# Patient Record
Sex: MALE | Race: BLACK OR AFRICAN AMERICAN | Employment: OTHER
[De-identification: names, ages, dates, MRNs, and addresses within clinical notes are randomized per-mention and may not be internally consistent; named-entity substitution may affect disease eponyms.]

---

## 2017-01-25 ENCOUNTER — OFFICE VISIT (OUTPATIENT)
Dept: INTERNAL MEDICINE | Facility: CLINIC | Age: 52
End: 2017-01-25

## 2017-01-25 VITALS
WEIGHT: 268.4 LBS | BODY MASS INDEX: 44.72 KG/M2 | HEART RATE: 84 BPM | DIASTOLIC BLOOD PRESSURE: 90 MMHG | SYSTOLIC BLOOD PRESSURE: 140 MMHG | TEMPERATURE: 98.1 F | HEIGHT: 65 IN | OXYGEN SATURATION: 98 %

## 2017-01-25 DIAGNOSIS — M54.16 LUMBAR RADICULITIS: Chronic | ICD-10-CM

## 2017-01-25 DIAGNOSIS — I10 ESSENTIAL HYPERTENSION: ICD-10-CM

## 2017-01-25 DIAGNOSIS — E78.5 HYPERLIPIDEMIA, UNSPECIFIED HYPERLIPIDEMIA TYPE: ICD-10-CM

## 2017-01-25 DIAGNOSIS — Z12.11 SPECIAL SCREENING FOR MALIGNANT NEOPLASMS, COLON: ICD-10-CM

## 2017-01-25 DIAGNOSIS — M16.11 ARTHRITIS OF RIGHT HIP: ICD-10-CM

## 2017-01-25 DIAGNOSIS — R51.9 DAILY HEADACHE: ICD-10-CM

## 2017-01-25 DIAGNOSIS — E66.01 MORBID OBESITY WITH BMI OF 40.0-44.9, ADULT (HCC): ICD-10-CM

## 2017-01-25 DIAGNOSIS — E11.9 CONTROLLED TYPE 2 DIABETES MELLITUS WITHOUT COMPLICATION, WITHOUT LONG-TERM CURRENT USE OF INSULIN (HCC): Primary | ICD-10-CM

## 2017-01-25 PROCEDURE — G8427 DOCREV CUR MEDS BY ELIG CLIN: HCPCS | Performed by: FAMILY MEDICINE

## 2017-01-25 PROCEDURE — 4004F PT TOBACCO SCREEN RCVD TLK: CPT | Performed by: FAMILY MEDICINE

## 2017-01-25 PROCEDURE — 3017F COLORECTAL CA SCREEN DOC REV: CPT | Performed by: FAMILY MEDICINE

## 2017-01-25 PROCEDURE — G8484 FLU IMMUNIZE NO ADMIN: HCPCS | Performed by: FAMILY MEDICINE

## 2017-01-25 PROCEDURE — 3044F HG A1C LEVEL LT 7.0%: CPT | Performed by: FAMILY MEDICINE

## 2017-01-25 PROCEDURE — G8419 CALC BMI OUT NRM PARAM NOF/U: HCPCS | Performed by: FAMILY MEDICINE

## 2017-01-25 PROCEDURE — 99214 OFFICE O/P EST MOD 30 MIN: CPT | Performed by: FAMILY MEDICINE

## 2017-01-25 RX ORDER — ATORVASTATIN CALCIUM 40 MG/1
40 TABLET, FILM COATED ORAL DAILY
Qty: 30 TABLET | Refills: 3 | Status: SHIPPED | OUTPATIENT
Start: 2017-01-25 | End: 2017-11-03 | Stop reason: SDUPTHER

## 2017-01-25 RX ORDER — LISINOPRIL AND HYDROCHLOROTHIAZIDE 12.5; 1 MG/1; MG/1
1 TABLET ORAL DAILY
Qty: 30 TABLET | Refills: 3 | Status: SHIPPED | OUTPATIENT
Start: 2017-01-25 | End: 2017-03-30 | Stop reason: SDUPTHER

## 2017-01-25 ASSESSMENT — PATIENT HEALTH QUESTIONNAIRE - PHQ9
1. LITTLE INTEREST OR PLEASURE IN DOING THINGS: 0
SUM OF ALL RESPONSES TO PHQ QUESTIONS 1-9: 0
SUM OF ALL RESPONSES TO PHQ9 QUESTIONS 1 & 2: 0
2. FEELING DOWN, DEPRESSED OR HOPELESS: 0

## 2017-01-25 ASSESSMENT — ENCOUNTER SYMPTOMS
ALLERGIC/IMMUNOLOGIC NEGATIVE: 1
EYES NEGATIVE: 1
RESPIRATORY NEGATIVE: 1

## 2017-01-31 ENCOUNTER — TELEPHONE (OUTPATIENT)
Dept: INTERNAL MEDICINE | Facility: CLINIC | Age: 52
End: 2017-01-31

## 2017-01-31 ENCOUNTER — OFFICE VISIT (OUTPATIENT)
Dept: ORTHOPEDIC SURGERY | Facility: CLINIC | Age: 52
End: 2017-01-31

## 2017-01-31 DIAGNOSIS — Z12.11 SPECIAL SCREENING FOR MALIGNANT NEOPLASMS, COLON: ICD-10-CM

## 2017-01-31 DIAGNOSIS — M16.11 ARTHRITIS OF RIGHT HIP: Primary | ICD-10-CM

## 2017-01-31 DIAGNOSIS — R19.5 POSITIVE FIT (FECAL IMMUNOCHEMICAL TEST): Primary | ICD-10-CM

## 2017-01-31 DIAGNOSIS — M16.11 PRIMARY OSTEOARTHRITIS OF RIGHT HIP: Primary | ICD-10-CM

## 2017-01-31 DIAGNOSIS — M16.11 PRIMARY OSTEOARTHRITIS OF RIGHT HIP: ICD-10-CM

## 2017-01-31 LAB
CONTROL: ABNORMAL
HEMOCCULT STL QL: POSITIVE

## 2017-01-31 PROCEDURE — G8419 CALC BMI OUT NRM PARAM NOF/U: HCPCS | Performed by: ORTHOPAEDIC SURGERY

## 2017-01-31 PROCEDURE — 4004F PT TOBACCO SCREEN RCVD TLK: CPT | Performed by: ORTHOPAEDIC SURGERY

## 2017-01-31 PROCEDURE — 3017F COLORECTAL CA SCREEN DOC REV: CPT | Performed by: ORTHOPAEDIC SURGERY

## 2017-01-31 PROCEDURE — G8484 FLU IMMUNIZE NO ADMIN: HCPCS | Performed by: ORTHOPAEDIC SURGERY

## 2017-01-31 PROCEDURE — 82274 ASSAY TEST FOR BLOOD FECAL: CPT | Performed by: FAMILY MEDICINE

## 2017-01-31 PROCEDURE — 99213 OFFICE O/P EST LOW 20 MIN: CPT | Performed by: ORTHOPAEDIC SURGERY

## 2017-01-31 PROCEDURE — G8427 DOCREV CUR MEDS BY ELIG CLIN: HCPCS | Performed by: ORTHOPAEDIC SURGERY

## 2017-01-31 ASSESSMENT — HOOS JR
SITTING: 2
RISING FROM SITTING: 3
BENDING TO THE FLOOR TO PICK UP OBJECT: 3
GOING UP OR DOWN STAIRS: 3
HOOS JR RAW SCORE: 16
LYING IN BED (TURNING OVER, MAINTAINING HIP POSITION): 2
WALKING ON UNEVEN SURFACE: 3

## 2017-01-31 ASSESSMENT — PROMIS GLOBAL HEALTH SCALE
IN GENERAL, PLEASE RATE HOW WELL YOU CARRY OUT YOUR USUAL SOCIAL ACTIVITIES (INCLUDES ACTIVITIES AT HOME, AT WORK, AND IN YOUR COMMUNITY, AND RESPONSIBILITIES AS A PARENT, CHILD, SPOUSE, EMPLOYEE, FRIEND, ETC) [ON A SCALE OF 1 (POOR) TO 5 (EXCELLENT)]?: 2
WHO IS THE PERSON COMPLETING THE PROMIS V1.1 SURVEY?: 0
IN THE PAST 7 DAYS, HOW WOULD YOU RATE YOUR PAIN ON AVERAGE [ON A SCALE FROM 0 (NO PAIN) TO 10 (WORST IMAGINABLE PAIN)]?: 8
HOW IS THE PROMIS V1.1 BEING ADMINISTERED?: 0
IN THE PAST 7 DAYS, HOW WOULD YOU RATE YOUR FATIGUE ON AVERAGE [ON A SCALE FROM 1 (NONE) TO 5 (VERY SEVERE)]?: 2
IN THE PAST 7 DAYS, HOW OFTEN HAVE YOU BEEN BOTHERED BY EMOTIONAL PROBLEMS, SUCH AS FEELING ANXIOUS, DEPRESSED, OR IRRITABLE [ON A SCALE FROM 1 (NEVER) TO 5 (ALWAYS)]?: 2
SUM OF RESPONSES TO QUESTIONS 3, 6, 7, & 8: 13
IN GENERAL, HOW WOULD YOU RATE YOUR PHYSICAL HEALTH [ON A SCALE OF 1 (POOR) TO 5 (EXCELLENT)]?: 1
IN GENERAL, HOW WOULD YOU RATE YOUR SATISFACTION WITH YOUR SOCIAL ACTIVITIES AND RELATIONSHIPS [ON A SCALE OF 1 (POOR) TO 5 (EXCELLENT)]?: 2
TO WHAT EXTENT ARE YOU ABLE TO CARRY OUT YOUR EVERYDAY PHYSICAL ACTIVITIES SUCH AS WALKING, CLIMBING STAIRS, CARRYING GROCERIES, OR MOVING A CHAIR [ON A SCALE OF 1 (NOT AT ALL) TO 5 (COMPLETELY)]?: 2
IN GENERAL, WOULD YOU SAY YOUR QUALITY OF LIFE IS...[ON A SCALE OF 1 (POOR) TO 5 (EXCELLENT)]: 2
IN GENERAL, HOW WOULD YOU RATE YOUR MENTAL HEALTH, INCLUDING YOUR MOOD AND YOUR ABILITY TO THINK [ON A SCALE OF 1 (POOR) TO 5 (EXCELLENT)]?: 5
IN GENERAL, WOULD YOU SAY YOUR HEALTH IS...[ON A SCALE OF 1 (POOR) TO 5 (EXCELLENT)]: 2
SUM OF RESPONSES TO QUESTIONS 2, 4, 5, & 10: 11

## 2017-02-14 ENCOUNTER — OFFICE VISIT (OUTPATIENT)
Dept: GASTROENTEROLOGY | Facility: CLINIC | Age: 52
End: 2017-02-14

## 2017-02-14 VITALS
TEMPERATURE: 98.4 F | BODY MASS INDEX: 43.65 KG/M2 | DIASTOLIC BLOOD PRESSURE: 92 MMHG | OXYGEN SATURATION: 98 % | HEIGHT: 65 IN | SYSTOLIC BLOOD PRESSURE: 147 MMHG | HEART RATE: 78 BPM | WEIGHT: 262 LBS

## 2017-02-14 DIAGNOSIS — Z86.010 HISTORY OF COLON POLYPS: ICD-10-CM

## 2017-02-14 DIAGNOSIS — K62.5 RECTAL BLEEDING: ICD-10-CM

## 2017-02-14 DIAGNOSIS — K75.9 INFLAMMATORY LIVER DISEASE: ICD-10-CM

## 2017-02-14 DIAGNOSIS — R76.8 HCV ANTIBODY POSITIVE: ICD-10-CM

## 2017-02-14 DIAGNOSIS — R19.5 POSITIVE FIT (FECAL IMMUNOCHEMICAL TEST): Primary | ICD-10-CM

## 2017-02-14 DIAGNOSIS — K76.9 LIVER DISEASE: ICD-10-CM

## 2017-02-14 DIAGNOSIS — R79.89 ELEVATED LFTS: ICD-10-CM

## 2017-02-14 DIAGNOSIS — K64.9 HEMORRHOIDS, UNSPECIFIED HEMORRHOID TYPE: ICD-10-CM

## 2017-02-14 PROCEDURE — G8419 CALC BMI OUT NRM PARAM NOF/U: HCPCS | Performed by: INTERNAL MEDICINE

## 2017-02-14 PROCEDURE — 99204 OFFICE O/P NEW MOD 45 MIN: CPT | Performed by: INTERNAL MEDICINE

## 2017-02-14 PROCEDURE — 4004F PT TOBACCO SCREEN RCVD TLK: CPT | Performed by: INTERNAL MEDICINE

## 2017-02-14 PROCEDURE — G8484 FLU IMMUNIZE NO ADMIN: HCPCS | Performed by: INTERNAL MEDICINE

## 2017-02-14 PROCEDURE — G8427 DOCREV CUR MEDS BY ELIG CLIN: HCPCS | Performed by: INTERNAL MEDICINE

## 2017-02-14 PROCEDURE — 3017F COLORECTAL CA SCREEN DOC REV: CPT | Performed by: INTERNAL MEDICINE

## 2017-02-14 ASSESSMENT — ENCOUNTER SYMPTOMS
DIARRHEA: 0
TROUBLE SWALLOWING: 0
SORE THROAT: 0
CONSTIPATION: 0
ABDOMINAL DISTENTION: 1
NAUSEA: 0
COUGH: 0
SHORTNESS OF BREATH: 0
VOMITING: 0
BLOOD IN STOOL: 0
RECTAL PAIN: 0
BACK PAIN: 1
ABDOMINAL PAIN: 0
CHOKING: 0
ANAL BLEEDING: 1

## 2017-02-15 ENCOUNTER — HOSPITAL ENCOUNTER (OUTPATIENT)
Age: 52
Setting detail: SPECIMEN
Discharge: HOME | End: 2017-02-15
Payer: MEDICARE

## 2017-02-15 ENCOUNTER — OFFICE VISIT (OUTPATIENT)
Dept: PAIN MANAGEMENT | Facility: CLINIC | Age: 52
End: 2017-02-15

## 2017-02-15 VITALS
WEIGHT: 262 LBS | HEART RATE: 75 BPM | DIASTOLIC BLOOD PRESSURE: 100 MMHG | HEIGHT: 65 IN | BODY MASS INDEX: 43.65 KG/M2 | RESPIRATION RATE: 16 BRPM | OXYGEN SATURATION: 98 % | SYSTOLIC BLOOD PRESSURE: 155 MMHG

## 2017-02-15 DIAGNOSIS — K75.9 INFLAMMATORY LIVER DISEASE: ICD-10-CM

## 2017-02-15 DIAGNOSIS — M16.11 ARTHRITIS OF RIGHT HIP: ICD-10-CM

## 2017-02-15 DIAGNOSIS — R76.8 HCV ANTIBODY POSITIVE: ICD-10-CM

## 2017-02-15 DIAGNOSIS — R79.89 ELEVATED LFTS: ICD-10-CM

## 2017-02-15 DIAGNOSIS — K76.9 LIVER DISEASE: ICD-10-CM

## 2017-02-15 DIAGNOSIS — M54.16 CHRONIC LUMBAR RADICULOPATHY: ICD-10-CM

## 2017-02-15 DIAGNOSIS — M48.061 LUMBAR STENOSIS: Primary | ICD-10-CM

## 2017-02-15 DIAGNOSIS — R19.5 POSITIVE FIT (FECAL IMMUNOCHEMICAL TEST): ICD-10-CM

## 2017-02-15 DIAGNOSIS — Z86.010 HISTORY OF COLON POLYPS: ICD-10-CM

## 2017-02-15 LAB
FERRITIN: 151 UG/L (ref 30–400)
HAV AB SERPL IA-ACNC: REACTIVE
HEPATITIS B CORE TOTAL ANTIBODY: REACTIVE
IGG: 1303 MG/DL (ref 700–1600)
IGM: 137 MG/DL (ref 40–230)
IRON SATURATION: 22 % (ref 20–55)
IRON: 79 UG/DL (ref 59–158)
TOTAL IRON BINDING CAPACITY: 358 UG/DL (ref 250–450)
UNSATURATED IRON BINDING CAPACITY: 279 UG/DL (ref 112–347)

## 2017-02-15 PROCEDURE — G8417 CALC BMI ABV UP PARAM F/U: HCPCS | Performed by: ANESTHESIOLOGY

## 2017-02-15 PROCEDURE — 99214 OFFICE O/P EST MOD 30 MIN: CPT | Performed by: ANESTHESIOLOGY

## 2017-02-15 PROCEDURE — 3017F COLORECTAL CA SCREEN DOC REV: CPT | Performed by: ANESTHESIOLOGY

## 2017-02-15 PROCEDURE — G8484 FLU IMMUNIZE NO ADMIN: HCPCS | Performed by: ANESTHESIOLOGY

## 2017-02-15 PROCEDURE — G8427 DOCREV CUR MEDS BY ELIG CLIN: HCPCS | Performed by: ANESTHESIOLOGY

## 2017-02-15 PROCEDURE — 4004F PT TOBACCO SCREEN RCVD TLK: CPT | Performed by: ANESTHESIOLOGY

## 2017-02-15 RX ORDER — OXYCODONE HYDROCHLORIDE AND ACETAMINOPHEN 5; 325 MG/1; MG/1
1 TABLET ORAL DAILY
Qty: 30 TABLET | Refills: 0 | Status: SHIPPED | OUTPATIENT
Start: 2017-02-15 | End: 2017-03-16 | Stop reason: SDUPTHER

## 2017-02-15 ASSESSMENT — ENCOUNTER SYMPTOMS: BACK PAIN: 1

## 2017-02-16 LAB
ANTI-NUCLEAR ANTIBODY (ANA): NEGATIVE
SMOOTH MUSCLE ANTIBODY: NORMAL

## 2017-02-17 LAB — MITOCHONDRIAL ANTIBODY: 8.8 UNITS (ref 0–20)

## 2017-02-20 LAB
DIRECT EXAM: ABNORMAL
HCV QUANTITATIVE: NORMAL
HEPATITIS C GENOTYPE: NORMAL
Lab: ABNORMAL
SPECIMEN DESCRIPTION: ABNORMAL
STATUS: ABNORMAL

## 2017-03-03 ENCOUNTER — INITIAL CONSULT (OUTPATIENT)
Dept: NEUROSURGERY | Facility: CLINIC | Age: 52
End: 2017-03-03

## 2017-03-03 VITALS
HEIGHT: 65 IN | HEART RATE: 88 BPM | WEIGHT: 259 LBS | DIASTOLIC BLOOD PRESSURE: 79 MMHG | BODY MASS INDEX: 43.15 KG/M2 | SYSTOLIC BLOOD PRESSURE: 139 MMHG

## 2017-03-03 DIAGNOSIS — T14.8XXA NERVE INJURY: Primary | ICD-10-CM

## 2017-03-03 PROCEDURE — 3017F COLORECTAL CA SCREEN DOC REV: CPT | Performed by: NEUROLOGICAL SURGERY

## 2017-03-03 PROCEDURE — 4004F PT TOBACCO SCREEN RCVD TLK: CPT | Performed by: NEUROLOGICAL SURGERY

## 2017-03-03 PROCEDURE — G8427 DOCREV CUR MEDS BY ELIG CLIN: HCPCS | Performed by: NEUROLOGICAL SURGERY

## 2017-03-03 PROCEDURE — 99203 OFFICE O/P NEW LOW 30 MIN: CPT | Performed by: NEUROLOGICAL SURGERY

## 2017-03-03 PROCEDURE — G8417 CALC BMI ABV UP PARAM F/U: HCPCS | Performed by: NEUROLOGICAL SURGERY

## 2017-03-03 PROCEDURE — G8484 FLU IMMUNIZE NO ADMIN: HCPCS | Performed by: NEUROLOGICAL SURGERY

## 2017-03-17 RX ORDER — OXYCODONE HYDROCHLORIDE AND ACETAMINOPHEN 5; 325 MG/1; MG/1
1 TABLET ORAL DAILY
Qty: 30 TABLET | Refills: 0 | Status: SHIPPED | OUTPATIENT
Start: 2017-03-17 | End: 2017-04-12 | Stop reason: SDUPTHER

## 2017-03-24 ENCOUNTER — OFFICE VISIT (OUTPATIENT)
Dept: GASTROENTEROLOGY | Age: 52
End: 2017-03-24
Payer: COMMERCIAL

## 2017-03-24 VITALS
HEART RATE: 91 BPM | OXYGEN SATURATION: 97 % | BODY MASS INDEX: 43.82 KG/M2 | DIASTOLIC BLOOD PRESSURE: 79 MMHG | TEMPERATURE: 97.3 F | SYSTOLIC BLOOD PRESSURE: 145 MMHG | HEIGHT: 65 IN | WEIGHT: 263 LBS

## 2017-03-24 DIAGNOSIS — R19.5 POSITIVE FIT (FECAL IMMUNOCHEMICAL TEST): ICD-10-CM

## 2017-03-24 DIAGNOSIS — B18.2 CHRONIC HEPATITIS C WITH HEPATIC COMA (HCC): ICD-10-CM

## 2017-03-24 DIAGNOSIS — K64.9 HEMORRHOIDS, UNSPECIFIED HEMORRHOID TYPE: ICD-10-CM

## 2017-03-24 PROCEDURE — 99203 OFFICE O/P NEW LOW 30 MIN: CPT | Performed by: INTERNAL MEDICINE

## 2017-03-24 ASSESSMENT — ENCOUNTER SYMPTOMS
DIARRHEA: 0
SORE THROAT: 0
VOMITING: 0
RECTAL PAIN: 0
BLOOD IN STOOL: 0
SHORTNESS OF BREATH: 0
TROUBLE SWALLOWING: 0
ABDOMINAL DISTENTION: 1
ABDOMINAL PAIN: 0
CONSTIPATION: 0
BACK PAIN: 1
ANAL BLEEDING: 1
COUGH: 0
NAUSEA: 0
CHOKING: 0

## 2017-03-27 ENCOUNTER — TELEPHONE (OUTPATIENT)
Dept: GASTROENTEROLOGY | Age: 52
End: 2017-03-27

## 2017-03-27 DIAGNOSIS — B18.2 CHRONIC HEPATITIS C WITH HEPATIC COMA (HCC): Primary | ICD-10-CM

## 2017-03-30 ENCOUNTER — HOSPITAL ENCOUNTER (OUTPATIENT)
Age: 52
Setting detail: SPECIMEN
Discharge: HOME | End: 2017-03-30
Payer: COMMERCIAL

## 2017-03-30 DIAGNOSIS — B18.2 CHRONIC HEPATITIS C WITH HEPATIC COMA (HCC): ICD-10-CM

## 2017-03-30 LAB
ABSOLUTE EOS #: 0.2 K/UL (ref 0–0.4)
ABSOLUTE LYMPH #: 4 K/UL (ref 1–4.8)
ABSOLUTE MONO #: 0.6 K/UL (ref 0.1–1.2)
AFP: 7.2 UG/L
ALBUMIN SERPL-MCNC: 3.9 G/DL (ref 3.5–5.2)
ALBUMIN/GLOBULIN RATIO: 1.1 (ref 1–2.5)
ALP BLD-CCNC: 64 U/L (ref 40–129)
ALT SERPL-CCNC: 58 U/L (ref 5–41)
ANION GAP SERPL CALCULATED.3IONS-SCNC: 13 MMOL/L (ref 9–17)
AST SERPL-CCNC: 35 U/L
BASOPHILS # BLD: 1 % (ref 0–2)
BASOPHILS ABSOLUTE: 0.1 K/UL (ref 0–0.2)
BILIRUB SERPL-MCNC: 0.18 MG/DL (ref 0.3–1.2)
BILIRUBIN DIRECT: 0.11 MG/DL
BILIRUBIN, INDIRECT: 0.07 MG/DL (ref 0–1)
BUN BLDV-MCNC: 14 MG/DL (ref 6–20)
CALCIUM SERPL-MCNC: 9.3 MG/DL (ref 8.6–10.4)
CHLORIDE BLD-SCNC: 99 MMOL/L (ref 98–107)
CO2: 21 MMOL/L (ref 20–31)
CREAT SERPL-MCNC: 0.9 MG/DL (ref 0.7–1.2)
DIFFERENTIAL TYPE: NORMAL
EOSINOPHILS RELATIVE PERCENT: 2 % (ref 1–4)
GFR AFRICAN AMERICAN: >60 ML/MIN
GFR NON-AFRICAN AMERICAN: >60 ML/MIN
GFR SERPL CREATININE-BSD FRML MDRD: ABNORMAL ML/MIN/{1.73_M2}
GFR SERPL CREATININE-BSD FRML MDRD: ABNORMAL ML/MIN/{1.73_M2}
GLUCOSE BLD-MCNC: 109 MG/DL (ref 70–99)
HCT VFR BLD CALC: 42.1 % (ref 41–53)
HEMOGLOBIN: 14.2 G/DL (ref 13.5–17.5)
LYMPHOCYTES # BLD: 44 % (ref 24–44)
MCH RBC QN AUTO: 30.2 PG (ref 26–34)
MCHC RBC AUTO-ENTMCNC: 33.8 G/DL (ref 31–37)
MCV RBC AUTO: 89.2 FL (ref 80–100)
MONOCYTES # BLD: 6 % (ref 2–11)
PDW BLD-RTO: 14.7 % (ref 12.5–15.4)
PLATELET # BLD: 203 K/UL (ref 140–450)
PLATELET ESTIMATE: NORMAL
PMV BLD AUTO: 8.9 FL (ref 6–12)
POTASSIUM SERPL-SCNC: 4 MMOL/L (ref 3.7–5.3)
RBC # BLD: 4.72 M/UL (ref 4.5–5.9)
RBC # BLD: NORMAL 10*6/UL
SEG NEUTROPHILS: 47 % (ref 36–66)
SEGMENTED NEUTROPHILS ABSOLUTE COUNT: 4.3 K/UL (ref 1.8–7.7)
SODIUM BLD-SCNC: 133 MMOL/L (ref 135–144)
TOTAL PROTEIN: 7.3 G/DL (ref 6.4–8.3)
WBC # BLD: 9.1 K/UL (ref 3.5–11)
WBC # BLD: NORMAL 10*3/UL

## 2017-04-03 LAB
ALANINE AMINOTRANSFERASE, FIBROMETER: 63 U/L (ref 5–50)
ALPHA-2-MACROGLOBULIN, FIBROMETER: 269 MG/DL (ref 131–293)
ASPARTATE AMINOTRANSFERASE, FIBROMETER: 37 U/L (ref 9–50)
CIRRHOMETER PATIENT SCORE: 0.02
EER FIBROMETER REPORT: ABNORMAL
FIBROMETER INTERPRETATION: ABNORMAL
FIBROMETER PATIENT SCORE: 0.48
FIBROMETER PLATELET COUNT: 206
FIBROMETER PROTHROMBIN INDEX: 110 % (ref 90–120)
FIBROSIS METAVIR CLASSIFICATION: ABNORMAL
GAMMA GLUTAMYL TRANSFERASE, FIBROMETER: 134 U/L (ref 7–51)
INFLAMETER METAVIR CLASSIFICATION: ABNORMAL
INFLAMETER PATIENT SCORE: 0.38
UREA NITROGEN, FIBROMETER: 14 MG/DL (ref 7–20)

## 2017-04-12 ENCOUNTER — OFFICE VISIT (OUTPATIENT)
Dept: PAIN MANAGEMENT | Age: 52
End: 2017-04-12
Payer: COMMERCIAL

## 2017-04-12 VITALS
WEIGHT: 259.6 LBS | DIASTOLIC BLOOD PRESSURE: 78 MMHG | HEART RATE: 83 BPM | HEIGHT: 65 IN | SYSTOLIC BLOOD PRESSURE: 128 MMHG | RESPIRATION RATE: 16 BRPM | BODY MASS INDEX: 43.25 KG/M2 | OXYGEN SATURATION: 95 %

## 2017-04-12 DIAGNOSIS — M54.16 LUMBAR RADICULOPATHY, CHRONIC: Primary | ICD-10-CM

## 2017-04-12 DIAGNOSIS — M16.11 ARTHRITIS OF RIGHT HIP: ICD-10-CM

## 2017-04-12 DIAGNOSIS — M48.061 LUMBAR STENOSIS: ICD-10-CM

## 2017-04-12 PROCEDURE — 99215 OFFICE O/P EST HI 40 MIN: CPT | Performed by: ANESTHESIOLOGY

## 2017-04-12 RX ORDER — OXYCODONE HYDROCHLORIDE AND ACETAMINOPHEN 5; 325 MG/1; MG/1
1 TABLET ORAL 2 TIMES DAILY PRN
Qty: 60 TABLET | Refills: 0 | Status: SHIPPED | OUTPATIENT
Start: 2017-04-12 | End: 2017-05-10 | Stop reason: SDUPTHER

## 2017-04-12 ASSESSMENT — ENCOUNTER SYMPTOMS: BACK PAIN: 1

## 2017-04-25 ENCOUNTER — TELEPHONE (OUTPATIENT)
Dept: GASTROENTEROLOGY | Age: 52
End: 2017-04-25

## 2017-05-10 RX ORDER — OXYCODONE HYDROCHLORIDE AND ACETAMINOPHEN 5; 325 MG/1; MG/1
1 TABLET ORAL 2 TIMES DAILY PRN
Qty: 60 TABLET | Refills: 0 | Status: SHIPPED | OUTPATIENT
Start: 2017-05-10 | End: 2017-06-05 | Stop reason: SDUPTHER

## 2017-06-01 ENCOUNTER — OFFICE VISIT (OUTPATIENT)
Dept: INTERNAL MEDICINE CLINIC | Age: 52
End: 2017-06-01
Payer: COMMERCIAL

## 2017-06-01 VITALS
HEART RATE: 80 BPM | TEMPERATURE: 98 F | OXYGEN SATURATION: 98 % | RESPIRATION RATE: 20 BRPM | HEIGHT: 65 IN | DIASTOLIC BLOOD PRESSURE: 80 MMHG | WEIGHT: 260 LBS | BODY MASS INDEX: 43.32 KG/M2 | SYSTOLIC BLOOD PRESSURE: 124 MMHG

## 2017-06-01 DIAGNOSIS — R51.9 DAILY HEADACHE: ICD-10-CM

## 2017-06-01 DIAGNOSIS — M51.26 LUMBAR DISCOGENIC PAIN SYNDROME: ICD-10-CM

## 2017-06-01 DIAGNOSIS — E11.21 CONTROLLED TYPE 2 DIABETES MELLITUS WITH DIABETIC NEPHROPATHY, WITHOUT LONG-TERM CURRENT USE OF INSULIN (HCC): Primary | ICD-10-CM

## 2017-06-01 DIAGNOSIS — E08.21 DIABETIC NEPHROPATHY ASSOCIATED WITH DIABETES MELLITUS DUE TO UNDERLYING CONDITION (HCC): ICD-10-CM

## 2017-06-01 DIAGNOSIS — E08.42 DIABETIC POLYNEUROPATHY ASSOCIATED WITH DIABETES MELLITUS DUE TO UNDERLYING CONDITION (HCC): ICD-10-CM

## 2017-06-01 DIAGNOSIS — F11.20 OPIATE DEPENDENCE, CONTINUOUS (HCC): ICD-10-CM

## 2017-06-01 DIAGNOSIS — B18.2 CHRONIC HEPATITIS C WITH HEPATIC COMA (HCC): ICD-10-CM

## 2017-06-01 LAB — HBA1C MFR BLD: 6 %

## 2017-06-01 PROCEDURE — 83036 HEMOGLOBIN GLYCOSYLATED A1C: CPT | Performed by: FAMILY MEDICINE

## 2017-06-01 PROCEDURE — 99214 OFFICE O/P EST MOD 30 MIN: CPT | Performed by: FAMILY MEDICINE

## 2017-06-01 ASSESSMENT — ENCOUNTER SYMPTOMS
EYES NEGATIVE: 1
ALLERGIC/IMMUNOLOGIC NEGATIVE: 1
RESPIRATORY NEGATIVE: 1

## 2017-06-05 RX ORDER — OXYCODONE HYDROCHLORIDE AND ACETAMINOPHEN 5; 325 MG/1; MG/1
1 TABLET ORAL 2 TIMES DAILY PRN
Qty: 60 TABLET | Refills: 0 | Status: SHIPPED | OUTPATIENT
Start: 2017-06-05 | End: 2017-07-12 | Stop reason: SDUPTHER

## 2017-06-30 RX ORDER — LISINOPRIL AND HYDROCHLOROTHIAZIDE 12.5; 1 MG/1; MG/1
TABLET ORAL
Qty: 90 TABLET | Refills: 0 | Status: SHIPPED | OUTPATIENT
Start: 2017-06-30 | End: 2017-09-27 | Stop reason: SDUPTHER

## 2017-06-30 RX ORDER — OXYCODONE HYDROCHLORIDE AND ACETAMINOPHEN 5; 325 MG/1; MG/1
1 TABLET ORAL 2 TIMES DAILY PRN
Qty: 60 TABLET | Refills: 0 | Status: CANCELLED | OUTPATIENT
Start: 2017-06-30 | End: 2017-07-30

## 2017-07-07 ENCOUNTER — TELEPHONE (OUTPATIENT)
Dept: PAIN MANAGEMENT | Age: 52
End: 2017-07-07

## 2017-07-12 ENCOUNTER — OFFICE VISIT (OUTPATIENT)
Dept: PAIN MANAGEMENT | Age: 52
End: 2017-07-12
Payer: COMMERCIAL

## 2017-07-12 VITALS
BODY MASS INDEX: 42.92 KG/M2 | HEART RATE: 83 BPM | DIASTOLIC BLOOD PRESSURE: 95 MMHG | SYSTOLIC BLOOD PRESSURE: 143 MMHG | TEMPERATURE: 97 F | OXYGEN SATURATION: 96 % | WEIGHT: 257.6 LBS | RESPIRATION RATE: 20 BRPM | HEIGHT: 65 IN

## 2017-07-12 DIAGNOSIS — M54.16 CHRONIC LUMBAR RADICULOPATHY: ICD-10-CM

## 2017-07-12 DIAGNOSIS — M54.16 LUMBAR RADICULITIS: Primary | Chronic | ICD-10-CM

## 2017-07-12 DIAGNOSIS — M48.061 LUMBAR STENOSIS: ICD-10-CM

## 2017-07-12 PROCEDURE — 99214 OFFICE O/P EST MOD 30 MIN: CPT | Performed by: ANESTHESIOLOGY

## 2017-07-12 RX ORDER — OXYCODONE HYDROCHLORIDE AND ACETAMINOPHEN 5; 325 MG/1; MG/1
1 TABLET ORAL 2 TIMES DAILY PRN
Qty: 60 TABLET | Refills: 0 | Status: SHIPPED | OUTPATIENT
Start: 2017-07-12 | End: 2017-09-06 | Stop reason: SDUPTHER

## 2017-07-12 RX ORDER — OXYCODONE HYDROCHLORIDE AND ACETAMINOPHEN 5; 325 MG/1; MG/1
1 TABLET ORAL 2 TIMES DAILY PRN
Qty: 60 TABLET | Refills: 0 | Status: SHIPPED | OUTPATIENT
Start: 2017-07-12 | End: 2017-07-12 | Stop reason: SDUPTHER

## 2017-07-12 ASSESSMENT — ENCOUNTER SYMPTOMS
RESPIRATORY NEGATIVE: 1
ALLERGIC/IMMUNOLOGIC NEGATIVE: 1
BACK PAIN: 1
GASTROINTESTINAL NEGATIVE: 1

## 2017-08-01 ENCOUNTER — HOSPITAL ENCOUNTER (EMERGENCY)
Age: 52
Discharge: HOME | End: 2017-08-01
Attending: EMERGENCY MEDICINE
Payer: COMMERCIAL

## 2017-08-01 VITALS
OXYGEN SATURATION: 97 % | HEART RATE: 97 BPM | SYSTOLIC BLOOD PRESSURE: 156 MMHG | HEIGHT: 65 IN | RESPIRATION RATE: 17 BRPM | BODY MASS INDEX: 42.89 KG/M2 | WEIGHT: 257.44 LBS | TEMPERATURE: 98.7 F | DIASTOLIC BLOOD PRESSURE: 72 MMHG

## 2017-08-01 DIAGNOSIS — M54.42 ACUTE LEFT-SIDED LOW BACK PAIN WITH LEFT-SIDED SCIATICA: ICD-10-CM

## 2017-08-01 DIAGNOSIS — M54.2 NECK PAIN: Primary | ICD-10-CM

## 2017-08-01 PROCEDURE — 99283 EMERGENCY DEPT VISIT LOW MDM: CPT

## 2017-08-01 PROCEDURE — 6370000000 HC RX 637 (ALT 250 FOR IP): Performed by: EMERGENCY MEDICINE

## 2017-08-01 RX ORDER — DIAZEPAM 5 MG/1
5 TABLET ORAL ONCE
Status: COMPLETED | OUTPATIENT
Start: 2017-08-01 | End: 2017-08-01

## 2017-08-01 RX ORDER — CYCLOBENZAPRINE HCL 10 MG
10 TABLET ORAL 3 TIMES DAILY PRN
Qty: 15 TABLET | Refills: 0 | Status: SHIPPED | OUTPATIENT
Start: 2017-08-01 | End: 2017-08-11

## 2017-08-01 RX ORDER — IBUPROFEN 800 MG/1
800 TABLET ORAL EVERY 8 HOURS PRN
Qty: 30 TABLET | Refills: 0 | Status: SHIPPED | OUTPATIENT
Start: 2017-08-01 | End: 2018-10-29 | Stop reason: RX

## 2017-08-01 RX ADMIN — DIAZEPAM 5 MG: 5 TABLET ORAL at 20:11

## 2017-08-01 ASSESSMENT — ENCOUNTER SYMPTOMS
NAUSEA: 0
TROUBLE SWALLOWING: 0
PHOTOPHOBIA: 0
BLOOD IN STOOL: 0
BACK PAIN: 1
ABDOMINAL PAIN: 0
CONSTIPATION: 0
SORE THROAT: 0
SHORTNESS OF BREATH: 0
RHINORRHEA: 0
DIARRHEA: 0
SINUS PRESSURE: 0
VOMITING: 0
COUGH: 0

## 2017-08-01 ASSESSMENT — PAIN DESCRIPTION - PAIN TYPE: TYPE: ACUTE PAIN

## 2017-08-01 ASSESSMENT — PAIN SCALES - GENERAL: PAINLEVEL_OUTOF10: 8

## 2017-08-01 ASSESSMENT — PAIN DESCRIPTION - LOCATION: LOCATION: NECK;BACK

## 2017-08-01 ASSESSMENT — PAIN DESCRIPTION - DESCRIPTORS: DESCRIPTORS: THROBBING;SHARP

## 2017-08-04 ENCOUNTER — OFFICE VISIT (OUTPATIENT)
Dept: INTERNAL MEDICINE CLINIC | Age: 52
End: 2017-08-04
Payer: OTHER MISCELLANEOUS

## 2017-08-04 VITALS
SYSTOLIC BLOOD PRESSURE: 128 MMHG | HEIGHT: 65 IN | BODY MASS INDEX: 43.42 KG/M2 | DIASTOLIC BLOOD PRESSURE: 80 MMHG | TEMPERATURE: 98 F | OXYGEN SATURATION: 97 % | HEART RATE: 80 BPM | WEIGHT: 260.6 LBS | RESPIRATION RATE: 20 BRPM

## 2017-08-04 DIAGNOSIS — B18.2 CHRONIC HEPATITIS C WITHOUT HEPATIC COMA (HCC): ICD-10-CM

## 2017-08-04 DIAGNOSIS — R07.89 ANTERIOR CHEST WALL PAIN: ICD-10-CM

## 2017-08-04 DIAGNOSIS — M79.18 MUSCULOSKELETAL PAIN: ICD-10-CM
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
## 2017-08-04 DIAGNOSIS — Z79.891 USE OF OPIATES FOR THERAPEUTIC PURPOSES: ICD-10-CM

## 2017-08-04 DIAGNOSIS — R73.01 IFG (IMPAIRED FASTING GLUCOSE): ICD-10-CM

## 2017-08-04 DIAGNOSIS — I10 ESSENTIAL HYPERTENSION: ICD-10-CM

## 2017-08-04 DIAGNOSIS — E88.81 METABOLIC SYNDROME: ICD-10-CM

## 2017-08-04 DIAGNOSIS — M47.816 OSTEOARTHRITIS OF LUMBAR SPINE, UNSPECIFIED SPINAL OSTEOARTHRITIS COMPLICATION STATUS: ICD-10-CM

## 2017-08-04 DIAGNOSIS — E78.2 MIXED HYPERLIPIDEMIA: ICD-10-CM

## 2017-08-04 PROCEDURE — 99214 OFFICE O/P EST MOD 30 MIN: CPT | Performed by: FAMILY MEDICINE

## 2017-08-04 ASSESSMENT — ENCOUNTER SYMPTOMS
ALLERGIC/IMMUNOLOGIC NEGATIVE: 1
RESPIRATORY NEGATIVE: 1
EYES NEGATIVE: 1

## 2017-08-08 ENCOUNTER — HOSPITAL ENCOUNTER (OUTPATIENT)
Dept: PHYSICAL THERAPY | Facility: CLINIC | Age: 52
Setting detail: THERAPIES SERIES
Discharge: HOME | End: 2017-08-08
Payer: OTHER MISCELLANEOUS

## 2017-08-08 PROCEDURE — 97162 PT EVAL MOD COMPLEX 30 MIN: CPT

## 2017-08-08 PROCEDURE — G8981 BODY POS CURRENT STATUS: HCPCS

## 2017-08-08 PROCEDURE — G0283 ELEC STIM OTHER THAN WOUND: HCPCS

## 2017-08-08 PROCEDURE — G8982 BODY POS GOAL STATUS: HCPCS

## 2017-08-10 ENCOUNTER — HOSPITAL ENCOUNTER (OUTPATIENT)
Age: 52
Setting detail: SPECIMEN
Discharge: HOME | End: 2017-08-10
Payer: OTHER MISCELLANEOUS

## 2017-08-10 DIAGNOSIS — M79.18 MUSCULOSKELETAL PAIN: ICD-10-CM

## 2017-08-10 DIAGNOSIS — M47.816 OSTEOARTHRITIS OF LUMBAR SPINE, UNSPECIFIED SPINAL OSTEOARTHRITIS COMPLICATION STATUS: ICD-10-CM

## 2017-08-10 DIAGNOSIS — I10 ESSENTIAL HYPERTENSION: ICD-10-CM

## 2017-08-10 LAB
CHOLESTEROL/HDL RATIO: 5.8
CHOLESTEROL: 245 MG/DL
CREATININE URINE: 23.9 MG/DL (ref 39–259)
HDLC SERPL-MCNC: 42 MG/DL
LDL CHOLESTEROL: 175 MG/DL (ref 0–130)
MICROALBUMIN/CREAT 24H UR: <12 MG/L
MICROALBUMIN/CREAT UR-RTO: 50 MCG/MG CREAT
TRIGL SERPL-MCNC: 140 MG/DL
TSH SERPL DL<=0.05 MIU/L-ACNC: 1.32 MIU/L (ref 0.3–5)
VLDLC SERPL CALC-MCNC: ABNORMAL MG/DL (ref 1–30)

## 2017-08-11 ENCOUNTER — HOSPITAL ENCOUNTER (OUTPATIENT)
Dept: PHYSICAL THERAPY | Facility: CLINIC | Age: 52
Setting detail: THERAPIES SERIES
Discharge: HOME | End: 2017-08-11
Payer: OTHER MISCELLANEOUS

## 2017-08-11 PROCEDURE — 97110 THERAPEUTIC EXERCISES: CPT

## 2017-08-11 PROCEDURE — G0283 ELEC STIM OTHER THAN WOUND: HCPCS

## 2017-08-15 ENCOUNTER — HOSPITAL ENCOUNTER (OUTPATIENT)
Dept: PHYSICAL THERAPY | Facility: CLINIC | Age: 52
Setting detail: THERAPIES SERIES
Discharge: HOME | End: 2017-08-15
Payer: OTHER MISCELLANEOUS

## 2017-08-15 PROCEDURE — G0283 ELEC STIM OTHER THAN WOUND: HCPCS

## 2017-08-15 PROCEDURE — 97110 THERAPEUTIC EXERCISES: CPT

## 2017-08-17 ENCOUNTER — HOSPITAL ENCOUNTER (OUTPATIENT)
Dept: PHYSICAL THERAPY | Facility: CLINIC | Age: 52
Setting detail: THERAPIES SERIES
Discharge: HOME | End: 2017-08-17
Payer: OTHER MISCELLANEOUS

## 2017-08-17 PROCEDURE — G0283 ELEC STIM OTHER THAN WOUND: HCPCS

## 2017-08-17 PROCEDURE — 97110 THERAPEUTIC EXERCISES: CPT

## 2017-08-22 ENCOUNTER — HOSPITAL ENCOUNTER (OUTPATIENT)
Dept: PHYSICAL THERAPY | Facility: CLINIC | Age: 52
Setting detail: THERAPIES SERIES
Discharge: HOME | End: 2017-08-22
Payer: OTHER MISCELLANEOUS

## 2017-08-22 PROCEDURE — G0283 ELEC STIM OTHER THAN WOUND: HCPCS

## 2017-08-24 ENCOUNTER — HOSPITAL ENCOUNTER (OUTPATIENT)
Dept: PHYSICAL THERAPY | Facility: CLINIC | Age: 52
Setting detail: THERAPIES SERIES
Discharge: HOME | End: 2017-08-24
Payer: OTHER MISCELLANEOUS

## 2017-08-24 PROCEDURE — G0283 ELEC STIM OTHER THAN WOUND: HCPCS

## 2017-08-24 PROCEDURE — 97110 THERAPEUTIC EXERCISES: CPT

## 2017-08-29 ENCOUNTER — HOSPITAL ENCOUNTER (OUTPATIENT)
Dept: PHYSICAL THERAPY | Facility: CLINIC | Age: 52
Setting detail: THERAPIES SERIES
Discharge: HOME | End: 2017-08-29
Payer: OTHER MISCELLANEOUS

## 2017-08-29 PROCEDURE — G0283 ELEC STIM OTHER THAN WOUND: HCPCS

## 2017-08-31 ENCOUNTER — HOSPITAL ENCOUNTER (OUTPATIENT)
Dept: PHYSICAL THERAPY | Facility: CLINIC | Age: 52
Setting detail: THERAPIES SERIES
Discharge: HOME | End: 2017-08-31
Payer: OTHER MISCELLANEOUS

## 2017-08-31 PROCEDURE — 97110 THERAPEUTIC EXERCISES: CPT

## 2017-08-31 PROCEDURE — G0283 ELEC STIM OTHER THAN WOUND: HCPCS

## 2017-09-05 ENCOUNTER — HOSPITAL ENCOUNTER (OUTPATIENT)
Dept: PHYSICAL THERAPY | Facility: CLINIC | Age: 52
Setting detail: THERAPIES SERIES
Discharge: HOME | End: 2017-09-05

## 2017-09-05 PROCEDURE — G0283 ELEC STIM OTHER THAN WOUND: HCPCS

## 2017-09-05 PROCEDURE — 97110 THERAPEUTIC EXERCISES: CPT

## 2017-09-06 ENCOUNTER — OFFICE VISIT (OUTPATIENT)
Dept: PAIN MANAGEMENT | Age: 52
End: 2017-09-06
Payer: COMMERCIAL

## 2017-09-06 VITALS
DIASTOLIC BLOOD PRESSURE: 88 MMHG | OXYGEN SATURATION: 95 % | HEART RATE: 77 BPM | BODY MASS INDEX: 43.15 KG/M2 | WEIGHT: 259 LBS | HEIGHT: 65 IN | SYSTOLIC BLOOD PRESSURE: 131 MMHG | RESPIRATION RATE: 16 BRPM

## 2017-09-06 DIAGNOSIS — M47.816 SPONDYLOSIS OF LUMBAR REGION WITHOUT MYELOPATHY OR RADICULOPATHY: Primary | ICD-10-CM

## 2017-09-06 DIAGNOSIS — M48.062 LUMBAR STENOSIS WITH NEUROGENIC CLAUDICATION: ICD-10-CM

## 2017-09-06 DIAGNOSIS — M54.16 CHRONIC LUMBAR RADICULOPATHY: ICD-10-CM

## 2017-09-06 DIAGNOSIS — Z79.891 CHRONIC USE OF OPIATE DRUGS THERAPEUTIC PURPOSES: ICD-10-CM

## 2017-09-06 DIAGNOSIS — E66.01 MORBID OBESITY WITH BMI OF 40.0-44.9, ADULT (HCC): ICD-10-CM

## 2017-09-06 PROCEDURE — 99214 OFFICE O/P EST MOD 30 MIN: CPT | Performed by: ANESTHESIOLOGY

## 2017-09-06 RX ORDER — OXYCODONE HYDROCHLORIDE AND ACETAMINOPHEN 5; 325 MG/1; MG/1
1 TABLET ORAL 2 TIMES DAILY PRN
Qty: 60 TABLET | Refills: 0 | Status: SHIPPED | OUTPATIENT
Start: 2017-09-06 | End: 2017-10-09 | Stop reason: SDUPTHER

## 2017-09-06 ASSESSMENT — ENCOUNTER SYMPTOMS
BACK PAIN: 1
RESPIRATORY NEGATIVE: 1

## 2017-09-07 ENCOUNTER — HOSPITAL ENCOUNTER (OUTPATIENT)
Dept: PHYSICAL THERAPY | Facility: CLINIC | Age: 52
Setting detail: THERAPIES SERIES
Discharge: HOME | End: 2017-09-07

## 2017-09-07 PROCEDURE — G0283 ELEC STIM OTHER THAN WOUND: HCPCS

## 2017-09-07 PROCEDURE — 97110 THERAPEUTIC EXERCISES: CPT

## 2017-09-12 ENCOUNTER — HOSPITAL ENCOUNTER (OUTPATIENT)
Dept: PHYSICAL THERAPY | Facility: CLINIC | Age: 52
Setting detail: THERAPIES SERIES
Discharge: HOME | End: 2017-09-12

## 2017-09-12 PROCEDURE — 97110 THERAPEUTIC EXERCISES: CPT

## 2017-09-12 PROCEDURE — G0283 ELEC STIM OTHER THAN WOUND: HCPCS

## 2017-09-14 ENCOUNTER — HOSPITAL ENCOUNTER (OUTPATIENT)
Dept: PHYSICAL THERAPY | Facility: CLINIC | Age: 52
Setting detail: THERAPIES SERIES
Discharge: HOME | End: 2017-09-14

## 2017-09-18 ENCOUNTER — APPOINTMENT (OUTPATIENT)
Dept: GENERAL RADIOLOGY | Age: 52
End: 2017-09-18
Attending: ANESTHESIOLOGY
Payer: COMMERCIAL

## 2017-09-18 ENCOUNTER — HOSPITAL ENCOUNTER (OUTPATIENT)
Age: 52
Setting detail: OUTPATIENT SURGERY
Discharge: HOME | End: 2017-09-18
Attending: ANESTHESIOLOGY | Admitting: ANESTHESIOLOGY
Payer: COMMERCIAL

## 2017-09-18 VITALS
RESPIRATION RATE: 16 BRPM | OXYGEN SATURATION: 97 % | HEART RATE: 76 BPM | BODY MASS INDEX: 42.97 KG/M2 | SYSTOLIC BLOOD PRESSURE: 138 MMHG | DIASTOLIC BLOOD PRESSURE: 76 MMHG | WEIGHT: 257.9 LBS | TEMPERATURE: 97.3 F | HEIGHT: 65 IN

## 2017-09-18 PROBLEM — M47.816 SPONDYLOSIS OF LUMBAR REGION WITHOUT MYELOPATHY OR RADICULOPATHY: Chronic | Status: ACTIVE | Noted: 2017-09-18

## 2017-09-18 LAB — GLUCOSE BLD-MCNC: 86 MG/DL (ref 75–110)

## 2017-09-18 PROCEDURE — 7100000011 HC PHASE II RECOVERY - ADDTL 15 MIN: Performed by: ANESTHESIOLOGY

## 2017-09-18 PROCEDURE — 3600000051 HC PAIN LEVEL 1 ADDL 15 MIN: Performed by: ANESTHESIOLOGY

## 2017-09-18 PROCEDURE — 82947 ASSAY GLUCOSE BLOOD QUANT: CPT

## 2017-09-18 PROCEDURE — 7100000010 HC PHASE II RECOVERY - FIRST 15 MIN: Performed by: ANESTHESIOLOGY

## 2017-09-18 PROCEDURE — 6360000004 HC RX CONTRAST MEDICATION: Performed by: ANESTHESIOLOGY

## 2017-09-18 PROCEDURE — 64493 INJ PARAVERT F JNT L/S 1 LEV: CPT | Performed by: ANESTHESIOLOGY

## 2017-09-18 PROCEDURE — 64494 INJ PARAVERT F JNT L/S 2 LEV: CPT | Performed by: ANESTHESIOLOGY

## 2017-09-18 PROCEDURE — 6360000002 HC RX W HCPCS: Performed by: ANESTHESIOLOGY

## 2017-09-18 PROCEDURE — 99152 MOD SED SAME PHYS/QHP 5/>YRS: CPT | Performed by: ANESTHESIOLOGY

## 2017-09-18 PROCEDURE — 3600000050 HC PAIN LEVEL 1 BASE: Performed by: ANESTHESIOLOGY

## 2017-09-18 PROCEDURE — 2580000003 HC RX 258: Performed by: ANESTHESIOLOGY

## 2017-09-18 PROCEDURE — 2500000003 HC RX 250 WO HCPCS: Performed by: ANESTHESIOLOGY

## 2017-09-18 PROCEDURE — 64495 INJ PARAVERT F JNT L/S 3 LEV: CPT | Performed by: ANESTHESIOLOGY

## 2017-09-18 PROCEDURE — 99153 MOD SED SAME PHYS/QHP EA: CPT | Performed by: ANESTHESIOLOGY

## 2017-09-18 PROCEDURE — 3209999900 FLUORO FOR SURGICAL PROCEDURES

## 2017-09-18 RX ORDER — SODIUM CHLORIDE 0.9 % (FLUSH) 0.9 %
10 SYRINGE (ML) INJECTION PRN
Status: DISCONTINUED | OUTPATIENT
Start: 2017-09-18 | End: 2017-09-18 | Stop reason: HOSPADM

## 2017-09-18 RX ORDER — LIDOCAINE HYDROCHLORIDE 10 MG/ML
INJECTION, SOLUTION INFILTRATION; PERINEURAL PRN
Status: DISCONTINUED | OUTPATIENT
Start: 2017-09-18 | End: 2017-09-18 | Stop reason: HOSPADM

## 2017-09-18 RX ORDER — SODIUM CHLORIDE 0.9 % (FLUSH) 0.9 %
10 SYRINGE (ML) INJECTION EVERY 12 HOURS SCHEDULED
Status: DISCONTINUED | OUTPATIENT
Start: 2017-09-18 | End: 2017-09-18 | Stop reason: HOSPADM

## 2017-09-18 RX ORDER — MIDAZOLAM HYDROCHLORIDE 1 MG/ML
INJECTION INTRAMUSCULAR; INTRAVENOUS PRN
Status: DISCONTINUED | OUTPATIENT
Start: 2017-09-18 | End: 2017-09-18 | Stop reason: HOSPADM

## 2017-09-18 RX ORDER — LIDOCAINE HYDROCHLORIDE 40 MG/ML
INJECTION, SOLUTION RETROBULBAR; TOPICAL PRN
Status: DISCONTINUED | OUTPATIENT
Start: 2017-09-18 | End: 2017-09-18 | Stop reason: HOSPADM

## 2017-09-18 RX ADMIN — Medication 10 ML: at 09:24

## 2017-09-18 ASSESSMENT — PAIN SCALES - GENERAL
PAINLEVEL_OUTOF10: 8
PAINLEVEL_OUTOF10: 8
PAINLEVEL_OUTOF10: 0
PAINLEVEL_OUTOF10: 0

## 2017-09-18 ASSESSMENT — PAIN - FUNCTIONAL ASSESSMENT: PAIN_FUNCTIONAL_ASSESSMENT: 0-10

## 2017-09-18 ASSESSMENT — PAIN DESCRIPTION - DESCRIPTORS: DESCRIPTORS: STABBING

## 2017-09-19 ENCOUNTER — HOSPITAL ENCOUNTER (OUTPATIENT)
Dept: PHYSICAL THERAPY | Facility: CLINIC | Age: 52
Setting detail: THERAPIES SERIES
Discharge: HOME | End: 2017-09-19

## 2017-09-19 PROCEDURE — 97110 THERAPEUTIC EXERCISES: CPT

## 2017-09-19 PROCEDURE — G0283 ELEC STIM OTHER THAN WOUND: HCPCS

## 2017-09-20 ENCOUNTER — OFFICE VISIT (OUTPATIENT)
Dept: GASTROENTEROLOGY | Age: 52
End: 2017-09-20
Payer: OTHER MISCELLANEOUS

## 2017-09-20 VITALS
SYSTOLIC BLOOD PRESSURE: 126 MMHG | WEIGHT: 258 LBS | BODY MASS INDEX: 42.99 KG/M2 | HEIGHT: 65 IN | OXYGEN SATURATION: 98 % | DIASTOLIC BLOOD PRESSURE: 83 MMHG | HEART RATE: 75 BPM

## 2017-09-20 DIAGNOSIS — B18.2 CHRONIC HEPATITIS C WITHOUT HEPATIC COMA (HCC): Primary | ICD-10-CM

## 2017-09-20 PROCEDURE — 99213 OFFICE O/P EST LOW 20 MIN: CPT | Performed by: INTERNAL MEDICINE

## 2017-09-20 ASSESSMENT — ENCOUNTER SYMPTOMS
COUGH: 0
NAUSEA: 0
VOMITING: 0
CONSTIPATION: 0
SHORTNESS OF BREATH: 0
BLOOD IN STOOL: 0
ABDOMINAL PAIN: 0
CHOKING: 0
ANAL BLEEDING: 1
BACK PAIN: 1
RECTAL PAIN: 0
TROUBLE SWALLOWING: 0
SORE THROAT: 0
DIARRHEA: 0
ABDOMINAL DISTENTION: 0

## 2017-09-21 ENCOUNTER — HOSPITAL ENCOUNTER (OUTPATIENT)
Age: 52
Setting detail: SPECIMEN
Discharge: HOME | End: 2017-09-21
Payer: COMMERCIAL

## 2017-09-21 ENCOUNTER — HOSPITAL ENCOUNTER (OUTPATIENT)
Dept: PHYSICAL THERAPY | Facility: CLINIC | Age: 52
Setting detail: THERAPIES SERIES
Discharge: HOME | End: 2017-09-21

## 2017-09-21 DIAGNOSIS — B18.2 CHRONIC HEPATITIS C WITHOUT HEPATIC COMA (HCC): ICD-10-CM

## 2017-09-21 PROCEDURE — G0283 ELEC STIM OTHER THAN WOUND: HCPCS

## 2017-09-21 PROCEDURE — 97110 THERAPEUTIC EXERCISES: CPT

## 2017-09-22 LAB
DIRECT EXAM: NORMAL
Lab: NORMAL
SPECIMEN DESCRIPTION: NORMAL
STATUS: NORMAL

## 2017-09-26 ENCOUNTER — HOSPITAL ENCOUNTER (OUTPATIENT)
Dept: PHYSICAL THERAPY | Facility: CLINIC | Age: 52
Setting detail: THERAPIES SERIES
Discharge: HOME | End: 2017-09-26

## 2017-09-26 PROCEDURE — 97110 THERAPEUTIC EXERCISES: CPT

## 2017-09-26 PROCEDURE — G0283 ELEC STIM OTHER THAN WOUND: HCPCS

## 2017-09-28 ENCOUNTER — HOSPITAL ENCOUNTER (OUTPATIENT)
Dept: PHYSICAL THERAPY | Facility: CLINIC | Age: 52
Setting detail: THERAPIES SERIES
Discharge: HOME | End: 2017-09-28

## 2017-09-28 PROCEDURE — 97110 THERAPEUTIC EXERCISES: CPT

## 2017-09-28 PROCEDURE — 97032 APPL MODALITY 1+ESTIM EA 15: CPT

## 2017-09-28 PROCEDURE — G0283 ELEC STIM OTHER THAN WOUND: HCPCS

## 2017-09-28 RX ORDER — LISINOPRIL AND HYDROCHLOROTHIAZIDE 12.5; 1 MG/1; MG/1
TABLET ORAL
Qty: 90 TABLET | Refills: 1 | Status: SHIPPED | OUTPATIENT
Start: 2017-09-28 | End: 2017-12-08 | Stop reason: SDUPTHER

## 2017-10-02 ENCOUNTER — TELEPHONE (OUTPATIENT)
Dept: PAIN MANAGEMENT | Age: 52
End: 2017-10-02

## 2017-10-02 NOTE — TELEPHONE ENCOUNTER
called this morning stating his procedure was denied I called pt and let him know and that we would contact him to reschedule

## 2017-10-03 ENCOUNTER — HOSPITAL ENCOUNTER (OUTPATIENT)
Dept: PHYSICAL THERAPY | Facility: CLINIC | Age: 52
Setting detail: THERAPIES SERIES
Discharge: HOME | End: 2017-10-03
Payer: COMMERCIAL

## 2017-10-03 NOTE — FLOWSHEET NOTE
[] Shayla Connolly        Outpatient Physical                Therapy       [address]       Phone: [phone]       Fax: [phone] [x] [facility] at [address] [facility]       Phone: [phone]       Fax: [phone] [] [place]. [address] [place] [address]      Phone: [phone]      Fax:  [phone]     Physical Therapy Cancel/No Show note    Date: 10/3/2017  Patient: Chitra Britt  : 1965  MRN: [id number]    Cancels/No Shows to date:     For today's appointment patient:  []  Cancelled  []  Rescheduled appointment  [x]  No-show     Reason given by patient:  []  Patient ill  []  Conflicting appointment  []  No transportation    []  Conflict with work  [x]  No reason given  []  Weather related  [x]  Other:     Comments: This was his last scheduled appointment. Will see if he calls to schedule last visit.     Electronically signed by: Shannan Triana PT

## 2017-10-05 ENCOUNTER — TELEPHONE (OUTPATIENT)
Dept: PAIN MANAGEMENT | Age: 52
End: 2017-10-05

## 2017-10-09 ENCOUNTER — HOSPITAL ENCOUNTER (OUTPATIENT)
Dept: PHYSICAL THERAPY | Facility: CLINIC | Age: 52
Setting detail: THERAPIES SERIES
Discharge: HOME | End: 2017-10-09
Payer: COMMERCIAL

## 2017-10-09 PROCEDURE — G0283 ELEC STIM OTHER THAN WOUND: HCPCS

## 2017-10-09 PROCEDURE — 97110 THERAPEUTIC EXERCISES: CPT

## 2017-10-09 RX ORDER — OXYCODONE HYDROCHLORIDE AND ACETAMINOPHEN 5; 325 MG/1; MG/1
1 TABLET ORAL 2 TIMES DAILY PRN
Qty: 60 TABLET | Refills: 0 | Status: SHIPPED | OUTPATIENT
Start: 2017-10-09 | End: 2017-10-11 | Stop reason: SDUPTHER

## 2017-10-09 NOTE — TELEPHONE ENCOUNTER
Prescription sent to the pharmacy  Controlled Substances Monitoring:     Attestation: The Prescription Monitoring Report for this patient was reviewed today. Megha Chicas MD)  Documentation: No signs of potential drug abuse or diversion identified.  Megha Chicas MD)

## 2017-10-09 NOTE — FLOWSHEET NOTE
feet elevated x 20 min  Precautions:  Exercises: bolded completed   Exercise Reps/ Time Weight/ Level Comments   Nustep 7 min L5 Increased level 9/5/17 with decreased time d/t pain   Bike 5' ML10    Supine:      LTR 5x ea 5\" hold Differed, as noted above   SKTC 10x ea 5\" hold    Posterior pelvic tilt 10x 10\"    Lstab marches 25x ea A increased reps 8/17/17   Lstab SLR 25x ea A increased reps 8/17/17               Straight crunches 20x  Added 8/17/17   Diagonal crunches   Attempted, but unable d/t pain and cramping   Hip abd standing  10x ea A Added 8/17/17; 8/24: omitted in error         Standing      Tband      -Rows 30x Green With shld abd, scapular medial/inferior contraction          Bowling motion with red tball 10x 3.3 red ball Abdom arnaldo, neutral spine    Diagonal crunch 10x ea  Elbow to opp knee w/ abdom arnaldo   Trunk rotation w/ tball 10x   3.3 red ball abdom arnaldo (caution with twist, within tolerance). Outstretched arms ~90 deg flexion holding ball             Blue ball, large   Increased reps 9/21/17   Side to side  25x     Bracing with marching 30xea A    Alternating arms up overhead 30x A    Alternating arms with legs 30x each A          Em   Added 8/31   Abdominal crunch   Seat at 4, held per patient due to positioning in equipment   Rotary twist 30x each side  4 plates    Leg press 96X 5 plates Using mostly L   Abdominals, knees bent, knees extended, rotation 20x each  Painful              Seated    Added 9/12/17   Cervical AROM 5x ea 5\" Rotation and side bend   Upper trap stretch 3x ea 30\"    Other:      Specific Instructions for next treatment:       Treatment Charges: Mins Units   [x]  Modalities- CP/IFC 15/15 0/1   [x]  Ther Exercise 25 2   []  Manual Therapy     []  Ther Activities     []  Aquatics     []  Vasocompression     []  Other     Total Treatment time 40        Assessment: [x] Progressing toward goals. [] No change.        [x] Other:Oswestry Low Back Scale = 50% overall
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
## 2017-10-09 NOTE — TELEPHONE ENCOUNTER
From: Juan Carlos Griffin  Sent: 10/8/2017 7:44 PM EDT  Subject: Medication Renewal Request    Juan Carlos Griffin would like a refill of the following medications:  oxyCODONE-acetaminophen (PERCOCET) 5-325 MG per tablet Liana Montanez MD]    Preferred pharmacy: [facility] [address] [phone] - F [phone]    Comment:

## 2017-10-10 ENCOUNTER — TELEPHONE (OUTPATIENT)
Dept: PAIN MANAGEMENT | Age: 52
End: 2017-10-10

## 2017-10-11 RX ORDER — OXYCODONE HYDROCHLORIDE AND ACETAMINOPHEN 5; 325 MG/1; MG/1
1 TABLET ORAL 2 TIMES DAILY PRN
Qty: 60 TABLET | Refills: 0 | Status: SHIPPED | OUTPATIENT
Start: 2017-10-11 | End: 2017-10-25 | Stop reason: SDUPTHER

## 2017-10-17 ENCOUNTER — APPOINTMENT (OUTPATIENT)
Dept: GENERAL RADIOLOGY | Age: 52
End: 2017-10-17
Attending: ANESTHESIOLOGY
Payer: COMMERCIAL

## 2017-10-17 ENCOUNTER — HOSPITAL ENCOUNTER (OUTPATIENT)
Age: 52
Setting detail: OUTPATIENT SURGERY
Discharge: HOME | End: 2017-10-17
Attending: ANESTHESIOLOGY | Admitting: ANESTHESIOLOGY
Payer: COMMERCIAL

## 2017-10-17 VITALS
DIASTOLIC BLOOD PRESSURE: 82 MMHG | OXYGEN SATURATION: 100 % | SYSTOLIC BLOOD PRESSURE: 131 MMHG | HEART RATE: 82 BPM | RESPIRATION RATE: 20 BRPM | HEIGHT: 65 IN | TEMPERATURE: 97.2 F | WEIGHT: 259 LBS | BODY MASS INDEX: 43.15 KG/M2

## 2017-10-17 LAB — GLUCOSE BLD-MCNC: 89 MG/DL (ref 75–110)

## 2017-10-17 PROCEDURE — 99152 MOD SED SAME PHYS/QHP 5/>YRS: CPT | Performed by: ANESTHESIOLOGY

## 2017-10-17 PROCEDURE — 7100000010 HC PHASE II RECOVERY - FIRST 15 MIN: Performed by: ANESTHESIOLOGY

## 2017-10-17 PROCEDURE — 7100000011 HC PHASE II RECOVERY - ADDTL 15 MIN: Performed by: ANESTHESIOLOGY

## 2017-10-17 PROCEDURE — 3209999900 FLUORO FOR SURGICAL PROCEDURES

## 2017-10-17 PROCEDURE — 6360000004 HC RX CONTRAST MEDICATION: Performed by: ANESTHESIOLOGY

## 2017-10-17 PROCEDURE — 3600000051 HC PAIN LEVEL 1 ADDL 15 MIN: Performed by: ANESTHESIOLOGY

## 2017-10-17 PROCEDURE — 82947 ASSAY GLUCOSE BLOOD QUANT: CPT

## 2017-10-17 PROCEDURE — 2500000003 HC RX 250 WO HCPCS: Performed by: ANESTHESIOLOGY

## 2017-10-17 PROCEDURE — 6360000002 HC RX W HCPCS: Performed by: ANESTHESIOLOGY

## 2017-10-17 PROCEDURE — 64495 INJ PARAVERT F JNT L/S 3 LEV: CPT | Performed by: ANESTHESIOLOGY

## 2017-10-17 PROCEDURE — 64493 INJ PARAVERT F JNT L/S 1 LEV: CPT | Performed by: ANESTHESIOLOGY

## 2017-10-17 PROCEDURE — 99153 MOD SED SAME PHYS/QHP EA: CPT | Performed by: ANESTHESIOLOGY

## 2017-10-17 PROCEDURE — 3600000050 HC PAIN LEVEL 1 BASE: Performed by: ANESTHESIOLOGY

## 2017-10-17 PROCEDURE — 64494 INJ PARAVERT F JNT L/S 2 LEV: CPT | Performed by: ANESTHESIOLOGY

## 2017-10-17 RX ORDER — FENTANYL CITRATE 50 UG/ML
INJECTION, SOLUTION INTRAMUSCULAR; INTRAVENOUS PRN
Status: DISCONTINUED | OUTPATIENT
Start: 2017-10-17 | End: 2017-10-17 | Stop reason: HOSPADM

## 2017-10-17 RX ORDER — SODIUM CHLORIDE 0.9 % (FLUSH) 0.9 %
10 SYRINGE (ML) INJECTION EVERY 12 HOURS SCHEDULED
Status: DISCONTINUED | OUTPATIENT
Start: 2017-10-17 | End: 2017-10-17 | Stop reason: HOSPADM

## 2017-10-17 RX ORDER — LIDOCAINE HYDROCHLORIDE 10 MG/ML
INJECTION, SOLUTION INFILTRATION; PERINEURAL PRN
Status: DISCONTINUED | OUTPATIENT
Start: 2017-10-17 | End: 2017-10-17 | Stop reason: HOSPADM

## 2017-10-17 RX ORDER — SODIUM CHLORIDE 0.9 % (FLUSH) 0.9 %
10 SYRINGE (ML) INJECTION PRN
Status: DISCONTINUED | OUTPATIENT
Start: 2017-10-17 | End: 2017-10-17 | Stop reason: HOSPADM

## 2017-10-17 RX ORDER — MIDAZOLAM HYDROCHLORIDE 1 MG/ML
INJECTION INTRAMUSCULAR; INTRAVENOUS PRN
Status: DISCONTINUED | OUTPATIENT
Start: 2017-10-17 | End: 2017-10-17 | Stop reason: HOSPADM

## 2017-10-17 RX ORDER — BUPIVACAINE HYDROCHLORIDE 5 MG/ML
INJECTION, SOLUTION EPIDURAL; INTRACAUDAL PRN
Status: DISCONTINUED | OUTPATIENT
Start: 2017-10-17 | End: 2017-10-17 | Stop reason: HOSPADM

## 2017-10-17 ASSESSMENT — PAIN SCALES - GENERAL
PAINLEVEL_OUTOF10: 0
PAINLEVEL_OUTOF10: 8
PAINLEVEL_OUTOF10: 0
PAINLEVEL_OUTOF10: 0

## 2017-10-17 ASSESSMENT — PAIN - FUNCTIONAL ASSESSMENT: PAIN_FUNCTIONAL_ASSESSMENT: 0-10

## 2017-10-17 NOTE — PROGRESS NOTES
PER SURGEON PATIENT MALLAMPATI ASSESSMENT IS 3 WITH AN ASA OF ASA 3 - Patient with moderate systemic disease with functional limitations

## 2017-10-17 NOTE — H&P
Pain Management History and Physical    Pt Name: Clearance Yovany  MRN: [id number]  YOB: 1965  Date of evaluation: 10/17/2017  Primary Care Physician: Ermalene Bamberger, MD    SUBJECTIVE:   History of Chief Complaint: This is Clearance Yovany a 45 y.o. male who presents today for a [address] L 2 TO L 5  by Dr Janna Malloy  for [address] [place] The patient complains of PAIN WITH AXIAL MOVEMENT OF THE SPINE   This is aggravated by [address] and relieved by PAIN MEDICATION  Any previous pain management  Yes.9/18/17  The patient does have a history of diabetes 89  Current Anticoagulation therapy  No.  .  Pain today is rated 7/10  on pain scale. Patient today denies  fever, chills, night sweats or recent illness. Past Medical History    has a past medical history of Asthma; Diabetes mellitus ([facility] [address] Hyperlipidemia; Hypertension; Migraine; Neuropathy ([facility] [address] [facility] Positive FIT (fecal immunochemical test); and Renal failure. Past Surgical History   has a past surgical history that includes Leg Surgery (Right); Fibula Fracture Surgery (Left); Nerve Block (N/A, 10/24/2016); Nerve Block (Right, 11/21/2016); other surgical history (Right, 01/26/2017); Cholecystectomy (03/08/2017); Colonoscopy (03/08/2017); Appendectomy; and Nerve Block (Bilateral, 9/18/2017). Medications  Prior to Admission medications    Medication Sig Start Date End Date Taking? Authorizing Provider   oxyCODONE-acetaminophen (PERCOCET) 5-325 MG per tablet Take 1 tablet by mouth 2 times daily as needed for Pain (DO NOT FILL BEFORE 10/12/2017) .  10/11/17 11/10/17 Yes Rosana Guerrier MD   lisinopril-hydrochlorothiazide (PRINZIDE;ZESTORETIC) 10-12.5 MG per tablet TAKE 1 TABLET BY MOUTH DAILY 9/28/17  Yes Ermalene Bamberger, MD   ibuprofen (ADVIL;MOTRIN) 800 MG tablet Take 1 tablet by mouth every 8 hours as needed for Pain 8/1/17  Yes Mendel Nash DO   metFORMIN (GLUCOPHAGE) 500 MG tablet TAKE 1 TABLET BY MOUTH TWICE DAILY WITH HCG in the last 720 hours. IMPRESSIONS:   SEVERE BACK PAIN  WITH CHRONIC RIGHT LEG PAIN AND NUMBNESS ASSOCIATED WITH SEVERE CENTRAL CANAL STENOSIS   1.  has a past medical history of Asthma; Diabetes mellitus ([facility] [address] Hyperlipidemia; Hypertension; Migraine; [facility] Positive FIT (fecal immunochemical test); and Renal failure. PLANS:   1.  BILATERAL MEDIAL BRANCH  NERVE BLOCK L 2 TO L 5      THUY KAPOOR APRN, ANP-BC  Electronically signed 10/17/2017 at 12:28 PM

## 2017-10-17 NOTE — OP NOTE
Patient Name: Paolo Garcia   YOB: 1965  Room/Bed: STAZ OR Pool/NONE  Medical Record Number: [id number]  Date: 10/17/2017       Sedation/ Anesthesia Plan:   intravenous sedation   as needed. Medications Planned:   midazolam (Versed) / Fentanyl  Intravenously  as needed. Preoperative Diagnosis: Lumbar spondylosis w/o myelopathy or radiculopathy  Postoperative Diagnosis: Lumbar spondylosis w/o myelopathy or radiculopathy    Procedure Performed:  Bilateral Lumbar Medial Branch nerve Blocks at the transverse processes of L3, L4, L5 and sacral  ala under fluoroscopy guidance    Procedure: The Patient was seen in the preop area, chart was reviewed, informed consent was obtained. Patient was taken to procedure room and was placed in prone position. Vital signs were monitored through out the  Procedure. A time out was completed. The skin over the back was prepped and draped in sterile manner. The target point was marked at the junction of Transverse process and superior articular process at the target levels. Skin and deep tissues were anesthetized with 1 % lidocaine. A 25-gauge needlele was advanced to the target spots under fluoroscopy guidance in AP / Lateral and Oblique views. Then after negative aspiration contrast dye was injected with live fluoroscopy in AP views that showed  spread of the contrast with no epidural space and no vascular runoff or intrathecal spread. Finally 0.5 ml of treatment solution 0.5% BUpivacaine was injected at each level. The needle was removed and a Band-Aid was placed over the needle  insertion site. The patient's vital signs remained stable and the patient tolerated the procedure well.       Post Procedure pain score in PACU was assessed with ambulation 0-1/10    Electronically signed by Irasema Mcmillan MD on 10/17/2017 at 1:49 PM

## 2017-10-25 ENCOUNTER — OFFICE VISIT (OUTPATIENT)
Dept: PAIN MANAGEMENT | Age: 52
End: 2017-10-25
Payer: COMMERCIAL

## 2017-10-25 VITALS
HEIGHT: 65 IN | BODY MASS INDEX: 43.35 KG/M2 | SYSTOLIC BLOOD PRESSURE: 136 MMHG | OXYGEN SATURATION: 96 % | RESPIRATION RATE: 16 BRPM | WEIGHT: 260.2 LBS | HEART RATE: 73 BPM | DIASTOLIC BLOOD PRESSURE: 88 MMHG

## 2017-10-25 DIAGNOSIS — M47.816 SPONDYLOSIS OF LUMBAR REGION WITHOUT MYELOPATHY OR RADICULOPATHY: Primary | Chronic | ICD-10-CM

## 2017-10-25 DIAGNOSIS — Z79.891 USE OF OPIATES FOR THERAPEUTIC PURPOSES: ICD-10-CM

## 2017-10-25 DIAGNOSIS — E66.01 MORBID OBESITY WITH BMI OF 40.0-44.9, ADULT (HCC): ICD-10-CM

## 2017-10-25 PROCEDURE — 99214 OFFICE O/P EST MOD 30 MIN: CPT | Performed by: ANESTHESIOLOGY

## 2017-10-25 RX ORDER — OXYCODONE HYDROCHLORIDE AND ACETAMINOPHEN 5; 325 MG/1; MG/1
1 TABLET ORAL 2 TIMES DAILY PRN
Qty: 60 TABLET | Refills: 0 | Status: SHIPPED | OUTPATIENT
Start: 2017-11-12 | End: 2017-12-08 | Stop reason: SDUPTHER

## 2017-10-25 RX ORDER — OXYCODONE HYDROCHLORIDE AND ACETAMINOPHEN 5; 325 MG/1; MG/1
1 TABLET ORAL 2 TIMES DAILY PRN
Qty: 60 TABLET | Refills: 0 | Status: SHIPPED | OUTPATIENT
Start: 2017-10-25 | End: 2017-10-25 | Stop reason: SDUPTHER

## 2017-10-25 ASSESSMENT — ENCOUNTER SYMPTOMS
ALLERGIC/IMMUNOLOGIC NEGATIVE: 1
BACK PAIN: 1
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
## 2017-11-02 ENCOUNTER — TELEPHONE (OUTPATIENT)
Dept: PAIN MANAGEMENT | Age: 52
End: 2017-11-02

## 2017-11-02 NOTE — TELEPHONE ENCOUNTER
Done faxed last 3 office notes to support Dx of back pain. Procedure is pending denial, need addtional info faxed to [phone].  Case #[id number]

## 2017-11-03 ENCOUNTER — OFFICE VISIT (OUTPATIENT)
Dept: INTERNAL MEDICINE CLINIC | Age: 52
End: 2017-11-03
Payer: COMMERCIAL

## 2017-11-03 VITALS
RESPIRATION RATE: 17 BRPM | DIASTOLIC BLOOD PRESSURE: 70 MMHG | TEMPERATURE: 98 F | WEIGHT: 257 LBS | SYSTOLIC BLOOD PRESSURE: 120 MMHG | OXYGEN SATURATION: 97 % | HEART RATE: 79 BPM | BODY MASS INDEX: 42.82 KG/M2 | HEIGHT: 65 IN

## 2017-11-03 DIAGNOSIS — Z79.891 USE OF OPIATES FOR THERAPEUTIC PURPOSES: ICD-10-CM

## 2017-11-03 DIAGNOSIS — E11.9 CONTROLLED TYPE 2 DIABETES MELLITUS WITHOUT COMPLICATION, WITHOUT LONG-TERM CURRENT USE OF INSULIN (HCC): Primary | ICD-10-CM

## 2017-11-03 DIAGNOSIS — G89.4 CHRONIC PAIN SYNDROME: ICD-10-CM

## 2017-11-03 DIAGNOSIS — E66.01 MORBID OBESITY WITH BMI OF 40.0-44.9, ADULT (HCC): ICD-10-CM

## 2017-11-03 DIAGNOSIS — E78.49 OTHER HYPERLIPIDEMIA: ICD-10-CM

## 2017-11-03 DIAGNOSIS — R73.01 IFG (IMPAIRED FASTING GLUCOSE): ICD-10-CM

## 2017-11-03 DIAGNOSIS — B18.2 CHRONIC HEPATITIS C WITHOUT HEPATIC COMA (HCC): ICD-10-CM

## 2017-11-03 DIAGNOSIS — M47.816 SPONDYLOSIS OF LUMBAR REGION WITHOUT MYELOPATHY OR RADICULOPATHY: Chronic | ICD-10-CM

## 2017-11-03 DIAGNOSIS — I10 ESSENTIAL HYPERTENSION: ICD-10-CM

## 2017-11-03 PROCEDURE — 99214 OFFICE O/P EST MOD 30 MIN: CPT | Performed by: FAMILY MEDICINE

## 2017-11-03 RX ORDER — ATORVASTATIN CALCIUM 40 MG/1
40 TABLET, FILM COATED ORAL DAILY
Qty: 90 TABLET | Refills: 0 | Status: SHIPPED | OUTPATIENT
Start: 2017-11-03 | End: 2018-01-29 | Stop reason: SDUPTHER

## 2017-11-03 ASSESSMENT — ENCOUNTER SYMPTOMS
GASTROINTESTINAL NEGATIVE: 1
EYES NEGATIVE: 1
ALLERGIC/IMMUNOLOGIC NEGATIVE: 1
RESPIRATORY NEGATIVE: 1

## 2017-11-03 NOTE — PROGRESS NOTES
(Originally 9/17/1982)    Flu vaccine (1) 11/13/2018 (Originally 9/1/2017)    Diabetic foot exam  06/01/2018    Diabetic hemoglobin A1C test  06/01/2018    Diabetic microalbuminuria test  08/10/2018    Lipid screen  08/10/2018    Colon cancer screen colonoscopy  03/08/2024    HIV screen  Completed
been identified and corrected by editing.

## 2017-11-09 ENCOUNTER — TELEPHONE (OUTPATIENT)
Dept: PAIN MANAGEMENT | Age: 52
End: 2017-11-09

## 2017-12-08 RX ORDER — OXYCODONE HYDROCHLORIDE AND ACETAMINOPHEN 5; 325 MG/1; MG/1
1 TABLET ORAL 2 TIMES DAILY PRN
Qty: 60 TABLET | Refills: 0 | Status: SHIPPED | OUTPATIENT
Start: 2017-12-08 | End: 2017-12-27 | Stop reason: SDUPTHER

## 2017-12-08 RX ORDER — LISINOPRIL AND HYDROCHLOROTHIAZIDE 12.5; 1 MG/1; MG/1
1 TABLET ORAL DAILY
Qty: 90 TABLET | Refills: 1 | Status: SHIPPED | OUTPATIENT
Start: 2017-12-08 | End: 2018-07-09 | Stop reason: SDUPTHER

## 2017-12-08 NOTE — TELEPHONE ENCOUNTER
script sent to pharmacy    Controlled Substances Monitoring:     Attestation: The Prescription Monitoring Report for this patient was reviewed today.  Markos Rowe MD)

## 2017-12-08 NOTE — TELEPHONE ENCOUNTER
From: Gurwinder Wilkins  Sent: 12/8/2017 10:02 AM EST  Subject: Medication Renewal Request    Gurwinder Wilkins would like a refill of the following medications:  metFORMIN (GLUCOPHAGE) 500 MG tablet Michelle Valverde MD]  lisinopril-hydrochlorothiazide (PRINZIDE;ZESTORETIC) 10-12.5 MG per tablet Michelle Valverde MD]    Preferred pharmacy: [facility] [address] [phone] - F [phone]    Comment:  pain meds are not do until 12/12/17 I wanted to put the request in now so by the 12th it will be ready.  Thank you    Medication renewals requested in this message routed separately:  oxyCODONE-acetaminophen (PERCOCET) 5-325 MG per tablet Syeda Garcia MD]

## 2017-12-27 ENCOUNTER — OFFICE VISIT (OUTPATIENT)
Dept: PAIN MANAGEMENT | Age: 52
End: 2017-12-27
Payer: COMMERCIAL

## 2017-12-27 VITALS
BODY MASS INDEX: 42.82 KG/M2 | WEIGHT: 257 LBS | SYSTOLIC BLOOD PRESSURE: 125 MMHG | HEIGHT: 65 IN | HEART RATE: 86 BPM | RESPIRATION RATE: 16 BRPM | DIASTOLIC BLOOD PRESSURE: 84 MMHG | OXYGEN SATURATION: 96 %

## 2017-12-27 DIAGNOSIS — E66.01 MORBID OBESITY WITH BMI OF 40.0-44.9, ADULT (HCC): ICD-10-CM

## 2017-12-27 DIAGNOSIS — M47.816 SPONDYLOSIS OF LUMBAR REGION WITHOUT MYELOPATHY OR RADICULOPATHY: Primary | Chronic | ICD-10-CM

## 2017-12-27 PROCEDURE — 99214 OFFICE O/P EST MOD 30 MIN: CPT | Performed by: ANESTHESIOLOGY

## 2017-12-27 RX ORDER — OXYCODONE HYDROCHLORIDE AND ACETAMINOPHEN 5; 325 MG/1; MG/1
1 TABLET ORAL 2 TIMES DAILY PRN
Qty: 60 TABLET | Refills: 0 | Status: SHIPPED | OUTPATIENT
Start: 2017-12-27 | End: 2018-02-07 | Stop reason: SDUPTHER

## 2017-12-27 ASSESSMENT — ENCOUNTER SYMPTOMS
NAUSEA: 1
BACK PAIN: 1
VOMITING: 1
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
## 2018-01-29 RX ORDER — ATORVASTATIN CALCIUM 40 MG/1
40 TABLET, FILM COATED ORAL DAILY
Qty: 90 TABLET | Refills: 0 | Status: SHIPPED | OUTPATIENT
Start: 2018-01-29 | End: 2018-05-01 | Stop reason: SDUPTHER

## 2018-02-02 ENCOUNTER — OFFICE VISIT (OUTPATIENT)
Dept: INTERNAL MEDICINE CLINIC | Age: 53
End: 2018-02-02
Payer: COMMERCIAL

## 2018-02-02 VITALS
HEIGHT: 65 IN | DIASTOLIC BLOOD PRESSURE: 80 MMHG | HEART RATE: 111 BPM | BODY MASS INDEX: 44.42 KG/M2 | WEIGHT: 266.6 LBS | OXYGEN SATURATION: 98 % | RESPIRATION RATE: 25 BRPM | SYSTOLIC BLOOD PRESSURE: 130 MMHG

## 2018-02-02 DIAGNOSIS — E11.9 CONTROLLED TYPE 2 DIABETES MELLITUS WITHOUT COMPLICATION, WITHOUT LONG-TERM CURRENT USE OF INSULIN (HCC): Primary | ICD-10-CM

## 2018-02-02 DIAGNOSIS — M54.2 NECK PAIN: ICD-10-CM

## 2018-02-02 DIAGNOSIS — E78.49 OTHER HYPERLIPIDEMIA: ICD-10-CM

## 2018-02-02 DIAGNOSIS — G89.29 CHRONIC LEFT SHOULDER PAIN: ICD-10-CM

## 2018-02-02 DIAGNOSIS — B34.9 VIRAL SYNDROME: ICD-10-CM

## 2018-02-02 DIAGNOSIS — E66.01 MORBID OBESITY WITH BMI OF 40.0-44.9, ADULT (HCC): ICD-10-CM

## 2018-02-02 DIAGNOSIS — I10 ESSENTIAL HYPERTENSION: ICD-10-CM

## 2018-02-02 DIAGNOSIS — M47.816 SPONDYLOSIS OF LUMBAR REGION WITHOUT MYELOPATHY OR RADICULOPATHY: Chronic | ICD-10-CM

## 2018-02-02 DIAGNOSIS — B18.2 CHRONIC HEPATITIS C WITHOUT HEPATIC COMA (HCC): ICD-10-CM

## 2018-02-02 DIAGNOSIS — M25.512 CHRONIC LEFT SHOULDER PAIN: ICD-10-CM

## 2018-02-02 DIAGNOSIS — E11.21 DIABETIC NEPHROPATHY ASSOCIATED WITH TYPE 2 DIABETES MELLITUS (HCC): ICD-10-CM

## 2018-02-02 PROBLEM — K64.9 HEMORRHOID: Status: RESOLVED | Noted: 2017-02-14 | Resolved: 2018-02-02

## 2018-02-02 PROCEDURE — 99214 OFFICE O/P EST MOD 30 MIN: CPT | Performed by: FAMILY MEDICINE

## 2018-02-02 ASSESSMENT — PATIENT HEALTH QUESTIONNAIRE - PHQ9
1. LITTLE INTEREST OR PLEASURE IN DOING THINGS: 0
SUM OF ALL RESPONSES TO PHQ QUESTIONS 1-9: 0
2. FEELING DOWN, DEPRESSED OR HOPELESS: 0
SUM OF ALL RESPONSES TO PHQ9 QUESTIONS 1 & 2: 0

## 2018-02-02 ASSESSMENT — ENCOUNTER SYMPTOMS
COUGH: 1
GASTROINTESTINAL NEGATIVE: 1
EYES NEGATIVE: 1
SORE THROAT: 1

## 2018-02-02 NOTE — PROGRESS NOTES
Chronic Disease Visit Information    BP Readings from Last 3 Encounters:   02/02/18 130/80   12/27/17 125/84   11/03/17 120/70          Hemoglobin A1C (%)   Date Value   06/01/2017 6.0   10/26/2016 6.0   05/23/2016 6.1 (H)     Microalb/Crt. Ratio (mcg/mg creat)   Date Value   08/10/2017 50 (H)     LDL Cholesterol (mg/dL)   Date Value   08/10/2017 175 (H)     HDL (mg/dL)   Date Value   08/10/2017 42     BUN (mg/dL)   Date Value   03/30/2017 14     CREATININE (mg/dL)   Date Value   03/30/2017 0.90     Glucose (mg/dL)   Date Value   03/30/2017 109 (H)            Have you changed or started any medications since your last visit including any over-the-counter medicines, vitamins, or herbal medicines? no   Are you having any side effects from any of your medications? -  no  Have you stopped taking any of your medications? Is so, why? -  no    Have you seen any other physician or provider since your last visit? No  Have you had any other diagnostic tests since your last visit? No  Have you been seen in the emergency room and/or had an admission to a hospital since we last saw you? No  Have you had your annual diabetic retinal (eye) exam? Yes - Records Requested  Have you had your routine dental cleaning in the past 6 months? yes -     Have you activated your "[employer] account? If not, what are your barriers?  Yes     Patient Care Team:  Selena Simmons MD as PCP - General (Family Medicine)  Nadiya Ross MD as Consulting Physician (Neurology)  Kasia Purvis MD as Consulting Physician (Pain Management)  Radha Marvin MD as Consulting Physician (Gastroenterology)         Medical History Review  Past Medical, Family, and Social History reviewed and does contribute to the patient presenting condition    Health Maintenance   Topic Date Due    Pneumococcal med risk (1 of 1 - PPSV23) 09/17/1982    Creatinine monitoring  05/23/2017    Diabetic retinal exam  09/03/2018 (Originally 9/17/1973)    DTaP/Tdap/Td vaccine (1 - Tdap) 09/11/2018 (Originally 9/17/1982)    Flu vaccine (1) 11/13/2018 (Originally 9/1/2017)    Potassium monitoring  03/30/2018    Diabetic foot exam  06/01/2018    A1C test (Diabetic or Prediabetic)  06/01/2018    Diabetic microalbuminuria test  08/10/2018    Lipid screen  08/10/2018    Colon cancer screen colonoscopy  03/08/2024    HIV screen  Completed

## 2018-02-07 DIAGNOSIS — M47.816 SPONDYLOSIS OF LUMBAR REGION WITHOUT MYELOPATHY OR RADICULOPATHY: Primary | ICD-10-CM

## 2018-02-07 RX ORDER — OXYCODONE HYDROCHLORIDE AND ACETAMINOPHEN 5; 325 MG/1; MG/1
1 TABLET ORAL 2 TIMES DAILY PRN
Qty: 60 TABLET | Refills: 0 | Status: SHIPPED | OUTPATIENT
Start: 2018-02-07 | End: 2018-03-08 | Stop reason: SDUPTHER

## 2018-02-08 ENCOUNTER — HOSPITAL ENCOUNTER (OUTPATIENT)
Dept: GENERAL RADIOLOGY | Age: 53
Discharge: HOME | End: 2018-02-10
Payer: COMMERCIAL

## 2018-02-08 ENCOUNTER — HOSPITAL ENCOUNTER (OUTPATIENT)
Dept: GENERAL RADIOLOGY | Age: 53
End: 2018-02-08
Payer: COMMERCIAL

## 2018-02-08 ENCOUNTER — APPOINTMENT (OUTPATIENT)
Dept: GENERAL RADIOLOGY | Age: 53
End: 2018-02-08
Payer: COMMERCIAL

## 2018-02-08 ENCOUNTER — HOSPITAL ENCOUNTER (OUTPATIENT)
Age: 53
Discharge: HOME | End: 2018-02-10
Payer: COMMERCIAL

## 2018-02-08 DIAGNOSIS — E78.49 OTHER HYPERLIPIDEMIA: ICD-10-CM

## 2018-02-08 DIAGNOSIS — M47.816 SPONDYLOSIS OF LUMBAR REGION WITHOUT MYELOPATHY OR RADICULOPATHY: Chronic | ICD-10-CM

## 2018-02-08 DIAGNOSIS — M54.2 NECK PAIN: ICD-10-CM

## 2018-02-08 PROCEDURE — 73030 X-RAY EXAM OF SHOULDER: CPT

## 2018-02-08 PROCEDURE — 72040 X-RAY EXAM NECK SPINE 2-3 VW: CPT

## 2018-02-27 ENCOUNTER — OFFICE VISIT (OUTPATIENT)
Dept: ORTHOPEDIC SURGERY | Age: 53
End: 2018-02-27
Payer: COMMERCIAL

## 2018-02-27 VITALS — BODY MASS INDEX: 44.41 KG/M2 | HEIGHT: 65 IN | WEIGHT: 266.54 LBS

## 2018-02-27 DIAGNOSIS — M47.812 SPONDYLOSIS OF CERVICAL REGION WITHOUT MYELOPATHY OR RADICULOPATHY: Primary | ICD-10-CM

## 2018-02-27 PROCEDURE — 99203 OFFICE O/P NEW LOW 30 MIN: CPT | Performed by: ORTHOPAEDIC SURGERY

## 2018-03-07 ENCOUNTER — HOSPITAL ENCOUNTER (OUTPATIENT)
Dept: PHYSICAL THERAPY | Facility: CLINIC | Age: 53
Setting detail: THERAPIES SERIES
Discharge: HOME | End: 2018-03-07
Payer: COMMERCIAL

## 2018-03-07 PROCEDURE — G8985 CARRY GOAL STATUS: HCPCS

## 2018-03-07 PROCEDURE — 97012 MECHANICAL TRACTION THERAPY: CPT

## 2018-03-07 PROCEDURE — G8984 CARRY CURRENT STATUS: HCPCS

## 2018-03-07 PROCEDURE — 97161 PT EVAL LOW COMPLEX 20 MIN: CPT

## 2018-03-07 NOTE — CONSULTS
work:   Job/ADL Description:  Pain:  [x] Yes  [] No Location: Left cervical/scapular rregion Pain Rating: (0-10 scale) 7-8/10  Pain altered Tx:  [] Yes  [] No  Action:  Symptoms:  [] Improving [x] Worsening [] Same  Better:  [] AM    [] PM    [] Sit    [] Rise/Sit    []Stand    [] Walk    [] Lying    [] Other:  Worse: [] AM    [] PM    [] Sit    [] Rise/Sit    []Stand    [] Walk    [] Lying    [] Bend                             [] Valsalva    [] Other:  Sleep: [] OK    [x] Disturbed    Objective:      STRENGTH  STRENGTH  ROM    Left Right  Left Right Cervical    C5 Shld Abd 4+ 5 L1-2 Hip Flex   Flexion WFL   Shld Flexion 4 5 Hip Abd   Extension Mod ? Shld IR   L3-4 Knee Ext   Rotation L 45** R 50   Shld ER   L4 Ankle DF   Sidebend L 15** R 25   C6 Elb Flex 5 5 L5 EHL   Retraction NT   C7 Elb Ext 5 5 S1 Plant. Flex   Lumbar    C8 EPL 5 5 Abdominals   Flexion    T1 Fing Abd   Erector Spinae   Extension          Rotation L  R         Sidebend L R         UE/LE                                                            ** reproduces pain in L axilla, L volar foream      TESTS (+/-) LEFT RIGHT Not Tested   SLR [] sit [] supine   []   Hamstring (SLR)   []   Spurling's ++  []   DKTC   []   Slump/Dural   []   SI JT   []   ZIYAD   []   Joint Mobility   []   Cerv. Comp   []   Cerv. Distraction   []   Cerv. Alar/Transverse   []   Vertebral Artery   []   Adsons   []   Terryl Sandi   []   Yesenia Tests ? Pain ?  Pain No Change Not Tested   RFIS [] [] [] [x]   CHAYITO [] [] [] [x]   RFIL [] [] [] [x]   REIL [] [] [] [x]   Rep Prot [] [] [] [x]   Rep Retract [] [] [] [x]       OBSERVATION No Deficit Deficit Not Tested Comments   Posture       Forward Head [] [x] []    Rounded Shoulders [] [x] []    Kyphosis [] [] []    Lordosis [] [] []    Lateral Shift [] [] []    Scoliosis [] [] []    Iliac Crest [] [] []    PSIS [] [] []    ASIS [] [] []    Genu Valgus [] [] []    Genu Varus [] [] []    Genu Recurvatum [] [] []    Pronation [] []

## 2018-03-08 DIAGNOSIS — M47.816 SPONDYLOSIS OF LUMBAR REGION WITHOUT MYELOPATHY OR RADICULOPATHY: ICD-10-CM

## 2018-03-08 RX ORDER — OXYCODONE HYDROCHLORIDE AND ACETAMINOPHEN 5; 325 MG/1; MG/1
1 TABLET ORAL 2 TIMES DAILY PRN
Qty: 60 TABLET | Refills: 0 | Status: SHIPPED | OUTPATIENT
Start: 2018-03-08 | End: 2018-03-23 | Stop reason: SDUPTHER

## 2018-03-08 NOTE — TELEPHONE ENCOUNTER
From: Ann Moore  Sent: 3/8/2018 9:22 AM EST  Subject: Medication Renewal Request    Ann Moore would like a refill of the following medications:     oxyCODONE-acetaminophen (PERCOCET) 5-325 MG per tablet Nereida Hull MD]    Preferred pharmacy: [facility] [address] [phone] - F [phone]    Comment:

## 2018-03-08 NOTE — TELEPHONE ENCOUNTER
Fred Priscilla is requesting a refill on the following medications:   Requested Prescriptions     Pending Prescriptions Disp Refills    oxyCODONE-acetaminophen (PERCOCET) 5-325 MG per tablet 60 tablet 0     Sig: Take 1 tablet by mouth 2 times daily as needed for Pain for up to 30 days. Earliest Fill Date: 3/8/18       Last OV 12/27/17    Future Appointments  Date Time Provider Chelly Aviles   3/14/2018 10:10 Shanel Lennon MD Syl[facility]   3/20/2018 9:50 AM Alda Triana MD Ortho [facility]   6/1/2018 8:45 AM Samina Alexander MD Adult pc Nena Parker report sent to Dr. Romana Rojo through alternative route for review.

## 2018-03-20 ENCOUNTER — OFFICE VISIT (OUTPATIENT)
Dept: ORTHOPEDIC SURGERY | Age: 53
End: 2018-03-20
Payer: COMMERCIAL

## 2018-03-20 VITALS — WEIGHT: 266.54 LBS | BODY MASS INDEX: 44.41 KG/M2 | HEIGHT: 65 IN

## 2018-03-20 DIAGNOSIS — M47.812 SPONDYLOSIS OF CERVICAL REGION WITHOUT MYELOPATHY OR RADICULOPATHY: Primary | ICD-10-CM

## 2018-03-20 PROCEDURE — 99212 OFFICE O/P EST SF 10 MIN: CPT | Performed by: ORTHOPAEDIC SURGERY
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
## 2018-03-23 ENCOUNTER — OFFICE VISIT (OUTPATIENT)
Dept: PAIN MANAGEMENT | Age: 53
End: 2018-03-23
Payer: COMMERCIAL

## 2018-03-23 VITALS
BODY MASS INDEX: 44.52 KG/M2 | WEIGHT: 267.2 LBS | OXYGEN SATURATION: 95 % | HEIGHT: 65 IN | HEART RATE: 87 BPM | SYSTOLIC BLOOD PRESSURE: 153 MMHG | RESPIRATION RATE: 16 BRPM | DIASTOLIC BLOOD PRESSURE: 96 MMHG

## 2018-03-23 DIAGNOSIS — M47.816 SPONDYLOSIS OF LUMBAR REGION WITHOUT MYELOPATHY OR RADICULOPATHY: Primary | ICD-10-CM

## 2018-03-23 DIAGNOSIS — Z79.891 CHRONIC USE OF OPIATE DRUGS THERAPEUTIC PURPOSES: ICD-10-CM

## 2018-03-23 DIAGNOSIS — E66.01 MORBID OBESITY WITH BMI OF 40.0-44.9, ADULT (HCC): ICD-10-CM

## 2018-03-23 PROCEDURE — 99213 OFFICE O/P EST LOW 20 MIN: CPT | Performed by: ANESTHESIOLOGY

## 2018-03-23 RX ORDER — OXYCODONE HYDROCHLORIDE AND ACETAMINOPHEN 5; 325 MG/1; MG/1
1 TABLET ORAL 2 TIMES DAILY PRN
Qty: 60 TABLET | Refills: 0 | Status: SHIPPED | OUTPATIENT
Start: 2018-03-23 | End: 2018-05-07 | Stop reason: SDUPTHER

## 2018-03-23 ASSESSMENT — ENCOUNTER SYMPTOMS
VOMITING: 1
NAUSEA: 1
BACK PAIN: 1
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
## 2018-03-26 ENCOUNTER — HOSPITAL ENCOUNTER (OUTPATIENT)
Dept: PHYSICAL THERAPY | Facility: CLINIC | Age: 53
Setting detail: THERAPIES SERIES
Discharge: HOME | End: 2018-03-26
Payer: COMMERCIAL

## 2018-03-26 PROCEDURE — 97110 THERAPEUTIC EXERCISES: CPT

## 2018-03-26 PROCEDURE — G0283 ELEC STIM OTHER THAN WOUND: HCPCS

## 2018-03-26 NOTE — FLOWSHEET NOTE
[] Medina Rahman       Outpatient Physical        Therapy       [address]       Phone: [phone]       Fax: [phone] [x] [facility] at [address]       Phone: [phone]       Fax: [phone] [] [place] [place] [address] [employer]  [address] [place] [address]   Phone: [phone]   Fax:  [phone]     Physical Therapy Daily Treatment Note    Date:  3/26/2018  Patient Name:  Brenda Kilgore    :  1965  MRN: [id number]  Physician: Celestino Rdz MD                Insurance: Brenda Mouse  Medical Diagnosis: Spondylosis of cervical  Region  Rehab Codes: M54.2, M62.830, R29.3  Onset Date: 2017                      Next 's appt. : 3/20/18  Visit# / total visits: 2/9  Cancels/No Shows: 0/0     Subjective:    Pain:  [x] Yes  [] No Location: L cervical area Pain Rating: (0-10 scale) 8/10  Pain altered Tx:  [] No  [] Yes  Action:  Comments: Pt states he had a bad migraine after traction last session and lasted around 3 days. Objective:  Modalities: IFC estim to L cervical area with heat pack in seated for 15'   Precautions:  Exercises:  Exercise Reps/ Time Weight/ Level Comments   Nu-Step 10' Lvl 1 UBE not available          UT stretch  3x20\"     Levator Stretch 3x20\"           Chin Tucks  10\"x10     Cervical AROM 10xea  Rotation, cervical flexion, flex/ext (pain free ranges)   Shoulder rolls  15xea  Front/back    Shoulder retractions x15                             Other:    Specific Instructions for next treatment:    Treatment Charges: Mins Units   [x]  Modalities: IFC, HP 15, 15 1,0   [x]  Ther Exercise 25 2   []  Manual Therapy     []  Ther Activities     []  Aquatics     []  Vasocompression     []  Other     Total Treatment time 40 3   Cap total used to date 3/26/18: $ 155.17    Assessment: [x] Progressing toward goals. Pt able to tolerate all exercises today.  Discussed to complete the exercises in a

## 2018-03-29 ENCOUNTER — HOSPITAL ENCOUNTER (OUTPATIENT)
Dept: PHYSICAL THERAPY | Facility: CLINIC | Age: 53
Setting detail: THERAPIES SERIES
Discharge: HOME | End: 2018-03-29
Payer: COMMERCIAL

## 2018-03-29 PROCEDURE — 97110 THERAPEUTIC EXERCISES: CPT

## 2018-03-29 PROCEDURE — 97140 MANUAL THERAPY 1/> REGIONS: CPT

## 2018-03-29 PROCEDURE — G0283 ELEC STIM OTHER THAN WOUND: HCPCS

## 2018-04-03 ENCOUNTER — HOSPITAL ENCOUNTER (OUTPATIENT)
Dept: PHYSICAL THERAPY | Facility: CLINIC | Age: 53
Setting detail: THERAPIES SERIES
Discharge: HOME | End: 2018-04-03
Payer: COMMERCIAL

## 2018-04-03 PROCEDURE — G0283 ELEC STIM OTHER THAN WOUND: HCPCS

## 2018-04-03 PROCEDURE — 97110 THERAPEUTIC EXERCISES: CPT

## 2018-04-03 PROCEDURE — 97140 MANUAL THERAPY 1/> REGIONS: CPT

## 2018-04-05 ENCOUNTER — HOSPITAL ENCOUNTER (OUTPATIENT)
Dept: PHYSICAL THERAPY | Facility: CLINIC | Age: 53
Setting detail: THERAPIES SERIES
Discharge: HOME | End: 2018-04-05
Payer: COMMERCIAL

## 2018-04-05 NOTE — FLOWSHEET NOTE
[] Ferdinand Seip        Outpatient Physical                Therapy       [address]       Phone: [phone]       Fax: [phone] [x] [facility] at [address] [facility]       Phone: [phone]       Fax: [phone] [] [place].  [address] [place] [address]      Phone: [phone]      Fax:  [phone]     Physical Therapy Cancel/No Show note    Date: 2018  Patient: Rigo Madrigal  : 1965  MRN: [id number]    Cancels/No Shows to date:     For today's appointment patient:  [x]  Cancelled  []  Rescheduled appointment  []  No-show     Reason given by patient:  []  Patient ill  []  Conflicting appointment  []  No transportation    []  Conflict with work  []  No reason given  []  Weather related  [x]  Other:      Comments:  Kids have an appt     [x]  Next appointment was confirmed on Monday at 8:15 am     Electronically signed by: Roland Abdalla PTA

## 2018-04-09 ENCOUNTER — HOSPITAL ENCOUNTER (OUTPATIENT)
Dept: PHYSICAL THERAPY | Facility: CLINIC | Age: 53
Setting detail: THERAPIES SERIES
Discharge: HOME | End: 2018-04-09
Payer: COMMERCIAL

## 2018-04-09 PROCEDURE — 97140 MANUAL THERAPY 1/> REGIONS: CPT

## 2018-04-09 PROCEDURE — 97110 THERAPEUTIC EXERCISES: CPT

## 2018-04-13 ENCOUNTER — HOSPITAL ENCOUNTER (OUTPATIENT)
Dept: PHYSICAL THERAPY | Facility: CLINIC | Age: 53
Setting detail: THERAPIES SERIES
Discharge: HOME | End: 2018-04-13
Payer: COMMERCIAL

## 2018-04-13 PROCEDURE — 97110 THERAPEUTIC EXERCISES: CPT

## 2018-04-13 PROCEDURE — G8985 CARRY GOAL STATUS: HCPCS

## 2018-04-13 PROCEDURE — G8984 CARRY CURRENT STATUS: HCPCS

## 2018-04-13 PROCEDURE — G0283 ELEC STIM OTHER THAN WOUND: HCPCS

## 2018-04-17 ENCOUNTER — HOSPITAL ENCOUNTER (OUTPATIENT)
Dept: PHYSICAL THERAPY | Facility: CLINIC | Age: 53
Setting detail: THERAPIES SERIES
Discharge: HOME | End: 2018-04-17
Payer: COMMERCIAL

## 2018-04-17 PROCEDURE — G0283 ELEC STIM OTHER THAN WOUND: HCPCS

## 2018-04-17 PROCEDURE — 97110 THERAPEUTIC EXERCISES: CPT

## 2018-04-19 ENCOUNTER — HOSPITAL ENCOUNTER (OUTPATIENT)
Dept: PHYSICAL THERAPY | Facility: CLINIC | Age: 53
Setting detail: THERAPIES SERIES
Discharge: HOME | End: 2018-04-19
Payer: COMMERCIAL

## 2018-04-19 PROCEDURE — G8986 CARRY D/C STATUS: HCPCS

## 2018-04-19 PROCEDURE — G8985 CARRY GOAL STATUS: HCPCS

## 2018-04-19 PROCEDURE — 97110 THERAPEUTIC EXERCISES: CPT

## 2018-04-19 PROCEDURE — G0283 ELEC STIM OTHER THAN WOUND: HCPCS

## 2018-04-19 PROCEDURE — 97140 MANUAL THERAPY 1/> REGIONS: CPT

## 2018-05-02 RX ORDER — ATORVASTATIN CALCIUM 40 MG/1
40 TABLET, FILM COATED ORAL DAILY
Qty: 90 TABLET | Refills: 0 | Status: SHIPPED | OUTPATIENT
Start: 2018-05-02 | End: 2018-07-09 | Stop reason: SDUPTHER

## 2018-05-07 DIAGNOSIS — M47.816 SPONDYLOSIS OF LUMBAR REGION WITHOUT MYELOPATHY OR RADICULOPATHY: ICD-10-CM

## 2018-05-07 RX ORDER — OXYCODONE HYDROCHLORIDE AND ACETAMINOPHEN 5; 325 MG/1; MG/1
1 TABLET ORAL 2 TIMES DAILY PRN
Qty: 60 TABLET | Refills: 0 | Status: SHIPPED | OUTPATIENT
Start: 2018-05-07 | End: 2018-06-11 | Stop reason: SDUPTHER

## 2018-06-01 ENCOUNTER — OFFICE VISIT (OUTPATIENT)
Dept: INTERNAL MEDICINE CLINIC | Age: 53
End: 2018-06-01
Payer: COMMERCIAL

## 2018-06-01 VITALS
WEIGHT: 267.2 LBS | HEIGHT: 65 IN | BODY MASS INDEX: 44.52 KG/M2 | OXYGEN SATURATION: 98 % | HEART RATE: 80 BPM | SYSTOLIC BLOOD PRESSURE: 128 MMHG | DIASTOLIC BLOOD PRESSURE: 80 MMHG | RESPIRATION RATE: 20 BRPM

## 2018-06-01 DIAGNOSIS — J45.20 MILD INTERMITTENT ASTHMA WITHOUT COMPLICATION: ICD-10-CM

## 2018-06-01 DIAGNOSIS — E66.01 MORBID OBESITY WITH BMI OF 40.0-44.9, ADULT (HCC): ICD-10-CM

## 2018-06-01 DIAGNOSIS — E78.49 OTHER HYPERLIPIDEMIA: ICD-10-CM

## 2018-06-01 DIAGNOSIS — M75.82 ROTATOR CUFF TENDONITIS, LEFT: ICD-10-CM

## 2018-06-01 DIAGNOSIS — I10 ESSENTIAL HYPERTENSION: ICD-10-CM

## 2018-06-01 DIAGNOSIS — M47.816 SPONDYLOSIS OF LUMBAR REGION WITHOUT MYELOPATHY OR RADICULOPATHY: Chronic | ICD-10-CM

## 2018-06-01 DIAGNOSIS — E11.9 CONTROLLED TYPE 2 DIABETES MELLITUS WITHOUT COMPLICATION, WITHOUT LONG-TERM CURRENT USE OF INSULIN (HCC): Primary | ICD-10-CM

## 2018-06-01 DIAGNOSIS — B18.2 CHRONIC HEPATITIS C WITHOUT HEPATIC COMA (HCC): ICD-10-CM

## 2018-06-01 DIAGNOSIS — Z79.891 CHRONIC USE OF OPIATE DRUGS THERAPEUTIC PURPOSES: ICD-10-CM

## 2018-06-01 PROCEDURE — 99214 OFFICE O/P EST MOD 30 MIN: CPT | Performed by: FAMILY MEDICINE

## 2018-06-01 ASSESSMENT — ENCOUNTER SYMPTOMS
EYES NEGATIVE: 1
RESPIRATORY NEGATIVE: 1
GASTROINTESTINAL NEGATIVE: 1
BACK PAIN: 1
ALLERGIC/IMMUNOLOGIC NEGATIVE: 1

## 2018-06-11 ENCOUNTER — INITIAL CONSULT (OUTPATIENT)
Dept: PAIN MANAGEMENT | Age: 53
End: 2018-06-11
Payer: COMMERCIAL

## 2018-06-11 VITALS
HEIGHT: 65 IN | RESPIRATION RATE: 16 BRPM | DIASTOLIC BLOOD PRESSURE: 75 MMHG | WEIGHT: 267 LBS | BODY MASS INDEX: 44.48 KG/M2 | SYSTOLIC BLOOD PRESSURE: 129 MMHG | OXYGEN SATURATION: 100 % | HEART RATE: 98 BPM

## 2018-06-11 DIAGNOSIS — Z79.891 CHRONIC USE OF OPIATE DRUGS THERAPEUTIC PURPOSES: ICD-10-CM

## 2018-06-11 DIAGNOSIS — M47.816 SPONDYLOSIS OF LUMBAR REGION WITHOUT MYELOPATHY OR RADICULOPATHY: ICD-10-CM

## 2018-06-11 DIAGNOSIS — M54.16 LUMBAR RADICULITIS: Primary | Chronic | ICD-10-CM

## 2018-06-11 PROCEDURE — 99214 OFFICE O/P EST MOD 30 MIN: CPT | Performed by: NURSE PRACTITIONER

## 2018-06-11 RX ORDER — OXYCODONE HYDROCHLORIDE AND ACETAMINOPHEN 5; 325 MG/1; MG/1
1 TABLET ORAL 2 TIMES DAILY PRN
Qty: 60 TABLET | Refills: 0 | Status: SHIPPED | OUTPATIENT
Start: 2018-06-11 | End: 2018-07-11 | Stop reason: SDUPTHER

## 2018-06-11 ASSESSMENT — ENCOUNTER SYMPTOMS
COUGH: 0
BACK PAIN: 1
CONSTIPATION: 0
SHORTNESS OF BREATH: 0

## 2018-07-09 DIAGNOSIS — M47.816 SPONDYLOSIS OF LUMBAR REGION WITHOUT MYELOPATHY OR RADICULOPATHY: ICD-10-CM

## 2018-07-09 RX ORDER — OXYCODONE HYDROCHLORIDE AND ACETAMINOPHEN 5; 325 MG/1; MG/1
1 TABLET ORAL 2 TIMES DAILY PRN
Qty: 60 TABLET | Refills: 0 | Status: CANCELLED | OUTPATIENT
Start: 2018-07-10 | End: 2018-08-09

## 2018-07-09 NOTE — TELEPHONE ENCOUNTER
From: Dimitrios Tipton  Sent: 7/9/2018 9:16 AM EDT  Subject: Medication Renewal Request    Dimitrios Tipton would like a refill of the following medications:     lisinopril-hydrochlorothiazide (PRINZIDE;ZESTORETIC) 10-12.5 MG per tablet Tariq Trujillo MD]     atorvastatin (LIPITOR) 40 MG tablet Tariq Trujillo MD]    Preferred [facility] [address] [facility] [address] - P [phone] - F [phone]        Medication renewals requested in this message routed separately:     oxyCODONE-acetaminophen (PERCOCET) 5-325 MG per tablet Brian Matute, APRN - CNP]

## 2018-07-09 NOTE — TELEPHONE ENCOUNTER
Dania Serum is requesting a refill on the following medications:   Requested Prescriptions     Pending Prescriptions Disp Refills    oxyCODONE-acetaminophen (PERCOCET) 5-325 MG per tablet 60 tablet 0     Sig: Take 1 tablet by mouth 2 times daily as needed for Pain (DO NOT FILL BEFORE 07/10/18) for up to 30 days. Nicanor Kayser Date: 7/10/18       Last OV 6/11 with Katheryn Lao NP and has appt on 8/8 with Dr July Prabhakar  Date Time Provider Chelly Aviles   8/8/2018 8:30 AM Meryle Sax, MD [facility]   10/2/2018 8:30 AM Daija Leon MD Adult pc Elise Po report sent to Dr. Mehran Augustine through alternative route for review.   Ov

## 2018-07-10 RX ORDER — ATORVASTATIN CALCIUM 40 MG/1
40 TABLET, FILM COATED ORAL DAILY
Qty: 90 TABLET | Refills: 1 | Status: SHIPPED | OUTPATIENT
Start: 2018-07-10 | End: 2019-04-02 | Stop reason: SDUPTHER

## 2018-07-10 RX ORDER — LISINOPRIL AND HYDROCHLOROTHIAZIDE 12.5; 1 MG/1; MG/1
1 TABLET ORAL DAILY
Qty: 90 TABLET | Refills: 1 | Status: SHIPPED | OUTPATIENT
Start: 2018-07-10 | End: 2019-04-02 | Stop reason: SDUPTHER

## 2018-07-11 ENCOUNTER — OFFICE VISIT (OUTPATIENT)
Dept: PAIN MANAGEMENT | Age: 53
End: 2018-07-11
Payer: COMMERCIAL

## 2018-07-11 VITALS
OXYGEN SATURATION: 98 % | BODY MASS INDEX: 43.99 KG/M2 | HEIGHT: 65 IN | HEART RATE: 77 BPM | DIASTOLIC BLOOD PRESSURE: 94 MMHG | WEIGHT: 264 LBS | SYSTOLIC BLOOD PRESSURE: 148 MMHG

## 2018-07-11 DIAGNOSIS — E66.01 MORBID OBESITY WITH BMI OF 40.0-44.9, ADULT (HCC): ICD-10-CM

## 2018-07-11 DIAGNOSIS — M47.816 SPONDYLOSIS OF LUMBAR REGION WITHOUT MYELOPATHY OR RADICULOPATHY: Primary | Chronic | ICD-10-CM

## 2018-07-11 DIAGNOSIS — Z79.891 CHRONIC USE OF OPIATE DRUGS THERAPEUTIC PURPOSES: ICD-10-CM

## 2018-07-11 PROCEDURE — 99214 OFFICE O/P EST MOD 30 MIN: CPT | Performed by: ANESTHESIOLOGY

## 2018-07-11 RX ORDER — OXYCODONE HYDROCHLORIDE AND ACETAMINOPHEN 5; 325 MG/1; MG/1
1 TABLET ORAL 2 TIMES DAILY PRN
Qty: 60 TABLET | Refills: 0 | Status: SHIPPED | OUTPATIENT
Start: 2018-07-11 | End: 2018-08-08 | Stop reason: SDUPTHER

## 2018-07-11 ASSESSMENT — ENCOUNTER SYMPTOMS
BACK PAIN: 1
CONSTIPATION: 0
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
## 2018-08-06 ENCOUNTER — HOSPITAL ENCOUNTER (OUTPATIENT)
Age: 53
Setting detail: OUTPATIENT SURGERY
Discharge: HOME | End: 2018-08-06
Attending: ANESTHESIOLOGY | Admitting: ANESTHESIOLOGY
Payer: COMMERCIAL

## 2018-08-06 ENCOUNTER — APPOINTMENT (OUTPATIENT)
Dept: GENERAL RADIOLOGY | Age: 53
End: 2018-08-06
Attending: ANESTHESIOLOGY
Payer: COMMERCIAL

## 2018-08-06 VITALS
BODY MASS INDEX: 43.35 KG/M2 | SYSTOLIC BLOOD PRESSURE: 127 MMHG | TEMPERATURE: 98.3 F | DIASTOLIC BLOOD PRESSURE: 78 MMHG | OXYGEN SATURATION: 98 % | HEART RATE: 84 BPM | WEIGHT: 260.2 LBS | HEIGHT: 65 IN | RESPIRATION RATE: 16 BRPM

## 2018-08-06 LAB — GLUCOSE BLD-MCNC: 91 MG/DL (ref 75–110)

## 2018-08-06 PROCEDURE — 82947 ASSAY GLUCOSE BLOOD QUANT: CPT

## 2018-08-06 PROCEDURE — 64635 DESTROY LUMB/SAC FACET JNT: CPT | Performed by: ANESTHESIOLOGY

## 2018-08-06 PROCEDURE — 64636 DESTROY L/S FACET JNT ADDL: CPT | Performed by: ANESTHESIOLOGY

## 2018-08-06 PROCEDURE — 3600000054 HC PAIN LEVEL 3 BASE: Performed by: ANESTHESIOLOGY

## 2018-08-06 PROCEDURE — 3600000055 HC PAIN LEVEL 3 ADDL 15 MIN: Performed by: ANESTHESIOLOGY

## 2018-08-06 PROCEDURE — 3209999900 FLUORO FOR SURGICAL PROCEDURES

## 2018-08-06 PROCEDURE — 6360000002 HC RX W HCPCS: Performed by: ANESTHESIOLOGY

## 2018-08-06 PROCEDURE — 7100000011 HC PHASE II RECOVERY - ADDTL 15 MIN: Performed by: ANESTHESIOLOGY

## 2018-08-06 PROCEDURE — 99152 MOD SED SAME PHYS/QHP 5/>YRS: CPT | Performed by: ANESTHESIOLOGY

## 2018-08-06 PROCEDURE — 7100000010 HC PHASE II RECOVERY - FIRST 15 MIN: Performed by: ANESTHESIOLOGY

## 2018-08-06 PROCEDURE — 2709999900 HC NON-CHARGEABLE SUPPLY: Performed by: ANESTHESIOLOGY

## 2018-08-06 PROCEDURE — 99153 MOD SED SAME PHYS/QHP EA: CPT | Performed by: ANESTHESIOLOGY

## 2018-08-06 PROCEDURE — 2500000003 HC RX 250 WO HCPCS: Performed by: ANESTHESIOLOGY

## 2018-08-06 RX ORDER — MIDAZOLAM HYDROCHLORIDE 1 MG/ML
INJECTION INTRAMUSCULAR; INTRAVENOUS PRN
Status: DISCONTINUED | OUTPATIENT
Start: 2018-08-06 | End: 2018-08-06 | Stop reason: HOSPADM

## 2018-08-06 RX ORDER — SODIUM CHLORIDE 0.9 % (FLUSH) 0.9 %
10 SYRINGE (ML) INJECTION EVERY 12 HOURS SCHEDULED
Status: DISCONTINUED | OUTPATIENT
Start: 2018-08-06 | End: 2018-08-06 | Stop reason: HOSPADM

## 2018-08-06 RX ORDER — SODIUM CHLORIDE 0.9 % (FLUSH) 0.9 %
10 SYRINGE (ML) INJECTION EVERY 12 HOURS SCHEDULED
Status: DISCONTINUED | OUTPATIENT
Start: 2018-08-06 | End: 2018-08-06 | Stop reason: SDUPTHER

## 2018-08-06 RX ORDER — LIDOCAINE HYDROCHLORIDE 10 MG/ML
INJECTION, SOLUTION INFILTRATION; PERINEURAL PRN
Status: DISCONTINUED | OUTPATIENT
Start: 2018-08-06 | End: 2018-08-06 | Stop reason: HOSPADM

## 2018-08-06 RX ORDER — FENTANYL CITRATE 50 UG/ML
INJECTION, SOLUTION INTRAMUSCULAR; INTRAVENOUS PRN
Status: DISCONTINUED | OUTPATIENT
Start: 2018-08-06 | End: 2018-08-06 | Stop reason: HOSPADM

## 2018-08-06 RX ORDER — SODIUM CHLORIDE 0.9 % (FLUSH) 0.9 %
10 SYRINGE (ML) INJECTION PRN
Status: DISCONTINUED | OUTPATIENT
Start: 2018-08-06 | End: 2018-08-06 | Stop reason: SDUPTHER

## 2018-08-06 RX ORDER — LIDOCAINE HYDROCHLORIDE 40 MG/ML
INJECTION, SOLUTION RETROBULBAR; TOPICAL PRN
Status: DISCONTINUED | OUTPATIENT
Start: 2018-08-06 | End: 2018-08-06 | Stop reason: HOSPADM

## 2018-08-06 RX ORDER — SODIUM CHLORIDE 0.9 % (FLUSH) 0.9 %
10 SYRINGE (ML) INJECTION PRN
Status: DISCONTINUED | OUTPATIENT
Start: 2018-08-06 | End: 2018-08-06 | Stop reason: HOSPADM

## 2018-08-06 ASSESSMENT — PAIN DESCRIPTION - LOCATION: LOCATION: BACK;LEG

## 2018-08-06 ASSESSMENT — PAIN SCALES - GENERAL
PAINLEVEL_OUTOF10: 0
PAINLEVEL_OUTOF10: 8
PAINLEVEL_OUTOF10: 0
PAINLEVEL_OUTOF10: 0

## 2018-08-06 ASSESSMENT — PAIN DESCRIPTION - PAIN TYPE: TYPE: CHRONIC PAIN

## 2018-08-06 ASSESSMENT — PAIN DESCRIPTION - ORIENTATION: ORIENTATION: LEFT

## 2018-08-06 NOTE — H&P
percocet for pain  No side effects  Helps with daily life activity tolerance     Past Medical History        Past Medical History:   Diagnosis Date    Asthma      Diabetes mellitus ([facility] [address]      Hyperlipidemia      Hypertension      Migraine      Neuropathy (HCC)      Positive FIT (fecal immunochemical test)      Renal failure           Past Surgical History         Past Surgical History:   Procedure Laterality Date    ANESTHESIA NERVE BLOCK Bilateral 9/18/2017     NERVE BLOCK BILATERAL MEDIAL BRANCH L2-L5 performed by Gema Uribe MD at [facility] Bilateral 10/17/2017     NERVE BLOCK BILATERAL MEDIAL BRANCH L2-L5 performed by Gema Uribe MD at [address] [place] [address]   03/08/2017    COLONOSCOPY   03/08/2017     internal hemorrhoids; mild diverticulosis    FIBULA FRACTURE SURGERY Left      LEG SURGERY Right      NERVE BLOCK N/A 10/24/2016     lumbar COLETTE    NERVE BLOCK Right 11/21/2016     lumbar COLETTE    OTHER SURGICAL HISTORY Right 01/26/2017     right hip steroid injection              Allergies   Allergen Reactions    Amitriptyline Anaphylaxis    Paxil [Paroxetine Hcl] Other (See Comments)         Review of Systems   Gastrointestinal: Negative for constipation. Musculoskeletal: Positive for back pain. Neurological: Positive for weakness. Psychiatric/Behavioral: Negative.          Objective:   Physical Exam   Constitutional: He is oriented to person, place, and time. He appears well-developed. No distress. HENT:   Head: Normocephalic and atraumatic. Cardiovascular: Normal rate. Pulmonary/Chest: Effort normal.   Musculoskeletal:        Lumbar back: He exhibits decreased range of motion, tenderness, pain and spasm. + TTP OVER BILATERAL LUMBAR PARA SPINAL AREA  + VE FACET LOADING   - VE SLR   Neurological: He is alert and oriented to person, place, and time. He has normal strength.  Gait normal.   Psychiatric: He has a normal mood and

## 2018-08-08 ENCOUNTER — OFFICE VISIT (OUTPATIENT)
Dept: PAIN MANAGEMENT | Age: 53
End: 2018-08-08

## 2018-08-08 VITALS
TEMPERATURE: 96.8 F | HEIGHT: 65 IN | RESPIRATION RATE: 16 BRPM | DIASTOLIC BLOOD PRESSURE: 71 MMHG | HEART RATE: 71 BPM | OXYGEN SATURATION: 98 % | WEIGHT: 260 LBS | SYSTOLIC BLOOD PRESSURE: 117 MMHG | BODY MASS INDEX: 43.32 KG/M2

## 2018-08-08 DIAGNOSIS — M47.816 SPONDYLOSIS OF LUMBAR REGION WITHOUT MYELOPATHY OR RADICULOPATHY: Chronic | ICD-10-CM

## 2018-08-08 DIAGNOSIS — Z79.891 CHRONIC USE OF OPIATE DRUGS THERAPEUTIC PURPOSES: ICD-10-CM

## 2018-08-08 DIAGNOSIS — E66.01 MORBID OBESITY WITH BMI OF 40.0-44.9, ADULT (HCC): ICD-10-CM

## 2018-08-08 DIAGNOSIS — M54.16 LUMBAR RADICULITIS: Primary | Chronic | ICD-10-CM

## 2018-08-08 PROCEDURE — 99024 POSTOP FOLLOW-UP VISIT: CPT | Performed by: ANESTHESIOLOGY

## 2018-08-08 RX ORDER — OXYCODONE HYDROCHLORIDE AND ACETAMINOPHEN 5; 325 MG/1; MG/1
1 TABLET ORAL 2 TIMES DAILY PRN
Qty: 60 TABLET | Refills: 0 | Status: SHIPPED | OUTPATIENT
Start: 2018-08-08 | End: 2018-09-07 | Stop reason: SDUPTHER

## 2018-08-08 ASSESSMENT — ENCOUNTER SYMPTOMS
BACK PAIN: 1
DIARRHEA: 1
RESPIRATORY NEGATIVE: 1
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
## 2018-08-13 ENCOUNTER — APPOINTMENT (OUTPATIENT)
Dept: GENERAL RADIOLOGY | Age: 53
End: 2018-08-13
Attending: ANESTHESIOLOGY
Payer: COMMERCIAL

## 2018-08-13 ENCOUNTER — HOSPITAL ENCOUNTER (OUTPATIENT)
Age: 53
Setting detail: OUTPATIENT SURGERY
Discharge: HOME | End: 2018-08-13
Attending: ANESTHESIOLOGY | Admitting: ANESTHESIOLOGY
Payer: COMMERCIAL

## 2018-08-13 VITALS
RESPIRATION RATE: 16 BRPM | SYSTOLIC BLOOD PRESSURE: 126 MMHG | BODY MASS INDEX: 43.32 KG/M2 | DIASTOLIC BLOOD PRESSURE: 78 MMHG | OXYGEN SATURATION: 100 % | HEIGHT: 65 IN | TEMPERATURE: 98.4 F | HEART RATE: 70 BPM | WEIGHT: 260 LBS

## 2018-08-13 LAB — GLUCOSE BLD-MCNC: 94 MG/DL (ref 75–110)

## 2018-08-13 PROCEDURE — 99153 MOD SED SAME PHYS/QHP EA: CPT | Performed by: ANESTHESIOLOGY

## 2018-08-13 PROCEDURE — 82947 ASSAY GLUCOSE BLOOD QUANT: CPT

## 2018-08-13 PROCEDURE — 3600000054 HC PAIN LEVEL 3 BASE: Performed by: ANESTHESIOLOGY

## 2018-08-13 PROCEDURE — 2709999900 HC NON-CHARGEABLE SUPPLY: Performed by: ANESTHESIOLOGY

## 2018-08-13 PROCEDURE — 64635 DESTROY LUMB/SAC FACET JNT: CPT | Performed by: ANESTHESIOLOGY

## 2018-08-13 PROCEDURE — 64636 DESTROY L/S FACET JNT ADDL: CPT | Performed by: ANESTHESIOLOGY

## 2018-08-13 PROCEDURE — 99152 MOD SED SAME PHYS/QHP 5/>YRS: CPT | Performed by: ANESTHESIOLOGY

## 2018-08-13 PROCEDURE — 3600000055 HC PAIN LEVEL 3 ADDL 15 MIN: Performed by: ANESTHESIOLOGY

## 2018-08-13 PROCEDURE — 7100000011 HC PHASE II RECOVERY - ADDTL 15 MIN: Performed by: ANESTHESIOLOGY

## 2018-08-13 PROCEDURE — 3209999900 FLUORO FOR SURGICAL PROCEDURES

## 2018-08-13 PROCEDURE — 6360000002 HC RX W HCPCS: Performed by: ANESTHESIOLOGY

## 2018-08-13 PROCEDURE — 7100000010 HC PHASE II RECOVERY - FIRST 15 MIN: Performed by: ANESTHESIOLOGY

## 2018-08-13 PROCEDURE — 2500000003 HC RX 250 WO HCPCS: Performed by: ANESTHESIOLOGY

## 2018-08-13 RX ORDER — SODIUM CHLORIDE 0.9 % (FLUSH) 0.9 %
10 SYRINGE (ML) INJECTION PRN
Status: DISCONTINUED | OUTPATIENT
Start: 2018-08-13 | End: 2018-08-13 | Stop reason: HOSPADM

## 2018-08-13 RX ORDER — SODIUM CHLORIDE 0.9 % (FLUSH) 0.9 %
10 SYRINGE (ML) INJECTION EVERY 12 HOURS SCHEDULED
Status: DISCONTINUED | OUTPATIENT
Start: 2018-08-13 | End: 2018-08-13 | Stop reason: HOSPADM

## 2018-08-13 RX ORDER — MIDAZOLAM HYDROCHLORIDE 1 MG/ML
INJECTION INTRAMUSCULAR; INTRAVENOUS PRN
Status: DISCONTINUED | OUTPATIENT
Start: 2018-08-13 | End: 2018-08-13 | Stop reason: HOSPADM

## 2018-08-13 RX ORDER — LIDOCAINE HYDROCHLORIDE 40 MG/ML
INJECTION, SOLUTION RETROBULBAR; TOPICAL PRN
Status: DISCONTINUED | OUTPATIENT
Start: 2018-08-13 | End: 2018-08-13 | Stop reason: HOSPADM

## 2018-08-13 RX ORDER — KETOROLAC TROMETHAMINE 30 MG/ML
INJECTION, SOLUTION INTRAMUSCULAR; INTRAVENOUS PRN
Status: DISCONTINUED | OUTPATIENT
Start: 2018-08-13 | End: 2018-08-13 | Stop reason: HOSPADM

## 2018-08-13 RX ORDER — LIDOCAINE HYDROCHLORIDE 10 MG/ML
INJECTION, SOLUTION INFILTRATION; PERINEURAL PRN
Status: DISCONTINUED | OUTPATIENT
Start: 2018-08-13 | End: 2018-08-13 | Stop reason: HOSPADM

## 2018-08-13 RX ORDER — FENTANYL CITRATE 50 UG/ML
INJECTION, SOLUTION INTRAMUSCULAR; INTRAVENOUS PRN
Status: DISCONTINUED | OUTPATIENT
Start: 2018-08-13 | End: 2018-08-13 | Stop reason: HOSPADM

## 2018-08-13 ASSESSMENT — PAIN SCALES - GENERAL
PAINLEVEL_OUTOF10: 0
PAINLEVEL_OUTOF10: 8
PAINLEVEL_OUTOF10: 0
PAINLEVEL_OUTOF10: 0

## 2018-08-13 ASSESSMENT — PAIN DESCRIPTION - DESCRIPTORS: DESCRIPTORS: ACHING

## 2018-08-13 ASSESSMENT — PAIN - FUNCTIONAL ASSESSMENT: PAIN_FUNCTIONAL_ASSESSMENT: 0-10

## 2018-08-13 NOTE — OP NOTE
Preoperative Diagnosis: Lumbar spondylosis w/o myelopathy, chronic low back pain  Postoperative Diagnosis: Lumbar spondylosis w/o myelopathy, chronic low back pain    Procedure Performed:  :Radiofrequency ablation of median branches at the Transverse processes of L3 / L4 / L5 and S1 on Left under fluoroscopic guidance with IV sedation. Procedure:    After starting an IV, the patient was taken to procedure room. The patient was placed in prone position and skin over the back was prepped and draped in sterile manner. Standard monitors were connected and vitals were monitored during the case and they remained stable during the procedure. A meaningful communication was kept up with the patient throughout the procedure. Then using fluoroscopy the junction of the transverse process of the target vertebra with the superior process of the facet joint was observed and the view was optimized. The skin and deep tissues were infiltrated with 2 ml of  1 % lidocaine. The RF canula with the 10 mm active tip was introduced through the skin wheal under fluoroscopy guidance such that the tip of the needle lies in the groove of the transverse process with the superior processes of the facet joint. Then a lateral and AP view of the lumbar spine was obtained to make sure the tip of needle is not in the neural foramen. Then electric impedence was checked to make sure it is acceptable. Then a sensory stimulus was applied at 50 Hz up to 0.5 volt and concordant pain symptoms were reproduced. Then a motor stimulus was applied at 2 Hz up to 2 volts or 3 x times the sensory stimulus and no motor stimulation was seen in lower extremities. Some multifidus stimulus was seen. Then after negative aspiration 1 ml of 4% lidocaine was injected through the needle at each level. The radiofrequency lesion was done at 85 degrees centigrade for 120 seconds.       For L5 median branch block the junction of the ala of  the sacrum with the
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
## 2018-09-07 ENCOUNTER — OFFICE VISIT (OUTPATIENT)
Dept: PAIN MANAGEMENT | Age: 53
End: 2018-09-07
Payer: COMMERCIAL

## 2018-09-07 VITALS
DIASTOLIC BLOOD PRESSURE: 83 MMHG | HEIGHT: 65 IN | TEMPERATURE: 97 F | SYSTOLIC BLOOD PRESSURE: 120 MMHG | RESPIRATION RATE: 16 BRPM | BODY MASS INDEX: 43.32 KG/M2 | HEART RATE: 77 BPM | OXYGEN SATURATION: 97 % | WEIGHT: 260 LBS

## 2018-09-07 DIAGNOSIS — Z79.891 CHRONIC USE OF OPIATE DRUGS THERAPEUTIC PURPOSES: ICD-10-CM

## 2018-09-07 DIAGNOSIS — M47.816 SPONDYLOSIS OF LUMBAR REGION WITHOUT MYELOPATHY OR RADICULOPATHY: Chronic | ICD-10-CM

## 2018-09-07 DIAGNOSIS — E66.01 MORBID OBESITY WITH BMI OF 40.0-44.9, ADULT (HCC): ICD-10-CM

## 2018-09-07 DIAGNOSIS — G89.29 ENCOUNTER FOR CHRONIC PAIN MANAGEMENT: Primary | ICD-10-CM

## 2018-09-07 PROCEDURE — 99213 OFFICE O/P EST LOW 20 MIN: CPT | Performed by: ANESTHESIOLOGY

## 2018-09-07 RX ORDER — OXYCODONE HYDROCHLORIDE AND ACETAMINOPHEN 5; 325 MG/1; MG/1
1 TABLET ORAL 2 TIMES DAILY PRN
Qty: 60 TABLET | Refills: 0 | Status: SHIPPED | OUTPATIENT
Start: 2018-09-07 | End: 2018-10-01 | Stop reason: SDUPTHER

## 2018-09-07 ASSESSMENT — ENCOUNTER SYMPTOMS
BACK PAIN: 1
RESPIRATORY NEGATIVE: 1

## 2018-10-01 ENCOUNTER — OFFICE VISIT (OUTPATIENT)
Dept: PAIN MANAGEMENT | Age: 53
End: 2018-10-01
Payer: COMMERCIAL

## 2018-10-01 VITALS
BODY MASS INDEX: 43.15 KG/M2 | RESPIRATION RATE: 16 BRPM | SYSTOLIC BLOOD PRESSURE: 142 MMHG | WEIGHT: 259 LBS | HEART RATE: 85 BPM | DIASTOLIC BLOOD PRESSURE: 81 MMHG | HEIGHT: 65 IN | OXYGEN SATURATION: 96 %

## 2018-10-01 DIAGNOSIS — M47.816 SPONDYLOSIS OF LUMBAR REGION WITHOUT MYELOPATHY OR RADICULOPATHY: Chronic | ICD-10-CM

## 2018-10-01 DIAGNOSIS — Z79.891 CHRONIC USE OF OPIATE DRUGS THERAPEUTIC PURPOSES: Primary | ICD-10-CM

## 2018-10-01 PROBLEM — B18.2 CHRONIC HEPATITIS C WITHOUT HEPATIC COMA (HCC): Status: RESOLVED | Noted: 2017-08-04 | Resolved: 2018-10-01

## 2018-10-01 PROCEDURE — 99213 OFFICE O/P EST LOW 20 MIN: CPT | Performed by: NURSE PRACTITIONER

## 2018-10-01 RX ORDER — OXYCODONE HYDROCHLORIDE AND ACETAMINOPHEN 5; 325 MG/1; MG/1
1 TABLET ORAL 2 TIMES DAILY PRN
Qty: 60 TABLET | Refills: 0 | Status: SHIPPED | OUTPATIENT
Start: 2018-10-07 | End: 2018-10-29 | Stop reason: SDUPTHER

## 2018-10-01 ASSESSMENT — ENCOUNTER SYMPTOMS
RESPIRATORY NEGATIVE: 1
BACK PAIN: 1

## 2018-10-01 NOTE — PROGRESS NOTES
Subjective:      Patient ID: Rocio Mccarthy is a 52 y.o. male. Back Pain   This is a chronic problem. The current episode started more than 1 year ago. The problem occurs constantly. The problem is unchanged. The pain is present in the lumbar spine. The quality of the pain is described as aching. The pain does not radiate. The pain is at a severity of 9/10. The pain is worse during the day. The symptoms are aggravated by bending, sitting and twisting. Risk factors include lack of exercise and obesity. He has tried analgesics (injections) for the symptoms. The treatment provided mild relief. Chief Complaint: back pain   Medication Refill: percocet     Pain score Today:  9  Adverse effects (Constipation / Nausea / Sedation / sexual Dysfunction / others) : No   Mood: good  Sleep pattern and quality: poor  Activity level: poor    Pill count Today: no patient did not bring but states he has 13 tablets at home. Instructed to bring medications to appt   Last dose taken: 10/1/18  OARRS report reviewed today: yes  ER/Hospitalizations/PCP visit related to pain since last visit: No    Any legal problems e.g. DUI etc.:No  Satisfied with current management: Yes     Opioid Contract:4/12/17  Last Urine Dug screen dated:7/11/18    Lab Results   Component Value Date    LABA1C 6.0 06/01/2017     Lab Results   Component Value Date     10/26/2016       Past Medical History, Past Surgical History, Social History, Allergies and Medications reviewed and updated in EPIC as indicated      Review of Systems   Constitutional: Negative. HENT: Negative. Respiratory: Negative. Musculoskeletal: Positive for arthralgias, back pain and myalgias. Negative for gait problem, joint swelling, neck pain and neck stiffness. Objective:   Physical Exam   Constitutional: He is oriented to person, place, and time. He appears well-developed. Cardiovascular: Normal rate.     Pulmonary/Chest: Effort normal.   Abdominal: Normal

## 2018-10-02 ENCOUNTER — HOSPITAL ENCOUNTER (OUTPATIENT)
Age: 53
Setting detail: SPECIMEN
Discharge: HOME | End: 2018-10-02
Payer: COMMERCIAL

## 2018-10-02 ENCOUNTER — OFFICE VISIT (OUTPATIENT)
Dept: INTERNAL MEDICINE CLINIC | Age: 53
End: 2018-10-02
Payer: COMMERCIAL

## 2018-10-02 VITALS
HEART RATE: 80 BPM | BODY MASS INDEX: 43.2 KG/M2 | HEIGHT: 65 IN | DIASTOLIC BLOOD PRESSURE: 80 MMHG | OXYGEN SATURATION: 98 % | RESPIRATION RATE: 20 BRPM | SYSTOLIC BLOOD PRESSURE: 130 MMHG | WEIGHT: 259.3 LBS

## 2018-10-02 DIAGNOSIS — M47.816 SPONDYLOSIS OF LUMBAR REGION WITHOUT MYELOPATHY OR RADICULOPATHY: Chronic | ICD-10-CM

## 2018-10-02 DIAGNOSIS — R51.9 DAILY HEADACHE: ICD-10-CM

## 2018-10-02 DIAGNOSIS — E11.9 CONTROLLED TYPE 2 DIABETES MELLITUS WITHOUT COMPLICATION, WITHOUT LONG-TERM CURRENT USE OF INSULIN (HCC): ICD-10-CM

## 2018-10-02 DIAGNOSIS — E11.9 CONTROLLED TYPE 2 DIABETES MELLITUS WITHOUT COMPLICATION, WITHOUT LONG-TERM CURRENT USE OF INSULIN (HCC): Primary | ICD-10-CM

## 2018-10-02 DIAGNOSIS — E78.2 MIXED HYPERLIPIDEMIA: ICD-10-CM

## 2018-10-02 DIAGNOSIS — Z79.891 CHRONIC USE OF OPIATE DRUGS THERAPEUTIC PURPOSES: ICD-10-CM

## 2018-10-02 DIAGNOSIS — I10 ESSENTIAL HYPERTENSION: ICD-10-CM

## 2018-10-02 DIAGNOSIS — J45.40 MODERATE PERSISTENT ASTHMA WITHOUT COMPLICATION: ICD-10-CM

## 2018-10-02 LAB
-: ABNORMAL
ALBUMIN SERPL-MCNC: 4.3 G/DL (ref 3.5–5.2)
ALBUMIN/GLOBULIN RATIO: 1.3 (ref 1–2.5)
ALP BLD-CCNC: 46 U/L (ref 40–129)
ALT SERPL-CCNC: 20 U/L (ref 5–41)
AMORPHOUS: ABNORMAL
ANION GAP SERPL CALCULATED.3IONS-SCNC: 12 MMOL/L (ref 9–17)
AST SERPL-CCNC: 21 U/L
BACTERIA: ABNORMAL
BILIRUB SERPL-MCNC: 0.37 MG/DL (ref 0.3–1.2)
BILIRUBIN URINE: NEGATIVE
BUN BLDV-MCNC: 13 MG/DL (ref 6–20)
BUN/CREAT BLD: NORMAL (ref 9–20)
CALCIUM SERPL-MCNC: 9.3 MG/DL (ref 8.6–10.4)
CASTS UA: ABNORMAL /LPF (ref 0–8)
CHLORIDE BLD-SCNC: 102 MMOL/L (ref 98–107)
CHOLESTEROL/HDL RATIO: 4.9
CHOLESTEROL: 196 MG/DL
CO2: 24 MMOL/L (ref 20–31)
COLOR: ABNORMAL
CREAT SERPL-MCNC: 0.99 MG/DL (ref 0.7–1.2)
CREATININE URINE: 206.6 MG/DL (ref 39–259)
CRYSTALS, UA: ABNORMAL /HPF
EPITHELIAL CELLS UA: ABNORMAL /HPF (ref 0–5)
ESTIMATED AVERAGE GLUCOSE: 134 MG/DL
GFR AFRICAN AMERICAN: >60 ML/MIN
GFR NON-AFRICAN AMERICAN: >60 ML/MIN
GFR SERPL CREATININE-BSD FRML MDRD: NORMAL ML/MIN/{1.73_M2}
GFR SERPL CREATININE-BSD FRML MDRD: NORMAL ML/MIN/{1.73_M2}
GLUCOSE BLD-MCNC: 99 MG/DL (ref 70–99)
GLUCOSE URINE: NEGATIVE
HBA1C MFR BLD: 6.3 % (ref 4–6)
HCT VFR BLD CALC: 43 % (ref 40.7–50.3)
HDLC SERPL-MCNC: 40 MG/DL
HEMOGLOBIN: 14 G/DL (ref 13–17)
KETONES, URINE: NEGATIVE
LDL CHOLESTEROL: 125 MG/DL (ref 0–130)
LEUKOCYTE ESTERASE, URINE: NEGATIVE
MCH RBC QN AUTO: 29.3 PG (ref 25.2–33.5)
MCHC RBC AUTO-ENTMCNC: 32.6 G/DL (ref 28.4–34.8)
MCV RBC AUTO: 90 FL (ref 82.6–102.9)
MICROALBUMIN/CREAT 24H UR: <12 MG/L
MICROALBUMIN/CREAT UR-RTO: NORMAL MCG/MG CREAT
MUCUS: ABNORMAL
NITRITE, URINE: NEGATIVE
NRBC AUTOMATED: 0 PER 100 WBC
OTHER OBSERVATIONS UA: ABNORMAL
PDW BLD-RTO: 13.4 % (ref 11.8–14.4)
PH UA: 5 (ref 5–8)
PLATELET # BLD: 222 K/UL (ref 138–453)
PMV BLD AUTO: 10.5 FL (ref 8.1–13.5)
POTASSIUM SERPL-SCNC: 4.4 MMOL/L (ref 3.7–5.3)
PROTEIN UA: NEGATIVE
RBC # BLD: 4.78 M/UL (ref 4.21–5.77)
RBC UA: ABNORMAL /HPF (ref 0–4)
RENAL EPITHELIAL, UA: ABNORMAL /HPF
SODIUM BLD-SCNC: 138 MMOL/L (ref 135–144)
SPECIFIC GRAVITY UA: 1.02 (ref 1–1.03)
TOTAL PROTEIN: 7.7 G/DL (ref 6.4–8.3)
TRICHOMONAS: ABNORMAL
TRIGL SERPL-MCNC: 157 MG/DL
TSH SERPL DL<=0.05 MIU/L-ACNC: 1.59 MIU/L (ref 0.3–5)
TURBIDITY: CLEAR
URINE HGB: ABNORMAL
UROBILINOGEN, URINE: NORMAL
VLDLC SERPL CALC-MCNC: ABNORMAL MG/DL (ref 1–30)
WBC # BLD: 7.9 K/UL (ref 3.5–11.3)
WBC UA: ABNORMAL /HPF (ref 0–5)
YEAST: ABNORMAL

## 2018-10-02 PROCEDURE — 99214 OFFICE O/P EST MOD 30 MIN: CPT | Performed by: FAMILY MEDICINE

## 2018-10-02 ASSESSMENT — ENCOUNTER SYMPTOMS
GASTROINTESTINAL NEGATIVE: 1
ALLERGIC/IMMUNOLOGIC NEGATIVE: 1
RESPIRATORY NEGATIVE: 1
BACK PAIN: 1
EYES NEGATIVE: 1

## 2018-10-29 ENCOUNTER — OFFICE VISIT (OUTPATIENT)
Dept: PAIN MANAGEMENT | Age: 53
End: 2018-10-29
Payer: COMMERCIAL

## 2018-10-29 VITALS
OXYGEN SATURATION: 98 % | BODY MASS INDEX: 41.93 KG/M2 | DIASTOLIC BLOOD PRESSURE: 89 MMHG | WEIGHT: 252 LBS | SYSTOLIC BLOOD PRESSURE: 147 MMHG | HEART RATE: 75 BPM

## 2018-10-29 DIAGNOSIS — M54.16 LUMBAR RADICULITIS: Primary | Chronic | ICD-10-CM

## 2018-10-29 DIAGNOSIS — M47.816 SPONDYLOSIS OF LUMBAR REGION WITHOUT MYELOPATHY OR RADICULOPATHY: Chronic | ICD-10-CM

## 2018-10-29 DIAGNOSIS — Z79.891 CHRONIC USE OF OPIATE DRUGS THERAPEUTIC PURPOSES: ICD-10-CM

## 2018-10-29 PROCEDURE — 99214 OFFICE O/P EST MOD 30 MIN: CPT | Performed by: NURSE PRACTITIONER

## 2018-10-29 RX ORDER — OXYCODONE HYDROCHLORIDE AND ACETAMINOPHEN 5; 325 MG/1; MG/1
1 TABLET ORAL 2 TIMES DAILY PRN
Qty: 60 TABLET | Refills: 0 | Status: SHIPPED | OUTPATIENT
Start: 2018-11-07 | End: 2018-12-04 | Stop reason: SDUPTHER

## 2018-10-29 ASSESSMENT — ENCOUNTER SYMPTOMS
STRIDOR: 0
CHOKING: 0
BACK PAIN: 1
APNEA: 0
CHEST TIGHTNESS: 0
WHEEZING: 0
SHORTNESS OF BREATH: 1
COUGH: 1
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
## 2018-11-08 ENCOUNTER — HOSPITAL ENCOUNTER (OUTPATIENT)
Dept: MRI IMAGING | Age: 53
Discharge: HOME | End: 2018-11-10
Payer: COMMERCIAL

## 2018-11-08 DIAGNOSIS — M54.16 LUMBAR RADICULITIS: Chronic | ICD-10-CM

## 2018-11-08 DIAGNOSIS — M47.816 SPONDYLOSIS OF LUMBAR REGION WITHOUT MYELOPATHY OR RADICULOPATHY: Chronic | ICD-10-CM

## 2018-11-08 PROCEDURE — 72148 MRI LUMBAR SPINE W/O DYE: CPT

## 2018-11-15 ENCOUNTER — OFFICE VISIT (OUTPATIENT)
Dept: PAIN MANAGEMENT | Age: 53
End: 2018-11-15
Payer: COMMERCIAL

## 2018-11-15 VITALS
HEART RATE: 95 BPM | DIASTOLIC BLOOD PRESSURE: 88 MMHG | OXYGEN SATURATION: 98 % | SYSTOLIC BLOOD PRESSURE: 142 MMHG | BODY MASS INDEX: 42.65 KG/M2 | HEIGHT: 65 IN | WEIGHT: 256 LBS

## 2018-11-15 DIAGNOSIS — Z79.891 CHRONIC USE OF OPIATE DRUGS THERAPEUTIC PURPOSES: Primary | ICD-10-CM

## 2018-11-15 DIAGNOSIS — M47.817 LUMBOSACRAL SPONDYLOSIS WITHOUT MYELOPATHY: ICD-10-CM

## 2018-11-15 DIAGNOSIS — E66.01 MORBID OBESITY WITH BMI OF 40.0-44.9, ADULT (HCC): ICD-10-CM

## 2018-11-15 DIAGNOSIS — M54.16 RIGHT LUMBAR RADICULITIS: ICD-10-CM

## 2018-11-15 DIAGNOSIS — R93.89 ABNORMAL MRI: ICD-10-CM

## 2018-11-15 PROCEDURE — 99214 OFFICE O/P EST MOD 30 MIN: CPT | Performed by: ANESTHESIOLOGY

## 2018-11-15 RX ORDER — METAXALONE 800 MG/1
800 TABLET ORAL 3 TIMES DAILY
Qty: 30 TABLET | Refills: 0 | Status: SHIPPED | OUTPATIENT
Start: 2018-11-15 | End: 2018-11-25

## 2018-11-15 ASSESSMENT — ENCOUNTER SYMPTOMS
CHOKING: 0
WHEEZING: 1
COUGH: 1
CHEST TIGHTNESS: 0
APNEA: 0
BACK PAIN: 1
STRIDOR: 0
SHORTNESS OF BREATH: 1

## 2018-11-15 NOTE — PROGRESS NOTES
Subjective:      Patient ID: Damián Landry is a 52 y.o. male. HPI   Chief Complaint   Patient presents with    Back Pain    Leg Pain    Results     MRI        History of chronic eczema lower back pain diagnosed with lumbar facet mediated pain  Failed conservative measures with physical therapy in 2017 and 2018  Previous imaging has shown multilevel lumbar facet arthropathy  Patient underwent diagnostic medial branch nerve block with good short-term pain relief and is now status post lumbar medial branch nerve radiofrequency ablation in August 2018  Patient reported 70-80% improvement in lower back pain after the radiofrequency treatment. On chronic opioid therapy  Currently takes oxycodone 5 mA twice a day  Denies any side effect  Takes the medication as prescribed  Automated prescription report is reviewed and is consistent with prescription history. Today presents with flareup of back pain and right leg pain  Onset without any particular injury, located in the lumbar area across midline affecting both sides predominantly on the right lumbosacral area with radiation down the right leg all the way to the foot associated with right leg numbness and tingling. Pain aggravated with standing and walking twisting turning and bending  No changes in bladder or bowel control  Nothing seems to alleviate the pain  Patient has been trying to rest physical therapy at home and medications for last several weeks without any improvement  Pain is markedly disabling  Patient had a recent MRI of lumbar spine and is here to review the results also. Patient is here with back pain, bilateral leg pain today and follow up for MRI results. Review of Systems   Constitutional: Positive for activity change. Negative for appetite change, chills, diaphoresis, fatigue, fever and unexpected weight change. Respiratory: Positive for cough, shortness of breath and wheezing. Negative for apnea, choking, chest tightness and stridor. CONTRAST, 11/8/2018 11:23 am    L4-L5: There is mild prominence of the epidural fat. The thecal sac is triangulated and mildly stenotic measuring 8.7 mm. Disc and/or osteophytes cause minimal narrowing of the neural foramina bilaterally. L5-S1: There prominence of the epidural fat. The thecal sac and S1 nerve roots are intact. Disc and/or osteophytes cause minimal narrowing of the neural foramina bilaterally. 1. Chronic use of opiate drugs therapeutic purposes    2. Morbid obesity with BMI of 40.0-44.9, adult ([facility] [address]    3. Abnormal MRI    4. Lumbosacral spondylosis without myelopathy    5.  Right lumbar radiculitis              Plan:      Recent MRI lumbar spine October 2018  Images were reviewed independently  Report is reviewed  Report is available in Epic  Epidural lipomatosis noticed      Severe right lumbar radiculitis refractory to conservative measures markedly disabling  I think patient can benefit from a lumbar epidural steroid injection    Orders Placed This Encounter   Procedures    [place] INJECT ANES/STEROID FORAMEN LUMBAR/SACRAL W IMG GUIDE ,1 LEVEL     RIGHT L4 AND L5 COLETTE     Standing Status:   Future     Standing Expiration Date:   2/15/2019     Orders Placed This Encounter   Medications    metaxalone (SKELAXIN) 800 MG tablet     Sig: Take 1 tablet by mouth 3 times daily for 10 days     Dispense:  30 tablet     Refill:  0             Pérez Adair MD

## 2018-12-03 ENCOUNTER — HOSPITAL ENCOUNTER (OUTPATIENT)
Age: 53
Setting detail: OUTPATIENT SURGERY
Discharge: HOME | End: 2018-12-03
Attending: ANESTHESIOLOGY | Admitting: ANESTHESIOLOGY
Payer: COMMERCIAL

## 2018-12-03 ENCOUNTER — APPOINTMENT (OUTPATIENT)
Dept: GENERAL RADIOLOGY | Age: 53
End: 2018-12-03
Attending: ANESTHESIOLOGY
Payer: COMMERCIAL

## 2018-12-03 VITALS
DIASTOLIC BLOOD PRESSURE: 84 MMHG | HEIGHT: 65 IN | HEART RATE: 78 BPM | RESPIRATION RATE: 22 BRPM | OXYGEN SATURATION: 96 % | TEMPERATURE: 97 F | BODY MASS INDEX: 44.77 KG/M2 | SYSTOLIC BLOOD PRESSURE: 126 MMHG | WEIGHT: 268.7 LBS

## 2018-12-03 PROBLEM — M54.16 RIGHT LUMBAR RADICULITIS: Chronic | Status: ACTIVE | Noted: 2018-12-03

## 2018-12-03 LAB — GLUCOSE BLD-MCNC: 104 MG/DL (ref 75–110)

## 2018-12-03 PROCEDURE — 7100000011 HC PHASE II RECOVERY - ADDTL 15 MIN: Performed by: ANESTHESIOLOGY

## 2018-12-03 PROCEDURE — 99152 MOD SED SAME PHYS/QHP 5/>YRS: CPT | Performed by: ANESTHESIOLOGY

## 2018-12-03 PROCEDURE — 2500000003 HC RX 250 WO HCPCS: Performed by: ANESTHESIOLOGY

## 2018-12-03 PROCEDURE — 2709999900 HC NON-CHARGEABLE SUPPLY: Performed by: ANESTHESIOLOGY

## 2018-12-03 PROCEDURE — 64484 NJX AA&/STRD TFRM EPI L/S EA: CPT | Performed by: ANESTHESIOLOGY

## 2018-12-03 PROCEDURE — 2580000003 HC RX 258: Performed by: ANESTHESIOLOGY

## 2018-12-03 PROCEDURE — 6360000002 HC RX W HCPCS: Performed by: ANESTHESIOLOGY

## 2018-12-03 PROCEDURE — 6360000004 HC RX CONTRAST MEDICATION: Performed by: ANESTHESIOLOGY

## 2018-12-03 PROCEDURE — 7100000010 HC PHASE II RECOVERY - FIRST 15 MIN: Performed by: ANESTHESIOLOGY

## 2018-12-03 PROCEDURE — 3600000051 HC PAIN LEVEL 1 ADDL 15 MIN: Performed by: ANESTHESIOLOGY

## 2018-12-03 PROCEDURE — 3600000050 HC PAIN LEVEL 1 BASE: Performed by: ANESTHESIOLOGY

## 2018-12-03 PROCEDURE — 82947 ASSAY GLUCOSE BLOOD QUANT: CPT

## 2018-12-03 PROCEDURE — 99153 MOD SED SAME PHYS/QHP EA: CPT | Performed by: ANESTHESIOLOGY

## 2018-12-03 PROCEDURE — 3209999900 FLUORO FOR SURGICAL PROCEDURES

## 2018-12-03 PROCEDURE — 64483 NJX AA&/STRD TFRM EPI L/S 1: CPT | Performed by: ANESTHESIOLOGY

## 2018-12-03 RX ORDER — SODIUM CHLORIDE 0.9 % (FLUSH) 0.9 %
10 SYRINGE (ML) INJECTION PRN
Status: DISCONTINUED | OUTPATIENT
Start: 2018-12-03 | End: 2018-12-03 | Stop reason: HOSPADM

## 2018-12-03 RX ORDER — LIDOCAINE HYDROCHLORIDE 10 MG/ML
INJECTION, SOLUTION INFILTRATION; PERINEURAL PRN
Status: DISCONTINUED | OUTPATIENT
Start: 2018-12-03 | End: 2018-12-03 | Stop reason: HOSPADM

## 2018-12-03 RX ORDER — MIDAZOLAM HYDROCHLORIDE 1 MG/ML
INJECTION INTRAMUSCULAR; INTRAVENOUS PRN
Status: DISCONTINUED | OUTPATIENT
Start: 2018-12-03 | End: 2018-12-03 | Stop reason: HOSPADM

## 2018-12-03 RX ORDER — FENTANYL CITRATE 50 UG/ML
INJECTION, SOLUTION INTRAMUSCULAR; INTRAVENOUS PRN
Status: DISCONTINUED | OUTPATIENT
Start: 2018-12-03 | End: 2018-12-03 | Stop reason: HOSPADM

## 2018-12-03 RX ORDER — SODIUM CHLORIDE 0.9 % (FLUSH) 0.9 %
10 SYRINGE (ML) INJECTION EVERY 12 HOURS SCHEDULED
Status: DISCONTINUED | OUTPATIENT
Start: 2018-12-03 | End: 2018-12-03 | Stop reason: HOSPADM

## 2018-12-03 RX ORDER — DEXAMETHASONE SODIUM PHOSPHATE 10 MG/ML
INJECTION INTRAMUSCULAR; INTRAVENOUS PRN
Status: DISCONTINUED | OUTPATIENT
Start: 2018-12-03 | End: 2018-12-03 | Stop reason: HOSPADM

## 2018-12-03 RX ADMIN — SODIUM CHLORIDE, PRESERVATIVE FREE 10 ML: 5 INJECTION INTRAVENOUS at 09:02

## 2018-12-03 ASSESSMENT — PAIN SCALES - GENERAL
PAINLEVEL_OUTOF10: 0
PAINLEVEL_OUTOF10: 9
PAINLEVEL_OUTOF10: 9
PAINLEVEL_OUTOF10: 0
PAINLEVEL_OUTOF10: 0
PAINLEVEL_OUTOF10: 9
PAINLEVEL_OUTOF10: 9

## 2018-12-03 ASSESSMENT — PAIN DESCRIPTION - DESCRIPTORS: DESCRIPTORS: STABBING;BURNING

## 2018-12-03 ASSESSMENT — PAIN - FUNCTIONAL ASSESSMENT: PAIN_FUNCTIONAL_ASSESSMENT: 0-10

## 2018-12-04 ENCOUNTER — TELEPHONE (OUTPATIENT)
Dept: PAIN MANAGEMENT | Age: 53
End: 2018-12-04

## 2018-12-04 DIAGNOSIS — M47.816 SPONDYLOSIS OF LUMBAR REGION WITHOUT MYELOPATHY OR RADICULOPATHY: Chronic | ICD-10-CM

## 2018-12-04 RX ORDER — OXYCODONE HYDROCHLORIDE AND ACETAMINOPHEN 5; 325 MG/1; MG/1
1 TABLET ORAL 2 TIMES DAILY PRN
Qty: 60 TABLET | Refills: 0 | Status: SHIPPED | OUTPATIENT
Start: 2018-12-04 | End: 2018-12-28 | Stop reason: SDUPTHER

## 2018-12-21 ENCOUNTER — HOSPITAL ENCOUNTER (EMERGENCY)
Age: 53
Discharge: HOME | End: 2018-12-21
Attending: FAMILY MEDICINE
Payer: COMMERCIAL

## 2018-12-21 VITALS
OXYGEN SATURATION: 98 % | WEIGHT: 259 LBS | TEMPERATURE: 98.1 F | HEART RATE: 79 BPM | DIASTOLIC BLOOD PRESSURE: 86 MMHG | HEIGHT: 65 IN | SYSTOLIC BLOOD PRESSURE: 179 MMHG | BODY MASS INDEX: 43.15 KG/M2 | RESPIRATION RATE: 16 BRPM

## 2018-12-21 DIAGNOSIS — S46.911A STRAIN OF RIGHT SHOULDER, INITIAL ENCOUNTER: Primary | ICD-10-CM

## 2018-12-21 PROCEDURE — 99283 EMERGENCY DEPT VISIT LOW MDM: CPT

## 2018-12-21 RX ORDER — ORPHENADRINE CITRATE 100 MG/1
100 TABLET, EXTENDED RELEASE ORAL 2 TIMES DAILY
Qty: 14 TABLET | Refills: 0 | Status: ON HOLD | OUTPATIENT
Start: 2018-12-21 | End: 2019-03-25

## 2018-12-21 ASSESSMENT — ENCOUNTER SYMPTOMS
ALLERGIC/IMMUNOLOGIC NEGATIVE: 1
GASTROINTESTINAL NEGATIVE: 1
EYES NEGATIVE: 1
RESPIRATORY NEGATIVE: 1

## 2018-12-21 ASSESSMENT — PAIN DESCRIPTION - LOCATION: LOCATION: SHOULDER;NECK

## 2018-12-21 ASSESSMENT — PAIN SCALES - GENERAL: PAINLEVEL_OUTOF10: 8

## 2018-12-21 ASSESSMENT — PAIN DESCRIPTION - ORIENTATION: ORIENTATION: LEFT
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
## 2018-12-28 ENCOUNTER — OFFICE VISIT (OUTPATIENT)
Dept: PAIN MANAGEMENT | Age: 53
End: 2018-12-28
Payer: COMMERCIAL

## 2018-12-28 ENCOUNTER — OFFICE VISIT (OUTPATIENT)
Dept: INTERNAL MEDICINE CLINIC | Age: 53
End: 2018-12-28
Payer: COMMERCIAL

## 2018-12-28 VITALS
BODY MASS INDEX: 43.65 KG/M2 | OXYGEN SATURATION: 99 % | RESPIRATION RATE: 16 BRPM | DIASTOLIC BLOOD PRESSURE: 80 MMHG | WEIGHT: 262 LBS | SYSTOLIC BLOOD PRESSURE: 143 MMHG | HEART RATE: 80 BPM | HEIGHT: 65 IN

## 2018-12-28 VITALS
RESPIRATION RATE: 20 BRPM | BODY MASS INDEX: 43.65 KG/M2 | SYSTOLIC BLOOD PRESSURE: 143 MMHG | HEIGHT: 65 IN | HEART RATE: 79 BPM | DIASTOLIC BLOOD PRESSURE: 80 MMHG | OXYGEN SATURATION: 98 % | WEIGHT: 262 LBS

## 2018-12-28 DIAGNOSIS — M21.371 RIGHT FOOT DROP: ICD-10-CM

## 2018-12-28 DIAGNOSIS — E66.01 MORBID OBESITY WITH BMI OF 40.0-44.9, ADULT (HCC): ICD-10-CM

## 2018-12-28 DIAGNOSIS — J45.20 MILD INTERMITTENT ASTHMA WITHOUT COMPLICATION: ICD-10-CM

## 2018-12-28 DIAGNOSIS — E11.9 CONTROLLED TYPE 2 DIABETES MELLITUS WITHOUT COMPLICATION, WITHOUT LONG-TERM CURRENT USE OF INSULIN (HCC): ICD-10-CM

## 2018-12-28 DIAGNOSIS — M47.816 SPONDYLOSIS OF LUMBAR REGION WITHOUT MYELOPATHY OR RADICULOPATHY: Chronic | ICD-10-CM

## 2018-12-28 DIAGNOSIS — I10 ESSENTIAL HYPERTENSION: ICD-10-CM

## 2018-12-28 DIAGNOSIS — Z79.891 CHRONIC USE OF OPIATE DRUGS THERAPEUTIC PURPOSES: Primary | ICD-10-CM

## 2018-12-28 DIAGNOSIS — M54.16 RIGHT LUMBAR RADICULITIS: Chronic | ICD-10-CM

## 2018-12-28 DIAGNOSIS — Z79.891 CHRONIC USE OF OPIATE DRUGS THERAPEUTIC PURPOSES: ICD-10-CM

## 2018-12-28 DIAGNOSIS — M75.82 ROTATOR CUFF TENDONITIS, LEFT: Primary | ICD-10-CM

## 2018-12-28 DIAGNOSIS — E78.2 MIXED HYPERLIPIDEMIA: ICD-10-CM

## 2018-12-28 PROCEDURE — 99214 OFFICE O/P EST MOD 30 MIN: CPT | Performed by: NURSE PRACTITIONER

## 2018-12-28 PROCEDURE — 99214 OFFICE O/P EST MOD 30 MIN: CPT | Performed by: FAMILY MEDICINE

## 2018-12-28 RX ORDER — TIZANIDINE 4 MG/1
4 TABLET ORAL 2 TIMES DAILY
Qty: 28 TABLET | Refills: 0 | Status: SHIPPED | OUTPATIENT
Start: 2018-12-28 | End: 2022-01-13 | Stop reason: SDUPTHER

## 2018-12-28 RX ORDER — OXYCODONE HYDROCHLORIDE AND ACETAMINOPHEN 5; 325 MG/1; MG/1
1 TABLET ORAL 2 TIMES DAILY PRN
Qty: 60 TABLET | Refills: 0 | Status: SHIPPED | OUTPATIENT
Start: 2019-01-03 | End: 2019-01-21 | Stop reason: SDUPTHER

## 2018-12-28 ASSESSMENT — ENCOUNTER SYMPTOMS
BACK PAIN: 1
RESPIRATORY NEGATIVE: 1

## 2019-01-10 ENCOUNTER — HOSPITAL ENCOUNTER (OUTPATIENT)
Dept: PHYSICAL THERAPY | Facility: CLINIC | Age: 54
Setting detail: THERAPIES SERIES
Discharge: HOME | End: 2019-01-10
Payer: COMMERCIAL

## 2019-01-10 PROCEDURE — 97161 PT EVAL LOW COMPLEX 20 MIN: CPT

## 2019-01-10 PROCEDURE — 97110 THERAPEUTIC EXERCISES: CPT

## 2019-01-21 ENCOUNTER — OFFICE VISIT (OUTPATIENT)
Dept: PAIN MANAGEMENT | Age: 54
End: 2019-01-21
Payer: COMMERCIAL

## 2019-01-21 VITALS
RESPIRATION RATE: 16 BRPM | DIASTOLIC BLOOD PRESSURE: 120 MMHG | HEIGHT: 65 IN | OXYGEN SATURATION: 99 % | WEIGHT: 262 LBS | BODY MASS INDEX: 43.65 KG/M2 | HEART RATE: 81 BPM | SYSTOLIC BLOOD PRESSURE: 188 MMHG

## 2019-01-21 DIAGNOSIS — M47.816 SPONDYLOSIS OF LUMBAR REGION WITHOUT MYELOPATHY OR RADICULOPATHY: Chronic | ICD-10-CM

## 2019-01-21 PROCEDURE — 99213 OFFICE O/P EST LOW 20 MIN: CPT | Performed by: NURSE PRACTITIONER

## 2019-01-21 RX ORDER — OXYCODONE HYDROCHLORIDE AND ACETAMINOPHEN 5; 325 MG/1; MG/1
1 TABLET ORAL 2 TIMES DAILY PRN
Qty: 60 TABLET | Refills: 0 | Status: SHIPPED | OUTPATIENT
Start: 2019-02-02 | End: 2019-02-25 | Stop reason: SDUPTHER

## 2019-01-21 ASSESSMENT — ENCOUNTER SYMPTOMS
RESPIRATORY NEGATIVE: 1
BACK PAIN: 1

## 2019-01-23 ENCOUNTER — HOSPITAL ENCOUNTER (OUTPATIENT)
Dept: PHYSICAL THERAPY | Facility: CLINIC | Age: 54
Setting detail: THERAPIES SERIES
Discharge: HOME | End: 2019-01-23
Payer: COMMERCIAL

## 2019-01-23 PROCEDURE — 97140 MANUAL THERAPY 1/> REGIONS: CPT

## 2019-01-23 PROCEDURE — 97016 VASOPNEUMATIC DEVICE THERAPY: CPT

## 2019-01-23 PROCEDURE — 97110 THERAPEUTIC EXERCISES: CPT

## 2019-01-28 ENCOUNTER — HOSPITAL ENCOUNTER (OUTPATIENT)
Dept: PHYSICAL THERAPY | Facility: CLINIC | Age: 54
Setting detail: THERAPIES SERIES
End: 2019-01-28
Payer: COMMERCIAL

## 2019-01-29 ENCOUNTER — HOSPITAL ENCOUNTER (OUTPATIENT)
Dept: PHYSICAL THERAPY | Facility: CLINIC | Age: 54
Setting detail: THERAPIES SERIES
Discharge: HOME | End: 2019-01-29
Payer: COMMERCIAL

## 2019-01-29 PROCEDURE — 97016 VASOPNEUMATIC DEVICE THERAPY: CPT

## 2019-01-29 PROCEDURE — 97110 THERAPEUTIC EXERCISES: CPT

## 2019-01-31 ENCOUNTER — HOSPITAL ENCOUNTER (OUTPATIENT)
Dept: PHYSICAL THERAPY | Facility: CLINIC | Age: 54
Setting detail: THERAPIES SERIES
Discharge: HOME | End: 2019-01-31
Payer: COMMERCIAL

## 2019-01-31 PROCEDURE — 97110 THERAPEUTIC EXERCISES: CPT

## 2019-01-31 PROCEDURE — 97016 VASOPNEUMATIC DEVICE THERAPY: CPT

## 2019-02-04 ENCOUNTER — HOSPITAL ENCOUNTER (OUTPATIENT)
Dept: PHYSICAL THERAPY | Facility: CLINIC | Age: 54
Setting detail: THERAPIES SERIES
Discharge: HOME | End: 2019-02-04
Payer: COMMERCIAL

## 2019-02-04 PROCEDURE — 97110 THERAPEUTIC EXERCISES: CPT

## 2019-02-04 PROCEDURE — 97012 MECHANICAL TRACTION THERAPY: CPT

## 2019-02-04 PROCEDURE — 97140 MANUAL THERAPY 1/> REGIONS: CPT

## 2019-02-07 ENCOUNTER — HOSPITAL ENCOUNTER (OUTPATIENT)
Dept: PHYSICAL THERAPY | Facility: CLINIC | Age: 54
Setting detail: THERAPIES SERIES
Discharge: HOME | End: 2019-02-07
Payer: COMMERCIAL

## 2019-02-07 PROCEDURE — 97110 THERAPEUTIC EXERCISES: CPT

## 2019-02-11 ENCOUNTER — HOSPITAL ENCOUNTER (OUTPATIENT)
Dept: PHYSICAL THERAPY | Facility: CLINIC | Age: 54
Setting detail: THERAPIES SERIES
Discharge: HOME | End: 2019-02-11
Payer: COMMERCIAL

## 2019-02-11 PROCEDURE — 97110 THERAPEUTIC EXERCISES: CPT

## 2019-02-14 ENCOUNTER — HOSPITAL ENCOUNTER (OUTPATIENT)
Dept: PHYSICAL THERAPY | Facility: CLINIC | Age: 54
Setting detail: THERAPIES SERIES
Discharge: HOME | End: 2019-02-14
Payer: COMMERCIAL

## 2019-02-14 PROCEDURE — 97110 THERAPEUTIC EXERCISES: CPT

## 2019-02-18 ENCOUNTER — HOSPITAL ENCOUNTER (OUTPATIENT)
Dept: PHYSICAL THERAPY | Facility: CLINIC | Age: 54
Setting detail: THERAPIES SERIES
Discharge: HOME | End: 2019-02-18
Payer: COMMERCIAL

## 2019-02-18 PROCEDURE — 97110 THERAPEUTIC EXERCISES: CPT

## 2019-02-21 ENCOUNTER — HOSPITAL ENCOUNTER (OUTPATIENT)
Dept: PHYSICAL THERAPY | Facility: CLINIC | Age: 54
Setting detail: THERAPIES SERIES
Discharge: HOME | End: 2019-02-21
Payer: COMMERCIAL

## 2019-02-21 PROCEDURE — 97110 THERAPEUTIC EXERCISES: CPT

## 2019-02-25 ENCOUNTER — OFFICE VISIT (OUTPATIENT)
Dept: PAIN MANAGEMENT | Age: 54
End: 2019-02-25
Payer: COMMERCIAL

## 2019-02-25 VITALS
WEIGHT: 262 LBS | SYSTOLIC BLOOD PRESSURE: 149 MMHG | OXYGEN SATURATION: 99 % | HEIGHT: 65 IN | DIASTOLIC BLOOD PRESSURE: 94 MMHG | HEART RATE: 87 BPM | BODY MASS INDEX: 43.65 KG/M2

## 2019-02-25 DIAGNOSIS — M47.816 SPONDYLOSIS OF LUMBAR REGION WITHOUT MYELOPATHY OR RADICULOPATHY: Primary | Chronic | ICD-10-CM

## 2019-02-25 DIAGNOSIS — M54.16 RIGHT LUMBAR RADICULITIS: Chronic | ICD-10-CM

## 2019-02-25 DIAGNOSIS — Z79.891 ENCOUNTER FOR LONG-TERM OPIATE ANALGESIC USE: ICD-10-CM

## 2019-02-25 DIAGNOSIS — M54.16 LUMBAR RADICULITIS: Chronic | ICD-10-CM

## 2019-02-25 PROCEDURE — 99214 OFFICE O/P EST MOD 30 MIN: CPT | Performed by: NURSE PRACTITIONER

## 2019-02-25 RX ORDER — TIZANIDINE 4 MG/1
4 TABLET ORAL 2 TIMES DAILY
Qty: 28 TABLET | Refills: 0 | Status: CANCELLED | OUTPATIENT
Start: 2019-02-25 | End: 2019-03-11

## 2019-02-25 RX ORDER — OXYCODONE HYDROCHLORIDE AND ACETAMINOPHEN 5; 325 MG/1; MG/1
1 TABLET ORAL 2 TIMES DAILY PRN
Qty: 60 TABLET | Refills: 0 | Status: SHIPPED | OUTPATIENT
Start: 2019-03-04 | End: 2019-03-21 | Stop reason: SDUPTHER

## 2019-02-25 ASSESSMENT — ENCOUNTER SYMPTOMS
CHOKING: 0
COUGH: 1
CONSTIPATION: 0
WHEEZING: 0
CHEST TIGHTNESS: 0
BACK PAIN: 1
APNEA: 0
STRIDOR: 0
SHORTNESS OF BREATH: 1

## 2019-02-26 ENCOUNTER — HOSPITAL ENCOUNTER (OUTPATIENT)
Dept: PHYSICAL THERAPY | Facility: CLINIC | Age: 54
Setting detail: THERAPIES SERIES
Discharge: HOME | End: 2019-02-26
Payer: COMMERCIAL

## 2019-02-26 PROCEDURE — 97110 THERAPEUTIC EXERCISES: CPT

## 2019-02-28 ENCOUNTER — HOSPITAL ENCOUNTER (OUTPATIENT)
Dept: PHYSICAL THERAPY | Facility: CLINIC | Age: 54
Setting detail: THERAPIES SERIES
Discharge: HOME | End: 2019-02-28
Payer: COMMERCIAL

## 2019-02-28 PROCEDURE — 97110 THERAPEUTIC EXERCISES: CPT

## 2019-02-28 ASSESSMENT — ENCOUNTER SYMPTOMS
CHEST TIGHTNESS: 0
CONSTIPATION: 0
BACK PAIN: 1
APNEA: 0
COUGH: 1
SHORTNESS OF BREATH: 1
WHEEZING: 0
CHOKING: 0
STRIDOR: 0

## 2019-03-21 ENCOUNTER — OFFICE VISIT (OUTPATIENT)
Dept: PAIN MANAGEMENT | Age: 54
End: 2019-03-21
Payer: COMMERCIAL

## 2019-03-21 VITALS
HEIGHT: 65 IN | OXYGEN SATURATION: 95 % | DIASTOLIC BLOOD PRESSURE: 77 MMHG | BODY MASS INDEX: 43.49 KG/M2 | HEART RATE: 83 BPM | WEIGHT: 261 LBS | SYSTOLIC BLOOD PRESSURE: 126 MMHG

## 2019-03-21 DIAGNOSIS — E66.01 MORBID OBESITY WITH BMI OF 40.0-44.9, ADULT (HCC): ICD-10-CM

## 2019-03-21 DIAGNOSIS — M47.816 SPONDYLOSIS OF LUMBAR REGION WITHOUT MYELOPATHY OR RADICULOPATHY: Chronic | ICD-10-CM

## 2019-03-21 DIAGNOSIS — M54.16 RIGHT LUMBAR RADICULITIS: Primary | Chronic | ICD-10-CM

## 2019-03-21 DIAGNOSIS — Z79.891 CHRONIC USE OF OPIATE DRUGS THERAPEUTIC PURPOSES: ICD-10-CM

## 2019-03-21 PROCEDURE — 99213 OFFICE O/P EST LOW 20 MIN: CPT | Performed by: ANESTHESIOLOGY

## 2019-03-21 RX ORDER — OXYCODONE HYDROCHLORIDE AND ACETAMINOPHEN 5; 325 MG/1; MG/1
1 TABLET ORAL 2 TIMES DAILY PRN
Qty: 60 TABLET | Refills: 0 | Status: SHIPPED | OUTPATIENT
Start: 2019-03-21 | End: 2019-04-25 | Stop reason: SDUPTHER

## 2019-03-21 ASSESSMENT — ENCOUNTER SYMPTOMS
BACK PAIN: 1
RESPIRATORY NEGATIVE: 1

## 2019-03-25 ENCOUNTER — HOSPITAL ENCOUNTER (OUTPATIENT)
Age: 54
Setting detail: OUTPATIENT SURGERY
Discharge: HOME | End: 2019-03-25
Attending: ANESTHESIOLOGY | Admitting: ANESTHESIOLOGY
Payer: COMMERCIAL

## 2019-03-25 ENCOUNTER — APPOINTMENT (OUTPATIENT)
Dept: GENERAL RADIOLOGY | Age: 54
End: 2019-03-25
Attending: ANESTHESIOLOGY
Payer: COMMERCIAL

## 2019-03-25 VITALS
BODY MASS INDEX: 44.82 KG/M2 | HEART RATE: 79 BPM | TEMPERATURE: 97.7 F | DIASTOLIC BLOOD PRESSURE: 80 MMHG | HEIGHT: 65 IN | WEIGHT: 269 LBS | SYSTOLIC BLOOD PRESSURE: 135 MMHG | OXYGEN SATURATION: 94 % | RESPIRATION RATE: 20 BRPM

## 2019-03-25 LAB — GLUCOSE BLD-MCNC: 94 MG/DL (ref 75–110)

## 2019-03-25 PROCEDURE — 6360000002 HC RX W HCPCS: Performed by: ANESTHESIOLOGY

## 2019-03-25 PROCEDURE — 82947 ASSAY GLUCOSE BLOOD QUANT: CPT

## 2019-03-25 PROCEDURE — 6360000004 HC RX CONTRAST MEDICATION: Performed by: ANESTHESIOLOGY

## 2019-03-25 PROCEDURE — 7100000011 HC PHASE II RECOVERY - ADDTL 15 MIN: Performed by: ANESTHESIOLOGY

## 2019-03-25 PROCEDURE — 64483 NJX AA&/STRD TFRM EPI L/S 1: CPT | Performed by: ANESTHESIOLOGY

## 2019-03-25 PROCEDURE — 3600000051 HC PAIN LEVEL 1 ADDL 15 MIN: Performed by: ANESTHESIOLOGY

## 2019-03-25 PROCEDURE — 64484 NJX AA&/STRD TFRM EPI L/S EA: CPT | Performed by: ANESTHESIOLOGY

## 2019-03-25 PROCEDURE — 3600000050 HC PAIN LEVEL 1 BASE: Performed by: ANESTHESIOLOGY

## 2019-03-25 PROCEDURE — 99152 MOD SED SAME PHYS/QHP 5/>YRS: CPT | Performed by: ANESTHESIOLOGY

## 2019-03-25 PROCEDURE — 2709999900 HC NON-CHARGEABLE SUPPLY: Performed by: ANESTHESIOLOGY

## 2019-03-25 PROCEDURE — 3209999900 FLUORO FOR SURGICAL PROCEDURES

## 2019-03-25 PROCEDURE — 7100000010 HC PHASE II RECOVERY - FIRST 15 MIN: Performed by: ANESTHESIOLOGY

## 2019-03-25 PROCEDURE — 2500000003 HC RX 250 WO HCPCS: Performed by: ANESTHESIOLOGY

## 2019-03-25 RX ORDER — SODIUM CHLORIDE 0.9 % (FLUSH) 0.9 %
10 SYRINGE (ML) INJECTION EVERY 12 HOURS SCHEDULED
Status: DISCONTINUED | OUTPATIENT
Start: 2019-03-25 | End: 2019-03-25 | Stop reason: HOSPADM

## 2019-03-25 RX ORDER — DEXAMETHASONE SODIUM PHOSPHATE 10 MG/ML
INJECTION INTRAMUSCULAR; INTRAVENOUS PRN
Status: DISCONTINUED | OUTPATIENT
Start: 2019-03-25 | End: 2019-03-25 | Stop reason: ALTCHOICE

## 2019-03-25 RX ORDER — SODIUM CHLORIDE 0.9 % (FLUSH) 0.9 %
10 SYRINGE (ML) INJECTION PRN
Status: DISCONTINUED | OUTPATIENT
Start: 2019-03-25 | End: 2019-03-25 | Stop reason: HOSPADM

## 2019-03-25 RX ORDER — FENTANYL CITRATE 50 UG/ML
INJECTION, SOLUTION INTRAMUSCULAR; INTRAVENOUS PRN
Status: DISCONTINUED | OUTPATIENT
Start: 2019-03-25 | End: 2019-03-25 | Stop reason: ALTCHOICE

## 2019-03-25 RX ORDER — LIDOCAINE HYDROCHLORIDE 10 MG/ML
INJECTION, SOLUTION INFILTRATION; PERINEURAL PRN
Status: DISCONTINUED | OUTPATIENT
Start: 2019-03-25 | End: 2019-03-25 | Stop reason: ALTCHOICE

## 2019-03-25 RX ORDER — MIDAZOLAM HYDROCHLORIDE 1 MG/ML
INJECTION INTRAMUSCULAR; INTRAVENOUS PRN
Status: DISCONTINUED | OUTPATIENT
Start: 2019-03-25 | End: 2019-03-25 | Stop reason: ALTCHOICE

## 2019-03-25 ASSESSMENT — PAIN DESCRIPTION - FREQUENCY
FREQUENCY: CONTINUOUS

## 2019-03-25 ASSESSMENT — PAIN - FUNCTIONAL ASSESSMENT
PAIN_FUNCTIONAL_ASSESSMENT: ACTIVITIES ARE NOT PREVENTED
PAIN_FUNCTIONAL_ASSESSMENT: PREVENTS OR INTERFERES SOME ACTIVE ACTIVITIES AND ADLS
PAIN_FUNCTIONAL_ASSESSMENT: 0-10

## 2019-03-25 ASSESSMENT — PAIN SCALES - GENERAL
PAINLEVEL_OUTOF10: 1
PAINLEVEL_OUTOF10: 6
PAINLEVEL_OUTOF10: 10
PAINLEVEL_OUTOF10: 2
PAINLEVEL_OUTOF10: 10

## 2019-03-25 ASSESSMENT — PAIN DESCRIPTION - LOCATION
LOCATION: BACK

## 2019-03-25 ASSESSMENT — PAIN DESCRIPTION - DESCRIPTORS
DESCRIPTORS: BURNING
DESCRIPTORS: SORE

## 2019-03-25 ASSESSMENT — PAIN DESCRIPTION - PROGRESSION
CLINICAL_PROGRESSION: GRADUALLY IMPROVING

## 2019-03-25 ASSESSMENT — PAIN DESCRIPTION - ORIENTATION
ORIENTATION: LOWER

## 2019-03-25 ASSESSMENT — PAIN DESCRIPTION - PAIN TYPE: TYPE: ACUTE PAIN

## 2019-04-02 ENCOUNTER — OFFICE VISIT (OUTPATIENT)
Dept: INTERNAL MEDICINE CLINIC | Age: 54
End: 2019-04-02
Payer: COMMERCIAL

## 2019-04-02 VITALS
HEIGHT: 65 IN | HEART RATE: 76 BPM | BODY MASS INDEX: 43.99 KG/M2 | OXYGEN SATURATION: 98 % | RESPIRATION RATE: 20 BRPM | DIASTOLIC BLOOD PRESSURE: 80 MMHG | WEIGHT: 264 LBS | SYSTOLIC BLOOD PRESSURE: 122 MMHG

## 2019-04-02 DIAGNOSIS — M47.816 SPONDYLOSIS OF LUMBAR REGION WITHOUT MYELOPATHY OR RADICULOPATHY: Chronic | ICD-10-CM

## 2019-04-02 DIAGNOSIS — R51.9 DAILY HEADACHE: ICD-10-CM

## 2019-04-02 DIAGNOSIS — F41.9 ANXIETY AND DEPRESSION: ICD-10-CM

## 2019-04-02 DIAGNOSIS — J44.9 OSA AND COPD OVERLAP SYNDROME (HCC): ICD-10-CM

## 2019-04-02 DIAGNOSIS — M54.16 LUMBAR RADICULITIS: Chronic | ICD-10-CM

## 2019-04-02 DIAGNOSIS — I10 ESSENTIAL HYPERTENSION: ICD-10-CM

## 2019-04-02 DIAGNOSIS — E66.01 MORBID OBESITY WITH BMI OF 40.0-44.9, ADULT (HCC): ICD-10-CM

## 2019-04-02 DIAGNOSIS — J44.9 CHRONIC OBSTRUCTIVE PULMONARY DISEASE, UNSPECIFIED COPD TYPE (HCC): Primary | ICD-10-CM

## 2019-04-02 DIAGNOSIS — Z79.891 CHRONIC USE OF OPIATE DRUGS THERAPEUTIC PURPOSES: ICD-10-CM

## 2019-04-02 DIAGNOSIS — E78.2 MIXED HYPERLIPIDEMIA: ICD-10-CM

## 2019-04-02 DIAGNOSIS — E11.9 CONTROLLED TYPE 2 DIABETES MELLITUS WITHOUT COMPLICATION, WITHOUT LONG-TERM CURRENT USE OF INSULIN (HCC): ICD-10-CM

## 2019-04-02 DIAGNOSIS — G47.33 OSA AND COPD OVERLAP SYNDROME (HCC): ICD-10-CM

## 2019-04-02 DIAGNOSIS — F32.A ANXIETY AND DEPRESSION: ICD-10-CM

## 2019-04-02 PROCEDURE — 99214 OFFICE O/P EST MOD 30 MIN: CPT | Performed by: FAMILY MEDICINE

## 2019-04-02 RX ORDER — ALBUTEROL SULFATE 90 UG/1
2 AEROSOL, METERED RESPIRATORY (INHALATION) 4 TIMES DAILY PRN
Qty: 3 INHALER | Refills: 1 | Status: SHIPPED | OUTPATIENT
Start: 2019-04-02 | End: 2021-03-08 | Stop reason: SDUPTHER

## 2019-04-02 RX ORDER — ASPIRIN 81 MG/1
81 TABLET ORAL DAILY
Qty: 90 TABLET | Refills: 1 | Status: SHIPPED | OUTPATIENT
Start: 2019-04-02 | End: 2021-09-20

## 2019-04-02 RX ORDER — LISINOPRIL AND HYDROCHLOROTHIAZIDE 12.5; 1 MG/1; MG/1
1 TABLET ORAL DAILY
Qty: 90 TABLET | Refills: 1 | Status: SHIPPED | OUTPATIENT
Start: 2019-04-02 | End: 2020-07-31

## 2019-04-02 RX ORDER — BUDESONIDE AND FORMOTEROL FUMARATE DIHYDRATE 160; 4.5 UG/1; UG/1
2 AEROSOL RESPIRATORY (INHALATION) 2 TIMES DAILY
Qty: 3 INHALER | Refills: 1 | Status: SHIPPED | OUTPATIENT
Start: 2019-04-02 | End: 2021-03-08 | Stop reason: SDUPTHER

## 2019-04-02 RX ORDER — BUPROPION HYDROCHLORIDE 150 MG/1
150 TABLET, EXTENDED RELEASE ORAL 2 TIMES DAILY
Qty: 60 TABLET | Refills: 3 | Status: SHIPPED | OUTPATIENT
Start: 2019-04-02 | End: 2019-04-17

## 2019-04-02 RX ORDER — OXYCODONE HYDROCHLORIDE AND ACETAMINOPHEN 5; 325 MG/1; MG/1
1 TABLET ORAL 2 TIMES DAILY PRN
Qty: 60 TABLET | Refills: 0 | Status: CANCELLED | OUTPATIENT
Start: 2019-04-02 | End: 2019-05-02

## 2019-04-02 RX ORDER — ATORVASTATIN CALCIUM 40 MG/1
40 TABLET, FILM COATED ORAL DAILY
Qty: 90 TABLET | Refills: 1 | Status: SHIPPED | OUTPATIENT
Start: 2019-04-02 | End: 2021-03-08 | Stop reason: SDUPTHER

## 2019-04-02 ASSESSMENT — PATIENT HEALTH QUESTIONNAIRE - PHQ9
1. LITTLE INTEREST OR PLEASURE IN DOING THINGS: 0
SUM OF ALL RESPONSES TO PHQ9 QUESTIONS 1 & 2: 0
SUM OF ALL RESPONSES TO PHQ QUESTIONS 1-9: 0
2. FEELING DOWN, DEPRESSED OR HOPELESS: 0
SUM OF ALL RESPONSES TO PHQ QUESTIONS 1-9: 0

## 2019-04-02 ASSESSMENT — ENCOUNTER SYMPTOMS
GASTROINTESTINAL NEGATIVE: 1
BACK PAIN: 1
COUGH: 1
ALLERGIC/IMMUNOLOGIC NEGATIVE: 1
EYES NEGATIVE: 1
SHORTNESS OF BREATH: 1

## 2019-04-02 ASSESSMENT — COPD QUESTIONNAIRES: COPD: 1

## 2019-04-02 NOTE — PROGRESS NOTES
Subjective:      Patient ID: Arnie Santos is a 52 y.o. male. COPD   He complains of cough and shortness of breath. This is a chronic problem. The current episode started more than 1 month ago. The problem occurs daily. The problem has been waxing and waning. The cough is non-productive. His symptoms are aggravated by emotional stress, exercise, change in weather and strenuous activity. Risk factors for lung disease include smoking/tobacco exposure. His past medical history is significant for asthma and COPD. Review of Systems   Constitutional: Negative. HENT: Negative. Eyes: Negative. Respiratory: Positive for cough and shortness of breath. Cardiovascular: Negative. Gastrointestinal: Negative. Endocrine: Negative. Musculoskeletal: Positive for arthralgias and back pain. Skin: Negative. Allergic/Immunologic: Negative. Neurological: Negative. Hematological: Negative. Psychiatric/Behavioral: Positive for dysphoric mood and sleep disturbance. The patient is nervous/anxious. Past family and social history unremarkable. Objective:   Physical Exam   Constitutional: He is oriented to person, place, and time. He appears well-developed and well-nourished. Morbid obesity with clinical suspicion for sleep apnea   HENT:   Head: Normocephalic and atraumatic. Right Ear: External ear normal.   Left Ear: External ear normal.   Nose: Nose normal.   Mouth/Throat: Oropharynx is clear and moist.   Eyes: Pupils are equal, round, and reactive to light. Conjunctivae and EOM are normal.   Neck: Normal range of motion. Neck supple. Cardiovascular: Normal rate, regular rhythm, normal heart sounds and intact distal pulses. Pulmonary/Chest: Effort normal and breath sounds normal.   Abdominal: Soft. Bowel sounds are normal.   Musculoskeletal: Normal range of motion. Chronic back pain. He is established with pain management. No apparent sign of myelopathy.   Right foot drop improved Neurological: He is alert and oriented to person, place, and time. He has normal reflexes. Skin: Skin is warm and dry. Psychiatric: He has a normal mood and affect. His behavior is normal. Judgment and thought content normal.   Anxiety/depression. He denies suicidality  Underlying chronic pain syndrome  Tobacco use  History of alcohol abuse. Currently sober   Nursing note and vitals reviewed. Assessment:       Diagnosis Orders   1. Chronic obstructive pulmonary disease, unspecified COPD type ([facility] [address]  Full PFT Study With Bronchodilator    XR CHEST STANDARD (2 VW)    Ajay Srivastava MD, Pulmonology, [place]   2. Spondylosis of lumbar region without myelopathy or radiculopathy     3. Daily headache     4. Essential hypertension     5. Mixed hyperlipidemia     6. Controlled type 2 diabetes mellitus without complication, without long-term current use of insulin ([facility] [address]     7. Morbid obesity with BMI of 40.0-44.9, adult ([facility] [address]     8. Chronic use of opiate drugs therapeutic purposes     9. Lumbar radiculitis     10. MARLENE and COPD overlap syndrome ([facility] [address]  Full PFT Study With Bronchodilator    XR CHEST STANDARD (2 VW)    Ajay Srivastava MD, Pulmonology, [place]   11. Anxiety and depression             Plan:      49-year-old obese -American male returns for follow-up. He is afebrile hemodynamically stable  COPD. He continued to smoke despite advise. Is counseled, quit date and consider alternative. He agrees to be placed on Wellbutrin first.  I placed him on beta agonist and inhaled corticosteroid. Morbid obesity with clinical suspicion for sleep apnea. He would be scheduled with sleep apnea clinic  Morbid obesity. She will benefit from low-fat high-fiber diet, daily moderate exercise, lifestyle change and weight reduction to keep BMI 27 below. Anxiety and depression. I placed him on Wellbutrin. He will also be scheduled with stress management he denies suicidality  Chronic back pain. Ongoing epidural and radiofrequency ablation as done by pain management  History of alcoholism. He says that he has been sober. He denies any other illicit drug use  Chronic daily headaches. Neurologically intact. Is advised to keep daily headache log, oral hydration, stress relaxation, weight reduction. Management of possible clinical sleep apnea and improved sleep hygiene. Chronic opiate dependence as provided by pain management. Avoid constipation  Hypertension well controlled to ACE inhibitor. Random blood pressure is less than 130/80. Consume less than 2 g of salt a day. Diabetes mellitus with A1c of 6.3. Continue metformin is tolerating well. Hyperlipidemia statin that he is tolerating well  I also added low-dose aspirin  Monofilament testing is unremarkable. He is counseled on diabetic foot care  Is advised to update his annual dilated eye exam with his ophthalmologist  Further recommendations to follow  This note is created with a voice recognition program and while intend to generate a document that accurately reflects the content of the visit, no guarantee can be provided that every mistake has been identified and corrected by editing.                 Kirsten Savage MD

## 2019-04-02 NOTE — PROGRESS NOTES
Chronic Disease Visit Information    BP Readings from Last 3 Encounters:   03/25/19 135/80   03/21/19 126/77   02/25/19 (!) 149/94          Hemoglobin A1C (%)   Date Value   10/02/2018 6.3 (H)   06/01/2017 6.0   10/26/2016 6.0     Microalb/Crt. Ratio (mcg/mg creat)   Date Value   10/02/2018 CANNOT BE CALCULATED     LDL Cholesterol (mg/dL)   Date Value   10/02/2018 125     HDL (mg/dL)   Date Value   10/02/2018 40 (L)     BUN (mg/dL)   Date Value   10/02/2018 13     CREATININE (mg/dL)   Date Value   10/02/2018 0.99     Glucose (mg/dL)   Date Value   10/02/2018 99            Have you changed or started any medications since your last visit including any over-the-counter medicines, vitamins, or herbal medicines? no   Are you having any side effects from any of your medications? -  no  Have you stopped taking any of your medications? Is so, why? -  no    Have you seen any other physician or provider since your last visit? No  Have you had any other diagnostic tests since your last visit? No  Have you been seen in the emergency room and/or had an admission to a hospital since we last saw you? No  Have you had your annual diabetic retinal (eye) exam? No  Have you had your routine dental cleaning in the past 6 months? yes    Have you activated your [employer] account? If not, what are your barriers?  Yes     Patient Care Team:  May Perea MD as PCP - General (Family Medicine)  May Perea MD as PCP - [facility] Attributed Provider  Lisa Ansari MD as Consulting Physician (Neurology)  Sima Wilder MD as Consulting Physician (Pain Management)  Mairya Varner MD as Consulting Physician (Gastroenterology)         Medical History Review  Past Medical, Family, and Social History reviewed and does not contribute to the patient presenting condition    Health Maintenance   Topic Date Due    Hepatitis A vaccine (1 of 2 - Risk 2-dose series) 09/17/1964    Pneumococcal 0-64 years at Risk Vaccine (1 of 1 - PPSV23) 09/17/1969    Hepatitis B Vaccine (1 of 3 - Risk 3-dose series) 09/17/1982    Diabetic foot exam  07/09/2019 (Originally 6/1/2018)    Shingles Vaccine (1 of 2) 07/23/2019 (Originally 9/17/2013)    Diabetic retinal exam  12/20/2019 (Originally 9/17/1973)    DTaP/Tdap/Td vaccine (1 - Tdap) 12/20/2019 (Originally 9/17/1982)    Flu vaccine (Season Ended) 12/20/2019 (Originally 9/1/2019)    A1C test (Diabetic or Prediabetic)  10/02/2019    Diabetic microalbuminuria test  10/02/2019    Lipid screen  10/02/2019    Potassium monitoring  10/02/2019    Creatinine monitoring  10/02/2019    Colon cancer screen colonoscopy  03/08/2024    Hepatitis C screen  Completed    HIV screen  Completed

## 2019-04-02 NOTE — TELEPHONE ENCOUNTER
Medication: Percocet 5/325  Last refill: 03/04/19  Pharmacy: [facility]  PCP:           Heena Yuen MD    Last visit: 03/21/19  Next visit: Visit date not found  Does patient have enough medication for 72 hours: Yes    Controlled Substance Only:     Last Refill: 03/04/19  Last medication contract documentation:(this must be documented using Rx Monitoring process to be updated)  Medication Contracts: Existing medication contract. (2/25/2019  9:37 AM)      Last urine drug screen: 07/11/18  Consistent with medication(s):   Yes  @[id number])@     OARRS Review for Controlled medication update  RX Monitoring 3/21/2019 2/25/2019 1/21/2019   Attestation The Prescription Monitoring Report for this patient was reviewed today. The Prescription Monitoring Report for this patient was reviewed today. The Prescription Monitoring Report for this patient was reviewed today. Chronic Pain Routine Monitoring Possible medication side effects, risk of tolerance/dependence & alternative treatments discussed. ;No signs of potential drug abuse or diversion identified: otherwise, see note documentation Possible medication side effects, risk of tolerance/dependence & alternative treatments discussed. ;Obtaining appropriate analgesic effect of treatment. ;No signs of potential drug abuse or diversion identified. Possible medication side effects, risk of tolerance/dependence & alternative treatments discussed. ;Obtaining appropriate analgesic effect of treatment. ;No signs of potential drug abuse or diversion identified. Chronic Pain > 50 MEDD Obtained or confirmened \"Consent of Opioid Use\" on file.  - -   Chronic Pain > 80 MEDD - - -         All Future Testing planned in [facility]  Lab Frequency Next Occurrence   Microalbumin, Ur Once 06/01/2019   Hemoglobin A1C Once 06/01/2019   Comprehensive Metabolic Panel Once 24/50/8499   Microalbumin, Ur Once 06/01/2019   CBC Once 06/01/2019   Lipid Panel Once 06/01/2019   Urinalysis with Microscopic Once 06/01/2019   TSH without Reflex Once 06/01/2019   Psa screening Once 06/02/2019   Destruction by neurolytic agent Once 05/25/2019   Destruction by neurolytic agent Once 05/25/2019   XR CHEST STANDARD (2 VW) Once 04/02/2019   Phase I - warming device     Phase I - cardiac monitor     Phase I & II - metered glucose         All future appointments  Future Appointments   Date Time Provider Chelly Aviles   4/30/2019  9:00 AM [facility] Respiratory Tech [facility] [place]   7/2/2019  8:30 AM Sarmad Davis  [address] Maintenance   Topic Date Due    Diabetic foot exam  07/09/2019 (Originally 6/1/2018)    Shingles Vaccine (1 of 2) 07/23/2019 (Originally 9/17/2013)    Hepatitis A vaccine (1 of 2 - Risk 2-dose series) 10/02/2019 (Originally 9/17/1964)    Diabetic retinal exam  12/20/2019 (Originally 9/17/1973)    DTaP/Tdap/Td vaccine (1 - Tdap) 12/20/2019 (Originally 9/17/1982)    Flu vaccine (Season Ended) 12/20/2019 (Originally 9/1/2019)    Hepatitis B Vaccine (1 of 3 - Risk 3-dose series) 04/02/2020 (Originally 9/17/1982)    Pneumococcal 0-64 years at Risk Vaccine (1 of 1 - PPSV23) 04/20/2023 (Originally 9/17/1969)    A1C test (Diabetic or Prediabetic)  10/02/2019    Diabetic microalbuminuria test  10/02/2019    Lipid screen  10/02/2019    Potassium monitoring  10/02/2019    Creatinine monitoring  10/02/2019    Colon cancer screen colonoscopy  03/08/2024    Hepatitis C screen  Completed    HIV screen  Completed       Hemoglobin A1C (%)   Date Value   10/02/2018 6.3 (H)   06/01/2017 6.0   10/26/2016 6.0                                                                     ( goal A1C is < 7)   Microalb/Crt.  Ratio (mcg/mg creat)   Date Value   10/02/2018 CANNOT BE CALCULATED     LDL Cholesterol (mg/dL)   Date Value   10/02/2018 125                                                  (goal LDL is <100)   AST (U/L)   Date Value   10/02/2018 21     ALT (U/L)   Date Value

## 2019-04-04 ENCOUNTER — HOSPITAL ENCOUNTER (OUTPATIENT)
Dept: GENERAL RADIOLOGY | Age: 54
Discharge: HOME | End: 2019-04-06
Payer: COMMERCIAL

## 2019-04-04 ENCOUNTER — HOSPITAL ENCOUNTER (OUTPATIENT)
Age: 54
Discharge: HOME | End: 2019-04-06
Payer: COMMERCIAL

## 2019-04-04 DIAGNOSIS — J44.9 CHRONIC OBSTRUCTIVE PULMONARY DISEASE, UNSPECIFIED COPD TYPE (HCC): ICD-10-CM

## 2019-04-04 DIAGNOSIS — J44.9 OSA AND COPD OVERLAP SYNDROME (HCC): ICD-10-CM

## 2019-04-04 DIAGNOSIS — G47.33 OSA AND COPD OVERLAP SYNDROME (HCC): ICD-10-CM

## 2019-04-04 PROCEDURE — 71046 X-RAY EXAM CHEST 2 VIEWS: CPT

## 2019-04-17 ENCOUNTER — HOSPITAL ENCOUNTER (OUTPATIENT)
Dept: PAIN MANAGEMENT | Age: 54
Discharge: HOME | End: 2019-04-17
Payer: COMMERCIAL

## 2019-04-17 VITALS
BODY MASS INDEX: 43.99 KG/M2 | TEMPERATURE: 97.5 F | RESPIRATION RATE: 16 BRPM | HEART RATE: 87 BPM | HEIGHT: 65 IN | WEIGHT: 264 LBS | SYSTOLIC BLOOD PRESSURE: 159 MMHG | DIASTOLIC BLOOD PRESSURE: 77 MMHG

## 2019-04-17 DIAGNOSIS — Z79.891 CHRONIC USE OF OPIATE DRUGS THERAPEUTIC PURPOSES: ICD-10-CM

## 2019-04-17 DIAGNOSIS — M47.816 SPONDYLOSIS OF LUMBAR REGION WITHOUT MYELOPATHY OR RADICULOPATHY: Primary | Chronic | ICD-10-CM

## 2019-04-17 DIAGNOSIS — G47.33 OSA AND COPD OVERLAP SYNDROME (HCC): ICD-10-CM

## 2019-04-17 DIAGNOSIS — E66.01 MORBID OBESITY WITH BMI OF 40.0-44.9, ADULT (HCC): ICD-10-CM

## 2019-04-17 DIAGNOSIS — J44.9 OSA AND COPD OVERLAP SYNDROME (HCC): ICD-10-CM

## 2019-04-17 PROCEDURE — 99214 OFFICE O/P EST MOD 30 MIN: CPT

## 2019-04-17 PROCEDURE — 99213 OFFICE O/P EST LOW 20 MIN: CPT | Performed by: ANESTHESIOLOGY

## 2019-04-17 RX ORDER — MELOXICAM 15 MG/1
15 TABLET ORAL DAILY
Qty: 15 TABLET | Refills: 0 | Status: SHIPPED | OUTPATIENT
Start: 2019-04-17 | End: 2019-06-03 | Stop reason: SINTOL

## 2019-04-17 RX ORDER — METAXALONE 800 MG/1
800 TABLET ORAL 2 TIMES DAILY
COMMUNITY
End: 2019-04-17 | Stop reason: SDUPTHER

## 2019-04-17 RX ORDER — MELOXICAM 15 MG/1
15 TABLET ORAL DAILY
COMMUNITY
End: 2019-04-17 | Stop reason: SDUPTHER

## 2019-04-17 RX ORDER — METAXALONE 800 MG/1
800 TABLET ORAL 2 TIMES DAILY
Qty: 15 TABLET | Refills: 0 | Status: ON HOLD | OUTPATIENT
Start: 2019-04-17 | End: 2019-04-27 | Stop reason: ALTCHOICE

## 2019-04-17 ASSESSMENT — PAIN DESCRIPTION - FREQUENCY: FREQUENCY: CONTINUOUS

## 2019-04-17 ASSESSMENT — PAIN DESCRIPTION - DESCRIPTORS: DESCRIPTORS: SHARP;NUMBNESS;PRESSURE

## 2019-04-17 ASSESSMENT — PAIN DESCRIPTION - PROGRESSION: CLINICAL_PROGRESSION: GRADUALLY WORSENING

## 2019-04-17 ASSESSMENT — PAIN SCALES - GENERAL: PAINLEVEL_OUTOF10: 9

## 2019-04-17 ASSESSMENT — PAIN DESCRIPTION - PAIN TYPE: TYPE: CHRONIC PAIN

## 2019-04-17 ASSESSMENT — PAIN DESCRIPTION - LOCATION: LOCATION: BACK

## 2019-04-17 ASSESSMENT — ENCOUNTER SYMPTOMS
BACK PAIN: 1
CHEST TIGHTNESS: 0
ABDOMINAL PAIN: 0

## 2019-04-17 ASSESSMENT — PAIN DESCRIPTION - ORIENTATION: ORIENTATION: RIGHT

## 2019-04-17 ASSESSMENT — PAIN - FUNCTIONAL ASSESSMENT: PAIN_FUNCTIONAL_ASSESSMENT: ACTIVITIES ARE NOT PREVENTED

## 2019-04-17 ASSESSMENT — PAIN DESCRIPTION - ONSET: ONSET: ON-GOING
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
## 2019-04-18 ENCOUNTER — TELEPHONE (OUTPATIENT)
Dept: PAIN MANAGEMENT | Age: 54
End: 2019-04-18

## 2019-04-18 NOTE — TELEPHONE ENCOUNTER
LVM for patient to scheduled him for 2 weeks with Dr. Felipa Segal per Dr. Felipa Segal.  Procedure is canceled for 4/22 per Felipa Segal

## 2019-04-25 ENCOUNTER — OFFICE VISIT (OUTPATIENT)
Dept: PAIN MANAGEMENT | Age: 54
End: 2019-04-25
Payer: COMMERCIAL

## 2019-04-25 VITALS
OXYGEN SATURATION: 98 % | WEIGHT: 264 LBS | HEIGHT: 65 IN | SYSTOLIC BLOOD PRESSURE: 143 MMHG | DIASTOLIC BLOOD PRESSURE: 86 MMHG | BODY MASS INDEX: 43.99 KG/M2 | HEART RATE: 85 BPM

## 2019-04-25 DIAGNOSIS — G47.33 OSA AND COPD OVERLAP SYNDROME (HCC): ICD-10-CM

## 2019-04-25 DIAGNOSIS — E66.01 MORBID OBESITY WITH BMI OF 40.0-44.9, ADULT (HCC): ICD-10-CM

## 2019-04-25 DIAGNOSIS — M47.816 SPONDYLOSIS OF LUMBAR REGION WITHOUT MYELOPATHY OR RADICULOPATHY: Primary | Chronic | ICD-10-CM

## 2019-04-25 DIAGNOSIS — Z79.891 CHRONIC USE OF OPIATE DRUGS THERAPEUTIC PURPOSES: ICD-10-CM

## 2019-04-25 DIAGNOSIS — J44.9 OSA AND COPD OVERLAP SYNDROME (HCC): ICD-10-CM

## 2019-04-25 PROCEDURE — 99213 OFFICE O/P EST LOW 20 MIN: CPT | Performed by: ANESTHESIOLOGY

## 2019-04-25 RX ORDER — OXYCODONE HYDROCHLORIDE AND ACETAMINOPHEN 5; 325 MG/1; MG/1
1 TABLET ORAL 2 TIMES DAILY PRN
Qty: 60 TABLET | Refills: 0 | Status: SHIPPED | OUTPATIENT
Start: 2019-05-03 | End: 2019-06-03 | Stop reason: SDUPTHER

## 2019-04-25 ASSESSMENT — ENCOUNTER SYMPTOMS
WHEEZING: 1
BACK PAIN: 1
SHORTNESS OF BREATH: 0
APNEA: 0
COUGH: 1
CHOKING: 0
CHEST TIGHTNESS: 0
STRIDOR: 0

## 2019-04-25 NOTE — PROGRESS NOTES
Subjective:      Patient ID: Karen Fried is a 52 y.o. male. HPI   Chief Complaint   Patient presents with    Back Pain    Medication Refill     Patient was seen 2 weeks back for flareup of acute leg up off right-sided lower back pain  No radiation of this pain in leg no associated numbness or paresthesia  He was ordered 2 week course of muscle relaxant and Mobic  He denies any significant improvement in symptoms  He is also taking chronically Percocet twice a day  Denies any side effect from the medication    Medication Refill: Percocet    Pain score Today:  9  Adverse effects (Constipation / Nausea / Sedation / sexual Dysfunction / others) :  none  Mood: fair  Sleep pattern and quality: fair  Activity level: fair    Pill count Today: patient states he has 15 pills left and he forgot them  Last dose taken   4/25/2019  OARRS report reviewed today: yes  ER/Hospitalizations/PCP visit related to pain since last visit:no   Any legal problems e.g. DUI etc.:No  Satisfied with current management: Yes    Opioid Contract: 04/12/2017  Last Urine Dug screen dated: 7/11/2018          Past Medical History, Past Surgical History, Social History, Allergies and Medications reviewed and updated in EPIC as indicated    Family History reviewed and is noncontributory. Review of Systems   Constitutional: Negative. Respiratory: Positive for cough and wheezing. Negative for apnea, choking, chest tightness, shortness of breath and stridor. Musculoskeletal: Positive for back pain, neck pain and neck stiffness. Negative for arthralgias, gait problem, joint swelling and myalgias. Neurological: Positive for weakness and numbness. Negative for dizziness, tremors, seizures, syncope, facial asymmetry, speech difficulty, light-headedness and headaches. Objective:   Physical Exam   Constitutional: No distress. OBESE   Cardiovascular: Normal rate.    Pulmonary/Chest: Effort normal.   Neurological: Gait normal. potential drug abuse or diversion identified: otherwise, see note documentation   Chronic Pain > 50 MEDD Obtained or confirmened \"Consent of Opioid Use\" on file.    Chronic Pain > 80 MEDD -             Cristina Reid MD

## 2019-04-26 ENCOUNTER — HOSPITAL ENCOUNTER (EMERGENCY)
Age: 54
Discharge: ANOTHER HOSPITAL | End: 2019-04-26
Attending: EMERGENCY MEDICINE
Payer: COMMERCIAL

## 2019-04-26 ENCOUNTER — HOSPITAL ENCOUNTER (OUTPATIENT)
Age: 54
Setting detail: OBSERVATION
Discharge: HOME | End: 2019-04-27
Attending: EMERGENCY MEDICINE | Admitting: EMERGENCY MEDICINE
Payer: COMMERCIAL

## 2019-04-26 ENCOUNTER — APPOINTMENT (OUTPATIENT)
Dept: GENERAL RADIOLOGY | Age: 54
End: 2019-04-26
Payer: COMMERCIAL

## 2019-04-26 VITALS
TEMPERATURE: 98 F | DIASTOLIC BLOOD PRESSURE: 99 MMHG | HEIGHT: 65 IN | BODY MASS INDEX: 41.48 KG/M2 | OXYGEN SATURATION: 99 % | SYSTOLIC BLOOD PRESSURE: 184 MMHG | WEIGHT: 249 LBS | RESPIRATION RATE: 26 BRPM | HEART RATE: 94 BPM

## 2019-04-26 DIAGNOSIS — S61.422A LACERATION OF LEFT HAND WITH FOREIGN BODY, INITIAL ENCOUNTER: Primary | ICD-10-CM

## 2019-04-26 DIAGNOSIS — S61.412A LACERATION OF LEFT HAND WITHOUT FOREIGN BODY, INITIAL ENCOUNTER: Primary | ICD-10-CM

## 2019-04-26 DIAGNOSIS — S61.210A LACERATION OF RIGHT INDEX FINGER WITHOUT FOREIGN BODY WITHOUT DAMAGE TO NAIL, INITIAL ENCOUNTER: ICD-10-CM

## 2019-04-26 DIAGNOSIS — S61.412A COMPLICATED LACERATION OF HAND, LEFT, INITIAL ENCOUNTER: ICD-10-CM

## 2019-04-26 LAB
ABO/RH: NORMAL
ABSOLUTE EOS #: 0.11 K/UL (ref 0–0.44)
ABSOLUTE IMMATURE GRANULOCYTE: 0.03 K/UL (ref 0–0.3)
ABSOLUTE LYMPH #: 4.23 K/UL (ref 1.1–3.7)
ABSOLUTE MONO #: 0.63 K/UL (ref 0.1–1.2)
ANION GAP SERPL CALCULATED.3IONS-SCNC: 13 MMOL/L (ref 9–17)
ANTIBODY SCREEN: NEGATIVE
ARM BAND NUMBER: NORMAL
BASOPHILS # BLD: 1 % (ref 0–2)
BASOPHILS ABSOLUTE: 0.05 K/UL (ref 0–0.2)
BUN BLDV-MCNC: 13 MG/DL (ref 6–20)
BUN/CREAT BLD: 12 (ref 9–20)
CALCIUM SERPL-MCNC: 9.2 MG/DL (ref 8.6–10.4)
CHLORIDE BLD-SCNC: 101 MMOL/L (ref 98–107)
CO2: 23 MMOL/L (ref 20–31)
CREAT SERPL-MCNC: 1.06 MG/DL (ref 0.7–1.2)
DIFFERENTIAL TYPE: ABNORMAL
EOSINOPHILS RELATIVE PERCENT: 1 % (ref 1–4)
EXPIRATION DATE: NORMAL
GFR AFRICAN AMERICAN: >60 ML/MIN
GFR NON-AFRICAN AMERICAN: >60 ML/MIN
GFR SERPL CREATININE-BSD FRML MDRD: ABNORMAL ML/MIN/{1.73_M2}
GFR SERPL CREATININE-BSD FRML MDRD: ABNORMAL ML/MIN/{1.73_M2}
GLUCOSE BLD-MCNC: 113 MG/DL (ref 70–99)
HCT VFR BLD CALC: 42.4 % (ref 40.7–50.3)
HEMOGLOBIN: 13.9 G/DL (ref 13–17)
IMMATURE GRANULOCYTES: 0 %
INR BLD: 0.9
LYMPHOCYTES # BLD: 44 % (ref 24–43)
MCH RBC QN AUTO: 29.3 PG (ref 25.2–33.5)
MCHC RBC AUTO-ENTMCNC: 32.8 G/DL (ref 28–38)
MCV RBC AUTO: 89.3 FL (ref 82.6–102.9)
MONOCYTES # BLD: 7 % (ref 3–12)
NRBC AUTOMATED: ABNORMAL PER 100 WBC
PARTIAL THROMBOPLASTIN TIME: 27.6 SEC (ref 23–31)
PDW BLD-RTO: 12.9 % (ref 11.8–14.4)
PLATELET # BLD: 231 K/UL (ref 138–453)
PLATELET ESTIMATE: ABNORMAL
PMV BLD AUTO: 9.8 FL (ref 8.1–13.5)
POTASSIUM SERPL-SCNC: 4.2 MMOL/L (ref 3.7–5.3)
PROTHROMBIN TIME: 9.7 SEC (ref 9.7–11.6)
RBC # BLD: 4.75 M/UL (ref 4.21–5.77)
RBC # BLD: ABNORMAL 10*6/UL
SEG NEUTROPHILS: 47 % (ref 36–65)
SEGMENTED NEUTROPHILS ABSOLUTE COUNT: 4.49 K/UL (ref 1.5–8.1)
SODIUM BLD-SCNC: 137 MMOL/L (ref 135–144)
WBC # BLD: 9.5 K/UL (ref 3.5–11.3)
WBC # BLD: ABNORMAL 10*3/UL

## 2019-04-26 PROCEDURE — 86900 BLOOD TYPING SEROLOGIC ABO: CPT

## 2019-04-26 PROCEDURE — 96374 THER/PROPH/DIAG INJ IV PUSH: CPT

## 2019-04-26 PROCEDURE — 6360000002 HC RX W HCPCS: Performed by: STUDENT IN AN ORGANIZED HEALTH CARE EDUCATION/TRAINING PROGRAM

## 2019-04-26 PROCEDURE — 2500000003 HC RX 250 WO HCPCS: Performed by: STUDENT IN AN ORGANIZED HEALTH CARE EDUCATION/TRAINING PROGRAM

## 2019-04-26 PROCEDURE — 86850 RBC ANTIBODY SCREEN: CPT

## 2019-04-26 PROCEDURE — 96365 THER/PROPH/DIAG IV INF INIT: CPT

## 2019-04-26 PROCEDURE — 99284 EMERGENCY DEPT VISIT MOD MDM: CPT

## 2019-04-26 PROCEDURE — 96375 TX/PRO/DX INJ NEW DRUG ADDON: CPT

## 2019-04-26 PROCEDURE — 6360000002 HC RX W HCPCS: Performed by: NURSE PRACTITIONER

## 2019-04-26 PROCEDURE — 73130 X-RAY EXAM OF HAND: CPT

## 2019-04-26 PROCEDURE — 12001 RPR S/N/AX/GEN/TRNK 2.5CM/<: CPT

## 2019-04-26 PROCEDURE — 85610 PROTHROMBIN TIME: CPT

## 2019-04-26 PROCEDURE — 85730 THROMBOPLASTIN TIME PARTIAL: CPT

## 2019-04-26 PROCEDURE — 2580000003 HC RX 258: Performed by: STUDENT IN AN ORGANIZED HEALTH CARE EDUCATION/TRAINING PROGRAM

## 2019-04-26 PROCEDURE — 85025 COMPLETE CBC W/AUTO DIFF WBC: CPT

## 2019-04-26 PROCEDURE — 96376 TX/PRO/DX INJ SAME DRUG ADON: CPT

## 2019-04-26 PROCEDURE — 86901 BLOOD TYPING SEROLOGIC RH(D): CPT

## 2019-04-26 PROCEDURE — 80048 BASIC METABOLIC PNL TOTAL CA: CPT

## 2019-04-26 RX ORDER — LIDOCAINE HYDROCHLORIDE 10 MG/ML
INJECTION, SOLUTION INFILTRATION; PERINEURAL
Status: DISPENSED
Start: 2019-04-26 | End: 2019-04-27

## 2019-04-26 RX ORDER — LIDOCAINE HYDROCHLORIDE 10 MG/ML
INJECTION, SOLUTION INFILTRATION; PERINEURAL
Status: DISCONTINUED
Start: 2019-04-26 | End: 2019-04-27 | Stop reason: HOSPADM

## 2019-04-26 RX ORDER — LIDOCAINE HYDROCHLORIDE 10 MG/ML
20 INJECTION, SOLUTION INFILTRATION; PERINEURAL ONCE
Status: COMPLETED | OUTPATIENT
Start: 2019-04-26 | End: 2019-04-26

## 2019-04-26 RX ORDER — MORPHINE SULFATE 2 MG/ML
2 INJECTION, SOLUTION INTRAMUSCULAR; INTRAVENOUS ONCE
Status: COMPLETED | OUTPATIENT
Start: 2019-04-26 | End: 2019-04-26

## 2019-04-26 RX ORDER — HYDROMORPHONE HYDROCHLORIDE 1 MG/ML
1 INJECTION, SOLUTION INTRAMUSCULAR; INTRAVENOUS; SUBCUTANEOUS ONCE
Status: COMPLETED | OUTPATIENT
Start: 2019-04-26 | End: 2019-04-26

## 2019-04-26 RX ORDER — ONDANSETRON 2 MG/ML
4 INJECTION INTRAMUSCULAR; INTRAVENOUS ONCE
Status: COMPLETED | OUTPATIENT
Start: 2019-04-26 | End: 2019-04-26

## 2019-04-26 RX ADMIN — MORPHINE SULFATE 2 MG: 2 INJECTION, SOLUTION INTRAMUSCULAR; INTRAVENOUS at 18:09

## 2019-04-26 RX ADMIN — HYDROMORPHONE HYDROCHLORIDE 1 MG: 1 INJECTION, SOLUTION INTRAMUSCULAR; INTRAVENOUS; SUBCUTANEOUS at 18:30

## 2019-04-26 RX ADMIN — HYDROMORPHONE HYDROCHLORIDE 1 MG: 1 INJECTION, SOLUTION INTRAMUSCULAR; INTRAVENOUS; SUBCUTANEOUS at 19:19

## 2019-04-26 RX ADMIN — ONDANSETRON 4 MG: 2 INJECTION INTRAMUSCULAR; INTRAVENOUS at 18:10

## 2019-04-26 RX ADMIN — DEXTROSE MONOHYDRATE 1 G: 5 INJECTION INTRAVENOUS at 20:40

## 2019-04-26 RX ADMIN — LIDOCAINE HYDROCHLORIDE 20 ML: 10 INJECTION, SOLUTION INFILTRATION; PERINEURAL at 20:41

## 2019-04-26 RX ADMIN — HYDROMORPHONE HYDROCHLORIDE 1 MG: 1 INJECTION, SOLUTION INTRAMUSCULAR; INTRAVENOUS; SUBCUTANEOUS at 19:43

## 2019-04-26 ASSESSMENT — PAIN SCALES - GENERAL
PAINLEVEL_OUTOF10: 10

## 2019-04-26 ASSESSMENT — PAIN DESCRIPTION - ORIENTATION: ORIENTATION: LEFT

## 2019-04-26 ASSESSMENT — PAIN DESCRIPTION - PAIN TYPE
TYPE: ACUTE PAIN
TYPE: ACUTE PAIN

## 2019-04-26 ASSESSMENT — PAIN DESCRIPTION - LOCATION
LOCATION: HAND
LOCATION: ARM

## 2019-04-26 ASSESSMENT — PAIN DESCRIPTION - DESCRIPTORS: DESCRIPTORS: BURNING;THROBBING

## 2019-04-26 NOTE — ED NOTES
Tourniquet re-tightened by Dr. Cammy Camilo.       Tatyana Grider, RN  04/26/19 Rene Feldman, SPIKE  04/26/19 1930

## 2019-04-26 NOTE — ED PROVIDER NOTES
[address] [place] [address] ED  EMERGENCY DEPARTMENT ENCOUNTER      Pt Name: Diana Rogers  MRN: [id number]  [place] 1965  Date of evaluation: 4/26/2019  Provider: SANDRA Miguel CNP    CHIEF COMPLAINT       Chief Complaint   Patient presents with    Laceration     left arm and R hand with griding wheel         HISTORY OF PRESENT ILLNESS  (Location/Symptom, Timing/Onset, Context/Setting, Quality, Duration, Modifying Factors, Severity.)   Diana Rogers is a 52 y.o. male who presents to the emergency department via private auto with a hand laceration that occurred less than one hour prior to arrival.  His hand got caught on a metal grinding wheel resulting in laceration to the dorsum of the left hand and to the right index finger. Fire department did come to the scene and placed a tourniquet on. They were told it would be sometime before and this would be available for transport so he was transported by private auto by a friend. He complains of significant pain to the left hand that he rates at a 10. He has numbness to all 5 fingers of left hand but denies decreased range of motion. His tetanus is up-to-date. Nursing Notes were reviewed.     ALLERGIES     Amitriptyline and Paxil [paroxetine hcl]    CURRENT MEDICATIONS       Previous Medications    ALBUTEROL SULFATE  (90 BASE) MCG/ACT INHALER    Inhale 2 puffs into the lungs 4 times daily as needed for Wheezing    ASPIRIN EC 81 MG EC TABLET    Take 1 tablet by mouth daily    ATORVASTATIN (LIPITOR) 40 MG TABLET    Take 1 tablet by mouth daily    BUDESONIDE-FORMOTEROL (SYMBICORT) 160-4.5 MCG/ACT AERO    Inhale 2 puffs into the lungs 2 times daily    LISINOPRIL-HYDROCHLOROTHIAZIDE (PRINZIDE;ZESTORETIC) 10-12.5 MG PER TABLET    Take 1 tablet by mouth daily    MELOXICAM (MOBIC) 15 MG TABLET    Take 1 tablet by mouth daily    METAXALONE (SKELAXIN) 800 MG TABLET    Take 1 tablet by mouth 2 times daily    METFORMIN (GLUCOPHAGE) 500 MG TABLET    TAKE 1 TABLET BY MOUTH TWICE DAILY WITH MEALS    OXYCODONE-ACETAMINOPHEN (PERCOCET) 5-325 MG PER TABLET    Take 1 tablet by mouth 2 times daily as needed for Pain for up to 30 days.        PAST MEDICAL HISTORY         Diagnosis Date    Asthma     Diabetes mellitus ([address]     Hyperlipidemia     Hypertension     Migraine     Neuropathy     Positive FIT (fecal immunochemical test)     Renal failure        SURGICAL HISTORY           Procedure Laterality Date    ANESTHESIA NERVE BLOCK Bilateral 9/18/2017    NERVE BLOCK BILATERAL MEDIAL BRANCH L2-L5 performed by [facility] MD at [facility] Bilateral 10/17/2017    [address] L2-L5 performed by [facility] MD at [facility] 39  03/08/2017    COLONOSCOPY  03/08/2017    internal hemorrhoids; mild diverticulosis    FIBULA FRACTURE SURGERY Left     LEG SURGERY Right     NERVE BLOCK N/A 10/24/2016    lumbar COLETTE    NERVE BLOCK Right 11/21/2016    lumbar COLTETE    NERVE BLOCK Left 08/13/2018    radiofrequency ablation medial branh block L2-L5    OTHER SURGICAL HISTORY Right 01/26/2017    right hip steroid injection    OTHER SURGICAL HISTORY Right 08/06/2018    NERVE RADIOFREQUENCY ABLATION RIGHT LUMBAR MEDIAL BRANCH L2-L5 (Right )    OTHER SURGICAL HISTORY  12/03/2018    RIGHT L4 L5 EPIDURAL STEROID INJECTION (Right )    OTHER SURGICAL HISTORY  03/25/2019    LUMBAR COLETTE (N/A )    [place] INJECT ANES/STEROID FORAMEN LUMBAR/SACRAL W IMG GUIDE ,1 LEVEL Right 12/3/2018    RIGHT L4 L5 EPIDURAL STEROID INJECTION performed by Salvatore Alexis MD at [address] [place] [address] OFFICE/OUTPT [facility] Right 8/6/2018    NERVE RADIOFREQUENCY ABLATION RIGHT LUMBAR MEDIAL BRANCH L2-L5 performed by Salvatore Alexis MD at [address] [place] [address] OFFICE/OUTPT [facility] Left 8/13/2018    NERVE RADIOFREQUENCY ABLATION LEFT LUMBAR MEDIAL BRANCH L2-L5 performed by Salvatore Alexis MD at [facility] radiologist. Macarena Simon radiographic images are visualized and preliminarily interpreted by the emergency physician with the below findings:    Interpretation per the Radiologist below, if available at the time of this note:    XR HAND LEFT (MIN 3 VIEWS)   Final Result   1. The patient's digit are incompletely distended, which limits evaluation. 2. No convincing acute fracture of the left hand. 3. Radiopaque densities are seen projecting over the soft tissues along the   radial aspect of the hand. LABS:  Labs Reviewed   CBC WITH AUTO DIFFERENTIAL - Abnormal; Notable for the following components:       Result Value    Lymphocytes 44 (*)     Absolute Lymph # 4.23 (*)     All other components within normal limits   BASIC METABOLIC PANEL - Abnormal; Notable for the following components:    Glucose 113 (*)     All other components within normal limits   APTT   PROTIME-INR   TYPE AND SCREEN       All other labs were within normal range or not returned as of this dictation. EMERGENCY DEPARTMENT COURSE and DIFFERENTIAL DIAGNOSIS/MDM:   Vitals:    Vitals:    19 1758 19 1926   BP: (!) 176/119 (!) 184/99   Pulse: 94    Resp: 26    Temp: 98 °F (36.7 °C)    TempSrc: Oral    SpO2: 99%    Weight: 249 lb (112.9 kg)    Height: 5' 5\" (1.651 m)        Medical Decision Makin-year-old male with a deep laceration to the dorsum of the left hand. Immediately discussed with Dr. Venkata Leblanc who also assessed the patient. Hand was evaluated and we wrapped. Tourniquet remains in place. He will be transferred to [facility] for trauma evaluation and probable surgery. Blood work is pending including type and screen. His tetanus is up-to-date. **Initially the tourniquet was kept in place because of probable arterial bleed. He had significant pain in the tourniquet was removed for a short time with some improvement in his pain.   Blood soaked through the dressing and less than 2 minutes and taken

## 2019-04-26 NOTE — ED NOTES
Tourniquette loosened by Dr. Margo Emanuel. Will continue to monitor dressing.       Valencia Escalante RN  04/26/19 1925
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
## 2019-04-27 VITALS
WEIGHT: 249 LBS | HEIGHT: 65 IN | HEART RATE: 95 BPM | SYSTOLIC BLOOD PRESSURE: 148 MMHG | TEMPERATURE: 98.5 F | RESPIRATION RATE: 18 BRPM | BODY MASS INDEX: 41.48 KG/M2 | OXYGEN SATURATION: 98 % | DIASTOLIC BLOOD PRESSURE: 114 MMHG

## 2019-04-27 LAB — GLUCOSE BLD-MCNC: 106 MG/DL (ref 75–110)

## 2019-04-27 PROCEDURE — G0378 HOSPITAL OBSERVATION PER HR: HCPCS

## 2019-04-27 PROCEDURE — 2580000003 HC RX 258: Performed by: STUDENT IN AN ORGANIZED HEALTH CARE EDUCATION/TRAINING PROGRAM

## 2019-04-27 PROCEDURE — 6370000000 HC RX 637 (ALT 250 FOR IP): Performed by: STUDENT IN AN ORGANIZED HEALTH CARE EDUCATION/TRAINING PROGRAM

## 2019-04-27 PROCEDURE — 82947 ASSAY GLUCOSE BLOOD QUANT: CPT

## 2019-04-27 RX ORDER — ATORVASTATIN CALCIUM 40 MG/1
40 TABLET, FILM COATED ORAL DAILY
Status: DISCONTINUED | OUTPATIENT
Start: 2019-04-27 | End: 2019-04-27 | Stop reason: HOSPADM

## 2019-04-27 RX ORDER — ASPIRIN 81 MG/1
81 TABLET ORAL DAILY
Status: DISCONTINUED | OUTPATIENT
Start: 2019-04-27 | End: 2019-04-27 | Stop reason: HOSPADM

## 2019-04-27 RX ORDER — SODIUM CHLORIDE 0.9 % (FLUSH) 0.9 %
10 SYRINGE (ML) INJECTION EVERY 12 HOURS SCHEDULED
Status: DISCONTINUED | OUTPATIENT
Start: 2019-04-27 | End: 2019-04-27 | Stop reason: HOSPADM

## 2019-04-27 RX ORDER — SODIUM CHLORIDE 0.9 % (FLUSH) 0.9 %
10 SYRINGE (ML) INJECTION PRN
Status: DISCONTINUED | OUTPATIENT
Start: 2019-04-27 | End: 2019-04-27 | Stop reason: HOSPADM

## 2019-04-27 RX ORDER — LISINOPRIL AND HYDROCHLOROTHIAZIDE 12.5; 1 MG/1; MG/1
1 TABLET ORAL DAILY
Status: DISCONTINUED | OUTPATIENT
Start: 2019-04-27 | End: 2019-04-27 | Stop reason: HOSPADM

## 2019-04-27 RX ORDER — MELOXICAM 7.5 MG/1
15 TABLET ORAL DAILY
Status: DISCONTINUED | OUTPATIENT
Start: 2019-04-27 | End: 2019-04-27 | Stop reason: HOSPADM

## 2019-04-27 RX ORDER — ACETAMINOPHEN 325 MG/1
650 TABLET ORAL EVERY 4 HOURS PRN
Status: DISCONTINUED | OUTPATIENT
Start: 2019-04-27 | End: 2019-04-27 | Stop reason: HOSPADM

## 2019-04-27 RX ORDER — CEPHALEXIN 500 MG/1
500 CAPSULE ORAL 2 TIMES DAILY
Qty: 14 CAPSULE | Refills: 0 | Status: SHIPPED | OUTPATIENT
Start: 2019-04-27 | End: 2019-05-04

## 2019-04-27 RX ADMIN — Medication 10 ML: at 08:01

## 2019-04-27 RX ADMIN — LISINOPRIL AND HYDROCHLOROTHIAZIDE 1 TABLET: 12.5; 1 TABLET ORAL at 07:59

## 2019-04-27 RX ADMIN — MELOXICAM 15 MG: 7.5 TABLET ORAL at 09:37

## 2019-04-27 RX ADMIN — ASPIRIN 81 MG: 81 TABLET ORAL at 07:59

## 2019-04-27 ASSESSMENT — PAIN SCALES - GENERAL
PAINLEVEL_OUTOF10: 3
PAINLEVEL_OUTOF10: 6
PAINLEVEL_OUTOF10: 0

## 2019-04-27 ASSESSMENT — ENCOUNTER SYMPTOMS
SHORTNESS OF BREATH: 0
ABDOMINAL PAIN: 0
NAUSEA: 0
VOMITING: 0

## 2019-04-27 NOTE — H&P
failure. I have reviewed the past medical history with the patient and it is pertinent to this complaint. SURGICAL HISTORY      has a past surgical history that includes Leg Surgery (Right); Fibula Fracture Surgery (Left); Nerve Block (N/A, 10/24/2016); Nerve Block (Right, 11/21/2016); other surgical history (Right, 01/26/2017); Cholecystectomy (03/08/2017); Colonoscopy (03/08/2017); Appendectomy; Nerve Block (Bilateral, 9/18/2017); Nerve Block (Bilateral, 10/17/2017); other surgical history (Right, 08/06/2018); pr office/outpt visit,procedure only (Right, 8/6/2018); Nerve Block (Left, 08/13/2018); pr office/outpt visit,procedure only (Left, 8/13/2018); other surgical history (12/03/2018); pr inject anes/steroid foramen lumbar/sacral w img guide ,1 level (Right, 12/3/2018); other surgical history (03/25/2019); and RADIOFREQUENCY ABLATION NERVES (N/A, 3/25/2019). I have reviewed and agree with Surgical History entered and it is pertinent to this complaint. CURRENT MEDICATIONS       aspirin EC tablet 81 mg Daily   atorvastatin (LIPITOR) tablet 40 mg Daily   lisinopril-hydrochlorothiazide (PRINZIDE;ZESTORETIC) 10-12.5 MG per tablet 1 tablet Daily   meloxicam (MOBIC) tablet 15 mg Daily   sodium chloride flush 0.9 % injection 10 mL 2 times per day   sodium chloride flush 0.9 % injection 10 mL PRN   acetaminophen (TYLENOL) tablet 650 mg Q4H PRN   lidocaine 1 % injection    lidocaine 1 % injection      fentaNYL (SUBLIMAZE) injection 25 mcg Q5 Min PRN   HYDROmorphone (DILAUDID) injection 0.5 mg Q5 Min PRN   fentaNYL (SUBLIMAZE) injection 25 mcg Q5 Min PRN   HYDROmorphone (DILAUDID) injection 0.5 mg Q5 Min PRN   labetalol (NORMODYNE;TRANDATE) injection 5 mg Q10 Min PRN   hydrALAZINE (APRESOLINE) injection 5 mg Q10 Min PRN   meperidine (DEMEROL) injection 12.5 mg Q5 Min PRN       All medication charted and reviewed. ALLERGIES     is allergic to amitriptyline and paxil [paroxetine hcl].       FAMILY HISTORY indicated that his mother is . He indicated that his father is . He indicated that the status of his maternal grandmother is unknown.     family history includes Cancer in his mother; Diabetes in his mother; Heart Disease in his maternal grandmother and mother. The patient denies any pertinent family history. I have reviewed and agree with the family history entered. I have reviewed the Family History and it is not significant to the case    SOCIAL HISTORY      reports that he has been smoking cigarettes. He started smoking about 39 years ago. He has a 10.00 pack-year smoking history. He has never used smokeless tobacco. He reports that he does not drink alcohol or use drugs. I have reviewed and agree with all Social.  There are concerns for substance abuse/use. PHYSICAL EXAM     INITIAL VITALS:  height is 5' 5\" (1.651 m) and weight is 249 lb (112.9 kg). His oral temperature is 98.5 °F (36.9 °C). His blood pressure is 148/114 (abnormal) and his pulse is 95. His respiration is 18 and oxygen saturation is 98%. CONSTITUTIONAL: AOx4, no apparent distress, appears stated age    HEAD: normocephalic, atraumatic   EYES: PERRLA, EOMI    ENT: moist mucous membranes, uvula midline   NECK: supple, symmetric   BACK: symmetric   LUNGS: clear to auscultation bilaterally   CARDIOVASCULAR: regular rate and rhythm, no murmurs, rubs or gallops   ABDOMEN: soft, non-tender, non-distended with normal active bowel sounds   NEUROLOGIC:  MAEx4, no focal sensory or motor deficits   MUSCULOSKELETAL: Right index finger with clean sutured wound. Minimal swelling present. Dorsum of left hand with 6cm sutured laceration with mild swelling present. Decreased 2 point discrimination distal to wound. Intact distal PMS and 2 point discrimination to all fingers.  Able to extend all fingers fully with some pain   SKIN: no rash or wounds       DIFFERENTIAL DIAGNOSIS/MDM:     Laceration, abrasion, avulsion, fracture, dislocation, hematoma, tendon involvement, fight bite, animal bite, foreign body    Evaluate for: time of injury, tetanus within 5 years, location, bite, foreign bodies, exposed tendon, additional injuries/ head trauma      DIAGNOSTIC RESULTS     EKG: All EKG's are interpreted by the Observation Physician who either signs or Co-signs this chart in the absence of a cardiologist.    none    RADIOLOGY:   I directly visualized the following  images and reviewed the radiologist interpretations:    Xr Hand Left (min 3 Views)    Result Date: 4/26/2019  EXAMINATION: 3 XRAY VIEWS OF THE LEFT HAND 4/26/2019 6:53 pm COMPARISON: None. HISTORY: ORDERING SYSTEM PROVIDED HISTORY: Pain TECHNOLOGIST PROVIDED HISTORY: Pain Ordering Physician Provided Reason for Exam: left hand laceration Acuity: Acute Type of Exam: Initial FINDINGS: The osseous structures are osteopenic. Evaluation of the digits is limited due to incomplete extension. There is no obvious acute fracture. Radiopaque densities are seen projecting along the radial aspect of the hand. 1. The patient's digit are incompletely distended, which limits evaluation. 2. No convincing acute fracture of the left hand. 3. Radiopaque densities are seen projecting over the soft tissues along the radial aspect of the hand. Xr Hand Right (min 3 Views)    Result Date: 4/26/2019  EXAMINATION: 3 XRAY VIEWS OF THE RIGHT HAND 4/26/2019 8:41 pm COMPARISON: None. HISTORY: ORDERING SYSTEM PROVIDED HISTORY: LAC DORSORADIAL ASPECT OF INDEX DIGIT OVER PROX PHALANX. TECHNOLOGIST PROVIDED HISTORY: LAC DORSORADIAL ASPECT OF INDEX DIGIT OVER PROX PHALANX. Ordering Physician Provided Reason for Exam: LAC DORSORADIAL ASPECT OF INDEX DIGIT OVER PROX PHALANX. Acuity: Acute Type of Exam: Initial FINDINGS: 3 images of the right hand were provided. There is no fracture or foreign body. No acute osseous abnormality.  No foreign body       LABS:  I have reviewed and interpreted all available lab results. Labs Reviewed   POC GLUCOSE FINGERSTICK       SCREENING TOOLS:    HEART Risk Score for Chest Pain Patients   History and Physical Exam Suspicion Level  (Nausea, Vomiting, Diaphoresis, Radiation, Exertion)   Slightly Suspicious (0 pts)   Moderately Suspicious (1 pt)   Highly Suspicious (2 pts)   EKG Interpretation   Normal (0 pts)   Non-Specific Repolarization Disturbance (1 pt)   Significant ST-Depression (2 pts)   Age of Patient (in years)   = 39 (0 pts)   46-64 (1 pt)   = 65 (2 pts)   Risk Factors   No Risk Factors (0 pts)   1-2 Risk Factors (1 pt)   = 3 Risk Factors (2 pts)   Risk Factors Include:   Hypercholesterolemia   Hypertension   Diabetes Mellitus   Cigarette smoking   Positive family history   Obesity   CAD   (SLE, CKDz, HIV, Cocaine abuse)   Troponin Levels   = Normal Limit (0 pts)   1-3 Times Normal Limit (1 pt)   > 3 Times Normal Limit (2 pts)  TOTAL: na    Percent Risk for Major Adverse Cardiac Event (MACE)  0-3 pts indicates low risk for MACE   2.5% (DISCHARGE)   4-7 pts indicates moderate risk for MACE  20.3% (OBS)  8-10 pts indicates high risk for MACE  72.7% (EARLY INVASIVE TX)    CDU IMPRESSION / PLAN      Samantha Moser is a 52 y.o. male who presents with    1.)  Acute laceration to dorsum of left hand  -Injury secondary to  usage  -Tourniquet was placed at [facility] and removed at [place] [id number] marked as placed at 1711, tourniquet removed at 2010  -X-ray demonstrated multiple foreign bodies in the wound.  -Wound was extensively debrided with removal of foreign bodies and then sutured.  -Wound currently has minimal bleeding with mild swelling. There is decreased 2 point discrimination distal to the wound. There is intact 2 point discrimination to patient's fingers in the left hand.   Patient is able to fully extend all of his fingers in his left hand with mild pain  -Wound is currently sutured and bandaged  -A consult was placed to Dr. Deuce Julien, plastic surgeon    2). Acute laceration to dorsal proixmal right index finger  -Resulting from same injury as above  -No tourniquet was placed for this wound  -X-ray did not demonstrate any foreign bodies to this wound  -Wound was cleaned and sutured in the emergency room  -There is minimal swelling and no bleeding present at this time  -Patient has intact range of motion to his right index finger  -Able to maintain digit in extension against resistance  -Patient has no decreased sensation to the right index finger. Distal pulse, motor ability, and sensation intact      · Continue home medications  · Monitor vitals, labs, and imaging  · DISPO: pending consults and clinical improvement    CONSULTS:    IP CONSULT TO PLASTIC SURGERY    PROCEDURES:  Not indicated       PATIENT REFERRED TO:    Cristian Ojeda MD  [address] [facility] [phone]            --  Diony Blanca MD   Emergency Medicine Resident     This dictation was generated by voice recognition computer software. Although all attempts are made to edit the dictation for accuracy, there may be errors in the transcription that are not intended.

## 2019-04-27 NOTE — ED PROVIDER NOTES
sulfate  (90 Base) MCG/ACT inhaler Inhale 2 puffs into the lungs 4 times daily as needed for Wheezing 4/2/19  Yes May Perea MD     PHYSICAL EXAM   (up to 7 for level 4, 8 or more for level 5)      INITIAL VITALS:    height is 5' 5\" (1.651 m) and weight is 249 lb (112.9 kg). His oral temperature is 97.5 °F (36.4 °C). His blood pressure is 160/90 (abnormal) and his pulse is 100. His respiration is 20 and oxygen saturation is 95%. Physical Exam   Constitutional: He is oriented to person, place, and time. He appears well-developed and well-nourished. Distressed, yelling in pain (this improved significantly after removal of tourniquet)   HENT:   Head: Normocephalic and atraumatic. Neck: Normal range of motion. Neck supple. Cardiovascular: Normal rate, regular rhythm and normal heart sounds. Pulmonary/Chest: Effort normal and breath sounds normal.   Abdominal: Soft. There is no tenderness. Musculoskeletal: He exhibits no deformity. Neurological: He is alert and oriented to person, place, and time. Skin: Skin is warm and dry. L hand- 6cm deep laceration to the dorsal aspect of patients hand from the base of the thumb MCP to the mid hand. Distal sensation in all digits intact, cap refill < 2 seconds in al digits, motor function intact in all digits (flexion, extension, adduction and abduction) and in the wrist  R hand- 2cm laceration over dorsal aspect of the proximal right index finger, sensation intact, cap refill intact, motor intact   Psychiatric: He has a normal mood and affect. His behavior is normal.   Vitals reviewed.         Vitals:    Vitals:    04/26/19 2014 04/27/19 0015 04/27/19 0115   BP: (!) 185/105 (!) 163/110 (!) 160/90   Pulse: 92 117 100   Resp: 16 20 20   Temp:  97.7 °F (36.5 °C) 97.5 °F (36.4 °C)   TempSrc:  Oral Oral   SpO2: 95% 94% 95%   Weight:  249 lb (112.9 kg)    Height:  5' 5\" (1.651 m)        DIFFERENTIAL  DIAGNOSIS     PLAN (Sheryle Koch / Jillian Crandall / EKG):  Orders Placed This Encounter   Procedures    XR HAND RIGHT (MIN 3 VIEWS)    DIET GENERAL;    Vital signs per unit routine    Notify physician    Notify physician    Up as tolerated    Place intermittent pneumatic compression device    Full Code    PATIENT STATUS (FROM ED OR OR/PROCEDURAL) Observation     Plan of care is reviewed and discussed with the family/ patient when able. Family/ Patient consents to treatment and plan if able to do so. MEDICATIONS ORDERED:  Orders Placed This Encounter   Medications    lidocaine 1 % injection 20 mL    ceFAZolin (ANCEF) 1 g in dextrose 5 % 50 mL IVPB (mini-bag)    lidocaine 1 % injection     NIOCLE VALDES: cabinet override    lidocaine 1 % injection     PRISCILLA DRAPER: cabinet override    aspirin EC tablet 81 mg    atorvastatin (LIPITOR) tablet 40 mg    lisinopril-hydrochlorothiazide (PRINZIDE;ZESTORETIC) 10-12.5 MG per tablet 1 tablet    meloxicam (MOBIC) tablet 15 mg    sodium chloride flush 0.9 % injection 10 mL    sodium chloride flush 0.9 % injection 10 mL    acetaminophen (TYLENOL) tablet 650 mg     DIAGNOSTIC RESULTS     LABS:  No results found for this visit on 04/26/19. Labs Reviewed - No data to display    RADIOLOGY:  Xr Hand Left (min 3 Views)    Result Date: 4/26/2019  EXAMINATION: 3 XRAY VIEWS OF THE LEFT HAND 4/26/2019 6:53 pm COMPARISON: None. HISTORY: ORDERING SYSTEM PROVIDED HISTORY: Pain TECHNOLOGIST PROVIDED HISTORY: Pain Ordering Physician Provided Reason for Exam: left hand laceration Acuity: Acute Type of Exam: Initial FINDINGS: The osseous structures are osteopenic. Evaluation of the digits is limited due to incomplete extension. There is no obvious acute fracture. Radiopaque densities are seen projecting along the radial aspect of the hand. 1. The patient's digit are incompletely distended, which limits evaluation. 2. No convincing acute fracture of the left hand.  3. Radiopaque densities are seen projecting over the soft tissues along the radial aspect of the hand. Xr Hand Right (min 3 Views)    Result Date: 4/26/2019  EXAMINATION: 3 XRAY VIEWS OF THE RIGHT HAND 4/26/2019 8:41 pm COMPARISON: None. HISTORY: ORDERING SYSTEM PROVIDED HISTORY: LAC DORSORADIAL ASPECT OF INDEX DIGIT OVER PROX PHALANX. TECHNOLOGIST PROVIDED HISTORY: LAC DORSORADIAL ASPECT OF INDEX DIGIT OVER PROX PHALANX. Ordering Physician Provided Reason for Exam: LAC DORSORADIAL ASPECT OF INDEX DIGIT OVER PROX PHALANX. Acuity: Acute Type of Exam: Initial FINDINGS: 3 images of the right hand were provided. There is no fracture or foreign body. No acute osseous abnormality. No foreign body     ED BEDSIDE ULTRASOUND:   Not indicated    [address] [place] [address] / [facility]   53 y/o M transferred from [facility] for management of laceration to the dorsal aspect of his L hand. Tourniquet was placed by paramedics [phone], patient arrived with tourniquet still in place, yelling and writhing, complaining of severe pain. Tourniquet was removed immediately with relief of patient's severe pain. No significant amount of bleeding noticed from laceration. Review of patient's [facility] reveals no underlying fracture however there are multiple radiopaque density seen over the soft tissues consistent with foreign body. Patient also with smaller laceration to dorsal aspect of right index finger. We will treat with Ancef, thoroughly irrigated wound and probed for foreign bodies prior to laceration repair. Tetanus is up to date. Plan for wound irrigation, exploration and debridement and removal of foreign bodies. Multiple metallic foreign bodies removed from laceration, wound was copiously irrigated. No tendon injury identified. Laceration was subsequently repaired, as well as laceration to the right index finger. As patient had a tourniquet on for several hours will admit patient to the ETU for further monitoring and for hand surgery evaluation. PROCEDURES:  Procedures  Patient Name: Jese Plunkett   Medical Record Number: [id number]   Laceration Repair Procedure Note  Indication: Laceration    Procedure: The patient was placed in the appropriate position and anesthesia around the laceration was obtained by infiltration using 1% Lidocaine without epinephrine. The area was then debrided, irrigated with normal saline and explored with no tendon injury noted and foreign material removed which had the appearance of metal. The laceration was closed with 4-0 Ethilon using interrupted sutures. A second laceration was closed with 4-0 Ethilon using interrupted sutures. The wound area was then dressed with bacitracin and gauze. Total repaired wound length: 6cm (L dorsal hand), 2cm (right index finger)    Other Items: Suture count: 15 (L dorsal hand), 5 (R index finger)    The patient tolerated the procedure well. Complications: None    CONSULTS:  None    CRITICAL CARE:  See attending note    FINAL IMPRESSION      1. Laceration of left hand with foreign body, initial encounter    2. Laceration of right index finger without foreign body without damage to nail, initial encounter        DISPOSITION / [address] Admitted 04/26/2019 11:34:27 PM    If the patient was admitted, some of the above orders,medications, labs, and consults may have been placed by the admitting team(s) and were auto populated above when I refreshed my note prior to signing it. If there is any question, please check for the responsible providerfor individual orders in the EHR system.     PATIENT REFERRED TO:  Toño Yates MD  [address] [address] [address]  [phone]            DISCHARGE MEDICATIONS:  Current Discharge Medication List        Cassie Arango DO  Emergency Medicine Resident    (Please note that portions of this note were completed with a voice recognition program.  Richard Ip made to edit the dictations but occasionally words are mis-transcribed.)      Scott Hutson,   04/27/19 5445

## 2019-04-27 NOTE — ED PROVIDER NOTES
I performed a history and physical examination of the patient and discussed management with the resident. I reviewed the residents note and agree with the documented findings and plan of care. Any areas of disagreement are noted on the chart. I was personally present for the key portions of any procedures. I have documented in the chart those procedures where I was not present during the key portions. I have reviewed the emergency nurses triage note. I agree with the chief complaint, past medical history, past surgical history, allergies, medications, social and family history as documented unless otherwise noted below. Documentation of the HPI, Physical Exam and Medical Decision Making performed by medical students or scribes is based on my personal performance of the HPI, PE and MDM. For Phys Assistant/ Nurse Practitioner cases/documentation I have personally evaluated this patient and have completed at least one if not all key elements of the E/M (history, physical exam, and MDM). I find the patient's history and physical exam are consistent with the NP/PA documentation. I agree with the care provided, treatment rendered, disposition and followup plan. Additional findings are as noted. Nilsa Banks. Samantha Owen MD  Attending Emergency  Physician    [facility] ED WITH LAC TO FRANCO HANDS ON [email or web address]. WRIST TOURNIQUET APPLIED AT [facility] DUE TO HEMORRHAGE AND PATIENT ARRIVES WITH TOURNIQUET STILL IN PLACE AND COMPLAINING BITTERLY OF PAIN OF THE ENTIRE LEFT HAND. TET UTD. LHD. NO BLEEDING WHEN TOURNIQUET REMOVED IMMEDIATELY UPON ARRIVAL IN ED-PATIENT REPORTED IMMEDIATE RELIEF OF PAIN IN LEFT HAND WHEN TOURNIQUET REMOVED. TOURNIQUET MARKED AS PLACED AT 1711. TOURNIQUET REMOVED AT 2010H. AWAKE, ALERT, COOP, RESPONSIVE. LEFT UPPER EXTR/HAND-5CM DEEP LAC DORSAL ASPECT OF HAND, EXTENDING FROM BASE OF THUMB METACARPAL TO BASE OF LONG FINGER METACARPAL.  DISTAL SENSATION, CAP REFILL, MOTOR/TENDON FUNC INTACT. ABLE TO MAINTAIN THUMB/INDEX/LONG DIGITS IN EXTENSION AGAINST RESISTANCE WITHOUT PAIN. 3CM LAC OVER DORSUM OF PROX PHALANX OF RIGHT INDEX DIGIT. NO DEFORMITY, CREPITUS. DISTAL SENSATION, CAP REFILL, MOTOR/TENDON FUNC INTACT. ABLE TO MAINTAIN DIGIT IN EXTENSION AGAINST RESISTANCE. RADIOGRAPHS-FLECKS OF RADIOOPAQUE FB'S PROJECTING OVER AREA OF LAC ON LEFT HAND. NO FX OR ACUTE BONY ABN. RIGHT HAND NEG FOR FX OR ACUTE BONY ABN. IMP-LAC'S TO RIGHT INDEX FINGER AND LEFT HAND AS DESCRIBED. PLAN-LOCAL ANES INFILTRATION, IRRIGATION,  EXPLORATION, DEBRIDEMENT OF LAC'S. PRIMARY REPAIR IF INDICATED. ATB'S.  ANTICIPATE ADMISSION TO OBS FOR HAND SURG CONSULT AND REASSESSMENT GIVEN PROLONGED TOURNIQUET TIME         Lidia Ledbetter MD  04/26/19 1728

## 2019-04-27 NOTE — ED NOTES
Bed: 18  Expected date:   Expected time:   Means of arrival:   Comments:  St smita Phillips RN  04/26/19 2001

## 2019-04-27 NOTE — DISCHARGE SUMMARY
, or return to the Emergency Room with worsening symptoms    Stressed to patient the importance of following up with primary care doctor for further workup/management of symptoms. Pt verbalizes understanding and agrees with plan. Discharge Medication Changes:       Medication List      START taking these medications    cephALEXin 500 MG capsule  Commonly known as:  KEFLEX  Take 1 capsule by mouth 2 times daily for 7 days        CONTINUE taking these medications    albuterol sulfate  (90 Base) MCG/ACT inhaler  Inhale 2 puffs into the lungs 4 times daily as needed for Wheezing     aspirin EC 81 MG EC tablet  Take 1 tablet by mouth daily     atorvastatin 40 MG tablet  Commonly known as:  LIPITOR  Take 1 tablet by mouth daily     budesonide-formoterol 160-4.5 MCG/ACT Aero  Commonly known as:  SYMBICORT  Inhale 2 puffs into the lungs 2 times daily     lisinopril-hydrochlorothiazide 10-12.5 MG per tablet  Commonly known as:  PRINZIDE;ZESTORETIC  Take 1 tablet by mouth daily     meloxicam 15 MG tablet  Commonly known as:  MOBIC  Take 1 tablet by mouth daily     metFORMIN 500 MG tablet  Commonly known as:  GLUCOPHAGE  TAKE 1 TABLET BY MOUTH TWICE DAILY WITH MEALS     oxyCODONE-acetaminophen 5-325 MG per tablet  Commonly known as:  PERCOCET  Take 1 tablet by mouth 2 times daily as needed for Pain for up to 30 days.   Start taking on:  5/3/2019        STOP taking these medications    metaxalone 800 MG tablet  Commonly known as:  SKELAXIN           Where to Get Your Medications      You can get these medications from any pharmacy    Bring a paper prescription for each of these medications  · cephALEXin 500 MG capsule         Diet:  DIET GENERAL;, advance as tolerated     Activity:  As tolerated    Consultants: IP CONSULT TO PLASTIC SURGERY    Procedures:  Not indicated     Diagnostic Test:   Results for orders placed or performed during the hospital encounter of 04/26/19   POC Glucose Fingerstick   Result Value Ref Range    POC Glucose 106 75 - 110 mg/dL     Xr Hand Left (min 3 Views)    Result Date: 4/26/2019  EXAMINATION: 3 XRAY VIEWS OF THE LEFT HAND 4/26/2019 6:53 pm COMPARISON: None. HISTORY: ORDERING SYSTEM PROVIDED HISTORY: Pain TECHNOLOGIST PROVIDED HISTORY: Pain Ordering Physician Provided Reason for Exam: left hand laceration Acuity: Acute Type of Exam: Initial FINDINGS: The osseous structures are osteopenic. Evaluation of the digits is limited due to incomplete extension. There is no obvious acute fracture. Radiopaque densities are seen projecting along the radial aspect of the hand. 1. The patient's digit are incompletely distended, which limits evaluation. 2. No convincing acute fracture of the left hand. 3. Radiopaque densities are seen projecting over the soft tissues along the radial aspect of the hand. Xr Hand Right (min 3 Views)    Result Date: 4/26/2019  EXAMINATION: 3 XRAY VIEWS OF THE RIGHT HAND 4/26/2019 8:41 pm COMPARISON: None. HISTORY: ORDERING SYSTEM PROVIDED HISTORY: LAC DORSORADIAL ASPECT OF INDEX DIGIT OVER PROX PHALANX. TECHNOLOGIST PROVIDED HISTORY: LAC DORSORADIAL ASPECT OF INDEX DIGIT OVER PROX PHALANX. Ordering Physician Provided Reason for Exam: LAC DORSORADIAL ASPECT OF INDEX DIGIT OVER PROX PHALANX. Acuity: Acute Type of Exam: Initial FINDINGS: 3 images of the right hand were provided. There is no fracture or foreign body. No acute osseous abnormality. No foreign body           Physical Exam:    General appearance - NAD, AOx 3   Lungs -CTAB, no R/R/R  Heart - RRR, no M/R/G  Abdomen - Soft, NT/ND  Neurological:  MAEx4, No focal motor deficit, sensory loss  Extremities - Cap refil <2 sec in all ext., decreased 2point discrimination distal to dorsum of left hand wound. Sutured and bandaged. Right index finger wound sutured, no drainage or swelling. Intact sensation  Skin -warm, dry      Hospital Course:  Clinical course has improved, labs and imaging reviewed.      Samantha Moser originally presented to the hospital on 4/26/2019  8:01 PM with left hand and right finger injury. At that time it was determined that He required further observation and testing and consultation. Labs and imaging were followed daily. Imaging results as above. He is medically stable to be discharged. Disposition: Home    Patient stated that they will not drive themselves home from the hospital if they have gotten pain killers/ narcotics earlier that day and that they will arrange for transportation on their own or work with the  for a ride. Patient counseled NOT to drive while under the influence of narcotics/ pain killers. Condition: Good    Patient stable and ready for discharge home. I have discussed plan of care with patient and they are in understanding. They were instructed to read discharge paperwork. All of their questions and concerns were addressed. Time Spent: 0 day      --  Kayla Rivera MD  Emergency Medicine Resident Physician    This dictation was generated by voice recognition computer software. Although all attempts are made to edit the dictation for accuracy, there may be errors in the transcription that are not intended.

## 2019-04-27 NOTE — CARE COORDINATION
Case Management Initial Discharge Plan  Katerine Landrum,             Met with:patient to discuss discharge plans. Information verified: address, contacts, phone number, , insurance Yes  PCP: Estela Arreguin MD  Date of last visit: 19    Insurance Provider: Luciana Dunn    Discharge Planning    Living Arrangements:  Children   Support Systems:  Family Members, [id number] has 2 stories  3 stairs to climb to get into front door, 13 stairs to climb to reach second floor  Location of bedroom/bathroom in home 2nd     Patient able to perform ADL's:Independent    Current Services (outpatient & in home) pain management  DME equipment: none  DME provider: n/a    Pharmacy: [facility]    Potential Assistance Purchasing Medications:  No  Does patient want to participate in local refill/ meds to beds program?  Not Assessed    Potential Assistance Needed:  N/A    Patient agreeable to home care: No  [place] of choice provided:  n/a    Prior SNF/Rehab Placement and Facility: no  Agreeable to SNF/Rehab: No  [place] of choice provided: n/a   Evaluation: n/a    Expected Discharge date:  19  Patient expects to be discharged to:  home  Follow Up Appointment: Best Day/ Time: Monday AM    Transportation provider: friend able to transport home  Transportation arrangements needed for discharge: No    Readmission Risk              Risk of Unplanned Readmission:        7             Does patient have a readmission risk score greater than 14?: No  If yes, follow-up appointment must be made within 7 days of discharge. 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 Patient plans to DC home independently and wants to follow up OP. KOREY spoke to provider, awaiting plastic sx consult 2/2 numbness and tingling to hand and deep wound. Awaiting for plastic sx consult. DC plan to return home, patient is agreeable.      2019 at 0918: referral sent to  [facility] office for follow up OP appt as requested by Dr. Steve Cárdenas. Bedside RN has also instructed patient to call office on Monday for appointment. Dr. Steve Cárdenas has s/w Dr. Jenni Silverio to schedule follow up 5/1/2019.        Electronically signed by Linda Coulter, RN, BSN on 4/27/19 at 8:59 AM

## 2019-04-27 NOTE — PROGRESS NOTES
CDU Daily Progress Note  Attending Physician       Pt Name: Caryle Jude  MRN: [id number]  [place] 1965  Date of evaluation: 4/27/19    I performed a history and physical examination of the patient and discussed management with the resident. I reviewed the residents note and agree with the documented findings and plan of care. Any areas of disagreement are noted on the chart. I was personally present for the key portions of any procedures. I have documented in the chart those procedures where I was not present during the key portions. I have reviewed the emergency nurses triage note. I agree with the chief complaint, past medical history, past surgical history, allergies, medications, social and family history as documented unless otherwise noted below. Documentation of the HPI, Physical Exam and Medical Decision Making performed by medical students or scribes is based on my personal performance of the HPI, PE and MDM. For Physician Assistant/ Nurse Practitioner cases/documentation I have personally evaluated this patient and have completed at least one if not all key elements of the E/M (history, physical exam, and MDM). Additional findings are as noted. The Family History, Social History and Review of Systems are unchanged from the previous day. No significant events overnight. Laceration dorsum L hand on grinding wheel. Touniquet applied by paramedics at [phone], then removed 20:10. Hand surgery consulted. Lac is to dorsum of hand between thumb and index finger L hand. Is able to extend all fingers against resistance without pain. 2 point descrimination on radial side of index finger decreased. Bleeding has ceased and the wound is closed and looks good. Neosporin dressing applied. He will FU with plastic surgery on Wednesday May 1.     Maribell Foote MD  Attending Physician  Critical Decision Unit

## 2019-04-27 NOTE — ED TRIAGE NOTES
Pt. Arrived via EMS transfer from [address] [place] [address],[address]. Pt was working in his garage when he cut his left hand and right index finger with the  he was working with. Tourniquet applied at [address] [place] [address] [facility] PT received 3 mg dilaudid, 2 mg morphine, 4 mg zofran. Lac to back of heft hand approx. [id number]. Lac to Right index finger approx. 3cm.

## 2019-04-30 ENCOUNTER — TELEPHONE (OUTPATIENT)
Dept: PAIN MANAGEMENT | Age: 54
End: 2019-04-30

## 2019-04-30 NOTE — TELEPHONE ENCOUNTER
Patient called stating he injured his in a meat grinding, he was admitted in [address] [place] [address] over the weekend. Patient wants to know what to do about pain because they told him, they can only give him tylenol. He states the pain is unbearable and 2 pills a day is not cutting it.

## 2019-04-30 NOTE — TELEPHONE ENCOUNTER
I can not increase opioids at all     OTC NSAID AND TYLENOL  FOR NEW PAIN ISSUES    patients on chronic opioids can not get scripts for new pain issues

## 2019-05-01 ENCOUNTER — TELEPHONE (OUTPATIENT)
Dept: INTERNAL MEDICINE CLINIC | Age: 54
End: 2019-05-01

## 2019-05-01 NOTE — TELEPHONE ENCOUNTER
Writer called letting him know, what  said. Patient states he wanted to see if Dr. Brooks Chavez wanted to go back in his hand. Writer let him know that Dr. Brooks Chavez does not need to go back in the wound, you should call your pcp and see if you can get in or call DR. Jessie [place] [facility] office. Patient verbalized understand.

## 2019-05-03 ENCOUNTER — OFFICE VISIT (OUTPATIENT)
Dept: PULMONOLOGY | Age: 54
End: 2019-05-03
Payer: COMMERCIAL

## 2019-05-03 VITALS
BODY MASS INDEX: 44.15 KG/M2 | OXYGEN SATURATION: 98 % | HEART RATE: 77 BPM | WEIGHT: 265 LBS | DIASTOLIC BLOOD PRESSURE: 81 MMHG | HEIGHT: 65 IN | SYSTOLIC BLOOD PRESSURE: 125 MMHG | RESPIRATION RATE: 12 BRPM

## 2019-05-03 VITALS — WEIGHT: 265 LBS | HEART RATE: 85 BPM | OXYGEN SATURATION: 99 % | HEIGHT: 72 IN | BODY MASS INDEX: 35.89 KG/M2

## 2019-05-03 DIAGNOSIS — E66.01 CLASS 3 SEVERE OBESITY DUE TO EXCESS CALORIES WITH SERIOUS COMORBIDITY IN ADULT, UNSPECIFIED BMI (HCC): ICD-10-CM

## 2019-05-03 DIAGNOSIS — F17.200 SMOKING: ICD-10-CM

## 2019-05-03 DIAGNOSIS — J44.9 OSA AND COPD OVERLAP SYNDROME (HCC): Primary | ICD-10-CM

## 2019-05-03 DIAGNOSIS — E11.9 CONTROLLED TYPE 2 DIABETES MELLITUS WITHOUT COMPLICATION, WITHOUT LONG-TERM CURRENT USE OF INSULIN (HCC): ICD-10-CM

## 2019-05-03 DIAGNOSIS — G47.33 OSA AND COPD OVERLAP SYNDROME (HCC): Primary | ICD-10-CM

## 2019-05-03 DIAGNOSIS — G47.33 OBSTRUCTIVE SLEEP APNEA: ICD-10-CM

## 2019-05-03 DIAGNOSIS — M54.16 LUMBAR RADICULITIS: ICD-10-CM

## 2019-05-03 DIAGNOSIS — I10 ESSENTIAL HYPERTENSION: ICD-10-CM

## 2019-05-03 DIAGNOSIS — J42 CHRONIC BRONCHITIS, UNSPECIFIED CHRONIC BRONCHITIS TYPE (HCC): ICD-10-CM

## 2019-05-03 DIAGNOSIS — S61.412A COMPLICATED LACERATION OF HAND, LEFT, INITIAL ENCOUNTER: ICD-10-CM

## 2019-05-03 DIAGNOSIS — G47.33 OSA (OBSTRUCTIVE SLEEP APNEA): Primary | ICD-10-CM

## 2019-05-03 PROCEDURE — 99204 OFFICE O/P NEW MOD 45 MIN: CPT | Performed by: INTERNAL MEDICINE

## 2019-05-03 PROCEDURE — 94729 DIFFUSING CAPACITY: CPT | Performed by: INTERNAL MEDICINE

## 2019-05-03 PROCEDURE — 94726 PLETHYSMOGRAPHY LUNG VOLUMES: CPT | Performed by: INTERNAL MEDICINE

## 2019-05-03 PROCEDURE — 94375 RESPIRATORY FLOW VOLUME LOOP: CPT | Performed by: INTERNAL MEDICINE

## 2019-05-03 NOTE — PROGRESS NOTES
David Hopeal  5/3/2019    Chief Complaint   Patient presents with    COPD     NEW PATIENT    Dyspnea. Morbid obesity. Obstructive sleep apnea syndrome    Dimitrios Tipton  presented for follow up for for dyspnea, which is about grade 2. He is using albuterol on as-needed basis, which she uses almost every day. He is not on any other inhalers. He has not noted any wheezing. No history of asthma as a youngster. She been smoking for almost 30 pack years. He did quit smoking for about 10 years in between. He smokes about a pack or less at this time. He has cough, which is mild, not nonproductive but occasionally he does bring sputum. He denies any frequent chest colds or pneumonias. No history of any hematemesis. No chest pain, no pneumothorax. None known to have any pedal edema, except maybe occasionally in the right lower leg which is secondary to an injury. He have had an surgery on the right hip area many years back. Never was noted to have any deep vein thrombosis. Has not noted any swelling on the left side. No history of any pulmonary or thromboembolic process or pulmonary embolism. Patient is morbidly obese. She has been trying to lose weight was not very successful. He does snore. Sleep is very irregular. He goes to bed about 8:00. He will wake up around at 10 or 11:00 or even total 1200 and stay awake for a few hours. Then he may fall asleep to wake up again or greater 9. He gets probably 3-5 hours of sleep every night. He is known to snore. His sleep is not refreshing. He can. Does not notice any headaches in the morning. He denies any and sleepiness to the daytime, although it seemed that he is tired. His any psychotic disorder such as anxiety, depression, Ur is a tear schizoid disorder. He is known to have systemic hypertension that is under good control. No angina, no myocardial infarction, no heart failure.     He also note her diabetes which is under good control 2. AGE LESS THAN 55 or GREATER 77 YEARS  []      3. QUIT SMOKING 15 YEARS OR GREATER   []      4. RECENT CT WITH IN 11 MONTHS    []      5. LIFE EXPECTANCY < 5 YEARS   []      6. SIGNS  AND SYMPTOMS OF LUNG CANCER   []           Immunization   There is no immunization history for the selected administration types on file for this patient.      Pneumococcal Vaccine     [] Up to date    [x] Indicated   [] Refused  [] Contraindicated       Influenza Vaccine   [x] Up to date    [] Indicated   [] Refused  [] Contraindicated       PAST MEDICAL HISTORY:         Diagnosis Date    Asthma     Diabetes mellitus ([facility]     Hyperlipidemia     Hypertension     Migraine     Neuropathy     Positive FIT (fecal immunochemical test)     Renal failure        Family History:       Problem Relation Age of Onset    Diabetes Mother     Heart Disease Mother     Cancer Mother     Heart Disease Maternal Grandmother        SURGICAL HISTORY:   Past Surgical History:   Procedure Laterality Date    ANESTHESIA NERVE BLOCK Bilateral 9/18/2017    NERVE BLOCK BILATERAL MEDIAL BRANCH L2-L5 performed by Mike Peters MD at [facility] Bilateral 10/17/2017    [address] L2-L5 performed by Mike [facility] MD at [facility]  03/08/2017    COLONOSCOPY  03/08/2017    internal hemorrhoids; mild diverticulosis    FIBULA FRACTURE SURGERY Left     LEG SURGERY Right     NERVE BLOCK N/A 10/24/2016    lumbar COLETTE    NERVE BLOCK Right 11/21/2016    lumbar COLETTE    NERVE BLOCK Left 08/13/2018    radiofrequency ablation medial branh block L2-L5    OTHER SURGICAL HISTORY Right 01/26/2017    right hip steroid injection    OTHER SURGICAL HISTORY Right 08/06/2018    NERVE RADIOFREQUENCY ABLATION RIGHT LUMBAR MEDIAL BRANCH L2-L5 (Right )    OTHER SURGICAL HISTORY  12/03/2018    RIGHT L4 L5 EPIDURAL STEROID INJECTION (Right )    OTHER SURGICAL HISTORY  03/25/2019 LUMBAR COLETTE (N/A )    IA INJECT ANES/STEROID FORAMEN LUMBAR/SACRAL W IMG GUIDE ,1 LEVEL Right 12/3/2018    RIGHT L4 L5 EPIDURAL STEROID INJECTION performed by Jeramy Harrison MD at [facility] OFFICE/OUTPT [address] Right 8/6/2018    NERVE RADIOFREQUENCY ABLATION RIGHT LUMBAR MEDIAL BRANCH L2-L5 performed by Jeramy Harrison MD at [facility] OFFICE/OUTPT [address] Left 8/13/2018    NERVE RADIOFREQUENCY ABLATION LEFT LUMBAR MEDIAL BRANCH L2-L5 performed by Jeramy Harrison MD at [facility] N/A 3/25/2019    LUMBAR COLETTE performed by Jeramy Harrison MD at [facility]              Not in a hospital admission. Allergies   Allergen Reactions    Amitriptyline Anaphylaxis    Paxil [Paroxetine Hcl] Other (See Comments)     Social History     Tobacco Use   Smoking Status Current Every Day Smoker    Packs/day: 0.50    Years: 20.00    Pack years: 10.00    Types: Cigarettes    Start date: 10/2/1979   Smokeless Tobacco Never Used   Tobacco Comment    1 pack every 2.5 days     Prior to Admission medications    Medication Sig Start Date End Date Taking? Authorizing Provider   tiotropium (SPIRIVA RESPIMAT) 2.5 MCG/ACT AERS inhaler Inhale 2 puffs into the lungs daily 5/3/19 6/2/19 Yes Beka Amin MD   cephALEXin (KEFLEX) 500 MG capsule Take 1 capsule by mouth 2 times daily for 7 days 4/27/19 5/4/19 Yes Marcelo Knox MD   oxyCODONE-acetaminophen (PERCOCET) 5-325 MG per tablet Take 1 tablet by mouth 2 times daily as needed for Pain for up to 30 days.  5/3/19 6/2/19 Yes Jeramy Harrison MD   meloxicam GABRIELA ELMORE [facility]) 15 MG tablet Take 1 tablet by mouth daily 4/17/19  Yes eJramy Harrison MD   metFORMIN (GLUCOPHAGE) 500 MG tablet TAKE 1 TABLET BY MOUTH TWICE DAILY WITH MEALS 4/2/19  Yes Demetrius Carbone MD   lisinopril-hydrochlorothiazide (PRINZIDE;ZESTORETIC) 10-12.5 MG per tablet Take 1 tablet by mouth daily 4/2/19  Yes Demetrius Carbone MD   atorvastatin (LIPITOR) 40 MG tablet Take 1 Clubbing No  Lower ext edema No1+   [] , 2 +  [] , 3+   []  Upper ext edema No       Musculoskeletal - no joint swelling or tenderness or synovitis               /81 (Site: Right Upper Arm)   Pulse 77   Resp 12   Ht 5' 5\" (1.651 m)   Wt 265 lb (120.2 kg)   SpO2 98% Comment: Room air at rest  BMI 44.10 kg/m²     CXR  No recent chest x-ray      CT Scans  No recent. No recent CT scan    Echo  No echocardiogram        Assessment   Diagnosis Orders   1. MARLENE (obstructive sleep apnea)  Baseline Diagnostic Sleep Study    Sleep Study with PAP Titration   2. Chronic bronchitis, unspecified chronic bronchitis type ([address]     3. Class 3 severe obesity due to excess calories with serious comorbidity in adult, unspecified BMI ([address]     4. Obstructive sleep apnea     5. Essential hypertension     6. Complicated laceration of hand, left, initial encounter     7. Smoking     8. Lumbar radiculitis     9. Controlled type 2 diabetes mellitus without complication, without long-term current use of insulin ([facility])         Plan:  's and shortness of breath is very likely manifestation of COPD. I did review his pulmonary function studies which do reveal mild degree of ventilatory dysfunction of the level of the small airway. Otherwise, urine and PFTs are essentially normal, including the flow volume loops. Because of these symptoms of dyspnea are bothering him. I did take the liberty of starting him on Spiriva along with the albuterol. This should further improve his respiratory distress. The patient is morbidly obese. Advised him to lose weight, which will greatly help him. We discussed at length the various strategies to lose weight. I advised her to continue the present antihypertensive therapy. Continue the treatment for the diabetes as before. Patient very likely has obstructive sleep apnea syndrome. I arranged for him to have a sleep study done. His sleep today does reveal evidence of apnea.   We'll plan to treat him with nasal CPAP or BiPAP. Treatment apnea in addition to improving his daytime symptoms. Will also improve the outcome of hypertension and diabetes. He applied may also improve the tolerance to pain, which she has been having for many years. Jamey pain is interfering with his sleep to some extent. Continue the treatment of his of pain and lumbar radiculitis with his doctors. Laceration of the hands have improved and healing well. We discussed at length regarding the need to lose weight and how to do that by controlling her calories and continue to participate in the exercise program.    We'll also get a CT scan of the chest and the chest x-ray because he has been chronic smoker and need to be followed for lung cancer screening. Patient was also advised to get influenza vaccine, which and and Pneumovax. He had an influenza vaccine but not the Pneumovax. Dictated by Dr. Xiomara Larsen M.D. dictation over thank you  Electronically signed by Tien Jimenes MD on   5/3/19 at 10:35 AM  Please note that this chart was generated using voice recognition Dragon dictation software. Although every effort was made to ensure the accuracy of this automated transcription, some errors in transcription may have occurred.

## 2019-05-07 ENCOUNTER — OFFICE VISIT (OUTPATIENT)
Dept: BURN CARE | Age: 54
End: 2019-05-07
Payer: COMMERCIAL

## 2019-05-07 VITALS
SYSTOLIC BLOOD PRESSURE: 142 MMHG | HEART RATE: 84 BPM | BODY MASS INDEX: 44.32 KG/M2 | HEIGHT: 65 IN | WEIGHT: 266 LBS | DIASTOLIC BLOOD PRESSURE: 88 MMHG

## 2019-05-07 DIAGNOSIS — S61.412A COMPLICATED LACERATION OF HAND, LEFT, INITIAL ENCOUNTER: Primary | ICD-10-CM

## 2019-05-07 PROCEDURE — 99202 OFFICE O/P NEW SF 15 MIN: CPT | Performed by: PLASTIC SURGERY

## 2019-05-07 NOTE — PROGRESS NOTES
[facility]Hand Clinic New Patient Visit      CHIEF COMPLAINT:    Chief Complaint   Patient presents with    Laceration     Laceration of lft hand w/ foreign body, throbbing burning sensation        HISTORY OFPRESENT ILLNESS:      The patient is a 52 y.o. male who is being seen for consultation and evaluation of left dorsal hand laceration and right dorsal hand laceration. Injury occurred with a table  on 4/26/19. Patient presented to [address] [place] [address],[address] and then subsequently [facility] at that time. He was washed out and sutured closed. Denies any other injuries. He states initially he had dysesthesias to his first three fingers, now he only has some dysesthesias to the dorsum of the hand and dorsal index finger. Has been keeping it clean, covered and using bacitracin ointment. He took 7 days of antibiotics.      Past Medical History:    Past Medical History:   Diagnosis Date    Asthma     Diabetes mellitus ([facility] [address]     Hyperlipidemia     Hypertension     Migraine     Neuropathy     Positive FIT (fecal immunochemical test)     Renal failure        Past Surgical History:    Past Surgical History:   Procedure Laterality Date    ANESTHESIA NERVE BLOCK Bilateral 9/18/2017    NERVE BLOCK BILATERAL MEDIAL BRANCH L2-L5 performed by Rasheeda Mendosa MD at [facility] Bilateral 10/17/2017    [address] L2-L5 performed by Rasheeda Mendosa MD at [address] [address]  03/08/2017    COLONOSCOPY  03/08/2017    internal hemorrhoids; mild diverticulosis    FIBULA FRACTURE SURGERY Left     LEG SURGERY Right     NERVE BLOCK N/A 10/24/2016    lumbar COLETTE    NERVE BLOCK Right 11/21/2016    lumbar COLETTE    NERVE BLOCK Left 08/13/2018    radiofrequency ablation medial branh block L2-L5    OTHER SURGICAL HISTORY Right 01/26/2017    right hip steroid injection    OTHER SURGICAL HISTORY Right 08/06/2018    NERVE RADIOFREQUENCY ABLATION RIGHT LUMBAR MEDIAL BRANCH L2-L5 (Right )    OTHER SURGICAL HISTORY  12/03/2018    RIGHT L4 L5 EPIDURAL STEROID INJECTION (Right )    OTHER SURGICAL HISTORY  03/25/2019    LUMBAR COLETTE (N/A )    SC INJECT ANES/STEROID FORAMEN LUMBAR/SACRAL W IMG GUIDE ,1 LEVEL Right 12/3/2018    RIGHT L4 L5 EPIDURAL STEROID INJECTION performed by Liu Ashford MD at [facility] OFFICE/OUTPT VISIT,PROCEDURE ONLY Right 8/6/2018    NERVE RADIOFREQUENCY ABLATION RIGHT LUMBAR MEDIAL BRANCH L2-L5 performed by Liu Ashford MD at [facility] OFFICE/OUTPT [facility] Left 8/13/2018    NERVE RADIOFREQUENCY ABLATION LEFT LUMBAR MEDIAL BRANCH L2-L5 performed by Liu Ashford MD at [facility] N/A 3/25/2019    LUMBAR COLETTE performed by Liu Ashford MD at [facility]       Current Medications:   Current Outpatient Medications   Medication Sig Dispense Refill    tiotropium (SPIRIVA RESPIMAT) 2.5 MCG/ACT AERS inhaler Inhale 2 puffs into the lungs daily 1 Inhaler 11    oxyCODONE-acetaminophen (PERCOCET) 5-325 MG per tablet Take 1 tablet by mouth 2 times daily as needed for Pain for up to 30 days. 60 tablet 0    meloxicam (MOBIC) 15 MG tablet Take 1 tablet by mouth daily 15 tablet 0    metFORMIN (GLUCOPHAGE) 500 MG tablet TAKE 1 TABLET BY MOUTH TWICE DAILY WITH MEALS 60 tablet 3    lisinopril-hydrochlorothiazide (PRINZIDE;ZESTORETIC) 10-12.5 MG per tablet Take 1 tablet by mouth daily 90 tablet 1    atorvastatin (LIPITOR) 40 MG tablet Take 1 tablet by mouth daily 90 tablet 1    aspirin EC 81 MG EC tablet Take 1 tablet by mouth daily 90 tablet 1    budesonide-formoterol (SYMBICORT) 160-4.5 MCG/ACT AERO Inhale 2 puffs into the lungs 2 times daily 3 Inhaler 1    albuterol sulfate  (90 Base) MCG/ACT inhaler Inhale 2 puffs into the lungs 4 times daily as needed for Wheezing 3 Inhaler 1     No current facility-administered medications for this visit.       Facility-Administered Medications Ordered in Other Visits Medication Dose Route Frequency Provider Last Rate Last Dose    fentaNYL (SUBLIMAZE) injection 25 mcg  25 mcg Intravenous Q5 Min PRN Lorena Friedman MD        HYDROmorphone (DILAUDID) injection 0.5 mg  0.5 mg Intravenous Q5 Min PRN Lorena Friedman MD        fentaNYL (SUBLIMAZE) injection 25 mcg  25 mcg Intravenous Q5 Min PRN Lorena Friedman MD        HYDROmorphone (DILAUDID) injection 0.5 mg  0.5 mg Intravenous Q5 Min PRN Lorena Friedman MD        labetalol (NORMODYNE;TRANDATE) injection 5 mg  5 mg Intravenous Q10 Min PRABBY Gutierrez MD        hydrALAZINE (APRESOLINE) injection 5 mg  5 mg Intravenous Q10 Min PRN Lorena Friedman MD        meperidine (DEMEROL) injection 12.5 mg  12.5 mg Intravenous Q5 Min PRN Lulú Gutierrez MD           Allergies:    Amitriptyline and Paxil [paroxetine hcl]    Social History:   Social History     Socioeconomic History    Marital status: Single     Spouse name: Not on file    Number of children: Not on file    Years of education: Not on file    Highest education level: Not on file   Occupational History    Not on file   Social Needs    Financial resource strain: Not on file    Food insecurity:     Worry: Not on file     Inability: Not on file    Transportation needs:     Medical: Not on file     Non-medical: Not on file   Tobacco Use    Smoking status: Current Every Day Smoker     Packs/day: 0.50     Years: 20.00     Pack years: 10.00     Types: Cigarettes     Start date: 10/2/1979    Smokeless tobacco: Never Used    Tobacco comment: 1 pack every 2.5 days   Substance and Sexual Activity    Alcohol use: No     Alcohol/week: 0.0 oz    Drug use: No    Sexual activity: Not on file   Lifestyle    Physical activity:     Days per week: Not on file     Minutes per session: Not on file    Stress: Not on file   Relationships    Social connections:     Talks on phone: Not on file     Gets together: Not on file     Attends Faith service: Not on file Active member of club or organization: Not on file     Attends meetings of clubs or organizations: Not on file     Relationship status: Not on file    Intimate partner violence:     Fear of current or ex partner: Not on file     Emotionally abused: Not on file     Physically abused: Not on file     Forced sexual activity: Not on file   Other Topics Concern    Not on file   Social History Narrative    Not on file       Family History:  Family History   Problem Relation Age of Onset    Diabetes Mother     Heart Disease Mother     Cancer Mother     Heart Disease Maternal Grandmother        REVIEW OF SYSTEMS:  Review of Systems    I have reviewed theCC, HPI, ROS, PMH, FHX, Social History. I agree with the documentation provided by other staff, residents, and/or medical students and have reviewed their documentation prior to providing my signature indicating agreement. PHYSICAL EXAM:  BP (!) 142/88   Pulse 84   Ht 5' 5\" (1.651 m)   Wt 266 lb (120.7 kg)   BMI 44.26 kg/m²   Physical Exam  Gen: AAOx3, NAD, well-nourished, appears stated age  Psych: affect appropriate, normal mood, expressesunderstanding of treatment plan  Head: normocephalic, atraumatic   Chest: unlabored respirations, symmetric chest rise bilaterally, no audible wheezes  Skin: RUE: Sutures present base of index finger. Slight superficial dehiscence but still approximated. No drainage. Compartments soft. Median/Radial/Ulnar/AIN/PIN motor intact. Median/Radial/Ulnar nerve sensation intact to light touch. 2+ rad pulse. LUE: Sutures present dorsum hand. Scabbing present. Radial and ulnar part of index finger intact. Dorsal proximal aspect of index and dorsal hand distal to lac dysesthesias. Full hand range of motion. Compartments soft. Median/Radial/Ulnar/AIN/PIN motor intact. Median/Radial/Ulnar nerve sensation intact to light touch. 2+ rad pulse. Radiology:   X-rays reviewed from EMR. No new imaging performed today.  No fracture or foreign body. ASSESSMENT/PLAN:    51 yo with left dorsal hand lac, right dorsal hand lac    - Healign well. No signs of infection  - Completed one week of antibiotics  - Tylenol/NSAIDS as needed for pain control  - Sutures maintained  - Keep clean and dry  - Follow up in two weeks for suture removal    ----------------------------------------  Toan Ga,   PGY-1, Department of [place], [place]    Attending Physician Statement  I have discussed the case, including pertinent history and exam findings with the resident. I have seen and examined the patient and the key elements of all parts of the encounter have been performed by me. I agree with the assessment, plan and orders as documented by the resident.   Anthony Dave MD

## 2019-05-20 ENCOUNTER — OFFICE VISIT (OUTPATIENT)
Dept: INTERNAL MEDICINE CLINIC | Age: 54
End: 2019-05-20
Payer: COMMERCIAL

## 2019-05-20 VITALS
SYSTOLIC BLOOD PRESSURE: 120 MMHG | HEART RATE: 85 BPM | DIASTOLIC BLOOD PRESSURE: 70 MMHG | OXYGEN SATURATION: 98 % | BODY MASS INDEX: 44.48 KG/M2 | WEIGHT: 267 LBS | HEIGHT: 65 IN

## 2019-05-20 DIAGNOSIS — J44.9 OSA AND COPD OVERLAP SYNDROME (HCC): ICD-10-CM

## 2019-05-20 DIAGNOSIS — R73.03 PREDIABETES: ICD-10-CM

## 2019-05-20 DIAGNOSIS — J44.9 CHRONIC OBSTRUCTIVE PULMONARY DISEASE, UNSPECIFIED COPD TYPE (HCC): ICD-10-CM

## 2019-05-20 DIAGNOSIS — E11.9 CONTROLLED TYPE 2 DIABETES MELLITUS WITHOUT COMPLICATION, WITHOUT LONG-TERM CURRENT USE OF INSULIN (HCC): ICD-10-CM

## 2019-05-20 DIAGNOSIS — M54.16 LUMBAR RADICULITIS: Chronic | ICD-10-CM

## 2019-05-20 DIAGNOSIS — I10 ESSENTIAL HYPERTENSION: ICD-10-CM

## 2019-05-20 DIAGNOSIS — Z79.891 CHRONIC USE OF OPIATE DRUGS THERAPEUTIC PURPOSES: ICD-10-CM

## 2019-05-20 DIAGNOSIS — E78.2 MIXED HYPERLIPIDEMIA: ICD-10-CM

## 2019-05-20 DIAGNOSIS — M47.816 SPONDYLOSIS OF LUMBAR REGION WITHOUT MYELOPATHY OR RADICULOPATHY: Chronic | ICD-10-CM

## 2019-05-20 DIAGNOSIS — F41.9 ANXIETY AND DEPRESSION: ICD-10-CM

## 2019-05-20 DIAGNOSIS — G47.33 OSA AND COPD OVERLAP SYNDROME (HCC): ICD-10-CM

## 2019-05-20 DIAGNOSIS — R51.9 DAILY HEADACHE: ICD-10-CM

## 2019-05-20 DIAGNOSIS — F32.A ANXIETY AND DEPRESSION: ICD-10-CM

## 2019-05-20 DIAGNOSIS — E66.01 MORBID OBESITY WITH BMI OF 40.0-44.9, ADULT (HCC): ICD-10-CM

## 2019-05-20 DIAGNOSIS — S61.412A COMPLICATED LACERATION OF HAND, LEFT, INITIAL ENCOUNTER: Primary | ICD-10-CM

## 2019-05-20 PROCEDURE — 99214 OFFICE O/P EST MOD 30 MIN: CPT | Performed by: FAMILY MEDICINE

## 2019-05-20 ASSESSMENT — ENCOUNTER SYMPTOMS
GASTROINTESTINAL NEGATIVE: 1
RESPIRATORY NEGATIVE: 1
ALLERGIC/IMMUNOLOGIC NEGATIVE: 1
EYES NEGATIVE: 1

## 2019-05-20 NOTE — PROGRESS NOTES
Subjective:      Patient ID: Baldomero Larose is a 52 y.o. male. Hand Injury    The incident occurred more than 1 week ago. The incident occurred at home. The injury mechanism was a direct blow. Pain location: Bilateral hand-dorsal surface. The quality of the pain is described as aching. The pain does not radiate. The pain is at a severity of 3/10. The pain has been constant since the incident. Associated symptoms include muscle weakness. Treatments tried: Chronic opiate dependence is provided by pain management. The treatment provided moderate relief. Review of Systems   Constitutional: Negative. HENT: Negative. Eyes: Negative. Respiratory: Negative. Cardiovascular: Negative. Gastrointestinal: Negative. Endocrine: Negative. Musculoskeletal: Positive for arthralgias and myalgias. Skin: Negative. Allergic/Immunologic: Negative. Neurological: Negative. Hematological: Negative. Psychiatric/Behavioral: The patient is nervous/anxious. Past family and social history unremarkable. Objective:   Physical Exam   Constitutional: He is oriented to person, place, and time. He appears well-developed and well-nourished. Obesity with sleep apnea on CPAP   HENT:   Head: Normocephalic and atraumatic. Right Ear: External ear normal.   Left Ear: External ear normal.   Nose: Nose normal.   Mouth/Throat: Oropharynx is clear and moist.   Eyes: Pupils are equal, round, and reactive to light. Conjunctivae and EOM are normal.   Neck: Normal range of motion. Neck supple. Cardiovascular: Normal rate, regular rhythm, normal heart sounds and intact distal pulses. Pulmonary/Chest: Effort normal and breath sounds normal.   Abdominal: Soft. Bowel sounds are normal.   Musculoskeletal: Normal range of motion. Left dorsal hand-well healed incision with intact sutures. Mild-to-moderate edema. No discharge, no crepitus.   Sensorimotor intact  Right dorsal hand-that heat incision over the knuckle of right index finger. No swelling, pain or redness or discharge. Sutures intact. Sensorimotor intact   Neurological: He is alert and oriented to person, place, and time. He has normal reflexes. Skin: Skin is warm and dry. Psychiatric:   Anxiety   Nursing note and vitals reviewed. Assessment:       Diagnosis Orders   1. Complicated laceration of hand, left, initial encounter     2. Daily headache     3. Essential hypertension     4. Mixed hyperlipidemia     5. Chronic use of opiate drugs therapeutic purposes     6. Morbid obesity with BMI of 40.0-44.9, adult ([address]     7. Controlled type 2 diabetes mellitus without complication, without long-term current use of insulin ([address]     8. Chronic obstructive pulmonary disease, unspecified COPD type ([address]     9. MARLENE and COPD overlap syndrome ([address]     10. Anxiety and depression     11. Lumbar radiculitis     12. Spondylosis of lumbar region without myelopathy or radiculopathy     13. Prediabetes             Plan:      49-year-old right hand dominant -American male is presented for follow-up. On 4/26/2019 when working on a table  at home, patient stated that the  jumped causing complicated laceration of left dorsal hand and right index finger. It was transferred to [facility] at [facility] where he was subsequently seen by plastic surgery. Patient underwent wound closure. Patient states that he did not undergo any tenderness or vascular repair. Sensorimotor intact. He was advised to keep the sutures intact for 3 weeks. He is scheduled to be seen this week. There is a modest dependent edema of the left hand. However no apparent sign of infection. Please advise elevation, ice. Is advised to follow-up with plastic surgery per schedule  Diabetes mellitus with A1c of 6.3 which is modest worsening. Continue metformin that he is tolerating well.   Is advised to adhere to ADA 1800 diet, daily moderate exercise, lifestyle change  Hypertension well controlled with the random blood pressure equal or less than 130/80. Consume less than 2 g of salt a day. Obesity with sleep apnea. He is compliant with CPAP. He would benefit from weight reduction to keep BMI around 25. He looks anxious, however denies being depressed on chronic pain syndrome. He is established with pain management. He is on opiates that he is tolerating well. Avoid constipation. COPD. He was recently seen by pulmonology. No recent pulmonary exacerbation. Continue beta agonist inhaled corticosteroid. Tobacco use. He is counseled, quit date, alternatives to consider. Med list reviewed advised to continue  Further recommendations to follow  Observe and call for any concern  This note is created with a voice recognition program and while intend to generate a document that accurately reflects the content of the visit, no guarantee can be provided that every mistake has been identified and corrected by editing.                   Theo Sales MD

## 2019-05-20 NOTE — PROGRESS NOTES
Chronic Disease Visit Information    BP Readings from Last 3 Encounters:   05/07/19 (!) 142/88   05/03/19 125/81   04/27/19 (!) 148/114          Hemoglobin A1C (%)   Date Value   10/02/2018 6.3 (H)   06/01/2017 6.0   10/26/2016 6.0     Microalb/Crt. Ratio (mcg/mg creat)   Date Value   10/02/2018 CANNOT BE CALCULATED     LDL Cholesterol (mg/dL)   Date Value   10/02/2018 125     HDL (mg/dL)   Date Value   10/02/2018 40 (L)     BUN (mg/dL)   Date Value   04/26/2019 13     CREATININE (mg/dL)   Date Value   04/26/2019 1.06     Glucose (mg/dL)   Date Value   04/26/2019 113 (H)            Have you changed or started any medications since your last visit including any over-the-counter medicines, vitamins, or herbal medicines? no   Are you having any side effects from any of your medications? -  no  Have you stopped taking any of your medications? Is so, why? -  no    Have you seen any other physician or provider since your last visit? No  Have you had any other diagnostic tests since your last visit? No  Have you been seen in the emergency room and/or had an admission to a hospital since we last saw you? No  Have you had your annual diabetic retinal (eye) exam? No  Have you had your routine dental cleaning in the past 6 months? no    Have you activated your [employer] account? If not, what are your barriers?  Yes     Patient Care Team:  Miguel A Bravo MD as PCP - General (Family Medicine)  Miguel A Bravo MD as PCP - S Attributed Provider  Jacqueline Boston MD as Consulting Physician (Neurology)  Marcus Meeks MD as Consulting Physician (Pain Management)  Maynor Goodrich MD as Consulting Physician (Gastroenterology)  Galo Howe MD as Consulting Physician (Pulmonology)         Medical History Review  Past Medical, Family, and Social History reviewed and does not contribute to the patient presenting condition    Health Maintenance   Topic Date Due    Diabetic foot exam  07/09/2019 (Originally 6/1/2018)    Shingles Vaccine (1 of 2) 07/23/2019 (Originally 9/17/2013)    Hepatitis A vaccine (1 of 2 - Risk 2-dose series) 10/02/2019 (Originally [birth year]17/1966)    Diabetic retinal exam  12/20/2019 (Originally 9/17/1973)    DTaP/Tdap/Td vaccine (1 - Tdap) 12/20/2019 (Originally 9/17/1982)    Flu vaccine (Season Ended) 12/20/2019 (Originally 9/1/2019)    Hepatitis B Vaccine (1 of 3 - Risk 3-dose series) 04/02/2020 (Originally 9/17/1982)    Pneumococcal 0-64 years Vaccine (1 of 1 - PPSV23) 04/20/2023 (Originally 9/17/1969)    A1C test (Diabetic or Prediabetic)  10/02/2019    Diabetic microalbuminuria test  10/02/2019    Lipid screen  10/02/2019    Potassium monitoring  04/26/2020    Creatinine monitoring  04/26/2020    Colon cancer screen colonoscopy  03/08/2024    Hepatitis C screen  Completed    HIV screen  Completed

## 2019-05-21 ENCOUNTER — TELEPHONE (OUTPATIENT)
Dept: BURN CARE | Age: 54
End: 2019-05-21

## 2019-06-03 ENCOUNTER — OFFICE VISIT (OUTPATIENT)
Dept: PAIN MANAGEMENT | Age: 54
End: 2019-06-03
Payer: COMMERCIAL

## 2019-06-03 VITALS
DIASTOLIC BLOOD PRESSURE: 79 MMHG | BODY MASS INDEX: 44.39 KG/M2 | SYSTOLIC BLOOD PRESSURE: 132 MMHG | HEIGHT: 65 IN | OXYGEN SATURATION: 95 % | WEIGHT: 266.4 LBS | HEART RATE: 87 BPM

## 2019-06-03 DIAGNOSIS — R51.9 DAILY HEADACHE: Primary | ICD-10-CM

## 2019-06-03 DIAGNOSIS — M54.16 LUMBAR RADICULITIS: Chronic | ICD-10-CM

## 2019-06-03 DIAGNOSIS — M47.816 SPONDYLOSIS OF LUMBAR REGION WITHOUT MYELOPATHY OR RADICULOPATHY: Chronic | ICD-10-CM

## 2019-06-03 DIAGNOSIS — Z79.891 CHRONIC USE OF OPIATE DRUGS THERAPEUTIC PURPOSES: ICD-10-CM

## 2019-06-03 PROCEDURE — 99213 OFFICE O/P EST LOW 20 MIN: CPT | Performed by: NURSE PRACTITIONER

## 2019-06-03 RX ORDER — OXYCODONE HYDROCHLORIDE AND ACETAMINOPHEN 5; 325 MG/1; MG/1
1 TABLET ORAL 2 TIMES DAILY PRN
Qty: 60 TABLET | Refills: 0 | Status: SHIPPED | OUTPATIENT
Start: 2019-06-03 | End: 2019-07-01 | Stop reason: SDUPTHER

## 2019-06-03 ASSESSMENT — ENCOUNTER SYMPTOMS: BACK PAIN: 1

## 2019-06-03 NOTE — PROGRESS NOTES
Subjective:      Patient ID: Jeevan Anthony is a 52 y.o. male. Back Pain   This is a chronic problem. The current episode started more than 1 year ago. The problem occurs constantly. The problem has been gradually worsening since onset. The pain is present in the lumbar spine and sacro-iliac. The pain does not radiate. The pain is at a severity of 9/10. The pain is severe. The pain is worse during the day. The symptoms are aggravated by bending, sitting and standing. Associated symptoms include leg pain, numbness and paresthesias. Risk factors include lack of exercise and obesity. He has tried analgesics, bed rest and heat for the symptoms. The treatment provided mild relief. Chief Complaint   Patient presents with    Medication Refill    Back Pain    Leg Pain     right     Foot Pain     right    :  Medication Refill: Oxycodone     Pain score Today: 9  Adverse effects (Constipation / Nausea / Sedation / sexual Dysfunction / others) :none   Mood: good  Sleep pattern and quality: poor  Activity level: poor    Pill count Today:0  due to fill 6/2/2019  Last dose taken 6/2/19  OARRS report reviewed today: yes  ER/Hospitalizations/PCP visit related to pain since last visit:yes left hand lac from  stitches to be removed tomorrow  Any legal problems e.g. DUI etc.:No  Satisfied with current management: yes    Opioid Contract:4/12/17  Last Urine Dug screen dated:4/25/19    Lab Results   Component Value Date    LABA1C 6.3 (H) 10/02/2018     Lab Results   Component Value Date     10/02/2018       Past Medical History, Past Surgical History, Social History, Allergies and Medications reviewed and updated in EPIC as indicated    Family History reviewed and is noncontributory. Review of Systems   Constitutional: Negative. HENT: Negative. Respiratory:        Uses inhalers    Cardiovascular: Negative. Musculoskeletal: Positive for back pain. Neurological: Positive for numbness and paresthesias. Chronic use of opiate drugs therapeutic purposes            Plan:      Patient relates current medications are helping the pain. Patient reports taking pain medications as prescribed, denies obtaining medications from different sources and denies use of illegal drugs. Patient denies side effects from medications like nausea, vomiting, constipation or drowsiness. Patient reports current activities of daily living are possible due to medications and would like to continue them. As always, we encourage daily stretching and strengthening exercises, and recommend minimizing use of pain medications unless patient cannot get through daily activities due to pain. Discussed with the patient the effect the patients medical condition and opioid medication may have on the patients ability to safely operate a vehicle. Pt verbalized understanding. Contract requirements met. Continue opioid therapy. Script written for percocet  Follow up appointment made for 4 weeks    Controlled Substances Monitoring:     RX Monitoring 6/3/2019   Attestation The Prescription Monitoring Report for this patient was reviewed today. Chronic Pain Routine Monitoring Possible medication side effects, risk of tolerance/dependence & alternative treatments discussed. ;Obtaining appropriate analgesic effect of treatment. ;No signs of potential drug abuse or diversion identified: otherwise, see note documentation   Chronic Pain > 50 MEDD -   Chronic Pain > 80 MEDD -           Merrick Ewing, APRN - CNP

## 2019-06-04 ENCOUNTER — OFFICE VISIT (OUTPATIENT)
Dept: BURN CARE | Age: 54
End: 2019-06-04
Payer: COMMERCIAL

## 2019-06-04 VITALS
BODY MASS INDEX: 44.32 KG/M2 | HEART RATE: 85 BPM | HEIGHT: 65 IN | WEIGHT: 266 LBS | SYSTOLIC BLOOD PRESSURE: 146 MMHG | DIASTOLIC BLOOD PRESSURE: 94 MMHG

## 2019-06-04 DIAGNOSIS — S61.412A COMPLICATED LACERATION OF HAND, LEFT, INITIAL ENCOUNTER: Primary | ICD-10-CM

## 2019-06-04 DIAGNOSIS — S61.411S: ICD-10-CM

## 2019-06-04 PROCEDURE — 99202 OFFICE O/P NEW SF 15 MIN: CPT | Performed by: PLASTIC SURGERY

## 2019-06-04 PROCEDURE — 99212 OFFICE O/P EST SF 10 MIN: CPT

## 2019-07-01 ENCOUNTER — OFFICE VISIT (OUTPATIENT)
Dept: PAIN MANAGEMENT | Age: 54
End: 2019-07-01
Payer: COMMERCIAL

## 2019-07-01 VITALS
HEART RATE: 90 BPM | SYSTOLIC BLOOD PRESSURE: 142 MMHG | HEIGHT: 65 IN | OXYGEN SATURATION: 95 % | WEIGHT: 266 LBS | DIASTOLIC BLOOD PRESSURE: 84 MMHG | BODY MASS INDEX: 44.32 KG/M2

## 2019-07-01 DIAGNOSIS — M54.16 LUMBAR RADICULITIS: Chronic | ICD-10-CM

## 2019-07-01 DIAGNOSIS — M47.816 SPONDYLOSIS OF LUMBAR REGION WITHOUT MYELOPATHY OR RADICULOPATHY: Chronic | ICD-10-CM

## 2019-07-01 DIAGNOSIS — Z79.891 CHRONIC USE OF OPIATE DRUG FOR THERAPEUTIC PURPOSE: Primary | ICD-10-CM

## 2019-07-01 PROCEDURE — 99213 OFFICE O/P EST LOW 20 MIN: CPT | Performed by: NURSE PRACTITIONER

## 2019-07-01 RX ORDER — OXYCODONE HYDROCHLORIDE AND ACETAMINOPHEN 5; 325 MG/1; MG/1
1 TABLET ORAL 2 TIMES DAILY PRN
Qty: 60 TABLET | Refills: 0 | Status: SHIPPED | OUTPATIENT
Start: 2019-07-01 | End: 2019-08-02 | Stop reason: SDUPTHER

## 2019-07-01 ASSESSMENT — ENCOUNTER SYMPTOMS
RESPIRATORY NEGATIVE: 1
BACK PAIN: 1
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
## 2019-07-18 ENCOUNTER — OFFICE VISIT (OUTPATIENT)
Dept: INTERNAL MEDICINE CLINIC | Age: 54
End: 2019-07-18
Payer: COMMERCIAL

## 2019-07-18 VITALS
SYSTOLIC BLOOD PRESSURE: 130 MMHG | BODY MASS INDEX: 43.99 KG/M2 | WEIGHT: 264 LBS | OXYGEN SATURATION: 98 % | HEART RATE: 82 BPM | DIASTOLIC BLOOD PRESSURE: 80 MMHG | RESPIRATION RATE: 20 BRPM | HEIGHT: 65 IN

## 2019-07-18 DIAGNOSIS — G47.33 OSA AND COPD OVERLAP SYNDROME (HCC): ICD-10-CM

## 2019-07-18 DIAGNOSIS — F32.A ANXIETY AND DEPRESSION: ICD-10-CM

## 2019-07-18 DIAGNOSIS — J44.9 OSA AND COPD OVERLAP SYNDROME (HCC): ICD-10-CM

## 2019-07-18 DIAGNOSIS — E78.2 MIXED HYPERLIPIDEMIA: ICD-10-CM

## 2019-07-18 DIAGNOSIS — J44.9 CHRONIC OBSTRUCTIVE PULMONARY DISEASE, UNSPECIFIED COPD TYPE (HCC): ICD-10-CM

## 2019-07-18 DIAGNOSIS — R51.9 DAILY HEADACHE: ICD-10-CM

## 2019-07-18 DIAGNOSIS — M54.16 LUMBAR RADICULITIS: Chronic | ICD-10-CM

## 2019-07-18 DIAGNOSIS — E11.9 CONTROLLED TYPE 2 DIABETES MELLITUS WITHOUT COMPLICATION, WITHOUT LONG-TERM CURRENT USE OF INSULIN (HCC): Primary | ICD-10-CM

## 2019-07-18 DIAGNOSIS — F41.9 ANXIETY AND DEPRESSION: ICD-10-CM

## 2019-07-18 DIAGNOSIS — E66.01 MORBID OBESITY WITH BMI OF 40.0-44.9, ADULT (HCC): ICD-10-CM

## 2019-07-18 DIAGNOSIS — R73.03 PREDIABETES: ICD-10-CM

## 2019-07-18 DIAGNOSIS — I10 ESSENTIAL HYPERTENSION: ICD-10-CM

## 2019-07-18 DIAGNOSIS — M47.816 SPONDYLOSIS OF LUMBAR REGION WITHOUT MYELOPATHY OR RADICULOPATHY: Chronic | ICD-10-CM

## 2019-07-18 DIAGNOSIS — Z79.891 CHRONIC USE OF OPIATE DRUG FOR THERAPEUTIC PURPOSE: ICD-10-CM

## 2019-07-18 PROBLEM — S61.412A COMPLICATED LACERATION OF HAND, LEFT, INITIAL ENCOUNTER: Status: RESOLVED | Noted: 2019-04-26 | Resolved: 2019-07-18

## 2019-07-18 PROCEDURE — 99214 OFFICE O/P EST MOD 30 MIN: CPT | Performed by: FAMILY MEDICINE

## 2019-07-18 ASSESSMENT — ENCOUNTER SYMPTOMS
GASTROINTESTINAL NEGATIVE: 1
ALLERGIC/IMMUNOLOGIC NEGATIVE: 1
RESPIRATORY NEGATIVE: 1
EYES NEGATIVE: 1
BACK PAIN: 1

## 2019-07-18 NOTE — PROGRESS NOTES
Subjective:      Patient ID: Helen Holliday is a 52 y.o. male. Diabetes   He presents for his follow-up diabetic visit. He has type 2 diabetes mellitus. His disease course has been stable. Hypoglycemia symptoms include nervousness/anxiousness. There are no diabetic associated symptoms. There are no hypoglycemic complications. Symptoms are stable. There are no diabetic complications. Risk factors for coronary artery disease include diabetes mellitus, dyslipidemia, family history, obesity, male sex, hypertension and stress. Current diabetic treatment includes oral agent (monotherapy) and diet. He is compliant with treatment all of the time. His weight is stable. He is following a generally healthy diet. Meal planning includes ADA exchanges, avoidance of concentrated sweets, calorie counting and carbohydrate counting. He has had a previous visit with a dietitian. He participates in exercise three times a week. There is no change in his home blood glucose trend. An ACE inhibitor/angiotensin II receptor blocker is being taken. Eye exam is current. Review of Systems   Constitutional: Negative. HENT: Negative. Eyes: Negative. Respiratory: Negative. Cardiovascular: Negative. Gastrointestinal: Negative. Endocrine: Negative. Musculoskeletal: Positive for arthralgias and back pain. Skin: Negative. Allergic/Immunologic: Negative. Neurological: Negative. Hematological: Negative. Psychiatric/Behavioral: The patient is nervous/anxious. Past family and social history unremarkable. Diagnosis Orders   1. Controlled type 2 diabetes mellitus without complication, without long-term current use of insulin ([address]     2. Chronic obstructive pulmonary disease, unspecified COPD type ([address]     3. Daily headache     4. Essential hypertension     5. Mixed hyperlipidemia     6. Chronic use of opiate drugs therapeutic purposes     7. Morbid obesity with BMI of 40.0-44.9, adult ([address]     8.  MARLENE and COPD overlap syndrome ([facility]  Micaela Robles DO, Pulmonology, [place]   9. Anxiety and depression     10. Prediabetes     11. Lumbar radiculitis     12. Spondylosis of lumbar region without myelopathy or radiculopathy           Objective:   Physical Exam   Constitutional: He is oriented to person, place, and time. He appears well-developed and well-nourished. Morbid obesity with sleep apnea on positive pressure ventilation  Overlap syndrome with underlying COPD   HENT:   Head: Normocephalic and atraumatic. Right Ear: External ear normal.   Left Ear: External ear normal.   Nose: Nose normal.   Mouth/Throat: Oropharynx is clear and moist.   Eyes: Pupils are equal, round, and reactive to light. Conjunctivae and EOM are normal.   Neck: Normal range of motion. Neck supple. Cardiovascular: Normal rate, regular rhythm, normal heart sounds and intact distal pulses. Pulmonary/Chest: Effort normal and breath sounds normal.   Abdominal: Soft. Bowel sounds are normal.   Musculoskeletal: Normal range of motion. Chronic pain syndrome. Opiate dependent as provided by pain management   Neurological: He is alert and oriented to person, place, and time. He has normal reflexes. Skin: Skin is warm and dry. Psychiatric:   Anxiety/depression. He denies suicidality   Nursing note and vitals reviewed. Assessment:       Diagnosis Orders   1. Controlled type 2 diabetes mellitus without complication, without long-term current use of insulin ([facility]     2. Chronic obstructive pulmonary disease, unspecified COPD type ([facility]     3. Daily headache     4. Essential hypertension     5. Mixed hyperlipidemia     6. Chronic use of opiate drugs therapeutic purposes     7. Morbid obesity with BMI of 40.0-44.9, adult ([facility]     8. MARLENE and COPD overlap syndrome ([facility]  Micaela Robles DO, Pulmonology, [place]   9. Anxiety and depression     10. Prediabetes     11. Lumbar radiculitis     12.  Spondylosis of lumbar region without myelopathy or radiculopathy             Plan:      49-year-old morbidly obese -American male returns for follow-up. He is afebrile hemodynamically stable, clinical examination is benign  Diabetes mellitus well controlled with A1c of 6.3. Advised to continue metformin that he is tolerating well  Morbid obesity. He would benefit from 80 1800 diet, daily moderate exercise, lifestyle change and weight reduction to keep BMI around 25  Sleep apnea on positive pressure ventilation  COPD. No recent decompensation. He is established with pulmonology. Risk factor stratification in particular weight reduction is advised. Continue beta agonist, inhaled corticosteroid and anticholinergics  Chronic pain syndrome. Opiate dependent as provided by pain management. Avoid constipation  Hypertension well controlled with random blood pressure equal less than 130/80. Consume less than 2 g of salt a day  Hyperlipidemia and statin that he is tolerating well  Monofilament testing is unremarkable. He is counseled on diabetic foot care  He is advised to update his annual dilated eye exam  Tobacco use. Once again he is counseled, quit it, alternative to consider. Med list reviewed advised to continue  Further recommendations to follow  This note is created with a voice recognition program and while intend to generate a document that accurately reflects the content of the visit, no guarantee can be provided that every mistake has been identified and corrected by editing.           Lilliam Kramer MD

## 2019-08-02 ENCOUNTER — OFFICE VISIT (OUTPATIENT)
Dept: PAIN MANAGEMENT | Age: 54
End: 2019-08-02
Payer: COMMERCIAL

## 2019-08-02 VITALS
OXYGEN SATURATION: 96 % | WEIGHT: 246 LBS | BODY MASS INDEX: 40.98 KG/M2 | HEIGHT: 65 IN | SYSTOLIC BLOOD PRESSURE: 132 MMHG | DIASTOLIC BLOOD PRESSURE: 77 MMHG | HEART RATE: 76 BPM

## 2019-08-02 DIAGNOSIS — Z79.891 ENCOUNTER FOR LONG-TERM OPIATE ANALGESIC USE: ICD-10-CM

## 2019-08-02 DIAGNOSIS — M47.816 SPONDYLOSIS OF LUMBAR REGION WITHOUT MYELOPATHY OR RADICULOPATHY: Chronic | ICD-10-CM

## 2019-08-02 PROCEDURE — 99213 OFFICE O/P EST LOW 20 MIN: CPT | Performed by: NURSE PRACTITIONER

## 2019-08-02 RX ORDER — OXYCODONE HYDROCHLORIDE AND ACETAMINOPHEN 5; 325 MG/1; MG/1
1 TABLET ORAL 2 TIMES DAILY PRN
Qty: 60 TABLET | Refills: 0 | Status: SHIPPED | OUTPATIENT
Start: 2019-08-02 | End: 2019-08-30 | Stop reason: SDUPTHER

## 2019-08-02 ASSESSMENT — ENCOUNTER SYMPTOMS
SHORTNESS OF BREATH: 1
EYES NEGATIVE: 1
SLEEP DISTURBANCES DUE TO BREATHING: 0
SNORING: 0
HEMOPTYSIS: 0
COUGH: 0
BACK PAIN: 1
SPUTUM PRODUCTION: 0
WHEEZING: 1

## 2019-08-30 ENCOUNTER — OFFICE VISIT (OUTPATIENT)
Dept: PAIN MANAGEMENT | Age: 54
End: 2019-08-30
Payer: COMMERCIAL

## 2019-08-30 VITALS
HEIGHT: 65 IN | DIASTOLIC BLOOD PRESSURE: 87 MMHG | OXYGEN SATURATION: 96 % | HEART RATE: 82 BPM | WEIGHT: 264 LBS | SYSTOLIC BLOOD PRESSURE: 137 MMHG | BODY MASS INDEX: 43.99 KG/M2

## 2019-08-30 DIAGNOSIS — Z79.891 CHRONIC USE OF OPIATE DRUG FOR THERAPEUTIC PURPOSE: ICD-10-CM

## 2019-08-30 DIAGNOSIS — M54.16 LUMBAR RADICULITIS: Primary | Chronic | ICD-10-CM

## 2019-08-30 DIAGNOSIS — M47.816 SPONDYLOSIS OF LUMBAR REGION WITHOUT MYELOPATHY OR RADICULOPATHY: Chronic | ICD-10-CM

## 2019-08-30 PROCEDURE — 99213 OFFICE O/P EST LOW 20 MIN: CPT | Performed by: NURSE PRACTITIONER

## 2019-08-30 RX ORDER — OXYCODONE HYDROCHLORIDE AND ACETAMINOPHEN 5; 325 MG/1; MG/1
1 TABLET ORAL 2 TIMES DAILY PRN
Qty: 58 TABLET | Refills: 0 | Status: SHIPPED | OUTPATIENT
Start: 2019-09-01 | End: 2019-08-30

## 2019-08-30 RX ORDER — OXYCODONE HYDROCHLORIDE AND ACETAMINOPHEN 5; 325 MG/1; MG/1
1 TABLET ORAL 2 TIMES DAILY PRN
Qty: 58 TABLET | Refills: 0 | Status: SHIPPED | OUTPATIENT
Start: 2019-08-31 | End: 2019-09-30 | Stop reason: SDUPTHER

## 2019-08-30 ASSESSMENT — ENCOUNTER SYMPTOMS: BACK PAIN: 1

## 2019-09-06 ENCOUNTER — TELEPHONE (OUTPATIENT)
Dept: PAIN MANAGEMENT | Age: 54
End: 2019-09-06

## 2019-09-20 ENCOUNTER — TELEPHONE (OUTPATIENT)
Dept: PAIN MANAGEMENT | Age: 54
End: 2019-09-20

## 2019-09-30 ENCOUNTER — OFFICE VISIT (OUTPATIENT)
Dept: PAIN MANAGEMENT | Age: 54
End: 2019-09-30
Payer: COMMERCIAL

## 2019-09-30 VITALS
DIASTOLIC BLOOD PRESSURE: 85 MMHG | HEART RATE: 79 BPM | WEIGHT: 264 LBS | OXYGEN SATURATION: 97 % | SYSTOLIC BLOOD PRESSURE: 125 MMHG | BODY MASS INDEX: 43.99 KG/M2 | HEIGHT: 65 IN

## 2019-09-30 DIAGNOSIS — M47.26 OSTEOARTHRITIS OF SPINE WITH RADICULOPATHY, LUMBAR REGION: Primary | ICD-10-CM

## 2019-09-30 DIAGNOSIS — M47.816 SPONDYLOSIS OF LUMBAR REGION WITHOUT MYELOPATHY OR RADICULOPATHY: Chronic | ICD-10-CM

## 2019-09-30 DIAGNOSIS — Z79.891 CHRONIC USE OF OPIATE DRUG FOR THERAPEUTIC PURPOSE: ICD-10-CM

## 2019-09-30 PROCEDURE — 99213 OFFICE O/P EST LOW 20 MIN: CPT | Performed by: NURSE PRACTITIONER

## 2019-09-30 RX ORDER — OXYCODONE HYDROCHLORIDE AND ACETAMINOPHEN 5; 325 MG/1; MG/1
1 TABLET ORAL 2 TIMES DAILY PRN
Qty: 60 TABLET | Refills: 0 | Status: SHIPPED | OUTPATIENT
Start: 2019-09-30 | End: 2019-10-28 | Stop reason: SDUPTHER

## 2019-09-30 ASSESSMENT — ENCOUNTER SYMPTOMS
CONSTIPATION: 0
EYES NEGATIVE: 1
COUGH: 1
BACK PAIN: 1
GASTROINTESTINAL NEGATIVE: 1
SHORTNESS OF BREATH: 1
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
## 2019-10-22 ENCOUNTER — TELEPHONE (OUTPATIENT)
Dept: INTERNAL MEDICINE CLINIC | Age: 54
End: 2019-10-22

## 2019-10-22 DIAGNOSIS — S19.9XXA INJURY OF NECK, INITIAL ENCOUNTER: Primary | ICD-10-CM

## 2019-10-28 ENCOUNTER — OFFICE VISIT (OUTPATIENT)
Dept: PAIN MANAGEMENT | Age: 54
End: 2019-10-28
Payer: COMMERCIAL

## 2019-10-28 VITALS
BODY MASS INDEX: 44.15 KG/M2 | HEIGHT: 65 IN | HEART RATE: 70 BPM | DIASTOLIC BLOOD PRESSURE: 144 MMHG | SYSTOLIC BLOOD PRESSURE: 149 MMHG | RESPIRATION RATE: 98 BRPM | WEIGHT: 265 LBS

## 2019-10-28 DIAGNOSIS — M54.16 LUMBAR RADICULITIS: Chronic | ICD-10-CM

## 2019-10-28 DIAGNOSIS — M47.26 OSTEOARTHRITIS OF SPINE WITH RADICULOPATHY, LUMBAR REGION: Primary | ICD-10-CM

## 2019-10-28 DIAGNOSIS — M47.816 SPONDYLOSIS OF LUMBAR REGION WITHOUT MYELOPATHY OR RADICULOPATHY: Chronic | ICD-10-CM

## 2019-10-28 PROCEDURE — 99213 OFFICE O/P EST LOW 20 MIN: CPT | Performed by: NURSE PRACTITIONER

## 2019-10-28 RX ORDER — OXYCODONE HYDROCHLORIDE AND ACETAMINOPHEN 5; 325 MG/1; MG/1
1 TABLET ORAL 2 TIMES DAILY PRN
Qty: 46 TABLET | Refills: 0 | Status: SHIPPED | OUTPATIENT
Start: 2019-10-30 | End: 2019-11-18 | Stop reason: SDUPTHER

## 2019-10-28 RX ORDER — OXYCODONE HYDROCHLORIDE AND ACETAMINOPHEN 5; 325 MG/1; MG/1
1 TABLET ORAL 2 TIMES DAILY PRN
Qty: 60 TABLET | Refills: 0 | Status: SHIPPED | OUTPATIENT
Start: 2019-10-30 | End: 2019-10-28

## 2019-10-28 ASSESSMENT — ENCOUNTER SYMPTOMS
SPUTUM PRODUCTION: 1
HEMOPTYSIS: 0
SNORING: 0
SHORTNESS OF BREATH: 1
COUGH: 0
BACK PAIN: 1
WHEEZING: 1
APHONIA: 0
CONSTIPATION: 0
SLEEP DISTURBANCES DUE TO BREATHING: 0

## 2019-10-29 ENCOUNTER — OFFICE VISIT (OUTPATIENT)
Dept: INTERNAL MEDICINE CLINIC | Age: 54
End: 2019-10-29
Payer: COMMERCIAL

## 2019-10-29 VITALS
OXYGEN SATURATION: 99 % | HEIGHT: 65 IN | RESPIRATION RATE: 20 BRPM | HEART RATE: 79 BPM | WEIGHT: 264 LBS | BODY MASS INDEX: 43.99 KG/M2 | SYSTOLIC BLOOD PRESSURE: 130 MMHG | DIASTOLIC BLOOD PRESSURE: 80 MMHG

## 2019-10-29 DIAGNOSIS — J44.9 OSA AND COPD OVERLAP SYNDROME (HCC): ICD-10-CM

## 2019-10-29 DIAGNOSIS — M47.26 OSTEOARTHRITIS OF SPINE WITH RADICULOPATHY, LUMBAR REGION: ICD-10-CM

## 2019-10-29 DIAGNOSIS — F41.9 ANXIETY AND DEPRESSION: ICD-10-CM

## 2019-10-29 DIAGNOSIS — Z28.21 INFLUENZA VACCINATION DECLINED: ICD-10-CM

## 2019-10-29 DIAGNOSIS — J44.9 CHRONIC OBSTRUCTIVE PULMONARY DISEASE, UNSPECIFIED COPD TYPE (HCC): ICD-10-CM

## 2019-10-29 DIAGNOSIS — E78.2 MIXED HYPERLIPIDEMIA: ICD-10-CM

## 2019-10-29 DIAGNOSIS — I10 ESSENTIAL HYPERTENSION: ICD-10-CM

## 2019-10-29 DIAGNOSIS — G47.33 OSA AND COPD OVERLAP SYNDROME (HCC): ICD-10-CM

## 2019-10-29 DIAGNOSIS — M54.16 LUMBAR RADICULITIS: Chronic | ICD-10-CM

## 2019-10-29 DIAGNOSIS — F32.A ANXIETY AND DEPRESSION: ICD-10-CM

## 2019-10-29 DIAGNOSIS — E66.01 MORBID OBESITY WITH BMI OF 40.0-44.9, ADULT (HCC): ICD-10-CM

## 2019-10-29 DIAGNOSIS — R51.9 DAILY HEADACHE: ICD-10-CM

## 2019-10-29 DIAGNOSIS — Z79.891 CHRONIC USE OF OPIATE DRUG FOR THERAPEUTIC PURPOSE: ICD-10-CM

## 2019-10-29 DIAGNOSIS — Z28.21 PNEUMOCOCCAL VACCINATION DECLINED: ICD-10-CM

## 2019-10-29 DIAGNOSIS — E11.9 CONTROLLED TYPE 2 DIABETES MELLITUS WITHOUT COMPLICATION, WITHOUT LONG-TERM CURRENT USE OF INSULIN (HCC): Primary | ICD-10-CM

## 2019-10-29 LAB
CREATININE URINE POCT: NORMAL
HBA1C MFR BLD: 6.1 %
MICROALBUMIN/CREAT 24H UR: NORMAL MG/G{CREAT}
MICROALBUMIN/CREAT UR-RTO: NORMAL

## 2019-10-29 PROCEDURE — 99214 OFFICE O/P EST MOD 30 MIN: CPT | Performed by: FAMILY MEDICINE

## 2019-10-29 PROCEDURE — 82044 UR ALBUMIN SEMIQUANTITATIVE: CPT | Performed by: FAMILY MEDICINE

## 2019-10-29 PROCEDURE — 83036 HEMOGLOBIN GLYCOSYLATED A1C: CPT | Performed by: FAMILY MEDICINE

## 2019-10-29 ASSESSMENT — ENCOUNTER SYMPTOMS
EYES NEGATIVE: 1
GASTROINTESTINAL NEGATIVE: 1
COUGH: 1
ALLERGIC/IMMUNOLOGIC NEGATIVE: 1

## 2019-10-29 ASSESSMENT — COPD QUESTIONNAIRES: COPD: 1

## 2019-11-18 ENCOUNTER — OFFICE VISIT (OUTPATIENT)
Dept: PAIN MANAGEMENT | Age: 54
End: 2019-11-18
Payer: COMMERCIAL

## 2019-11-18 VITALS
WEIGHT: 264 LBS | OXYGEN SATURATION: 95 % | HEART RATE: 91 BPM | BODY MASS INDEX: 43.99 KG/M2 | SYSTOLIC BLOOD PRESSURE: 162 MMHG | HEIGHT: 65 IN | DIASTOLIC BLOOD PRESSURE: 89 MMHG

## 2019-11-18 DIAGNOSIS — Z79.891 CHRONIC USE OF OPIATE DRUG FOR THERAPEUTIC PURPOSE: ICD-10-CM

## 2019-11-18 DIAGNOSIS — M54.16 LUMBAR RADICULITIS: Primary | Chronic | ICD-10-CM

## 2019-11-18 DIAGNOSIS — M47.816 SPONDYLOSIS OF LUMBAR REGION WITHOUT MYELOPATHY OR RADICULOPATHY: Chronic | ICD-10-CM

## 2019-11-18 PROCEDURE — 99213 OFFICE O/P EST LOW 20 MIN: CPT | Performed by: NURSE PRACTITIONER

## 2019-11-18 RX ORDER — OXYCODONE HYDROCHLORIDE AND ACETAMINOPHEN 5; 325 MG/1; MG/1
1 TABLET ORAL 2 TIMES DAILY PRN
Qty: 60 TABLET | Refills: 0 | Status: SHIPPED | OUTPATIENT
Start: 2019-11-22 | End: 2019-12-13 | Stop reason: SDUPTHER

## 2019-11-18 ASSESSMENT — ENCOUNTER SYMPTOMS
WHEEZING: 0
COUGH: 1
SNORING: 0
BACK PAIN: 1
SLEEP DISTURBANCES DUE TO BREATHING: 0
CONSTIPATION: 0
SHORTNESS OF BREATH: 1
HEMOPTYSIS: 0

## 2019-12-13 ENCOUNTER — OFFICE VISIT (OUTPATIENT)
Dept: PAIN MANAGEMENT | Age: 54
End: 2019-12-13
Payer: COMMERCIAL

## 2019-12-13 VITALS
BODY MASS INDEX: 43.99 KG/M2 | OXYGEN SATURATION: 96 % | SYSTOLIC BLOOD PRESSURE: 135 MMHG | DIASTOLIC BLOOD PRESSURE: 84 MMHG | HEIGHT: 65 IN | WEIGHT: 264 LBS | HEART RATE: 73 BPM

## 2019-12-13 DIAGNOSIS — M47.816 SPONDYLOSIS OF LUMBAR REGION WITHOUT MYELOPATHY OR RADICULOPATHY: Chronic | ICD-10-CM

## 2019-12-13 PROCEDURE — 99213 OFFICE O/P EST LOW 20 MIN: CPT | Performed by: NURSE PRACTITIONER

## 2019-12-13 RX ORDER — OXYCODONE HYDROCHLORIDE AND ACETAMINOPHEN 5; 325 MG/1; MG/1
1 TABLET ORAL 2 TIMES DAILY PRN
Qty: 60 TABLET | Refills: 0 | Status: SHIPPED | OUTPATIENT
Start: 2019-12-22 | End: 2020-01-10 | Stop reason: SDUPTHER

## 2019-12-13 ASSESSMENT — ENCOUNTER SYMPTOMS
BACK PAIN: 1
SHORTNESS OF BREATH: 1

## 2020-01-10 ENCOUNTER — OFFICE VISIT (OUTPATIENT)
Dept: PAIN MANAGEMENT | Age: 55
End: 2020-01-10
Payer: COMMERCIAL

## 2020-01-10 VITALS
OXYGEN SATURATION: 95 % | BODY MASS INDEX: 43.65 KG/M2 | HEART RATE: 89 BPM | WEIGHT: 262 LBS | SYSTOLIC BLOOD PRESSURE: 151 MMHG | HEIGHT: 65 IN | DIASTOLIC BLOOD PRESSURE: 87 MMHG

## 2020-01-10 PROCEDURE — 99214 OFFICE O/P EST MOD 30 MIN: CPT | Performed by: NURSE PRACTITIONER

## 2020-01-10 RX ORDER — OXYCODONE HYDROCHLORIDE AND ACETAMINOPHEN 5; 325 MG/1; MG/1
1 TABLET ORAL 2 TIMES DAILY PRN
Qty: 60 TABLET | Refills: 0 | Status: SHIPPED | OUTPATIENT
Start: 2020-01-21 | End: 2020-02-17 | Stop reason: SDUPTHER

## 2020-01-10 ASSESSMENT — ENCOUNTER SYMPTOMS
WHEEZING: 1
COUGH: 0
BACK PAIN: 1
SHORTNESS OF BREATH: 0
CONSTIPATION: 0

## 2020-01-10 NOTE — PROGRESS NOTES
10/10. The pain is severe. The pain is the same all the time. The symptoms are aggravated by bending, sitting, standing and position. Associated symptoms include headaches and paresthesias. Pertinent negatives include no chest pain or fever. Risk factors include lack of exercise and obesity. He has tried analgesics for the symptoms. Medication Refill: Percocet    Pain score Today:  10  Adverse effects (Constipation / Nausea / Sedation / sexual Dysfunction / others) : no  Mood: good  Sleep pattern and quality: poor  Activity level: good    Pill count Today:# 19 Percocet   Last dose taken  1/10/20  OARRS report reviewed today: yes  Morphine equivalent: 15  ER/Hospitalizations/PCP visit related to pain since last visit:no   Any legal problems e.g. DUI etc.:No  Satisfied with current management: Yes    Opioid Contract:6/3/19  Last Urine Dug screen dated: 4/25/19    Past Medical History, Past Surgical History, Social History, Allergies and Medications reviewed and updated in EPIC as indicated    Family History reviewed and is noncontributory. Periodic Controlled Substance Monitoring: Possible medication side effects, risk of tolerance/dependence & alternative treatments discussed., No signs of potential drug abuse or diversion identified. , Assessed functional status., Obtaining appropriate analgesic effect of treatment.  Luis Carlos Duffy, APRN - CNP)      Past Medical History:   Diagnosis Date    Asthma     Diabetes mellitus ([address]     Hyperlipidemia     Hypertension     Migraine     Neuropathy     Positive FIT (fecal immunochemical test)     Renal failure        Past Surgical History:   Procedure Laterality Date    ANESTHESIA NERVE BLOCK Bilateral 9/18/2017    NERVE BLOCK BILATERAL MEDIAL BRANCH L2-L5 performed by Wade Bazan MD at [facility] Bilateral 10/17/2017    NERVE BLOCK BILATERAL MEDIAL BRANCH L2-L5 performed by Wade Bazan MD at [address] [place] [address] current activities of daily living are possible due to medications and would like to continue them. As always, we encourage daily stretching and strengthening exercises, and recommend minimizing use of pain medications unless patient cannot get through daily activities due to pain. Contract requirements met. Continue opioid therapy. Script written for percocet  VERNA obtained today for monitoring purposes.  Last dose of medication was today  Discussed repeating RFA but pt states was too painful of a procedure to repeat  Follow up appointment made for 4 weeks with MD for f/u

## 2020-02-17 ENCOUNTER — OFFICE VISIT (OUTPATIENT)
Dept: PAIN MANAGEMENT | Age: 55
End: 2020-02-17
Payer: COMMERCIAL

## 2020-02-17 VITALS
OXYGEN SATURATION: 94 % | HEART RATE: 89 BPM | DIASTOLIC BLOOD PRESSURE: 89 MMHG | SYSTOLIC BLOOD PRESSURE: 136 MMHG | BODY MASS INDEX: 43.65 KG/M2 | WEIGHT: 262 LBS | HEIGHT: 65 IN

## 2020-02-17 PROCEDURE — 99213 OFFICE O/P EST LOW 20 MIN: CPT | Performed by: NURSE PRACTITIONER

## 2020-02-17 RX ORDER — OXYCODONE HYDROCHLORIDE AND ACETAMINOPHEN 5; 325 MG/1; MG/1
1 TABLET ORAL 2 TIMES DAILY PRN
Qty: 60 TABLET | Refills: 0 | Status: SHIPPED | OUTPATIENT
Start: 2020-02-20 | End: 2020-03-19 | Stop reason: SDUPTHER

## 2020-02-17 ASSESSMENT — ENCOUNTER SYMPTOMS
COUGH: 0
BACK PAIN: 1
CONSTIPATION: 0
DIARRHEA: 0
SHORTNESS OF BREATH: 1

## 2020-02-17 NOTE — PROGRESS NOTES
Pack years: 10.00     Types: Cigarettes     Start date: 10/2/1979    Smokeless tobacco: Never Used    Tobacco comment: 1 pack every 2.5 days   Substance and Sexual Activity    Alcohol use: No     Alcohol/week: 0.0 standard drinks    Drug use: No    Sexual activity: Not on file   Lifestyle    Physical activity:     Days per week: Not on file     Minutes per session: Not on file    Stress: Not on file   Relationships    Social connections:     Talks on phone: Not on file     Gets together: Not on file     Attends Bahai service: Not on file     Active member of club or organization: Not on file     Attends meetings of clubs or organizations: Not on file     Relationship status: Not on file    Intimate partner violence:     Fear of current or ex partner: Not on file     Emotionally abused: Not on file     Physically abused: Not on file     Forced sexual activity: Not on file   Other Topics Concern    Not on file   Social History Narrative    Not on file       Review of Systems:  Review of Systems   Constitution: Negative for chills and fever. Cardiovascular: Negative for chest pain and palpitations. Respiratory: Positive for shortness of breath. Negative for cough. Musculoskeletal: Positive for back pain and neck pain. Gastrointestinal: Negative for constipation and diarrhea. Neurological: Negative for numbness and paresthesias. Psychiatric/Behavioral: Negative for depression. Physical Exam:  /89   Pulse 89   Ht 5' 4.75\" (1.645 m)   Wt 262 lb (118.8 kg)   SpO2 94%   BMI 43.94 kg/m²     Physical Exam  Cardiovascular:      Rate and Rhythm: Normal rate. Pulmonary:      Effort: Pulmonary effort is normal.   Musculoskeletal: Normal range of motion. Skin:     General: Skin is warm and dry. Neurological:      Mental Status: He is alert and oriented to person, place, and time.           Assessment:  Problem List Items Addressed This Visit     Lumbar radiculitis (Chronic) Relevant Medications    oxyCODONE-acetaminophen (PERCOCET) 5-325 MG per tablet (Start on 2/20/2020)    Chronic use of opiate drugs therapeutic purposes    Relevant Medications    oxyCODONE-acetaminophen (PERCOCET) 5-325 MG per tablet (Start on 2/20/2020)    Osteoarthritis of spine with radiculopathy, lumbar region - Primary    Relevant Medications    oxyCODONE-acetaminophen (PERCOCET) 5-325 MG per tablet (Start on 2/20/2020)      Other Visit Diagnoses     Cervicalgia                 Treatment Plan:  Patient relates current medications are helping the pain. Patient reports taking pain medications as prescribed, denies obtaining medications from different sources and denies use of illegal drugs. Patient denies side effects from medications like nausea, vomiting, constipation or drowsiness. Patient reports current activities of daily living are possible due to medications and would like to continue them. As always, we encourage daily stretching and strengthening exercises, and recommend minimizing use of pain medications unless patient cannot get through daily activities due to pain. Contract requirements met. Continue opioid therapy.  Script written for percocet  Working on getting TENS replacement battery  Follow up appointment made for 4 weeks with MD

## 2020-03-16 ENCOUNTER — TELEPHONE (OUTPATIENT)
Dept: PAIN MANAGEMENT | Age: 55
End: 2020-03-16

## 2020-03-19 ENCOUNTER — OFFICE VISIT (OUTPATIENT)
Dept: PAIN MANAGEMENT | Age: 55
End: 2020-03-19
Payer: COMMERCIAL

## 2020-03-19 VITALS
SYSTOLIC BLOOD PRESSURE: 155 MMHG | HEART RATE: 66 BPM | HEIGHT: 65 IN | BODY MASS INDEX: 43.15 KG/M2 | WEIGHT: 259 LBS | DIASTOLIC BLOOD PRESSURE: 97 MMHG

## 2020-03-19 PROCEDURE — 99213 OFFICE O/P EST LOW 20 MIN: CPT | Performed by: ANESTHESIOLOGY

## 2020-03-19 RX ORDER — OXYCODONE HYDROCHLORIDE AND ACETAMINOPHEN 5; 325 MG/1; MG/1
1 TABLET ORAL 2 TIMES DAILY PRN
Qty: 60 TABLET | Refills: 0 | Status: SHIPPED | OUTPATIENT
Start: 2020-03-21 | End: 2020-04-17 | Stop reason: SDUPTHER

## 2020-03-19 ASSESSMENT — ENCOUNTER SYMPTOMS
WHEEZING: 1
BACK PAIN: 1

## 2020-03-19 NOTE — PROGRESS NOTES
Start date: 10/2/1979    Smokeless tobacco: Never Used    Tobacco comment: 1 pack every 2.5 days   Substance and Sexual Activity    Alcohol use: No     Alcohol/week: 0.0 standard drinks    Drug use: No    Sexual activity: None   Lifestyle    Physical activity     Days per week: None     Minutes per session: None    Stress: None   Relationships    Social connections     Talks on phone: None     Gets together: None     Attends Jewish service: None     Active member of club or organization: None     Attends meetings of clubs or organizations: None     Relationship status: None    Intimate partner violence     Fear of current or ex partner: None     Emotionally abused: None     Physically abused: None     Forced sexual activity: None   Other Topics Concern    None   Social History Narrative    None     Family History   Problem Relation Age of Onset    Diabetes Mother     Heart Disease Mother     Cancer Mother     Heart Disease Maternal Grandmother      Allergies   Allergen Reactions    Amitriptyline Anaphylaxis    Paxil [Paroxetine Hcl] Other (See Comments)     Amitriptyline and Paxil [paroxetine hcl]   There were no vitals filed for this visit. Current Outpatient Medications   Medication Sig Dispense Refill    [START ON 3/21/2020] oxyCODONE-acetaminophen (PERCOCET) 5-325 MG per tablet Take 1 tablet by mouth 2 times daily as needed for Pain for up to 30 days.  60 tablet 0    Umeclidinium Bromide (INCRUSE ELLIPTA) 62.5 MCG/INH AEPB Inhale 1 puff into the lungs daily 1 each 5    tiotropium (SPIRIVA RESPIMAT) 2.5 MCG/ACT AERS inhaler Inhale 2 puffs into the lungs daily 1 Inhaler 11    metFORMIN (GLUCOPHAGE) 500 MG tablet TAKE 1 TABLET BY MOUTH TWICE DAILY WITH MEALS 60 tablet 3    lisinopril-hydrochlorothiazide (PRINZIDE;ZESTORETIC) 10-12.5 MG per tablet Take 1 tablet by mouth daily 90 tablet 1    atorvastatin (LIPITOR) 40 MG tablet Take 1 tablet by mouth daily 90 tablet 1    aspirin EC 81 MG EC

## 2020-04-17 ENCOUNTER — TELEMEDICINE (OUTPATIENT)
Dept: PAIN MANAGEMENT | Age: 55
End: 2020-04-17
Payer: COMMERCIAL

## 2020-04-17 VITALS — HEIGHT: 65 IN | WEIGHT: 261 LBS | BODY MASS INDEX: 43.49 KG/M2

## 2020-04-17 PROCEDURE — 99213 OFFICE O/P EST LOW 20 MIN: CPT | Performed by: ANESTHESIOLOGY

## 2020-04-17 RX ORDER — OXYCODONE HYDROCHLORIDE AND ACETAMINOPHEN 5; 325 MG/1; MG/1
1 TABLET ORAL 2 TIMES DAILY PRN
Qty: 60 TABLET | Refills: 0 | Status: SHIPPED | OUTPATIENT
Start: 2020-04-20 | End: 2020-05-12 | Stop reason: SDUPTHER

## 2020-04-17 ASSESSMENT — ENCOUNTER SYMPTOMS
SHORTNESS OF BREATH: 1
BACK PAIN: 1

## 2020-04-17 NOTE — PROGRESS NOTES
diverticulosis    FIBULA FRACTURE SURGERY Left     LEG SURGERY Right     NERVE BLOCK N/A 10/24/2016    lumbar COLETTE    NERVE BLOCK Right 11/21/2016    lumbar COLETTE    NERVE BLOCK Left 08/13/2018    radiofrequency ablation medial branh block L2-L5    OTHER SURGICAL HISTORY Right 01/26/2017    right hip steroid injection    OTHER SURGICAL HISTORY Right 08/06/2018    NERVE RADIOFREQUENCY ABLATION RIGHT LUMBAR MEDIAL BRANCH L2-L5 (Right )    OTHER SURGICAL HISTORY  12/03/2018    RIGHT L4 L5 EPIDURAL STEROID INJECTION (Right )    OTHER SURGICAL HISTORY  03/25/2019    LUMBAR COLETTE (N/A )    VA INJECT ANES/STEROID FORAMEN LUMBAR/SACRAL W IMG GUIDE ,1 LEVEL Right 12/3/2018    RIGHT L4 L5 EPIDURAL STEROID INJECTION performed by Wild De La Cruz MD at [address] OFFICE[address]OUTPT [address] Right 8/6/2018    NERVE RADIOFREQUENCY ABLATION RIGHT LUMBAR MEDIAL BRANCH L2-L5 performed by Wild De La Cruz MD at [address] OFFICE/OUTPT [address] Left 8/13/2018    NERVE RADIOFREQUENCY ABLATION LEFT LUMBAR MEDIAL BRANCH L2-L5 performed by Wild De La Cruz MD at [address] N/A 3/25/2019    LUMBAR COLETTE performed by Wild De La Cruz MD at [address] [facility] History     Socioeconomic History    Marital status: Single     Spouse name: None    Number of children: None    Years of education: None    Highest education level: None   Occupational History    None   Social Needs    Financial resource strain: None    Food insecurity     Worry: None     Inability: None    Transportation needs     Medical: None     Non-medical: None   Tobacco Use    Smoking status: Current Every Day Smoker     Packs/day: 0.50     Years: 20.00     Pack years: 10.00     Types: Cigarettes     Start date: 10/2/1979    Smokeless tobacco: Never Used    Tobacco comment: 1 pack every 2.5 days   Substance and Sexual Activity    Alcohol use: No     Alcohol/week: 0.0 standard drinks    Drug use: No    Sexual 4. MARLENE and COPD overlap syndrome ([facility]        Patient is a stable on low-dose opioid  Continue to report high pain score  I will advise for lumbar facet injection    Risk of opioid related respiratory depression is higher considering obesity and obstructive sleep apnea history  Risk discussed with patient  Patient voiced understanding    No orders of the defined types were placed in this encounter. Orders Placed This Encounter   Medications    oxyCODONE-acetaminophen (PERCOCET) 5-325 MG per tablet     Sig: Take 1 tablet by mouth 2 times daily as needed for Pain for up to 30 days. Dispense:  60 tablet     Refill:  0     Reduce doses taken as pain becomes manageable        Controlled Substance Monitoring:    Acute and Chronic Pain Monitoring:   RX Monitoring 4/17/2020   Attestation -   Periodic Controlled Substance Monitoring Possible medication side effects, risk of tolerance/dependence & alternative treatments discussed. ;No signs of potential drug abuse or diversion identified. ;Assessed functional status. ;Obtaining appropriate analgesic effect of treatment. Chronic Pain > 50 MEDD -   Chronic Pain > 80 MEDD -         Paulo Lindo is a 62 y.o. male being evaluated by a Virtual Visit (video visit) encounter to address concerns as mentioned above. A caregiver was present when appropriate. Due to this being a TeleHealth encounter (During HORXI-59 public health emergency), evaluation of the following organ systems was limited: Vitals/Constitutional/EENT/Resp/CV/GI//MS/Neuro/Skin/Heme-Lymph-Imm. Pursuant to the emergency declaration under the [address] [place] [address], [address] [place] [address] authority and the [employer] Act, this Virtual Visit was conducted with patient's (and/or legal guardian's) consent, to reduce the patient's risk of exposure to COVID-19 and provide necessary medical care.   The patient (and/or legal guardian) has also been

## 2020-05-12 ENCOUNTER — TELEMEDICINE (OUTPATIENT)
Dept: PAIN MANAGEMENT | Age: 55
End: 2020-05-12
Payer: COMMERCIAL

## 2020-05-12 VITALS — BODY MASS INDEX: 43.15 KG/M2 | HEIGHT: 65 IN | WEIGHT: 259 LBS

## 2020-05-12 PROCEDURE — 99214 OFFICE O/P EST MOD 30 MIN: CPT | Performed by: ANESTHESIOLOGY

## 2020-05-12 RX ORDER — OXYCODONE HYDROCHLORIDE AND ACETAMINOPHEN 5; 325 MG/1; MG/1
1 TABLET ORAL 2 TIMES DAILY PRN
Qty: 60 TABLET | Refills: 0 | Status: SHIPPED | OUTPATIENT
Start: 2020-05-20 | End: 2020-06-19 | Stop reason: SDUPTHER

## 2020-05-12 ASSESSMENT — ENCOUNTER SYMPTOMS
BACK PAIN: 1
RESPIRATORY NEGATIVE: 1

## 2020-05-12 NOTE — PROGRESS NOTES
None   Social Needs    Financial resource strain: None    Food insecurity     Worry: None     Inability: None    Transportation needs     Medical: None     Non-medical: None   Tobacco Use    Smoking status: Current Every Day Smoker     Packs/day: 0.50     Years: 20.00     Pack years: 10.00     Types: Cigarettes     Start date: 10/2/1979    Smokeless tobacco: Never Used    Tobacco comment: 1 pack every 2.5 days   Substance and Sexual Activity    Alcohol use: No     Alcohol/week: 0.0 standard drinks    Drug use: No    Sexual activity: None   Lifestyle    Physical activity     Days per week: None     Minutes per session: None    Stress: None   Relationships    Social connections     Talks on phone: None     Gets together: None     Attends Pentecostalism service: None     Active member of club or organization: None     Attends meetings of clubs or organizations: None     Relationship status: None    Intimate partner violence     Fear of current or ex partner: None     Emotionally abused: None     Physically abused: None     Forced sexual activity: None   Other Topics Concern    None   Social History Narrative    None     Family History   Problem Relation Age of Onset    Diabetes Mother     Heart Disease Mother     Cancer Mother     Heart Disease Maternal Grandmother      Allergies   Allergen Reactions    Amitriptyline Anaphylaxis    Paxil [Paroxetine Hcl] Other (See Comments)     Amitriptyline and Paxil [paroxetine hcl]   There were no vitals filed for this visit. Current Outpatient Medications   Medication Sig Dispense Refill    [START ON 5/20/2020] oxyCODONE-acetaminophen (PERCOCET) 5-325 MG per tablet Take 1 tablet by mouth 2 times daily as needed for Pain for up to 30 days.  60 tablet 0    Umeclidinium Bromide (INCRUSE ELLIPTA) 62.5 MCG/INH AEPB Inhale 1 puff into the lungs daily 1 each 5    metFORMIN (GLUCOPHAGE) 500 MG tablet TAKE 1 TABLET BY MOUTH TWICE DAILY WITH MEALS 60 tablet 3    motion appears normal on cervical spine  External ears appears normal)    Lungs:  Normal effort. He is not in respiratory distress. Neurological: Patient is alert and oriented to person, place and time.  (Able to follow command    Psych  Mood is good  Affect is normal). Skin:  No rash or cyanosis. Assessment & Plan  1. Encounter for chronic pain management    2. Osteoarthritis of spine with radiculopathy, lumbar region    3. Chronic use of opiate drugs therapeutic purposes    4. MARLENE and COPD overlap syndrome ([address]    5. Morbid obesity with BMI of 40.0-44.9, adult (HCC)    Chronic lower back pain with flareup of lumbar radiculitis on the right side refractory to conservative measures affecting daily life activity    Will recommend for right-sided lumbar epidural steroid injection  Procedure discussed in detail with patient  Agreed to proceed  We will schedule for COLETTE x2 on May 28 and Katrin 15    Automated prescription report reviewed  No sign or symptoms of any aberrancy  Safe use of opioid medication discussed with patient  Patient is at risk of respiratory depression from opioid higher compared to usual population because of obesity and obstructive sleep apnea history  He voiced understanding  Contract requirement met  Refill ordered  No orders of the defined types were placed in this encounter. Orders Placed This Encounter   Medications    oxyCODONE-acetaminophen (PERCOCET) 5-325 MG per tablet     Sig: Take 1 tablet by mouth 2 times daily as needed for Pain for up to 30 days. Dispense:  60 tablet     Refill:  0     Reduce doses taken as pain becomes manageable        Controlled Substance Monitoring:    Acute and Chronic Pain Monitoring:   RX Monitoring 5/12/2020   Attestation -   Periodic Controlled Substance Monitoring No signs of potential drug abuse or diversion identified. ;Possible medication side effects, risk of tolerance/dependence & alternative treatments discussed. ;Assessed functional

## 2020-05-22 ENCOUNTER — TELEPHONE (OUTPATIENT)
Dept: PAIN MANAGEMENT | Age: 55
End: 2020-05-22

## 2020-05-26 ENCOUNTER — HOSPITAL ENCOUNTER (OUTPATIENT)
Dept: PREADMISSION TESTING | Age: 55
Setting detail: SPECIMEN
Discharge: HOME | End: 2020-05-30
Payer: COMMERCIAL

## 2020-05-26 PROCEDURE — U0004 COV-19 TEST NON-CDC HGH THRU: HCPCS

## 2020-05-27 LAB
SARS-COV-2, PCR: NOT DETECTED
SARS-COV-2, RAPID: NORMAL
SARS-COV-2: NORMAL
SOURCE: NORMAL

## 2020-05-28 ENCOUNTER — HOSPITAL ENCOUNTER (OUTPATIENT)
Age: 55
Setting detail: OUTPATIENT SURGERY
Discharge: HOME | End: 2020-05-28
Attending: ANESTHESIOLOGY | Admitting: ANESTHESIOLOGY
Payer: COMMERCIAL

## 2020-05-28 ENCOUNTER — APPOINTMENT (OUTPATIENT)
Dept: GENERAL RADIOLOGY | Age: 55
End: 2020-05-28
Attending: ANESTHESIOLOGY
Payer: COMMERCIAL

## 2020-05-28 VITALS
BODY MASS INDEX: 43.99 KG/M2 | DIASTOLIC BLOOD PRESSURE: 93 MMHG | SYSTOLIC BLOOD PRESSURE: 139 MMHG | OXYGEN SATURATION: 96 % | HEART RATE: 75 BPM | WEIGHT: 264 LBS | RESPIRATION RATE: 18 BRPM | TEMPERATURE: 97 F | HEIGHT: 65 IN

## 2020-05-28 PROBLEM — G89.29 CHRONIC RADICULAR LUMBAR PAIN: Chronic | Status: ACTIVE | Noted: 2020-05-28

## 2020-05-28 PROBLEM — M54.16 CHRONIC RADICULAR LUMBAR PAIN: Chronic | Status: ACTIVE | Noted: 2020-05-28

## 2020-05-28 PROBLEM — M51.9 LUMBAR DISC DISEASE: Chronic | Status: ACTIVE | Noted: 2020-05-28

## 2020-05-28 LAB — GLUCOSE BLD-MCNC: 89 MG/DL (ref 75–110)

## 2020-05-28 PROCEDURE — 6360000004 HC RX CONTRAST MEDICATION: Performed by: ANESTHESIOLOGY

## 2020-05-28 PROCEDURE — 2580000003 HC RX 258: Performed by: ANESTHESIOLOGY

## 2020-05-28 PROCEDURE — 7100000010 HC PHASE II RECOVERY - FIRST 15 MIN: Performed by: ANESTHESIOLOGY

## 2020-05-28 PROCEDURE — 6360000002 HC RX W HCPCS: Performed by: ANESTHESIOLOGY

## 2020-05-28 PROCEDURE — 82947 ASSAY GLUCOSE BLOOD QUANT: CPT

## 2020-05-28 PROCEDURE — 2709999900 HC NON-CHARGEABLE SUPPLY: Performed by: ANESTHESIOLOGY

## 2020-05-28 PROCEDURE — 7100000011 HC PHASE II RECOVERY - ADDTL 15 MIN: Performed by: ANESTHESIOLOGY

## 2020-05-28 PROCEDURE — 99152 MOD SED SAME PHYS/QHP 5/>YRS: CPT | Performed by: ANESTHESIOLOGY

## 2020-05-28 PROCEDURE — 62323 NJX INTERLAMINAR LMBR/SAC: CPT | Performed by: ANESTHESIOLOGY

## 2020-05-28 PROCEDURE — 2500000003 HC RX 250 WO HCPCS: Performed by: ANESTHESIOLOGY

## 2020-05-28 PROCEDURE — 3600000050 HC PAIN LEVEL 1 BASE: Performed by: ANESTHESIOLOGY

## 2020-05-28 PROCEDURE — 3209999900 FLUORO FOR SURGICAL PROCEDURES

## 2020-05-28 RX ORDER — FENTANYL CITRATE 50 UG/ML
INJECTION, SOLUTION INTRAMUSCULAR; INTRAVENOUS PRN
Status: DISCONTINUED | OUTPATIENT
Start: 2020-05-28 | End: 2020-05-28 | Stop reason: ALTCHOICE

## 2020-05-28 RX ORDER — SODIUM CHLORIDE 0.9 % (FLUSH) 0.9 %
10 SYRINGE (ML) INJECTION 2 TIMES DAILY
Status: DISCONTINUED | OUTPATIENT
Start: 2020-05-28 | End: 2020-05-28 | Stop reason: HOSPADM

## 2020-05-28 RX ORDER — MIDAZOLAM HYDROCHLORIDE 1 MG/ML
INJECTION INTRAMUSCULAR; INTRAVENOUS PRN
Status: DISCONTINUED | OUTPATIENT
Start: 2020-05-28 | End: 2020-05-28 | Stop reason: ALTCHOICE

## 2020-05-28 RX ORDER — LIDOCAINE HYDROCHLORIDE 10 MG/ML
INJECTION, SOLUTION INFILTRATION; PERINEURAL PRN
Status: DISCONTINUED | OUTPATIENT
Start: 2020-05-28 | End: 2020-05-28 | Stop reason: ALTCHOICE

## 2020-05-28 RX ORDER — DEXAMETHASONE SODIUM PHOSPHATE 10 MG/ML
INJECTION INTRAMUSCULAR; INTRAVENOUS PRN
Status: DISCONTINUED | OUTPATIENT
Start: 2020-05-28 | End: 2020-05-28 | Stop reason: ALTCHOICE

## 2020-05-28 RX ADMIN — Medication 10 ML: at 10:24

## 2020-05-28 ASSESSMENT — PAIN SCALES - GENERAL
PAINLEVEL_OUTOF10: 8
PAINLEVEL_OUTOF10: 8
PAINLEVEL_OUTOF10: 0

## 2020-05-28 ASSESSMENT — PAIN - FUNCTIONAL ASSESSMENT: PAIN_FUNCTIONAL_ASSESSMENT: 0-10

## 2020-05-28 ASSESSMENT — PAIN DESCRIPTION - DESCRIPTORS: DESCRIPTORS: CONSTANT

## 2020-05-28 NOTE — H&P
in 2018 that showed lumbar disc herniation at L4 and L5 level  Patient had tried physical therapy and  other conservative treatments in past without any benefit     On chronic opioid therapy  Continue to report high pain score  No history of illicit substance use  Denies any side effect from the medication  Finds medication helpful to facilitate daily life activities  Pain score Today:  8  Adverse effects (Constipation / Nausea / Sedation / sexual Dysfunction / others) : None  Mood: fair  Sleep pattern and quality: fair  Activity level: fair     Pill count Today:Not done  Last dose taken  5/12/20  OARRS report reviewed today: yes  ER/Hospitalizations/PCP visit related to pain since last visit:no   Any legal problems e.g. DUI etc.:No  Satisfied with current management: Yes     Opioid Contract:6/3/19  Last Urine Dug screen dated:1/10/20        Past Medical History, Past Surgical History, Social History, Allergies and Medications reviewed and updated in EPIC as indicated     Family History reviewed and is noncontributory.         Past Medical History        Past Medical History:   Diagnosis Date    Asthma      Diabetes mellitus [facility]      Hyperlipidemia      Hypertension      Migraine      Neuropathy      Positive FIT (fecal immunochemical test)      Renal failure           Past Surgical History         Past Surgical History:   Procedure Laterality Date    ANESTHESIA NERVE BLOCK Bilateral 9/18/2017     NERVE BLOCK BILATERAL MEDIAL BRANCH L2-L5 performed by Delmar Flannery MD at [facility] Bilateral 10/17/2017     [address] L2-L5 performed by Delmar Flannery MD at [address]    03/08/2017    COLONOSCOPY   03/08/2017     internal hemorrhoids; mild diverticulosis    FIBULA FRACTURE SURGERY Left      LEG SURGERY Right      NERVE BLOCK N/A 10/24/2016     lumbar COLETTE    NERVE BLOCK Right 11/21/2016     lumbar COLETTE    NERVE BLOCK Relationships    Social connections       Talks on phone: None       Gets together: None       Attends Episcopal service: None       Active member of club or organization: None       Attends meetings of clubs or organizations: None       Relationship status: None    Intimate partner violence       Fear of current or ex partner: None       Emotionally abused: None       Physically abused: None       Forced sexual activity: None   Other Topics Concern    None   Social History Narrative    None         Family History         Family History   Problem Relation Age of Onset    Diabetes Mother      Heart Disease Mother      Cancer Mother      Heart Disease Maternal Grandmother                Allergies   Allergen Reactions    Amitriptyline Anaphylaxis    Paxil [Paroxetine Hcl] Other (See Comments)      Amitriptyline and Paxil [paroxetine hcl]   There were no vitals filed for this visit. Current Facility-Administered Medications          Current Outpatient Medications   Medication Sig Dispense Refill    [START ON 5/20/2020] oxyCODONE-acetaminophen (PERCOCET) 5-325 MG per tablet Take 1 tablet by mouth 2 times daily as needed for Pain for up to 30 days.  60 tablet 0    Umeclidinium Bromide (INCRUSE ELLIPTA) 62.5 MCG/INH AEPB Inhale 1 puff into the lungs daily 1 each 5    metFORMIN (GLUCOPHAGE) 500 MG tablet TAKE 1 TABLET BY MOUTH TWICE DAILY WITH MEALS 60 tablet 3    lisinopril-hydrochlorothiazide (PRINZIDE;ZESTORETIC) 10-12.5 MG per tablet Take 1 tablet by mouth daily 90 tablet 1    atorvastatin (LIPITOR) 40 MG tablet Take 1 tablet by mouth daily 90 tablet 1    aspirin EC 81 MG EC tablet Take 1 tablet by mouth daily 90 tablet 1    budesonide-formoterol (SYMBICORT) 160-4.5 MCG/ACT AERO Inhale 2 puffs into the lungs 2 times daily 3 Inhaler 1    albuterol sulfate  (90 Base) MCG/ACT inhaler Inhale 2 puffs into the lungs 4 times daily as needed for Wheezing 3 Inhaler 1    tiotropium (SPIRIVA RESPIMAT) 2. 5 MCG/ACT AERS inhaler Inhale 2 puffs into the lungs daily 1 Inhaler 11      No current facility-administered medications for this visit. Facility-Administered Medications Ordered in Other Visits   Medication Dose Route Frequency Provider Last Rate Last Dose    fentaNYL (SUBLIMAZE) injection 25 mcg  25 mcg Intravenous Q5 Min PRN Lorena Jeffery MD        HYDROmorphone (DILAUDID) injection 0.5 mg  0.5 mg Intravenous Q5 Min PRN Lorena Jeffery MD        fentaNYL (SUBLIMAZE) injection 25 mcg  25 mcg Intravenous Q5 Min PRN Lorena Jeffery MD        HYDROmorphone (DILAUDID) injection 0.5 mg  0.5 mg Intravenous Q5 Min PRN Lorena Jeffery MD        labetalol (NORMODYNE;TRANDATE) injection 5 mg  5 mg Intravenous Q10 Min PRN Lorena Jeffery MD        hydrALAZINE (APRESOLINE) injection 5 mg  5 mg Intravenous Q10 Min PRN Lorena Jeffery MD        meperidine (DEMEROL) injection 12.5 mg  12.5 mg Intravenous Q5 Min PRN Lorena Jeffery MD             Review of Systems   Constitutional: Negative. Respiratory: Negative. Musculoskeletal: Positive for back pain. Neurological: Positive for numbness. Objective:  General Appearance:  Well-appearing, in no acute distress and in pain. Vital signs: (most recent): Height 5' 5\" (1.651 m), weight 259 lb (117.5 kg). Output: Producing urine and producing stool. HEENT: (No visible masses in neck  Range of motion appears normal on cervical spine  External ears appears normal)    Lungs:  Normal effort. He is not in respiratory distress. Neurological: Patient is alert and oriented to person, place and time.  (Able to follow command     Psych  Mood is good  Affect is normal). Skin:  No rash or cyanosis. Assessment & Plan  1. Encounter for chronic pain management    2. Osteoarthritis of spine with radiculopathy, lumbar region    3. Chronic use of opiate drugs therapeutic purposes    4. MARLENE and COPD overlap syndrome ([address]    5.  Morbid Vitals/Constitutional/EENT/Resp/CV/GI//MS/Neuro/Skin/Heme-Lymph-Imm. Pursuant to the emergency declaration under the [address] [place] [address] and the [employer] Act, this Virtual Visit was conducted with patient's (and/or legal guardian's) consent, to reduce the patient's risk of exposure to COVID-19 and provide necessary medical care. The patient (and/or legal guardian) has also been advised to contact this office for worsening conditions or problems, and seek emergency medical treatment and/or call 911 if deemed necessary. Patient identification was verified at the start of the visit: Yes     Total time spent for this encounter: Not billed by time     Services were provided through a video synchronous discussion virtually to substitute for in-person clinic visit. Patient and provider were located at their individual homes. --Isabela Amin MD on 5/12/2020 at 8:59 AM     An electronic signature was used to authenticate this note. Electronically signed by Isabela Amin MD on 5/12/2020 at 8:59 AM               ·   Telemedicine on 5/12/2020   ·   ·   Revision History   ·   ·   Detailed Report   ·   Progress Notes Info     Author Note Status Last Update User   Isabela Amin MD Signed Isabela Amin MD   Last Update Date/Time: 5/12/2020  9:10 AM   Chart Review Routing History     No routing history on file.

## 2020-05-28 NOTE — OP NOTE
solution containing 5 ml of  1 % PF lidocaine and 4 ml of PF NS and 1 ml of DexaMethasone 10 mg / ml was injected. The needle was removed and a Band-Aid was placed over the needle  insertion site. The patient's vital signs remained stable and the patient tolerated the procedure well. Electronically signed by Wild De La Cruz MD on 5/28/2020 at 11:20 AM      SEDATION NOTE:    ASA CLASSIFICATION  3  MP   CLASSIFICATION  3    · Moderate intravenous conscious sedation was supervised by Dr. Robbie Deleon  . The patient was independently monitored by a Registered Nurse assigned to the Procedure Room  . Monitoring included automated blood pressure, continuous EKG, Capnography and continuous pulse oximetry. . The detailed Conscious Record is permanently stored in the [place] [id number].      The following is the conscious sedation record;  Start Time:  1109  End times:  1122  Duration:  13 minutes  MEDS GIVEN 1 MG VERSED AND 50 [address]      Electronically signed by Wild De La Cruz MD on 5/28/2020 at 11:20 AM

## 2020-06-12 ENCOUNTER — HOSPITAL ENCOUNTER (OUTPATIENT)
Dept: PREADMISSION TESTING | Age: 55
Setting detail: SPECIMEN
Discharge: HOME | End: 2020-06-16
Payer: COMMERCIAL

## 2020-06-12 PROCEDURE — U0004 COV-19 TEST NON-CDC HGH THRU: HCPCS

## 2020-06-13 LAB
SARS-COV-2, PCR: NOT DETECTED
SARS-COV-2, RAPID: NORMAL
SARS-COV-2: NORMAL
SOURCE: NORMAL

## 2020-06-14 ENCOUNTER — TELEPHONE (OUTPATIENT)
Dept: PRIMARY CARE CLINIC | Age: 55
End: 2020-06-14

## 2020-06-15 ENCOUNTER — APPOINTMENT (OUTPATIENT)
Dept: GENERAL RADIOLOGY | Age: 55
End: 2020-06-15
Attending: ANESTHESIOLOGY
Payer: COMMERCIAL

## 2020-06-15 ENCOUNTER — HOSPITAL ENCOUNTER (OUTPATIENT)
Age: 55
Setting detail: OUTPATIENT SURGERY
Discharge: HOME | End: 2020-06-15
Attending: ANESTHESIOLOGY | Admitting: ANESTHESIOLOGY
Payer: COMMERCIAL

## 2020-06-15 VITALS
HEART RATE: 75 BPM | BODY MASS INDEX: 43.99 KG/M2 | DIASTOLIC BLOOD PRESSURE: 72 MMHG | HEIGHT: 65 IN | SYSTOLIC BLOOD PRESSURE: 125 MMHG | WEIGHT: 264 LBS | TEMPERATURE: 97.2 F | RESPIRATION RATE: 15 BRPM | OXYGEN SATURATION: 97 %

## 2020-06-15 LAB — GLUCOSE BLD-MCNC: 89 MG/DL (ref 75–110)

## 2020-06-15 PROCEDURE — 6360000002 HC RX W HCPCS: Performed by: ANESTHESIOLOGY

## 2020-06-15 PROCEDURE — 3209999900 FLUORO FOR SURGICAL PROCEDURES

## 2020-06-15 PROCEDURE — 2709999900 HC NON-CHARGEABLE SUPPLY: Performed by: ANESTHESIOLOGY

## 2020-06-15 PROCEDURE — 3600000050 HC PAIN LEVEL 1 BASE: Performed by: ANESTHESIOLOGY

## 2020-06-15 PROCEDURE — 62323 NJX INTERLAMINAR LMBR/SAC: CPT | Performed by: ANESTHESIOLOGY

## 2020-06-15 PROCEDURE — 99152 MOD SED SAME PHYS/QHP 5/>YRS: CPT | Performed by: ANESTHESIOLOGY

## 2020-06-15 PROCEDURE — 6360000004 HC RX CONTRAST MEDICATION: Performed by: ANESTHESIOLOGY

## 2020-06-15 PROCEDURE — 82947 ASSAY GLUCOSE BLOOD QUANT: CPT

## 2020-06-15 PROCEDURE — 2500000003 HC RX 250 WO HCPCS: Performed by: ANESTHESIOLOGY

## 2020-06-15 PROCEDURE — 7100000010 HC PHASE II RECOVERY - FIRST 15 MIN: Performed by: ANESTHESIOLOGY

## 2020-06-15 PROCEDURE — 7100000011 HC PHASE II RECOVERY - ADDTL 15 MIN: Performed by: ANESTHESIOLOGY

## 2020-06-15 PROCEDURE — 2580000003 HC RX 258: Performed by: ANESTHESIOLOGY

## 2020-06-15 RX ORDER — SODIUM CHLORIDE 0.9 % (FLUSH) 0.9 %
10 SYRINGE (ML) INJECTION EVERY 12 HOURS SCHEDULED
Status: DISCONTINUED | OUTPATIENT
Start: 2020-06-15 | End: 2020-06-15 | Stop reason: HOSPADM

## 2020-06-15 RX ORDER — SODIUM CHLORIDE 0.9 % (FLUSH) 0.9 %
10 SYRINGE (ML) INJECTION PRN
Status: DISCONTINUED | OUTPATIENT
Start: 2020-06-15 | End: 2020-06-15 | Stop reason: HOSPADM

## 2020-06-15 RX ORDER — MIDAZOLAM HYDROCHLORIDE 1 MG/ML
INJECTION INTRAMUSCULAR; INTRAVENOUS PRN
Status: DISCONTINUED | OUTPATIENT
Start: 2020-06-15 | End: 2020-06-15 | Stop reason: ALTCHOICE

## 2020-06-15 RX ORDER — FENTANYL CITRATE 50 UG/ML
INJECTION, SOLUTION INTRAMUSCULAR; INTRAVENOUS PRN
Status: DISCONTINUED | OUTPATIENT
Start: 2020-06-15 | End: 2020-06-15 | Stop reason: ALTCHOICE

## 2020-06-15 RX ORDER — LIDOCAINE HYDROCHLORIDE 10 MG/ML
INJECTION, SOLUTION INFILTRATION; PERINEURAL PRN
Status: DISCONTINUED | OUTPATIENT
Start: 2020-06-15 | End: 2020-06-15 | Stop reason: ALTCHOICE

## 2020-06-15 RX ORDER — DEXAMETHASONE SODIUM PHOSPHATE 10 MG/ML
INJECTION INTRAMUSCULAR; INTRAVENOUS PRN
Status: DISCONTINUED | OUTPATIENT
Start: 2020-06-15 | End: 2020-06-15 | Stop reason: ALTCHOICE

## 2020-06-15 RX ADMIN — SODIUM CHLORIDE, PRESERVATIVE FREE 10 ML: 5 INJECTION INTRAVENOUS at 07:48

## 2020-06-15 ASSESSMENT — PAIN - FUNCTIONAL ASSESSMENT: PAIN_FUNCTIONAL_ASSESSMENT: 0-10

## 2020-06-15 ASSESSMENT — PAIN SCALES - GENERAL
PAINLEVEL_OUTOF10: 0
PAINLEVEL_OUTOF10: 9
PAINLEVEL_OUTOF10: 9
PAINLEVEL_OUTOF10: 0

## 2020-06-15 ASSESSMENT — PAIN DESCRIPTION - DESCRIPTORS: DESCRIPTORS: SHARP

## 2020-06-15 NOTE — H&P
History and Physical Service   [facility] 12    HISTORY AND PHYSICAL EXAMINATION            Date of Evaluation: 6/15/2020  Patient name:  Kai Armendraiz  MRN:   [id number]  YOB: 1965  PCP:    Suzie Lemus MD    History Obtained From:     Patient, medical records    History of Present Illness: This is Kai Armendariz a 62 y.o. male who presents today for a LUMBAR EPIDURAL STEROID INJECTION AT RIGHT L 5 S 1  by Dr. Trisha Peres  for TREATMENT OF  [address] PATIENT 'S PAIN IS AT  .9/10. HE HAS DIFFICULTY WITH RIGHT LEG PAIN AND CONTROL. HE HAS EPISODIC MIGRAINE HEADACHES AS WELL. HE STATES THAT LAST INJECTION WORKED VERY WELL UNTIL ONE WEEK AGO. HE PRESENTS FOR A REPEAT EPIDURAL STEROID INJECTION. HE DENIES CHEST PAIN OR DIFFICULTY BREATHING. HE DOES NOT TAKE BLOOD THINNERS. HIS BLOOD SUGAR WAS 89.         Past Medical History:     Past Medical History:   Diagnosis Date    Asthma     Diabetes mellitus ([facility]     Hyperlipidemia     Hypertension     Migraine     Neuropathy     Positive FIT (fecal immunochemical test)     Renal failure         Past Surgical History:     Past Surgical History:   Procedure Laterality Date    ANESTHESIA NERVE BLOCK Bilateral 9/18/2017    NERVE BLOCK BILATERAL MEDIAL BRANCH L2-L5 performed by Neno Vickers MD at [facility] Bilateral 10/17/2017    [address] L2-L5 performed by Neno Vickers MD at [address] [place] [address] [address]  03/08/2017    COLONOSCOPY  03/08/2017    internal hemorrhoids; mild diverticulosis    ENDOSCOPY, COLON, DIAGNOSTIC      FIBULA FRACTURE SURGERY Left     LEG SURGERY Right     NERVE BLOCK N/A 10/24/2016    lumbar COLETTE    NERVE BLOCK Right 11/21/2016    lumbar COLETTE    NERVE BLOCK Left 08/13/2018    radiofrequency ablation medial branh block L2-L5    OTHER SURGICAL HISTORY Right 01/26/2017    right hip steroid injection    OTHER SURGICAL HISTORY Right 08/06/2018    NERVE RADIOFREQUENCY ABLATION RIGHT LUMBAR MEDIAL BRANCH L2-L5 (Right )    OTHER SURGICAL HISTORY  12/03/2018    RIGHT L4 L5 EPIDURAL STEROID INJECTION (Right )    OTHER SURGICAL HISTORY  03/25/2019    LUMBAR COLETTE (N/A )    PAIN MANAGEMENT PROCEDURE Right 5/28/2020    EPIDURAL STEROID INJECTION RIGHT L5S1 performed by Roberta Ashby MD at [place] [address] ANES/STEROID FORAMEN LUMBAR/SACRAL W IMG GUIDE ,1 LEVEL Right 12/3/2018    RIGHT L4 L5 EPIDURAL STEROID INJECTION [facility] [address] Right 8/6/2018    NERVE RADIOFREQUENCY ABLATION RIGHT LUMBAR MEDIAL BRANCH L2-L5 performed by Roberta Ashby MD at [facility] OFFICE/OUTPT [address] Left 8/13/2018    NERVE RADIOFREQUENCY ABLATION LEFT LUMBAR MEDIAL BRANCH L2-L5 performed by Roberta Ashby MD at [address] [place] [address] N/A 3/25/2019    LUMBAR COLETTE performed by Roberta Ashby MD at [address] [place] [address]        Medications Prior to Admission:     Prior to Admission medications    Medication Sig Start Date End Date Taking? Authorizing Provider   oxyCODONE-acetaminophen (PERCOCET) 5-325 MG per tablet Take 1 tablet by mouth 2 times daily as needed for Pain for up to 30 days.  5/20/20 6/19/20 Yes Roberta Ashby MD   tiotropium (SPIRIVA RESPIMAT) 2.5 MCG/ACT AERS inhaler Inhale 2 puffs into the lungs daily 5/3/19 6/15/20 Yes Kristin Mendez MD   metFORMIN (GLUCOPHAGE) 500 MG tablet TAKE 1 TABLET BY MOUTH TWICE DAILY WITH MEALS 4/2/19  Yes Zamzam Gomes MD   lisinopril-hydrochlorothiazide (PRINZIDE;ZESTORETIC) 10-12.5 MG per tablet Take 1 tablet by mouth daily 4/2/19  Yes Zamzam Gomes MD   budesonide-formoterol (SYMBICORT) 160-4.5 MCG/ACT AERO Inhale 2 puffs into the lungs 2 times daily 4/2/19  Yes Zamzam Gomes MD   albuterol sulfate  (90 Base) MCG/ACT inhaler Inhale 2 puffs into the lungs 4 times daily as needed for

## 2020-06-15 NOTE — OP NOTE
in Lateral view. Then after negative aspiration contrast dye Omnipaque-180 was injected with live fluoroscopy in AP views that showed  spread of the contrast in the epidural space  and no vascular runoff or intrathecal spread. Finally 10 ml of treatment solution containing 5 ml of  1 % PF lidocaine and 4 ml of PF NS and 1 ml of Dexamethasone 10 mg / ml was injected. The needle was removed and a Band-Aid was placed over the needle  insertion site. The patient's vital signs remained stable and the patient tolerated the procedure well. Electronically signed by Karla Trujillo MD on 6/15/2020 at 8:27 AM    SEDATION NOTE:    ASA CLASSIFICATION  3  MP   CLASSIFICATION  3    · Moderate intravenous conscious sedation was supervised by Dr. Tawana Chambers  . The patient was independently monitored by a Registered Nurse assigned to the Procedure Room  . Monitoring included automated blood pressure, continuous EKG, Capnography and continuous pulse oximetry. . The detailed Conscious Record is permanently stored in the [place] [address].      The following is the conscious sedation record;  Start Time:  0814  End times:  0828  Duration:  14 minutes  MEDS GIVEN 2 MG VERSED AND 50 MCG FENTANYL        Electronically signed by Karla Trujillo MD on 6/15/2020 at 8:27 AM

## 2020-06-19 ENCOUNTER — TELEMEDICINE (OUTPATIENT)
Dept: PULMONOLOGY | Age: 55
End: 2020-06-19
Payer: COMMERCIAL

## 2020-06-19 ENCOUNTER — TELEPHONE (OUTPATIENT)
Dept: PAIN MANAGEMENT | Age: 55
End: 2020-06-19

## 2020-06-19 PROCEDURE — 99214 OFFICE O/P EST MOD 30 MIN: CPT | Performed by: INTERNAL MEDICINE

## 2020-06-19 RX ORDER — OXYCODONE HYDROCHLORIDE AND ACETAMINOPHEN 5; 325 MG/1; MG/1
1 TABLET ORAL 2 TIMES DAILY PRN
Qty: 60 TABLET | Refills: 0 | Status: SHIPPED | OUTPATIENT
Start: 2020-06-19 | End: 2020-07-14 | Stop reason: SDUPTHER

## 2020-06-19 NOTE — PROGRESS NOTES
sleep apnea syndrome    COPD    History of smoking    Diabetes    Hypertension    Back pain          Plan:   1. I advised the patient to schedule a sleep study with the sleep center again. The sleep center is open now. After looking at the sleep center will decide his further management of sleep apnea. 2.   3. He was encouraged to lose weight  4.   5. He was advised to participate in regular exercise program and dietary restrictions. 6.   7. Patient will require the bronchodilators at this time he can continue the Advair albuterol and Symbicort however he will need Spiriva as well. We have to talk to the insurance company because the patient cannot tolerate Incruse because it causes her difficulty in breathing. 8.   9. He will continue treatment of hypertension as before  10. 11. Continue the diabetes and diabetes treatment as before. He will he will continue to participate in exercise program and dietary restriction to lose weight. If he cannot lose weight then he may have to undergo bariatric surgery. Weight loss will greatly help a lot of his symptoms. 12.   13. Patient also counseled regarding need to give up smoking altogether. He was offered pharmacological intervention that he declined  14.   15. I plan to see him in follow-up after the sleep study performed dictated with Dr. Milan Bundy MD dictation over thank you  16. An  electronic signature was used to authenticate this note. --Brain Coates MD on 6/19/2020 at 11:07 AM    9}    Pursuant to the emergency declaration under the [facility], [facility] waiver authority and the [employer] Act, this Virtual  Visit was conducted, with patient's consent, to reduce the patient's risk of exposure to COVID-19 and provide continuity of care for an established patient.     Services were provided through a video synchronous discussion virtually to substitute for

## 2020-06-19 NOTE — TELEPHONE ENCOUNTER
Magali Farias is requesting a refill on the following medications:   Requested Prescriptions     Pending Prescriptions Disp Refills    oxyCODONE-acetaminophen (PERCOCET) 5-325 MG per tablet 60 tablet 0     Sig: Take 1 tablet by mouth 2 times daily as needed for Pain for up to 30 days. Last OV 5/12/2020, he had procedure 6/15/2020,    Future Appointments   Date Time Provider Chelly Traci   6/19/2020 11:00 AM Shreyas Price MD Resp Spec [facility]   7/1/2020 11:00 AM Ramana He MD [facility]       OARRS report sent to Dr. Cori Diaz through alternative route for review.

## 2020-07-01 ENCOUNTER — TELEMEDICINE (OUTPATIENT)
Dept: PAIN MANAGEMENT | Age: 55
End: 2020-07-01
Payer: COMMERCIAL

## 2020-07-01 PROCEDURE — 99213 OFFICE O/P EST LOW 20 MIN: CPT | Performed by: ANESTHESIOLOGY

## 2020-07-01 RX ORDER — OXYCODONE HYDROCHLORIDE AND ACETAMINOPHEN 5; 325 MG/1; MG/1
1 TABLET ORAL 2 TIMES DAILY PRN
Qty: 60 TABLET | Refills: 0 | Status: CANCELLED | OUTPATIENT
Start: 2020-07-01 | End: 2020-07-31

## 2020-07-01 ASSESSMENT — ENCOUNTER SYMPTOMS
CONSTIPATION: 0
BACK PAIN: 1
COUGH: 0
SHORTNESS OF BREATH: 0
DIARRHEA: 0
SORE THROAT: 0

## 2020-07-01 NOTE — PROGRESS NOTES
The patient is a 62 y. o. Non-/non  male. Chief Complaint   Patient presents with    Back Pain    Follow Up After Procedure     6/15/2020 COLETTE        HPI     64-year-old man with past medical history significant for morbid obesity and obstructive sleep apnea and central apnea syndrome  He is seen in our clinic for chronic lower back pain  Pain is located in the lumbar area across midline affect both side  Pain radiates down right leg associated with right leg intermittent numbness and tingling  Pain aggravated with excessive activity and alleviates with rest and medications  Currently taking oxycodone  Denies any side effect on the medication  Find the medication helpful  No history of illicit substance use  Patient epidural steroid injection  Do not report much benefit from the injection at this time  S/P: 6/15/2020 COLETTE    Outcome   Any improvement of activity? Only very slightly   Any side effects (appetite,leg cramping,facial fleshing): Right leg pain   Increase of pain:  Yes, right leg is bothering him more than before procedure  Pain score Today:  9    Patient reports 39 pills left. He will not need a refill for 20 more days.      Lab Results   Component Value Date    LABA1C 6.1 10/29/2019     Lab Results   Component Value Date     10/02/2018           Past Medical History:   Diagnosis Date    Asthma     Diabetes mellitus ([facility] [address]     Hyperlipidemia     Hypertension     Migraine     Neuropathy     Positive FIT (fecal immunochemical test)     Renal failure       Past Surgical History:   Procedure Laterality Date    ANESTHESIA NERVE BLOCK Bilateral 9/18/2017    NERVE BLOCK BILATERAL MEDIAL BRANCH L2-L5 performed by Chester Julio MD at [address] Bilateral 10/17/2017    [address] L2-L5 performed by Chester Julio MD at [facility]  03/08/2017    COLONOSCOPY  03/08/2017    internal hemorrhoids; mild diverticulosis    ENDOSCOPY, COLON, DIAGNOSTIC      FIBULA FRACTURE SURGERY Left     LEG SURGERY Right     NERVE BLOCK N/A 10/24/2016    lumbar COLETTE    NERVE BLOCK Right 11/21/2016    lumbar COLETTE    NERVE BLOCK Left 08/13/2018    radiofrequency ablation medial branh block L2-L5    OTHER SURGICAL HISTORY Right 01/26/2017    right hip steroid injection    OTHER SURGICAL HISTORY Right 08/06/2018    NERVE RADIOFREQUENCY ABLATION RIGHT LUMBAR MEDIAL BRANCH L2-L5 (Right )    OTHER SURGICAL HISTORY  12/03/2018    RIGHT L4 L5 EPIDURAL STEROID INJECTION (Right )    OTHER SURGICAL HISTORY  03/25/2019    LUMBAR COLETTE (N/A )    PAIN MANAGEMENT PROCEDURE Right 5/28/2020    EPIDURAL STEROID INJECTION RIGHT L5S1 performed by Ravin Hernandez MD at [place] [address] Right 6/15/2020    EPIDURAL STEROID INJECTION RIGTH L5S1 performed by Ravin Hernandez MD at [place] [address] ANES/STEROID FORAMEN LUMBAR/SACRAL W IMG GUIDE ,1 LEVEL Right 12/3/2018    RIGHT L4 L5 EPIDURAL STEROID INJECTION performed by Ravin Hernandez MD at [address] OFFICE/OUTPT [address] [facility] [address] Right 8/6/2018    NERVE RADIOFREQUENCY ABLATION RIGHT LUMBAR MEDIAL BRANCH L2-L5 performed by Ravin Hernandez MD at [address] OFFICE/OUTPT [address] [facility] Left 8/13/2018    NERVE RADIOFREQUENCY ABLATION LEFT LUMBAR MEDIAL BRANCH L2-L5 performed by Ravin Hernandez MD at [address] [place] [address] N/A 3/25/2019    LUMBAR COLETTE performed by Ravin Hernandez MD at [address] [place] [address] History     Socioeconomic History    Marital status: Single     Spouse name: None    Number of children: None    Years of education: None    Highest education level: None   Occupational History    None   Social Needs    Financial resource strain: None    Food insecurity     Worry: None     Inability: None    Transportation needs     Medical: None     Non-medical: None   Tobacco Use    Smoking status: Current Every Day Smoker tablet 1    atorvastatin (LIPITOR) 40 MG tablet Take 1 tablet by mouth daily 90 tablet 1    aspirin EC 81 MG EC tablet Take 1 tablet by mouth daily 90 tablet 1    budesonide-formoterol (SYMBICORT) 160-4.5 MCG/ACT AERO Inhale 2 puffs into the lungs 2 times daily 3 Inhaler 1    albuterol sulfate  (90 Base) MCG/ACT inhaler Inhale 2 puffs into the lungs 4 times daily as needed for Wheezing 3 Inhaler 1     No current facility-administered medications for this visit. Facility-Administered Medications Ordered in Other Visits   Medication Dose Route Frequency Provider Last Rate Last Dose    fentaNYL (SUBLIMAZE) injection 25 mcg  25 mcg Intravenous Q5 Min PRN Lorena Soto MD        HYDROmorphone (DILAUDID) injection 0.5 mg  0.5 mg Intravenous Q5 Min PRN Lorena Soto MD        fentaNYL (SUBLIMAZE) injection 25 mcg  25 mcg Intravenous Q5 Min PRN Lorena Soto MD        HYDROmorphone (DILAUDID) injection 0.5 mg  0.5 mg Intravenous Q5 Min PRN Lorena Soto MD        labetalol (NORMODYNE;TRANDATE) injection 5 mg  5 mg Intravenous Q10 Min PRN Lorena Soto MD        hydrALAZINE (APRESOLINE) injection 5 mg  5 mg Intravenous Q10 Min PRN Lorena Soto MD        meperidine (DEMEROL) injection 12.5 mg  12.5 mg Intravenous Q5 Min PRN Lorena Soto MD         Review of Systems   Constitutional: Negative for chills and fever. HENT: Negative for congestion and sore throat. Eyes: Negative for visual disturbance. Respiratory: Negative for cough and shortness of breath. Cardiovascular: Negative for chest pain, palpitations and leg swelling. Gastrointestinal: Negative for constipation and diarrhea. Genitourinary: Negative for difficulty urinating. Musculoskeletal: Positive for back pain. Neurological: Positive for dizziness, light-headedness and headaches. Hematological: Does not bruise/bleed easily. Psychiatric/Behavioral: The patient is not nervous/anxious. Objective:  General Appearance:  Well-appearing and in no acute distress. Vital signs: (most recent): There were no vitals taken for this visit. No fever. Output: Producing urine and producing stool. HEENT: (No visible masses in neck  Range of motion appears normal on cervical spine  External ears appears normal)    Lungs:  Normal effort. He is not in respiratory distress. Neurological: Patient is alert and oriented to person, place and time.  (Able to follow command    Psych  Mood is good  Affect is normal). Skin:  No rash or cyanosis. Assessment & Plan     Physical Therapy Discharge Note     Date: 2019                                                Patient: Kingsley Fall              : 1965                      MRN: [id number]                         Physician: Nemo Griggs MD                                     Insurance: [employer]  Diagnosis: Rotator cuff tendonitis Left           Onset Date: 18                         Next Dr. Nolan Long: 19  Visit# / total visits:                   Cancels/No Shows: 0/0  Date of initial visit: 1/10/2019                Date of final visit: 2019     EXAMINATION: MRI OF THE LUMBAR SPINE WITHOUT CONTRAST, 2018 11:23 am    L4-L5: There is mild prominence of the epidural fat. The thecal sac is triangulated and mildly stenotic measuring 8.7 mm. Disc and/or osteophytes cause minimal narrowing of the neural foramina bilaterally. L5-S1: There prominence of the epidural fat. The thecal sac and S1 nerve roots are intact. Disc and/or osteophytes cause minimal narrowing of the neural foramina bilaterally. 1. Chronic use of opiate drugs therapeutic purposes    2. Osteoarthritis of spine with radiculopathy, lumbar region    3. Chronic radicular lumbar pain    4. Morbid obesity with BMI of 40.0-44.9, adult ([facility] [address]    5.  MARLENE and COPD overlap syndrome ([facility] [address]        automated prescription report reviewed  No sign or symptoms of any aberrancy  Safe use of medication discussed with patient  Refill not required today  Patient will call for refill in 2 weeks  We will plan to see him in 6 weeks in clinic  Plan will be to obtain urine for toxicology next visit    Saul Martínez is a 62 y.o. male being evaluated by a Virtual Visit (video visit) encounter to address concerns as mentioned above. A caregiver was present when appropriate. Due to this being a TeleHealth encounter (During TUBNJ-18 public health emergency), evaluation of the following organ systems was limited: Vitals/Constitutional/EENT/Resp/CV/GI//MS/Neuro/Skin/Heme-Lymph-Imm. Pursuant to the emergency declaration under the [address] [place] [address] authority and the [employer] Act, this Virtual Visit was conducted with patient's (and/or legal guardian's) consent, to reduce the patient's risk of exposure to COVID-19 and provide necessary medical care. The patient (and/or legal guardian) has also been advised to contact this office for worsening conditions or problems, and seek emergency medical treatment and/or call 911 if deemed necessary. Patient identification was verified at the start of the visit: Yes    Total time spent for this encounter: Not billed by time    Services were provided through a video synchronous discussion virtually to substitute for in-person clinic visit. Patient and provider were located at their individual homes. --Anahy Perez MD on 7/1/2020 at 11:33 AM    An electronic signature was used to authenticate this note.     Electronically signed by Anahy Perez MD on 7/1/2020 at 11:33 AM

## 2020-07-14 RX ORDER — OXYCODONE HYDROCHLORIDE AND ACETAMINOPHEN 5; 325 MG/1; MG/1
1 TABLET ORAL 2 TIMES DAILY PRN
Qty: 60 TABLET | Refills: 0 | Status: SHIPPED | OUTPATIENT
Start: 2020-07-19 | End: 2020-08-12 | Stop reason: SDUPTHER

## 2020-07-14 NOTE — TELEPHONE ENCOUNTER
Yolanda Sheldon is requesting a refill on the following medications:   Requested Prescriptions     Pending Prescriptions Disp Refills    oxyCODONE-acetaminophen (PERCOCET) 5-325 MG per tablet 60 tablet 0     Sig: Take 1 tablet by mouth 2 times daily as needed for Pain for up to 30 days. Last VV 7/1/20. Last VV progress note stated:Refill not required today  Patient will call for refill in 2 weeks  We will plan to see him in 6 weeks in clinic    Future Appointments   Date Time Provider Chelly Aviles   8/12/2020  2:40 Domi Russell MD [facility]   10/16/2020  9:30 AM Crystal Rolon MD [facility] report sent to Dr. Pete Barrios through alternative route for review.

## 2020-07-16 NOTE — TELEPHONE ENCOUNTER
metered glucose PRN                Patient Active Problem List:     Daily headache     Chronic bilateral low back pain with right-sided sciatica     Hypertension     Hyperlipidemia     Chronic use of opiate drugs therapeutic purposes     Morbid obesity with BMI of 40.0-44.9, adult ([facility])     Osteoarthritis of spine with radiculopathy, lumbar region     Controlled type 2 diabetes mellitus without complication, without long-term current use of insulin ([facility])     COPD (chronic obstructive pulmonary disease) ([facility])     MARLENE and COPD overlap syndrome ([facility])     Anxiety and depression     Prediabetes     Pneumococcal vaccination declined     Influenza vaccination declined     Lumbar disc disease     Chronic radicular lumbar pain

## 2020-07-31 RX ORDER — LISINOPRIL AND HYDROCHLOROTHIAZIDE 12.5; 1 MG/1; MG/1
TABLET ORAL
Qty: 20 TABLET | Refills: 0 | Status: SHIPPED | OUTPATIENT
Start: 2020-07-31 | End: 2020-09-23 | Stop reason: SDUPTHER

## 2020-08-02 RX ORDER — TIOTROPIUM BROMIDE INHALATION SPRAY 3.12 UG/1
SPRAY, METERED RESPIRATORY (INHALATION)
Qty: 1 INHALER | Refills: 10 | Status: SHIPPED | OUTPATIENT
Start: 2020-08-02 | End: 2021-02-12 | Stop reason: ALTCHOICE

## 2020-08-03 ENCOUNTER — TELEPHONE (OUTPATIENT)
Dept: PULMONOLOGY | Age: 55
End: 2020-08-03

## 2020-08-03 NOTE — TELEPHONE ENCOUNTER
Received a PA request from [employer] for [address] [place]. This is in the process with tono.

## 2020-08-12 ENCOUNTER — OFFICE VISIT (OUTPATIENT)
Dept: PAIN MANAGEMENT | Age: 55
End: 2020-08-12
Payer: COMMERCIAL

## 2020-08-12 VITALS
TEMPERATURE: 96.5 F | HEIGHT: 65 IN | DIASTOLIC BLOOD PRESSURE: 87 MMHG | SYSTOLIC BLOOD PRESSURE: 156 MMHG | OXYGEN SATURATION: 97 % | HEART RATE: 95 BPM | BODY MASS INDEX: 44.48 KG/M2 | WEIGHT: 267 LBS

## 2020-08-12 PROCEDURE — 99213 OFFICE O/P EST LOW 20 MIN: CPT | Performed by: ANESTHESIOLOGY

## 2020-08-12 RX ORDER — OXYCODONE HYDROCHLORIDE AND ACETAMINOPHEN 5; 325 MG/1; MG/1
1 TABLET ORAL 2 TIMES DAILY PRN
Qty: 60 TABLET | Refills: 0 | Status: SHIPPED | OUTPATIENT
Start: 2020-08-18 | End: 2020-09-15 | Stop reason: SDUPTHER

## 2020-08-12 ASSESSMENT — ENCOUNTER SYMPTOMS
GASTROINTESTINAL NEGATIVE: 1
BACK PAIN: 1

## 2020-08-12 NOTE — PROGRESS NOTES
The patient is a 62 y. o. Non-/non  male. Chief Complaint   Patient presents with    Medication Refill        HPI    Past medical history significant for morbid obesity and obstructive sleep apnea and central apnea syndrome  He is seen in our clinic for chronic lower back pain  Pain located in the lumbar area across midline  Report intermittent radiation down both legs left more than right  Pain aggravated with excessive activity lifting bending and walking  Pain is chronic onset many years ago  Describes the pain is constant  Rates average intensity 8/10  Currently on oxycodone twice a day  Denies any side effect  Finds the medication helpful allowing him to do daily life activity  No history of illicit substance use  No other changes in health history    Pain score Today:  8  Adverse effects (Constipation / Nausea / Sedation / sexual Dysfunction / others) : no  Mood: good  Sleep pattern and quality: poor  Activity level: fair    Pill count Today:8.5 patient reported -did not bring them today  Last dose taken  This morning   OARRS report reviewed today: yes  ER/Hospitalizations/PCP visit related to pain since last visit:no   Any legal problems e.g. DUI etc.:No  Satisfied with current management: Yes    Opioid Contract:06/03/2019  Last Urine Dug screen dated:04/30/2019    Lab Results   Component Value Date    LABA1C 6.1 10/29/2019     Lab Results   Component Value Date     10/02/2018       Past Medical History, Past Surgical History, Social History, Allergies and Medications reviewed and updated in EPIC as indicated    Family History reviewed and is noncontributory.          Past Medical History:   Diagnosis Date    Asthma     Diabetes mellitus ([facility] [address]     Hyperlipidemia     Hypertension     Migraine     Neuropathy     Positive FIT (fecal immunochemical test)     Renal failure       Past Surgical History:   Procedure Laterality Date    ANESTHESIA NERVE BLOCK Bilateral 9/18/2017    NERVE BLOCK BILATERAL MEDIAL BRANCH L2-L5 performed [facility] Devyn Fernando MD at [facility] Bilateral 10/17/2017    NERVE BLOCK BILATERAL MEDIAL BRANCH L2-L5 performed by Devyn Fernando MD at [facility]  03/08/2017    COLONOSCOPY  03/08/2017    internal hemorrhoids; mild diverticulosis    ENDOSCOPY, COLON, DIAGNOSTIC      FIBULA FRACTURE SURGERY Left     LEG SURGERY Right     NERVE BLOCK N/A 10/24/2016    lumbar COLETTE    NERVE BLOCK Right 11/21/2016    lumbar COLETTE    NERVE BLOCK Left 08/13/2018    radiofrequency ablation medial branh block L2-L5    OTHER SURGICAL HISTORY Right 01/26/2017    right hip steroid injection    OTHER SURGICAL HISTORY Right 08/06/2018    NERVE RADIOFREQUENCY ABLATION RIGHT LUMBAR MEDIAL BRANCH L2-L5 (Right )    OTHER SURGICAL HISTORY  12/03/2018    RIGHT L4 L5 EPIDURAL STEROID INJECTION (Right )    OTHER SURGICAL HISTORY  03/25/2019    LUMBAR COLETTE (N/A )    PAIN MANAGEMENT PROCEDURE Right 5/28/2020    EPIDURAL STEROID INJECTION RIGHT L5S1 performed by Devyn Fernando MD at [address] [place] [address] Right 6/15/2020    EPIDURAL STEROID INJECTION RIGTH L5S1 performed by Devyn Fernando MD at [place] [address] ANES/STEROID FORAMEN LUMBAR/SACRAL W IMG GUIDE ,1 LEVEL Right 12/3/2018    RIGHT L4 L5 EPIDURAL STEROID INJECTION performed by Devyn Fernando MD at [address] OFFICE/OUTPT [address] [facility] Right 8/6/2018    NERVE RADIOFREQUENCY ABLATION RIGHT LUMBAR MEDIAL BRANCH L2-L5 performed by Devyn Fernando MD at [address] OFFICE/OUTPT [address] [facility] Left 8/13/2018    NERVE RADIOFREQUENCY ABLATION LEFT LUMBAR MEDIAL BRANCH L2-L5 performed by Devyn Fernando MD at [address] [place] [address] N/A 3/25/2019    LUMBAR CLOETTE performed by Devyn Fernando MD at [address] [place] [address] History     Socioeconomic History    Marital status: Single     Spouse name: None    Number of children: None    Years of inhaler INHALE 2 PUFFS INTO THE LUNGS DAILY 1 Inhaler 10    lisinopril-hydroCHLOROthiazide (PRINZIDE;ZESTORETIC) 10-12.5 MG per tablet TAKE 1 TABLET BY MOUTH ONE TIME A DAY 20 tablet 0    metFORMIN (GLUCOPHAGE) 500 MG tablet TAKE 1 TABLET BY MOUTH TWICE DAILY WITH MEALS 60 tablet 1    Umeclidinium Bromide (INCRUSE ELLIPTA) 62.5 MCG/INH AEPB Inhale 1 puff into the lungs daily 1 each 5    atorvastatin (LIPITOR) 40 MG tablet Take 1 tablet by mouth daily 90 tablet 1    aspirin EC 81 MG EC tablet Take 1 tablet by mouth daily 90 tablet 1    budesonide-formoterol (SYMBICORT) 160-4.5 MCG/ACT AERO Inhale 2 puffs into the lungs 2 times daily 3 Inhaler 1    albuterol sulfate  (90 Base) MCG/ACT inhaler Inhale 2 puffs into the lungs 4 times daily as needed for Wheezing 3 Inhaler 1     No current facility-administered medications for this visit. Facility-Administered Medications Ordered in Other Visits   Medication Dose Route Frequency Provider Last Rate Last Dose    fentaNYL (SUBLIMAZE) injection 25 mcg  25 mcg Intravenous Q5 Min PRN Lorena Ayala MD        HYDROmorphone (DILAUDID) injection 0.5 mg  0.5 mg Intravenous Q5 Min PRN Lorena Ayala MD        fentaNYL (SUBLIMAZE) injection 25 mcg  25 mcg Intravenous Q5 Min PRN Lorena Ayala MD        HYDROmorphone (DILAUDID) injection 0.5 mg  0.5 mg Intravenous Q5 Min PRN Lorena Ayala MD        labetalol (NORMODYNE;TRANDATE) injection 5 mg  5 mg Intravenous Q10 Min PRN Lorena Ayala MD        hydrALAZINE (APRESOLINE) injection 5 mg  5 mg Intravenous Q10 Min PRN Lorena Ayala MD        meperidine (DEMEROL) injection 12.5 mg  12.5 mg Intravenous Q5 Min PRN Lorena Ayala MD         Review of Systems   Constitutional: Negative. Negative for fever. Gastrointestinal: Negative. Musculoskeletal: Positive for back pain. Neurological: Positive for numbness. Hematological: Negative. Psychiatric/Behavioral: Negative. Objective:  General Appearance:  Well-appearing, in no acute distress, uncomfortable and in pain. Vital signs: (most recent): Blood pressure (!) 156/87, pulse 95, temperature 96.5 °F (35.8 °C), height 5' 5\" (1.651 m), weight 267 lb (121.1 kg), SpO2 97 %. Vital signs are normal.  No fever. Output: Producing urine and producing stool. HEENT: Normal HEENT exam.    Lungs:  Normal effort and normal respiratory rate. Breath sounds clear to auscultation. He is not in respiratory distress. Heart: Normal rate. Extremities: Normal range of motion. There is no deformity. Neurological: Patient is alert and oriented to person, place and time. Patient has normal coordination. Pupils:  Pupils are equal, round, and reactive to light. Pupils are equal.   Skin:  Warm and dry. No rash or cyanosis. Assessment & Plan     EXAMINATION: MRI OF THE LUMBAR SPINE WITHOUT CONTRAST, 11/8/2018 11:23 am    L4-L5: There is mild prominence of the epidural fat. The thecal sac is triangulated and mildly stenotic measuring 8.7 mm. Disc and/or osteophytes cause minimal narrowing of the neural foramina bilaterally. L5-S1: There prominence of the epidural fat. The thecal sac and S1 nerve roots are intact. Disc and/or osteophytes cause minimal narrowing of the neural foramina bilaterally. 1. Encounter for chronic pain management    2. Chronic radicular lumbar pain    3. Chronic use of opiate drugs therapeutic purposes    4. Morbid obesity with BMI of 40.0-44.9, adult ([facility] [address]    5. MARLENE and COPD overlap syndrome ([facility] [address]    6. Osteoarthritis of spine with radiculopathy, lumbar region        Automated prescription report reviewed  Safe use of medication discussed with patient  Refill ordered  Sign or symptom of any aberrancy  No orders of the defined types were placed in this encounter.      Orders Placed This Encounter   Medications    oxyCODONE-acetaminophen (PERCOCET) 5-325 MG per tablet     Sig: Take 1 tablet by mouth 2 times daily as needed for Pain for up to 30 days. Dispense:  60 tablet     Refill:  0     Reduce doses taken as pain becomes manageable        Controlled Substance Monitoring:    Acute and Chronic Pain Monitoring:   RX Monitoring 8/12/2020   Attestation -   Periodic Controlled Substance Monitoring Possible medication side effects, risk of tolerance/dependence & alternative treatments discussed. ;No signs of potential drug abuse or diversion identified. ;Assessed functional status. ;Obtaining appropriate analgesic effect of treatment. Chronic Pain > 50 MEDD Obtained or confirmed \"Consent for Opioid Use\" on file.    Chronic Pain > 80 MEDD -         Electronically signed by Jay Jay Chandler MD on 8/12/2020 at 3:13 PM

## 2020-09-01 ENCOUNTER — TELEPHONE (OUTPATIENT)
Dept: INTERNAL MEDICINE CLINIC | Age: 55
End: 2020-09-01

## 2020-09-01 NOTE — TELEPHONE ENCOUNTER
Mary Jain from [employer] called to request notes from 10/29/2019 O/V.  Fax [phone]    Last O/V 10/19/19

## 2020-09-14 ASSESSMENT — ENCOUNTER SYMPTOMS
BACK PAIN: 1
SHORTNESS OF BREATH: 1

## 2020-09-14 NOTE — PROGRESS NOTES
The patient is a 62 y. o. Non-/non  male. Chief Complaint   Patient presents with    Medication Refill     # 3 Percocet counted     Back Pain        HPI      57-year-old man with history of chronic lower back pain  Onset of symptom many years ago  Pain located in the lumbar area  Intermittently radiates down both legs left side more than right  No changes in bladder or bowel control  Pain aggravated with excessive activity  Usually rates the pain high 8-9 over 10  Today still states that he is been having problem financially receiving his disability check and that has stressed him out causing of hypertension and increased pain    Currently take oxycodone for pain  Denies any side effect finds medication helpful  No history of illicit substance use      Pain score Today:  9  Adverse effects (Constipation / Nausea / Sedation / sexual Dysfunction / others) : no  Mood: good to fair  Sleep pattern and quality: poor  Activity level: poor    Pill count Today:0  Last dose taken  9/14/20  OARRS report reviewed today: yes  ER/Hospitalizations/PCP visit related to pain since last visit:no   Any legal problems e.g. DUI etc.:No  Satisfied with current management: Yes    Opioid Contract:06/03/19  Last Urine Dug screen dated:4/30/19    Lab Results   Component Value Date    LABA1C 6.1 10/29/2019     Lab Results   Component Value Date     10/02/2018       Past Medical History, Past Surgical History, Social History, Allergies and Medications reviewed and updated in EPIC as indicated    Family History reviewed and is noncontributory.           Past Medical History:   Diagnosis Date    Asthma     Diabetes mellitus ([facility] [address]     Hyperlipidemia     Hypertension     Migraine     Neuropathy     Positive FIT (fecal immunochemical test)     Renal failure       Past Surgical History:   Procedure Laterality Date    ANESTHESIA NERVE BLOCK Bilateral 9/18/2017    NERVE BLOCK BILATERAL MEDIAL BRANCH L2-L5 performed by Flower Johnson MD at [facility] Bilateral 10/17/2017    NERVE BLOCK BILATERAL MEDIAL BRANCH L2-L5 performed by Flower Johnson MD at [facility]  03/08/2017    COLONOSCOPY  03/08/2017    internal hemorrhoids; mild diverticulosis    ENDOSCOPY, COLON, DIAGNOSTIC      FIBULA FRACTURE SURGERY Left     LEG SURGERY Right     NERVE BLOCK N/A 10/24/2016    lumbar COLETTE    NERVE BLOCK Right 11/21/2016    lumbar COLETTE    NERVE BLOCK Left 08/13/2018    radiofrequency ablation medial branh block L2-L5    OTHER SURGICAL HISTORY Right 01/26/2017    right hip steroid injection    OTHER SURGICAL HISTORY Right 08/06/2018    NERVE RADIOFREQUENCY ABLATION RIGHT LUMBAR MEDIAL BRANCH L2-L5 (Right )    OTHER SURGICAL HISTORY  12/03/2018    RIGHT L4 L5 EPIDURAL STEROID INJECTION (Right )    OTHER SURGICAL HISTORY  03/25/2019    LUMBAR COLETTE (N/A )    PAIN MANAGEMENT PROCEDURE Right 5/28/2020    EPIDURAL STEROID INJECTION RIGHT L5S1 performed by Flower Johnson MD at [facility] Right 6/15/2020    EPIDURAL STEROID INJECTION RIGTH L5S1 performed by Flower Johnson MD at [place] [address] ANES/STEROID FORAMEN LUMBAR/SACRAL W IMG GUIDE ,1 LEVEL Right 12/3/2018    RIGHT L4 L5 EPIDURAL STEROID INJECTION performed by Flower Johnson MD at [facility] OFFICE/[address]PT [address] Right 8/6/2018    NERVE RADIOFREQUENCY ABLATION RIGHT LUMBAR MEDIAL BRANCH L2-L5 performed by Flower Johnson MD at [facility] OFFICE/OUTPT [address] Left 8/13/2018    NERVE RADIOFREQUENCY ABLATION LEFT LUMBAR MEDIAL BRANCH L2-L5 performed by Flower Johnson MD at [address] [facility] N/A 3/25/2019    LUMBAR COLETTE performed by Flower Johnson MD at [address] [facility] History     Socioeconomic History    Marital status: Single     Spouse name: None    Number of children: None    Years of education: None    Highest education level: None Occupational History    None   Social Needs    Financial resource strain: None    Food insecurity     Worry: None     Inability: None    Transportation needs     Medical: None     Non-medical: None   Tobacco Use    Smoking status: Current Every Day Smoker     Packs/day: 0.50     Years: 20.00     Pack years: 10.00     Types: Cigarettes     Start date: 10/2/1979    Smokeless tobacco: Never Used    Tobacco comment: 1 pack every 2.5 days   Substance and Sexual Activity    Alcohol use: No     Alcohol/week: 0.0 standard drinks    Drug use: No    Sexual activity: None   Lifestyle    Physical activity     Days per week: None     Minutes per session: None    Stress: None   Relationships    Social connections     Talks on phone: None     Gets together: None     Attends [employer] service: None     Active member of club or organization: None     Attends meetings of clubs or organizations: None     Relationship status: None    Intimate partner violence     Fear of current or ex partner: None     Emotionally abused: None     Physically abused: None     Forced sexual activity: None   Other Topics Concern    None   Social History Narrative    None     Family History   Problem Relation Age of Onset    Diabetes Mother     Heart Disease Mother     Cancer Mother     Heart Disease Maternal Grandmother      Allergies   Allergen Reactions    Amitriptyline Anaphylaxis    Paroxetine Other (See Comments)    Paxil [Paroxetine Hcl] Other (See Comments)     Amitriptyline; Paroxetine; and Paxil [paroxetine hcl]   Vitals:    09/15/20 1450   BP: (!) 175/90   Pulse: 90   Temp: 97 °F (36.1 °C)   SpO2: 97%     Current Outpatient Medications   Medication Sig Dispense Refill    [START ON 9/17/2020] oxyCODONE-acetaminophen (PERCOCET) 5-325 MG per tablet Take 1 tablet by mouth 2 times daily as needed for Pain for up to 30 days.  60 tablet 0    SPIRIVA RESPIMAT 2.5 MCG/ACT AERS inhaler INHALE 2 PUFFS INTO THE LUNGS DAILY 1 Vital signs: (most recent): Blood pressure (!) 175/90, pulse 90, temperature 97 °F (36.1 °C), temperature source Temporal, height 5' 5\" (1.651 m), weight 266 lb (120.7 kg), SpO2 97 %. Vital signs are normal.  No fever. Output: Producing urine and producing stool. HEENT: Normal HEENT exam.    Lungs:  Normal effort and normal respiratory rate. Breath sounds clear to auscultation. He is not in respiratory distress. Heart: Normal rate. Extremities: Normal range of motion. There is no deformity. Neurological: Patient is alert and oriented to person, place and time. Patient has normal coordination. Pupils:  Pupils are equal, round, and reactive to light. Pupils are equal.   Skin:  Warm and dry. No rash or cyanosis. Assessment & Plan       1. Chronic use of opiate drugs therapeutic purposes    2. Chronic bilateral low back pain with right-sided sciatica    3. Morbid obesity with BMI of 40.0-44.9, adult ([facility] [address]        Orders Placed This Encounter   Procedures    Urine Drug Screen, Comprehensive      Orders Placed This Encounter   Medications    oxyCODONE-acetaminophen (PERCOCET) 5-325 MG per tablet     Sig: Take 1 tablet by mouth 2 times daily as needed for Pain for up to 30 days. Dispense:  60 tablet     Refill:  0     Reduce doses taken as pain becomes manageable      Controlled Substance Monitoring:    Acute and Chronic Pain Monitoring:   RX Monitoring 9/15/2020   Attestation -   Periodic Controlled Substance Monitoring Possible medication side effects, risk of tolerance/dependence & alternative treatments discussed. ;Assessed functional status. ;No signs of potential drug abuse or diversion identified. Chronic Pain > 50 MEDD Obtained or confirmed \"Consent for Opioid Use\" on file.    Chronic Pain > 80 MEDD -           Electronically signed by José Munoz MD on 9/15/2020 at 3:33 PM   Urine toxicology 9/19/2020  Validity testing satisfactory  Positive for oxycodone and metabolite consistent with treatment  No illicit substance

## 2020-09-15 ENCOUNTER — OFFICE VISIT (OUTPATIENT)
Dept: PAIN MANAGEMENT | Age: 55
End: 2020-09-15
Payer: COMMERCIAL

## 2020-09-15 VITALS
WEIGHT: 266 LBS | HEART RATE: 90 BPM | SYSTOLIC BLOOD PRESSURE: 175 MMHG | DIASTOLIC BLOOD PRESSURE: 90 MMHG | TEMPERATURE: 97 F | OXYGEN SATURATION: 97 % | HEIGHT: 65 IN | BODY MASS INDEX: 44.32 KG/M2

## 2020-09-15 PROCEDURE — 99213 OFFICE O/P EST LOW 20 MIN: CPT | Performed by: ANESTHESIOLOGY

## 2020-09-15 RX ORDER — OXYCODONE HYDROCHLORIDE AND ACETAMINOPHEN 5; 325 MG/1; MG/1
1 TABLET ORAL 2 TIMES DAILY PRN
Qty: 60 TABLET | Refills: 0 | Status: SHIPPED | OUTPATIENT
Start: 2020-09-17 | End: 2020-10-14 | Stop reason: SDUPTHER

## 2020-09-23 ENCOUNTER — OFFICE VISIT (OUTPATIENT)
Dept: INTERNAL MEDICINE CLINIC | Age: 55
End: 2020-09-23
Payer: COMMERCIAL

## 2020-09-23 ENCOUNTER — HOSPITAL ENCOUNTER (OUTPATIENT)
Age: 55
Setting detail: SPECIMEN
Discharge: HOME | End: 2020-09-23
Payer: COMMERCIAL

## 2020-09-23 VITALS
WEIGHT: 266 LBS | BODY MASS INDEX: 44.32 KG/M2 | HEIGHT: 65 IN | RESPIRATION RATE: 20 BRPM | DIASTOLIC BLOOD PRESSURE: 90 MMHG | TEMPERATURE: 97.8 F | SYSTOLIC BLOOD PRESSURE: 136 MMHG | HEART RATE: 75 BPM | OXYGEN SATURATION: 98 %

## 2020-09-23 PROBLEM — E11.21 DIABETIC NEPHROPATHY ASSOCIATED WITH TYPE 2 DIABETES MELLITUS (HCC): Status: ACTIVE | Noted: 2020-09-23

## 2020-09-23 LAB
-: ABNORMAL
ALBUMIN SERPL-MCNC: 4.1 G/DL (ref 3.5–5.2)
ALBUMIN/GLOBULIN RATIO: 1.3 (ref 1–2.5)
ALP BLD-CCNC: 53 U/L (ref 40–129)
ALT SERPL-CCNC: 18 U/L (ref 5–41)
AMORPHOUS: ABNORMAL
ANION GAP SERPL CALCULATED.3IONS-SCNC: 10 MMOL/L (ref 9–17)
AST SERPL-CCNC: 20 U/L
BACTERIA: ABNORMAL
BILIRUB SERPL-MCNC: 0.21 MG/DL (ref 0.3–1.2)
BILIRUBIN URINE: NEGATIVE
BUN BLDV-MCNC: 13 MG/DL (ref 6–20)
BUN/CREAT BLD: ABNORMAL (ref 9–20)
CALCIUM SERPL-MCNC: 9.8 MG/DL (ref 8.6–10.4)
CASTS UA: ABNORMAL /LPF (ref 0–8)
CHLORIDE BLD-SCNC: 102 MMOL/L (ref 98–107)
CHOLESTEROL/HDL RATIO: 5.5
CHOLESTEROL: 230 MG/DL
CO2: 24 MMOL/L (ref 20–31)
COLOR: YELLOW
CREAT SERPL-MCNC: 0.88 MG/DL (ref 0.7–1.2)
CREATININE URINE: 52.9 MG/DL (ref 39–259)
CRYSTALS, UA: ABNORMAL /HPF
EPITHELIAL CELLS UA: ABNORMAL /HPF (ref 0–5)
ESTIMATED AVERAGE GLUCOSE: 143 MG/DL
GFR AFRICAN AMERICAN: >60 ML/MIN
GFR NON-AFRICAN AMERICAN: >60 ML/MIN
GFR SERPL CREATININE-BSD FRML MDRD: ABNORMAL ML/MIN/{1.73_M2}
GFR SERPL CREATININE-BSD FRML MDRD: ABNORMAL ML/MIN/{1.73_M2}
GLUCOSE BLD-MCNC: 87 MG/DL (ref 70–99)
GLUCOSE URINE: NEGATIVE
HBA1C MFR BLD: 6.6 % (ref 4–6)
HCT VFR BLD CALC: 42.7 % (ref 40.7–50.3)
HDLC SERPL-MCNC: 42 MG/DL
HEMOGLOBIN: 13.6 G/DL (ref 13–17)
KETONES, URINE: NEGATIVE
LDL CHOLESTEROL: 150 MG/DL (ref 0–130)
LEUKOCYTE ESTERASE, URINE: NEGATIVE
MCH RBC QN AUTO: 29.4 PG (ref 25.2–33.5)
MCHC RBC AUTO-ENTMCNC: 31.9 G/DL (ref 28.4–34.8)
MCV RBC AUTO: 92.4 FL (ref 82.6–102.9)
MICROALBUMIN/CREAT 24H UR: <12 MG/L
MICROALBUMIN/CREAT UR-RTO: NORMAL MCG/MG CREAT
MUCUS: ABNORMAL
NITRITE, URINE: NEGATIVE
NRBC AUTOMATED: 0 PER 100 WBC
OTHER OBSERVATIONS UA: ABNORMAL
PDW BLD-RTO: 13.7 % (ref 11.8–14.4)
PH UA: 6.5 (ref 5–8)
PLATELET # BLD: 258 K/UL (ref 138–453)
PMV BLD AUTO: 10.7 FL (ref 8.1–13.5)
POTASSIUM SERPL-SCNC: 4.3 MMOL/L (ref 3.7–5.3)
PROSTATE SPECIFIC ANTIGEN: 0.68 UG/L
PROTEIN UA: NEGATIVE
RBC # BLD: 4.62 M/UL (ref 4.21–5.77)
RBC UA: ABNORMAL /HPF (ref 0–4)
RENAL EPITHELIAL, UA: ABNORMAL /HPF
SODIUM BLD-SCNC: 136 MMOL/L (ref 135–144)
SPECIFIC GRAVITY UA: 1.01 (ref 1–1.03)
TOTAL PROTEIN: 7.2 G/DL (ref 6.4–8.3)
TRICHOMONAS: ABNORMAL
TRIGL SERPL-MCNC: 192 MG/DL
TSH SERPL DL<=0.05 MIU/L-ACNC: 1.13 MIU/L (ref 0.3–5)
TURBIDITY: CLEAR
URINE HGB: ABNORMAL
UROBILINOGEN, URINE: NORMAL
VLDLC SERPL CALC-MCNC: ABNORMAL MG/DL (ref 1–30)
WBC # BLD: 8.5 K/UL (ref 3.5–11.3)
WBC UA: ABNORMAL /HPF (ref 0–5)
YEAST: ABNORMAL

## 2020-09-23 PROCEDURE — 99214 OFFICE O/P EST MOD 30 MIN: CPT | Performed by: FAMILY MEDICINE

## 2020-09-23 RX ORDER — LISINOPRIL AND HYDROCHLOROTHIAZIDE 12.5; 1 MG/1; MG/1
TABLET ORAL
Qty: 90 TABLET | Refills: 0 | Status: SHIPPED | OUTPATIENT
Start: 2020-09-23 | End: 2021-01-07

## 2020-09-23 ASSESSMENT — ENCOUNTER SYMPTOMS
RESPIRATORY NEGATIVE: 1
ALLERGIC/IMMUNOLOGIC NEGATIVE: 1
GASTROINTESTINAL NEGATIVE: 1
BACK PAIN: 1
EYES NEGATIVE: 1

## 2020-09-23 NOTE — PROGRESS NOTES
Subjective:      Patient ID: Kait Mustafa is a 60 y.o. male. Diabetes   He presents for his follow-up diabetic visit. He has type 2 diabetes mellitus. His disease course has been stable. Hypoglycemia symptoms include nervousness/anxiousness. There are no diabetic associated symptoms. There are no hypoglycemic complications. Symptoms are stable. Diabetic complications include nephropathy. Risk factors for coronary artery disease include diabetes mellitus, dyslipidemia, family history, obesity, male sex, hypertension, stress and sedentary lifestyle. Current diabetic treatment includes oral agent (monotherapy). He is compliant with treatment all of the time. His weight is increasing steadily. He is following a generally unhealthy, high fat/cholesterol, high salt and low fiber diet. Meal planning includes ADA exchanges, avoidance of concentrated sweets, calorie counting and carbohydrate counting. He has had a previous visit with a dietitian. He participates in exercise three times a week. Home blood sugar record trend: He did not bring Accu-Chek log. An ACE inhibitor/angiotensin II receptor blocker is being taken. Review of Systems   Constitutional: Negative. HENT: Negative. Eyes: Negative. Respiratory: Negative. Cardiovascular: Negative. Gastrointestinal: Negative. Endocrine: Negative. Musculoskeletal: Positive for arthralgias, back pain, gait problem and myalgias. Skin: Negative. Allergic/Immunologic: Negative. Hematological: Negative. Psychiatric/Behavioral: Positive for dysphoric mood. The patient is nervous/anxious. Past family and social history unremarkable. Diagnosis Orders   1. Controlled type 2 diabetes mellitus without complication, without long-term current use of insulin (HCC)  CBC    Comprehensive Metabolic Panel    Hemoglobin A1C    Lipid Panel    Microalbumin, Ur    Urinalysis with Microscopic    TSH without Reflex    Psa screening   2.  Chronic bilateral low Abdominal:      General: Bowel sounds are normal.      Palpations: Abdomen is soft. Musculoskeletal: Normal range of motion. Comments: Chronic pain syndrome. Opiate dependent as provided by pain management   Skin:     General: Skin is warm and dry. Neurological:      Mental Status: He is alert and oriented to person, place, and time. Deep Tendon Reflexes: Reflexes are normal and symmetric. Comments: Tension headaches. Neurologically intact   Psychiatric:      Comments: Anxiety/depression. He is reluctant to psych consultation or medications         Assessment:       Diagnosis Orders   1. Controlled type 2 diabetes mellitus without complication, without long-term current use of insulin (HCC)  CBC    Comprehensive Metabolic Panel    Hemoglobin A1C    Lipid Panel    Microalbumin, Ur    Urinalysis with Microscopic    TSH without Reflex    Psa screening   2. Chronic bilateral low back pain with right-sided sciatica     3. Diabetic nephropathy associated with type 2 diabetes mellitus (HCC)  CBC    Comprehensive Metabolic Panel    Hemoglobin A1C    Lipid Panel    Microalbumin, Ur    Urinalysis with Microscopic    TSH without Reflex    Psa screening   4. Lumbar disc disease     5. Chronic radicular lumbar pain     6. Daily headache     7. Essential hypertension     8. Mixed hyperlipidemia  CBC    Comprehensive Metabolic Panel    Hemoglobin A1C    Lipid Panel    Microalbumin, Ur    Urinalysis with Microscopic    TSH without Reflex    Psa screening   9. Chronic use of opiate drugs therapeutic purposes     10. Morbid obesity with BMI of 40.0-44.9, adult ([facility] [address]     11. Osteoarthritis of spine with radiculopathy, lumbar region     12. Chronic obstructive pulmonary disease, unspecified COPD type ([address]     13. MARLENE and COPD overlap syndrome ([facility] [address]     14. Anxiety and depression     15.  Prediabetes  CBC    Comprehensive Metabolic Panel    Hemoglobin A1C    Lipid Panel    Microalbumin, Ur    Urinalysis with

## 2020-10-14 ENCOUNTER — OFFICE VISIT (OUTPATIENT)
Dept: PAIN MANAGEMENT | Age: 55
End: 2020-10-14
Payer: COMMERCIAL

## 2020-10-14 VITALS
WEIGHT: 241 LBS | TEMPERATURE: 98.4 F | BODY MASS INDEX: 40.15 KG/M2 | DIASTOLIC BLOOD PRESSURE: 78 MMHG | HEART RATE: 78 BPM | HEIGHT: 65 IN | SYSTOLIC BLOOD PRESSURE: 133 MMHG

## 2020-10-14 PROCEDURE — 99213 OFFICE O/P EST LOW 20 MIN: CPT | Performed by: ANESTHESIOLOGY

## 2020-10-14 RX ORDER — OXYCODONE HYDROCHLORIDE AND ACETAMINOPHEN 5; 325 MG/1; MG/1
1 TABLET ORAL 2 TIMES DAILY PRN
Qty: 60 TABLET | Refills: 0 | Status: SHIPPED | OUTPATIENT
Start: 2020-10-17 | End: 2020-11-11 | Stop reason: SDUPTHER

## 2020-10-14 ASSESSMENT — ENCOUNTER SYMPTOMS
BACK PAIN: 1
SHORTNESS OF BREATH: 1

## 2020-10-14 NOTE — PROGRESS NOTES
The patient is a 60 y. o. Non-/non  male. Chief Complaint   Patient presents with    Medication Refill    Leg Pain        HPI       68-year-old man with past medical history significant for morbid obesity obstructive sleep apnea and central apnea syndrome    He is seen in our clinic for history of chronic lower back pain onset of symptom many years ago located in the lower back across midline radiates down right leg  Pain aggravated with excessive activity and weather changes  Described the pain is aching stabbing burning sensation  Rates intensity is 8/10  On low-dose opioid therapy with oxycodone twice a day  Denies any side effect finds medication helpful  Had flareup of lumbar radicular pain in June 2020 for which he had an epidural injection and responded well to the treatment      Pain score Today:  8  Adverse effects (Constipation / Nausea / Sedation / sexual Dysfunction / others) : No  Mood: good  Sleep pattern and quality: poor  Activity level: fair    Pill count Today:3  Last dose taken  10/14/2020   OARRS report reviewed today: yes  ER/Hospitalizations/PCP visit related to pain since last visit:no   Any legal problems e.g. DUI etc.:No  Satisfied with current management: No    Opioid Contract:06/13/2019   Last Urine Dug screen dated: 04/30/2019     Lab Results   Component Value Date    LABA1C 6.6 (H) 09/23/2020     Lab Results   Component Value Date     09/23/2020       Past Medical History, Past Surgical History, Social History, Allergies and Medications reviewed and updated in EPIC as indicated    Family History reviewed and is noncontributory.           Past Medical History:   Diagnosis Date    Asthma     Diabetes mellitus ([facility] [address]     Hyperlipidemia     Hypertension     Migraine     Neuropathy     Positive FIT (fecal immunochemical test)     Renal failure       Past Surgical History:   Procedure Laterality Date    ANESTHESIA NERVE BLOCK Bilateral 9/18/2017    NERVE BLOCK BILATERAL MEDIAL BRANCH L2-L5 performed by Maria Dolores Dill MD at [facility] Bilateral 10/17/2017    NERVE BLOCK BILATERAL MEDIAL BRANCH L2-L5 performed by Maria Dolores Dill MD at [facility] [address]  03/08/2017    COLONOSCOPY  03/08/2017    internal hemorrhoids; mild diverticulosis    ENDOSCOPY, COLON, DIAGNOSTIC      FIBULA FRACTURE SURGERY Left     LEG SURGERY Right     NERVE BLOCK N/A 10/24/2016    lumbar COLETTE    NERVE BLOCK Right 11/21/2016    lumbar COLETTE    NERVE BLOCK Left 08/13/2018    radiofrequency ablation medial branh block L2-L5    OTHER SURGICAL HISTORY Right 01/26/2017    right hip steroid injection    OTHER SURGICAL HISTORY Right 08/06/2018    NERVE RADIOFREQUENCY ABLATION RIGHT LUMBAR MEDIAL BRANCH L2-L5 (Right )    OTHER SURGICAL HISTORY  12/03/2018    RIGHT L4 L5 EPIDURAL STEROID INJECTION (Right )    OTHER SURGICAL HISTORY  03/25/2019    LUMBAR COLETTE (N/A )    PAIN MANAGEMENT PROCEDURE Right 5/28/2020    EPIDURAL STEROID INJECTION RIGHT L5S1 performed by Maria Dolores Dill MD at [address] [facility] Right 6/15/2020    EPIDURAL STEROID INJECTION RIGTH L5S1 performed by Maria Dolores Dill MD at [facility]/STEROID FORAMEN LUMBAR/SACRAL W IMG GUIDE ,1 LEVEL Right 12/3/2018    RIGHT L4 L5 EPIDURAL STEROID INJECTION performed by Maria Dolores Dill MD at [address] [facility] [address] Right 8/6/2018    NERVE RADIOFREQUENCY ABLATION RIGHT LUMBAR MEDIAL BRANCH L2-L5 performed by Maria Dolores Dill MD at [address] OFF[address]/OUTPT [address] [facility] [address] Left 8/13/2018    NERVE RADIOFREQUENCY ABLATION LEFT LUMBAR MEDIAL BRANCH L2-L5 performed by Maria Dolores Dill MD at [address] [place] [address] N/A 3/25/2019    LUMBAR COLETTE performed by Maria Dolores Dill MD at [address] History     Socioeconomic History    Marital status: Single     Spouse name: None    Number of children: None    Years of education: None    Highest education level: None   Occupational History    None   Social Needs    Financial resource strain: None    Food insecurity     Worry: None     Inability: None    Transportation needs     Medical: None     Non-medical: None   Tobacco Use    Smoking status: Current Every Day Smoker     Packs/day: 0.50     Years: 20.00     Pack years: 10.00     Types: Cigarettes     Start date: 10/2/1979    Smokeless tobacco: Never Used    Tobacco comment: 1 pack every 2.5 days   Substance and Sexual Activity    Alcohol use: No     Alcohol/week: 0.0 standard drinks    Drug use: No    Sexual activity: None   Lifestyle    Physical activity     Days per week: None     Minutes per session: None    Stress: None   Relationships    Social connections     Talks on phone: None     Gets together: None     Attends [employer] service: None     Active member of club or organization: None     Attends meetings of clubs or organizations: None     Relationship status: None    Intimate partner violence     Fear of current or ex partner: None     Emotionally abused: None     Physically abused: None     Forced sexual activity: None   Other Topics Concern    None   Social History Narrative    None     Family History   Problem Relation Age of Onset    Diabetes Mother     Heart Disease Mother     Cancer Mother     Heart Disease Maternal Grandmother      Allergies   Allergen Reactions    Amitriptyline Anaphylaxis    Paroxetine Other (See Comments)    Paxil [Paroxetine Hcl] Other (See Comments)     Amitriptyline; Paroxetine; and Paxil [paroxetine hcl]   Vitals:    10/14/20 1552   BP: 133/78   Pulse: 78   Temp: 98.4 °F (36.9 °C)     Current Outpatient Medications   Medication Sig Dispense Refill    [START ON 10/17/2020] oxyCODONE-acetaminophen (PERCOCET) 5-325 MG per tablet Take 1 tablet by mouth 2 times daily as needed for Pain for up to 30 days.  60 tablet 0    lisinopril-hydroCHLOROthiazide (PRINZIDE;ZESTORETIC) 10-12.5 MG per tablet TAKE 1 TABLET BY MOUTH ONE TIME A DAY 90 tablet 0    SPIRIVA RESPIMAT 2.5 MCG/ACT AERS inhaler INHALE 2 PUFFS INTO THE LUNGS DAILY 1 Inhaler 10    metFORMIN (GLUCOPHAGE) 500 MG tablet TAKE 1 TABLET BY MOUTH TWICE DAILY WITH MEALS 60 tablet 1    Umeclidinium Bromide (INCRUSE ELLIPTA) 62.5 MCG/INH AEPB Inhale 1 puff into the lungs daily 1 each 5    atorvastatin (LIPITOR) 40 MG tablet Take 1 tablet by mouth daily 90 tablet 1    aspirin EC 81 MG EC tablet Take 1 tablet by mouth daily 90 tablet 1    budesonide-formoterol (SYMBICORT) 160-4.5 MCG/ACT AERO Inhale 2 puffs into the lungs 2 times daily 3 Inhaler 1    albuterol sulfate  (90 Base) MCG/ACT inhaler Inhale 2 puffs into the lungs 4 times daily as needed for Wheezing 3 Inhaler 1     No current facility-administered medications for this visit. Facility-Administered Medications Ordered in Other Visits   Medication Dose Route Frequency Provider Last Rate Last Dose    fentaNYL (SUBLIMAZE) injection 25 mcg  25 mcg Intravenous Q5 Min PRN Lorena Linn MD        HYDROmorphone (DILAUDID) injection 0.5 mg  0.5 mg Intravenous Q5 Min PRN Lorena Linn MD        fentaNYL (SUBLIMAZE) injection 25 mcg  25 mcg Intravenous Q5 Min PRN Lorena Linn MD        HYDROmorphone (DILAUDID) injection 0.5 mg  0.5 mg Intravenous Q5 Min PRN Lorena Linn MD        labetalol (NORMODYNE;TRANDATE) injection 5 mg  5 mg Intravenous Q10 Min PRN Lorena Linn MD        hydrALAZINE (APRESOLINE) injection 5 mg  5 mg Intravenous Q10 Min PRN Lorena Linn MD        meperidine (DEMEROL) injection 12.5 mg  12.5 mg Intravenous Q5 Min PRN Lorena Linn MD         Review of Systems   Constitutional: Negative. Negative for fever. Respiratory: Positive for shortness of breath. Musculoskeletal: Positive for back pain, neck pain and neck stiffness. Neurological: Positive for numbness and headaches.        Objective:  General Appearance: Well-appearing and in no acute distress. Vital signs: (most recent): Blood pressure 133/78, pulse 78, temperature 98.4 °F (36.9 °C), height 5' 5\" (1.651 m), weight 241 lb (109.3 kg). Vital signs are normal.  No fever. Output: Producing urine and producing stool. HEENT: Normal HEENT exam.    Lungs:  Normal effort and normal respiratory rate. Breath sounds clear to auscultation. He is not in respiratory distress. Heart: Normal rate. Extremities: Normal range of motion. There is no deformity. Neurological: Patient is alert and oriented to person, place and time. Patient has normal coordination. Pupils:  Pupils are equal, round, and reactive to light. Pupils are equal.   Skin:  Warm and dry. No rash or cyanosis. Assessment & Plan  1. Chronic use of opiate drugs therapeutic purposes    2. Chronic bilateral low back pain with right-sided sciatica    3. Morbid obesity with BMI of 40.0-44.9, adult ([facility] [address]    4. MARLENE and COPD overlap syndrome ([facility] [address]          Sitting comfortably do not appear in any distress moves without any difficulty but continued to report high pain score medication Refill: On face pain scale his pain is 3-4    Automated prescription report reviewed  No sign or symptom of any aberrancy  Safe use of medication discussed  Risk associated with use of opioid discussed with patient  Refill ordered  No orders of the defined types were placed in this encounter. Orders Placed This Encounter   Medications    oxyCODONE-acetaminophen (PERCOCET) 5-325 MG per tablet     Sig: Take 1 tablet by mouth 2 times daily as needed for Pain for up to 30 days.      Dispense:  60 tablet     Refill:  0     Reduce doses taken as pain becomes manageable        Controlled Substance Monitoring:    Acute and Chronic Pain Monitoring:   RX Monitoring 10/14/2020   Attestation -   Periodic Controlled Substance Monitoring Possible medication side effects, risk of tolerance/dependence & alternative treatments discussed. ;No signs of potential drug abuse or diversion identified. ;Assessed functional status. ;Obtaining appropriate analgesic effect of treatment. Chronic Pain > 50 MEDD Obtained or confirmed \"Consent for Opioid Use\" on file.    Chronic Pain > 80 MEDD -         Electronically signed by Hailey Traore MD on 10/14/2020 at 4:14 PM

## 2020-10-16 ENCOUNTER — OFFICE VISIT (OUTPATIENT)
Dept: PULMONOLOGY | Age: 55
End: 2020-10-16
Payer: COMMERCIAL

## 2020-10-16 VITALS
HEIGHT: 65 IN | BODY MASS INDEX: 43.72 KG/M2 | HEART RATE: 76 BPM | WEIGHT: 262.4 LBS | RESPIRATION RATE: 18 BRPM | TEMPERATURE: 89 F | OXYGEN SATURATION: 98 % | SYSTOLIC BLOOD PRESSURE: 132 MMHG | DIASTOLIC BLOOD PRESSURE: 86 MMHG

## 2020-10-16 PROCEDURE — 99214 OFFICE O/P EST MOD 30 MIN: CPT | Performed by: INTERNAL MEDICINE

## 2020-10-16 ASSESSMENT — SLEEP AND FATIGUE QUESTIONNAIRES
HOW LIKELY ARE YOU TO NOD OFF OR FALL ASLEEP WHEN YOU ARE A PASSENGER IN A CAR FOR AN HOUR WITHOUT A BREAK: 1
HOW LIKELY ARE YOU TO NOD OFF OR FALL ASLEEP WHILE SITTING INACTIVE IN A PUBLIC PLACE: 0
HOW LIKELY ARE YOU TO NOD OFF OR FALL ASLEEP WHILE SITTING AND TALKING TO SOMEONE: 0
HOW LIKELY ARE YOU TO NOD OFF OR FALL ASLEEP IN A CAR, WHILE STOPPED FOR A FEW MINUTES IN TRAFFIC: 0
HOW LIKELY ARE YOU TO NOD OFF OR FALL ASLEEP WHILE SITTING AND READING: 0
HOW LIKELY ARE YOU TO NOD OFF OR FALL ASLEEP WHILE SITTING QUIETLY AFTER LUNCH WITHOUT ALCOHOL: 0
HOW LIKELY ARE YOU TO NOD OFF OR FALL ASLEEP WHILE LYING DOWN TO REST IN THE AFTERNOON WHEN CIRCUMSTANCES PERMIT: 0
ESS TOTAL SCORE: 1
HOW LIKELY ARE YOU TO NOD OFF OR FALL ASLEEP WHILE WATCHING TV: 0

## 2020-10-16 NOTE — PROGRESS NOTES
Alvert Valdes  10/16/2020    Chief Complaint   Patient presents with    COPD     slowing down on smoking/ requesting refill on spriva    Dyspnea. Morbid obesity. Obstructive sleep apnea syndrome    Tosha Rao is known to have chronic obstructive lung disease due to chronic bronchitis and emphysema secondary to prior smoking unfortunately he continued to smoke in spite of repeated advised to give up smoking. He has been responding well to the use of Symbicort and Spiriva he uses albuterol only on as-needed basis. But he continue a problem of obtaining the Spiriva because his insurance company will not allow that. Will be will make an appeal on his behalf    Patient is overweight and has symptoms of sleep apnea but he has not got his sleep study done yet. He did not take a flu shot. And he does not want one also. Unfortunately continues to smoke. In spite of the fact that he was advised repeatedly to give up smoking. He has not been able to lose much weight. Sputum-sputum is not excessive or purulent. No blood in it no chest pain no fever chills or rigors no pedal edema          Sleep Medicine 10/16/2020 7/15/2019   Sitting and reading 0 2   Watching TV 0 3   Sitting, inactive in a public place (e.g. a theatre or a meeting) 0 3   As a passenger in a car for an hour without a break 1 3   Lying down to rest in the afternoon when circumstances permit 0 0   Sitting and talking to someone 0 0   Sitting quietly after a lunch without alcohol 0 0   In a car, while stopped for a few minutes in traffic 0 0   Total score 1 11   . She did not complete and upper sleepiness scale. We'll get one last the next visit. Review of Systems -  General ROS: negative for - chills, fatigue, fever or weight loss. He is morbidly obese. No nasal stuffiness. No polyps. The throat examination with significant redundant tissue.   ENT ROS: negative for - headaches, oral lesions or sore throat  Cardiovascular ROS: no chest pain , orthopnea or pnd . He is known to have hypertension, no heart disease. No angina. No proximal legs and her dyspnea. Gastrointestinal ROS: no abdominal pain, change in bowel habits, or black or bloody stools. Peptic ulcer disease. No acid reflux. Skin - no rash   Neuro - no blurry vision , no loc . No focal weakness . He does have some weakness of the right leg since the injury and surgery many years back. msk - no jt tenderness or swelling  . No acute arthritis. He haven't had a soft tissue injury to the back of his right hand and the left hand which has been taken care and is healing well. Vascular - no claudication , rest completed and negative . No evidence of DVT  Lymphatic - complete and negative for large lymph node  Hematology - oncology - complete and negative . Now anemia, bleeding disorder  Allergy immunology - complete and negative    no burning or hematuria . No hematuria      LUNG CANCER SCREENING     1. CRITERIA MET    [x]     CT ORDERED  [x]      2. CRITERIA NOT MET   []      3. REFUSED                    []        REASON CRITERIA NOT MET     1. SMOKING LESS THAN 30 PY  []      2. AGE LESS THAN 55 or GREATER 77 YEARS  []      3. QUIT SMOKING 15 YEARS OR GREATER   []      4. RECENT CT WITH IN 11 MONTHS    []      5. LIFE EXPECTANCY < 5 YEARS   []      6. SIGNS  AND SYMPTOMS OF LUNG CANCER   []           Immunization   There is no immunization history for the selected administration types on file for this patient.      Pneumococcal Vaccine     [] Up to date    [x] Indicated   [] Refused  [] Contraindicated       Influenza Vaccine   [x] Up to date    [] Indicated   [] Refused  [] Contraindicated       PAST MEDICAL HISTORY:         Diagnosis Date    Asthma     Diabetes mellitus ([facility] [address]     Hyperlipidemia     Hypertension     Migraine     Neuropathy     Positive FIT (fecal immunochemical test)     Renal failure        Family History: Problem Relation Age of Onset    Diabetes Mother     Heart Disease Mother     Cancer Mother     Heart Disease Maternal Grandmother        SURGICAL HISTORY:   Past Surgical History:   Procedure Laterality Date    ANESTHESIA NERVE BLOCK Bilateral 9/18/2017    NERVE BLOCK BILATERAL MEDIAL BRANCH L2-L5 performed by Porsha Gomez MD at [facility] Bilateral 10/17/2017    [address] L2-L5 performed by Porsha Gomez MD at [place] [address]  03/08/2017    COLONOSCOPY  03/08/2017    internal hemorrhoids; mild diverticulosis    ENDOSCOPY, COLON, DIAGNOSTIC      FIBULA FRACTURE SURGERY Left     LEG SURGERY Right     NERVE BLOCK N/A 10/24/2016    lumbar COLETTE    NERVE BLOCK Right 11/21/2016    lumbar COLETTE    NERVE BLOCK Left 08/13/2018    radiofrequency ablation medial branh block L2-L5    OTHER SURGICAL HISTORY Right 01/26/2017    right hip steroid injection    OTHER SURGICAL HISTORY Right 08/06/2018    NERVE RADIOFREQUENCY ABLATION RIGHT LUMBAR MEDIAL BRANCH L2-L5 (Right )    OTHER SURGICAL HISTORY  12/03/2018    RIGHT L4 L5 EPIDURAL STEROID INJECTION (Right )    OTHER SURGICAL HISTORY  03/25/2019    LUMBAR COLETTE (N/A )    PAIN MANAGEMENT PROCEDURE Right 5/28/2020    EPIDURAL STEROID INJECTION RIGHT L5S1 performed by Porsha Gomez MD at [address] [facility] Right 6/15/2020    EPIDURAL STEROID INJECTION RIGTH L5S1 performed by Porsha Gomez MD at [place] [address] ANES/STEROID FORAMEN LUMBAR/SACRAL W IMG GUIDE ,1 LEVEL Right 12/3/2018    RIGHT L4 L5 EPIDURAL STEROID INJECTION performed by Porsha Gomez MD at [address] OFFICE/OUTPT [address] Right 8/6/2018    NERVE RADIOFREQUENCY ABLATION RIGHT LUMBAR MEDIAL BRANCH L2-L5 performed by Porsha Gomez MD at [address] OFFICE/OUTPT [address] Left 8/13/2018    NERVE RADIOFREQUENCY ABLATION LEFT LUMBAR MEDIAL BRANCH L2-L5 performed by Aaliyah Anthony MD at [address] [place] [address] N/A 3/25/2019    LUMBAR COLETTE performed by Aaliyah Anthony MD at [address] [place] [address]              Not in a hospital admission. Allergies   Allergen Reactions    Amitriptyline Anaphylaxis    Paroxetine Other (See Comments)    Paxil [Paroxetine Hcl] Other (See Comments)     Social History     Tobacco Use   Smoking Status Current Every Day Smoker    Packs/day: 0.50    Years: 20.00    Pack years: 10.00    Types: Cigarettes    Start date: 10/2/1979   Smokeless Tobacco Never Used   Tobacco Comment    down to 5 cigg. per day     Prior to Admission medications    Medication Sig Start Date End Date Taking? Authorizing Provider   oxyCODONE-acetaminophen (PERCOCET) 5-325 MG per tablet Take 1 tablet by mouth 2 times daily as needed for Pain for up to 30 days.  10/17/20 11/16/20 Yes Aaliyah Anthony MD   lisinopril-hydroCHLOROthiazide (PRINZIDE;ZESTORETIC) 10-12.5 MG per tablet TAKE 1 TABLET BY MOUTH ONE TIME A DAY 9/23/20  Yes Haleigh Mckeon MD   SPIRIVA RESPIMAT 2.5 MCG/ACT AERS inhaler INHALE 2 PUFFS INTO THE LUNGS DAILY 8/2/20  Yes Liset Joshua MD   metFORMIN (GLUCOPHAGE) 500 MG tablet TAKE 1 TABLET BY MOUTH TWICE DAILY WITH MEALS 7/16/20  Yes Haleigh Mckeon MD   Umeclidinium Bromide (INCRUSE ELLIPTA) 62.5 MCG/INH AEPB Inhale 1 puff into the lungs daily 2/13/20  Yes Liset Joshua MD   atorvastatin (LIPITOR) 40 MG tablet Take 1 tablet by mouth daily 4/2/19  Yes Haleigh Mckeon MD   aspirin EC 81 MG EC tablet Take 1 tablet by mouth daily 4/2/19  Yes Haleigh Mckeon MD   budesonide-formoterol (SYMBICORT) 160-4.5 MCG/ACT AERO Inhale 2 puffs into the lungs 2 times daily 4/2/19  Yes Haleigh Mckeon MD   albuterol sulfate  (90 Base) MCG/ACT inhaler Inhale 2 puffs into the lungs 4 times daily as needed for Wheezing 4/2/19  Yes Haleigh Mckeon MD         Physical Exam unchanged no rales or rhonchi no respiratory distress  General Appearance:    Alert, cooperative, no scan    Echo  No echocardiogram        Assessment  COPD  Chronic smoker    Hypertension  Controlled diabetes  Plan:  Patient pulmonary status is stable he was advised to continue the same bronchodilator therapy there is no evidence of an acute exacerbation of COPD    Patient is still smoking he was advised to give up smoking. However he refused to do so    Patient did not want to have influenza vaccine he was offered in the office but he refused. He was advised to get his sleep study done. He very likely has clinical features of sleep apnea syndrome. He is a candidate for lung cancer screening a CT scan was ordered. Patient encouraged to lose weight    I have reviewed the bronchodilators as before    I will review the CT scan cancer screening and call the patient about the results. .  5/3/19 at 10:35 AM  Please note that this chart was generated using voice recognition Dragon dictation software. Although every effort was made to ensure the accuracy of this automated transcription, some errors in transcription may have occurred.

## 2020-11-04 ENCOUNTER — OFFICE VISIT (OUTPATIENT)
Dept: INTERNAL MEDICINE CLINIC | Age: 55
End: 2020-11-04
Payer: COMMERCIAL

## 2020-11-04 VITALS
TEMPERATURE: 97.2 F | WEIGHT: 264 LBS | OXYGEN SATURATION: 98 % | SYSTOLIC BLOOD PRESSURE: 130 MMHG | RESPIRATION RATE: 18 BRPM | HEART RATE: 92 BPM | HEIGHT: 66 IN | BODY MASS INDEX: 42.43 KG/M2 | DIASTOLIC BLOOD PRESSURE: 72 MMHG

## 2020-11-04 PROCEDURE — 99214 OFFICE O/P EST MOD 30 MIN: CPT | Performed by: FAMILY MEDICINE

## 2020-11-04 ASSESSMENT — ENCOUNTER SYMPTOMS
ALLERGIC/IMMUNOLOGIC NEGATIVE: 1
RESPIRATORY NEGATIVE: 1
EYES NEGATIVE: 1
GASTROINTESTINAL NEGATIVE: 1
BACK PAIN: 1

## 2020-11-04 NOTE — PROGRESS NOTES
Subjective:      Patient ID: Haley Soliman is a 60 y.o. male. Diabetes   He presents for his follow-up diabetic visit. He has type 2 diabetes mellitus. His disease course has been improving. There are no hypoglycemic associated symptoms. There are no diabetic associated symptoms. There are no hypoglycemic complications. Symptoms are stable. There are no diabetic complications. Risk factors for coronary artery disease include diabetes mellitus, dyslipidemia, obesity, male sex and hypertension. Current diabetic treatment includes diet and oral agent (monotherapy). He is compliant with treatment all of the time. His weight is stable. He is following a generally healthy diet. Meal planning includes avoidance of concentrated sweets, calorie counting and carbohydrate counting. He has had a previous visit with a dietitian. He participates in exercise three times a week. There is no change in his home blood glucose trend. An ACE inhibitor/angiotensin II receptor blocker is being taken. Eye exam is current. Review of Systems   Constitutional: Negative. HENT: Negative. Eyes: Negative. Respiratory: Negative. Cardiovascular: Negative. Gastrointestinal: Negative. Endocrine: Negative. Musculoskeletal: Positive for arthralgias and back pain. Skin: Negative. Allergic/Immunologic: Negative. Neurological: Negative. Hematological: Negative. Psychiatric/Behavioral: Negative. Past family and social history unremarkable. Diagnosis Orders   1. Controlled type 2 diabetes mellitus without complication, without long-term current use of insulin ([address]     2. Chronic bilateral low back pain with right-sided sciatica     3. Lumbar disc disease     4. Chronic radicular lumbar pain     5. Daily headache     6. Essential hypertension     7. Mixed hyperlipidemia     8. Chronic use of opiate drugs therapeutic purposes     9. Morbid obesity with BMI of 40.0-44.9, adult ([address]     10.  Osteoarthritis of spine with radiculopathy, lumbar region     11. Chronic obstructive pulmonary disease, unspecified COPD type ([address]     12. MARLENE and COPD overlap syndrome ([address]     13. Anxiety and depression     14. Prediabetes     15. Pneumococcal vaccination declined     16. Influenza vaccination declined     16. Diabetic nephropathy associated with type 2 diabetes mellitus ([address]           Objective:   Physical Exam  Vitals signs and nursing note reviewed. Constitutional:       Appearance: He is well-developed. Comments: Obesity with sleep apnea   HENT:      Head: Normocephalic and atraumatic. Right Ear: External ear normal.      Left Ear: External ear normal.      Nose: Nose normal.   Eyes:      Conjunctiva/sclera: Conjunctivae normal.      Pupils: Pupils are equal, round, and reactive to light. Neck:      Musculoskeletal: Normal range of motion and neck supple. Cardiovascular:      Rate and Rhythm: Normal rate and regular rhythm. Heart sounds: Normal heart sounds. Comments: Hypertension  Hyperlipidemia  Diabetes mellitus-controlled  Pulmonary:      Effort: Pulmonary effort is normal.      Breath sounds: Normal breath sounds. Comments: COPD  Abdominal:      General: Bowel sounds are normal.      Palpations: Abdomen is soft. Musculoskeletal: Normal range of motion. Comments: Chronic back pain. Opiate dependent as provided by pain management   Skin:     General: Skin is warm and dry. Neurological:      Mental Status: He is alert and oriented to person, place, and time. Deep Tendon Reflexes: Reflexes are normal and symmetric. Psychiatric:         Behavior: Behavior normal.         Thought Content: Thought content normal.         Assessment:       Diagnosis Orders   1. Controlled type 2 diabetes mellitus without complication, without long-term current use of insulin ([address]     2. Chronic bilateral low back pain with right-sided sciatica     3. Lumbar disc disease     4.  Chronic radicular Genny Eid MD

## 2020-11-11 ENCOUNTER — OFFICE VISIT (OUTPATIENT)
Dept: PAIN MANAGEMENT | Age: 55
End: 2020-11-11
Payer: COMMERCIAL

## 2020-11-11 VITALS
SYSTOLIC BLOOD PRESSURE: 130 MMHG | OXYGEN SATURATION: 97 % | TEMPERATURE: 96.8 F | HEIGHT: 65 IN | HEART RATE: 89 BPM | WEIGHT: 264 LBS | BODY MASS INDEX: 43.99 KG/M2 | DIASTOLIC BLOOD PRESSURE: 72 MMHG

## 2020-11-11 PROCEDURE — 99213 OFFICE O/P EST LOW 20 MIN: CPT | Performed by: ANESTHESIOLOGY

## 2020-11-11 RX ORDER — OXYCODONE HYDROCHLORIDE AND ACETAMINOPHEN 5; 325 MG/1; MG/1
1 TABLET ORAL 2 TIMES DAILY PRN
Qty: 60 TABLET | Refills: 0 | Status: SHIPPED | OUTPATIENT
Start: 2020-11-16 | End: 2020-12-09 | Stop reason: SDUPTHER

## 2020-11-11 ASSESSMENT — ENCOUNTER SYMPTOMS
BACK PAIN: 1
SHORTNESS OF BREATH: 1

## 2020-11-11 NOTE — PROGRESS NOTES
The patient is a 60 y. o. Non-/non  male. Chief Complaint   Patient presents with    Medication Refill    Back Pain        HPI  78-year-old man with past medical history significant for morbid obesity and obstructive sleep apnea and central sleep apnea syndrome    He is seen in our clinic for history of chronic lower back pain onset of symptom many years ago  Pain extends down right leg  He has tried lumbar epidural injection and facet injections in past  Currently on medication management taking oxycodone twice a day  Finds the medication helpful allowing him to do daily life activities  Rates average pain score 7/10  Pain is constant aggravated with routine activities and interfere with quality of life  Patient states that medication helps him participate in daily activities without going through severe pain  No other changes in health history  Medication Refill: Percocet    Pain score Today:  7  Adverse effects (Constipation / Nausea / Sedation / sexual Dysfunction / others) : none   Mood: good  Sleep pattern and quality: poor  Activity level: fair    Pill count Today:7 Percocet  Last dose taken 11/11/2020   OARRS report reviewed today: yes  ER/Hospitalizations/PCP visit related to pain since last visit:no   Any legal problems e.g. DUI etc.:No  Satisfied with current management: Yes    Opioid Contract:6/13/19  Last Urine Dug screen dated:9/15/20        Past Medical History, Past Surgical History, Social History, Allergies and Medications reviewed and updated in EPIC as indicated    Family History reviewed and is noncontributory.           Past Medical History:   Diagnosis Date    Asthma     Diabetes mellitus ([facility] [address]     Hyperlipidemia     Hypertension     Migraine     Neuropathy     Positive FIT (fecal immunochemical test)     Renal failure       Past Surgical History:   Procedure Laterality Date    ANESTHESIA NERVE BLOCK Bilateral 9/18/2017    NERVE BLOCK BILATERAL MEDIAL BRANCH L2-L5 level: None   Occupational History    None   Social Needs    Financial resource strain: None    Food insecurity     Worry: None     Inability: None    Transportation needs     Medical: None     Non-medical: None   Tobacco Use    Smoking status: Current Every Day Smoker     Packs/day: 0.50     Years: 20.00     Pack years: 10.00     Types: Cigarettes     Start date: 10/2/1979    Smokeless tobacco: Never Used    Tobacco comment: down to 5 cigg. per day   Substance and Sexual Activity    Alcohol use: No     Alcohol/week: 0.0 standard drinks    Drug use: No    Sexual activity: None   Lifestyle    Physical activity     Days per week: None     Minutes per session: None    Stress: None   Relationships    Social connections     Talks on phone: None     Gets together: None     Attends Ca[employer] service: None     Active member of club or organization: None     Attends meetings of clubs or organizations: None     Relationship status: None    Intimate partner violence     Fear of current or ex partner: None     Emotionally abused: None     Physically abused: None     Forced sexual activity: None   Other Topics Concern    None   Social History Narrative    None     Family History   Problem Relation Age of Onset    Diabetes Mother     Heart Disease Mother     Cancer Mother     Heart Disease Maternal Grandmother      Allergies   Allergen Reactions    Amitriptyline Anaphylaxis    Paroxetine Other (See Comments)    Paxil [Paroxetine Hcl] Other (See Comments)     Amitriptyline; Paroxetine; and Paxil [paroxetine hcl]   Vitals:    11/11/20 1445   BP: 130/72   Pulse: 89   Temp: 96.8 °F (36 °C)   SpO2: 97%     Current Outpatient Medications   Medication Sig Dispense Refill    [START ON 11/16/2020] oxyCODONE-acetaminophen (PERCOCET) 5-325 MG per tablet Take 1 tablet by mouth 2 times daily as needed for Pain for up to 30 days.  60 tablet 0    metFORMIN (GLUCOPHAGE) 500 MG tablet TAKE 1 TABLET BY MOUTH TWICE DAILY WITH MEALS 180 tablet 1    tiotropium (SPIRIVA RESPIMAT) 2.5 MCG/ACT AERS inhaler Inhale 2 puffs into the lungs daily 1 g 5    lisinopril-hydroCHLOROthiazide (PRINZIDE;ZESTORETIC) 10-12.5 MG per tablet TAKE 1 TABLET BY MOUTH ONE TIME A DAY 90 tablet 0    SPIRIVA RESPIMAT 2.5 MCG/ACT AERS inhaler INHALE 2 PUFFS INTO THE LUNGS DAILY 1 Inhaler 10    Umeclidinium Bromide (INCRUSE ELLIPTA) 62.5 MCG/INH AEPB Inhale 1 puff into the lungs daily 1 each 5    atorvastatin (LIPITOR) 40 MG tablet Take 1 tablet by mouth daily 90 tablet 1    aspirin EC 81 MG EC tablet Take 1 tablet by mouth daily 90 tablet 1    budesonide-formoterol (SYMBICORT) 160-4.5 MCG/ACT AERO Inhale 2 puffs into the lungs 2 times daily 3 Inhaler 1    albuterol sulfate  (90 Base) MCG/ACT inhaler Inhale 2 puffs into the lungs 4 times daily as needed for Wheezing 3 Inhaler 1     No current facility-administered medications for this visit. Facility-Administered Medications Ordered in Other Visits   Medication Dose Route Frequency Provider Last Rate Last Dose    fentaNYL (SUBLIMAZE) injection 25 mcg  25 mcg Intravenous Q5 Min PRN Lorena Bernardo MD        HYDROmorphone (DILAUDID) injection 0.5 mg  0.5 mg Intravenous Q5 Min PRN Lorena Bernardo MD        fentaNYL (SUBLIMAZE) injection 25 mcg  25 mcg Intravenous Q5 Min PRN Lorena Bernardo MD        HYDROmorphone (DILAUDID) injection 0.5 mg  0.5 mg Intravenous Q5 Min PRN Lorena Bernardo MD        labetalol (NORMODYNE;TRANDATE) injection 5 mg  5 mg Intravenous Q10 Min PRN Lorena Bernardo MD        hydrALAZINE (APRESOLINE) injection 5 mg  5 mg Intravenous Q10 Min PRN Lorena Bernardo MD        meperidine (DEMEROL) injection 12.5 mg  12.5 mg Intravenous Q5 Min PRN Lorena Bernardo MD         Review of Systems   Constitutional: Negative. Negative for fever. Respiratory: Positive for shortness of breath. Musculoskeletal: Positive for arthralgias and back pain.    Neurological: Positive for numbness and headaches. Objective:  General Appearance:  Uncomfortable, in pain, well-appearing and in no acute distress. Vital signs: (most recent): Blood pressure 130/72, pulse 89, temperature 96.8 °F (36 °C), height 5' 5\" (1.651 m), weight 264 lb (119.7 kg), SpO2 97 %. Vital signs are normal.  No fever. Output: Producing urine and producing stool. HEENT: Normal HEENT exam.    Lungs:  Normal effort and normal respiratory rate. Breath sounds clear to auscultation. He is not in respiratory distress. Heart: Normal rate. Extremities: Normal range of motion. There is no deformity. Neurological: Patient is alert and oriented to person, place and time. Patient has normal coordination. Pupils:  Pupils are equal, round, and reactive to light. Pupils are equal.   Skin:  Warm and dry. No rash or cyanosis. Assessment & Plan  1. Chronic bilateral low back pain with right-sided sciatica    2. Chronic use of opiate drugs therapeutic purposes    3. Morbid obesity with BMI of 40.0-44.9, adult ([facility] [address]    4. MARLENE and COPD overlap syndrome (HCC)        Stable on current medication regimen  Risk associated with opioid use including respiratory depression is higher considering obesity and obstructive sleep apnea history    Advised patient to complete pulmonary function testing    Safe use of medication discussed  No sign or symptom of any aberrancy  Automated prescription report reviewed  Refill ordered    No orders of the defined types were placed in this encounter. Orders Placed This Encounter   Medications    oxyCODONE-acetaminophen (PERCOCET) 5-325 MG per tablet     Sig: Take 1 tablet by mouth 2 times daily as needed for Pain for up to 30 days.      Dispense:  60 tablet     Refill:  0     Reduce doses taken as pain becomes manageable      Controlled Substance Monitoring:    Acute and Chronic Pain Monitoring:   RX Monitoring 11/11/2020   Attestation -   Periodic Controlled Substance Monitoring No signs of potential drug abuse or diversion identified. ;Possible medication side effects, risk of tolerance/dependence & alternative treatments discussed. ;Assessed functional status. ;Obtaining appropriate analgesic effect of treatment. Chronic Pain > 50 MEDD Obtained or confirmed \"Consent for Opioid Use\" on file.    Chronic Pain > 80 MEDD -             Electronically signed by Raj Winters MD on 11/11/2020 at 3:07 PM

## 2020-12-09 ENCOUNTER — TELEMEDICINE (OUTPATIENT)
Dept: PAIN MANAGEMENT | Age: 55
End: 2020-12-09
Payer: COMMERCIAL

## 2020-12-09 PROCEDURE — 99213 OFFICE O/P EST LOW 20 MIN: CPT | Performed by: ANESTHESIOLOGY

## 2020-12-09 RX ORDER — OXYCODONE HYDROCHLORIDE AND ACETAMINOPHEN 5; 325 MG/1; MG/1
1 TABLET ORAL 2 TIMES DAILY PRN
Qty: 60 TABLET | Refills: 0 | Status: SHIPPED | OUTPATIENT
Start: 2020-12-09 | End: 2021-01-06 | Stop reason: SDUPTHER

## 2020-12-09 ASSESSMENT — ENCOUNTER SYMPTOMS
BACK PAIN: 1
COUGH: 0
WHEEZING: 0

## 2020-12-09 NOTE — PROGRESS NOTES
The patient is a 60 y. o. Non-/non  male. Chief Complaint   Patient presents with    Medication Refill    Back Pain        HPI    13-year-old man with history of multiple major medical problems including obesity obstructive sleep apnea and central apnea syndrome    Is seen in our clinic for history of chronic lower back pain  Most of the pain is axial lumbar spinal pain  Also report extension of this pain into the right hip gluteal region and posterior thigh  Had interventional procedures in past with limited benefit  Tried physical therapy in past  Currently stable on low-dose opioid therapy with oxycodone 5 mg twice a day  Report no side effect  Has been compliant with the treatment  Medication helps him maintain his active daily lifestyle  No sign or symptom of any aberrancy    Pain score Today:  6  Adverse effects (Constipation / Nausea / Sedation / sexual Dysfunction / others) : none  Mood: poor  Sleep pattern and quality: poor  Activity level: fair    Pill count Today: #13  Last dose taken  12/9/2020  OARRS report reviewed today: yes  ER/Hospitalizations/PCP visit related to pain since last visit:no   Any legal problems e.g. DUI etc.:No  Satisfied with current management: Yes    Opioid Contract:6/3/2019  Last Urine Dug screen dated: 9/22/2020    Lab Results   Component Value Date    LABA1C 6.6 (H) 09/23/2020     Lab Results   Component Value Date     09/23/2020       Past Medical History, Past Surgical History, Social History, Allergies and Medications reviewed and updated in EPIC as indicated    Family History reviewed and is noncontributory.       Past Medical History:   Diagnosis Date    Asthma     Diabetes mellitus ([facility] [address]     Hyperlipidemia     Hypertension     Migraine     Neuropathy     Positive FIT (fecal immunochemical test)     Renal failure       Past Surgical History:   Procedure Laterality Date    ANESTHESIA NERVE BLOCK Bilateral 9/18/2017    NERVE BLOCK BILATERAL MEDIAL BRANCH L2-L5 performed by Evelia Zuniga MD at [facility] Bilateral 10/17/2017    NERVE BLOCK BILATERAL MEDIAL BRANCH L2-L5 performed by Evelia Zuniga MD at [facility]  03/08/2017    COLONOSCOPY  03/08/2017    internal hemorrhoids; mild diverticulosis    ENDOSCOPY, COLON, DIAGNOSTIC      FIBULA FRACTURE SURGERY Left     LEG SURGERY Right     NERVE BLOCK N/A 10/24/2016    lumbar COLETTE    NERVE BLOCK Right 11/21/2016    lumbar COLETTE    NERVE BLOCK Left 08/13/2018    radiofrequency ablation medial branh block L2-L5    OTHER SURGICAL HISTORY Right 01/26/2017    right hip steroid injection    OTHER SURGICAL HISTORY Right 08/06/2018    NERVE RADIOFREQUENCY ABLATION RIGHT LUMBAR MEDIAL BRANCH L2-L5 (Right )    OTHER SURGICAL HISTORY  12/03/2018    RIGHT L4 L5 EPIDURAL STEROID INJECTION (Right )    OTHER SURGICAL HISTORY  03/25/2019    LUMBAR COLETTE (N/A )    PAIN MANAGEMENT PROCEDURE Right 5/28/2020    EPIDURAL STEROID INJECTION RIGHT L5S1 performed by Evelia Zuniga MD at [address] [facility] Right 6/15/2020    EPIDURAL STEROID INJECTION RIGTH L5S1 performed by Evelia Zuniga MD at [place] [address] ANES/STEROID FORAMEN LUMBAR/SACRAL W IMG GUIDE ,1 LEVEL Right 12/3/2018    RIGHT L4 L5 EPIDURAL STEROID INJECTION performed by Evelia Zuniga MD at [address]ICE/OUTPT [address] [facility] [address] Right 8/6/2018    NERVE RADIOFREQUENCY ABLATION RIGHT LUMBAR MEDIAL BRANCH L2-L5 performed by Evelia Zuniga MD at [address] OFFICE/OUTPT [address] [facility] [address] Left 8/13/2018    NERVE RADIOFREQUENCY ABLATION LEFT LUMBAR MEDIAL BRANCH L2-L5 performed by Evelia Zuniga MD at [address] [facility] N/A 3/25/2019    LUMBAR COLETTE performed by Evelia Zuniga MD at [address] [facility] History     Socioeconomic History    Marital status: Single     Spouse name: Not on file    Number of children: Not on file    Years of education: Not on file    Highest education level: Not on file   Occupational History    Not on file   Social Needs    Financial resource strain: Not on file    Food insecurity     Worry: Not on file     Inability: Not on file    Transportation needs     Medical: Not on file     Non-medical: Not on file   Tobacco Use    Smoking status: Current Every Day Smoker     Packs/day: 0.50     Years: 20.00     Pack years: 10.00     Types: Cigarettes     Start date: 10/2/1979    Smokeless tobacco: Never Used    Tobacco comment: down to 5 cigg. per day   Substance and Sexual Activity    Alcohol use: No     Alcohol/week: 0.0 standard drinks    Drug use: No    Sexual activity: Not on file   Lifestyle    Physical activity     Days per week: Not on file     Minutes per session: Not on file    Stress: Not on file   Relationships    Social connections     Talks on phone: Not on file     Gets together: Not on file     Attends [employer] service: Not on file     Active member of club or organization: Not on file     Attends meetings of clubs or organizations: Not on file     Relationship status: Not on file    Intimate partner violence     Fear of current or ex partner: Not on file     Emotionally abused: Not on file     Physically abused: Not on file     Forced sexual activity: Not on file   Other Topics Concern    Not on file   Social History Narrative    Not on file     Family History   Problem Relation Age of Onset    Diabetes Mother     Heart Disease Mother     Cancer Mother     Heart Disease Maternal Grandmother      Allergies   Allergen Reactions    Amitriptyline Anaphylaxis    Paroxetine Other (See Comments)    Paxil [Paroxetine Hcl] Other (See Comments)     Amitriptyline; Paroxetine; and Paxil [paroxetine hcl]   There were no vitals filed for this visit.   Current Outpatient Medications   Medication Sig Dispense Refill    oxyCODONE-acetaminophen (PERCOCET) 5-325 MG per tablet Take 1 tablet by mouth 2 and fever. Respiratory: Negative for cough and wheezing. Musculoskeletal: Positive for back pain. Neurological: Positive for numbness. Negative for weakness. Objective:  General Appearance:  Well-appearing and in no acute distress. Vital signs: (most recent): There were no vitals taken for this visit. Vital signs are normal.  No fever. Output: Producing urine and producing stool. HEENT: Normal HEENT exam.    Lungs:  Normal effort and normal respiratory rate. Breath sounds clear to auscultation. He is not in respiratory distress. Heart: Normal rate. Extremities: Normal range of motion. There is no deformity. Neurological: Patient is alert and oriented to person, place and time. Patient has normal coordination. Pupils:  Pupils are equal, round, and reactive to light. Pupils are equal.   Skin:  Warm and dry. No rash or cyanosis. Assessment & Plan     57-year-old man with history of multiple major medical problems including obesity obstructive sleep apnea and central apnea syndrome    Is seen in our clinic for history of chronic lower back pain  Most of the pain is axial lumbar spinal pain  Also report extension of this pain into the right hip gluteal region and posterior thigh  Had interventional procedures in past with limited benefit  Tried physical therapy in past  Currently stable on low-dose opioid therapy with oxycodone 5 mg twice a day  Report no side effect  Has been compliant with the treatment  Medication helps him maintain his active daily lifestyle  No sign or symptom of any aberrancy    1. Chronic bilateral low back pain with right-sided sciatica    2. Chronic use of opiate drugs therapeutic purposes    3. Morbid obesity with BMI of 40.0-44.9, adult ([facility] [address]    4. MARLENE and COPD overlap syndrome ([facility] [address]    5.  Osteoarthritis of spine with radiculopathy, lumbar region        Automated prescription report reviewed  No sign or symptom of any aberrancy  Safe use of medication discussed  Refill ordered    Stable on current regimen    No orders of the defined types were placed in this encounter. Orders Placed This Encounter   Medications    oxyCODONE-acetaminophen (PERCOCET) 5-325 MG per tablet     Sig: Take 1 tablet by mouth 2 times daily as needed for Pain for up to 30 days. Dispense:  60 tablet     Refill:  0     Reduce doses taken as pain becomes manageable      Controlled Substance Monitoring:    Acute and Chronic Pain Monitoring:   RX Monitoring 12/9/2020   Attestation -   Periodic Controlled Substance Monitoring Possible medication side effects, risk of tolerance/dependence & alternative treatments discussed. ;No signs of potential drug abuse or diversion identified. ;Assessed functional status. ;Obtaining appropriate analgesic effect of treatment. Chronic Pain > 50 MEDD Obtained or confirmed \"Consent for Opioid Use\" on file. Chronic Pain > 80 MEDD -         Robert Skelton is a 60 y.o. male being evaluated by a Virtual Visit (video visit) encounter to address concerns as mentioned above. A caregiver was present when appropriate. Due to this being a TeleHealth encounter (During ZKPDJ-02 public health emergency), evaluation of the following organ systems was limited: Vitals/Constitutional/EENT/Resp/CV/GI//MS/Neuro/Skin/Heme-Lymph-Imm. Pursuant to the emergency declaration under the [address] [place] [address], [address] [place] [address] authority and the [employer] Act, this Virtual Visit was conducted with patient's (and/or legal guardian's) consent, to reduce the patient's risk of exposure to COVID-19 and provide necessary medical care. The patient (and/or legal guardian) has also been advised to contact this office for worsening conditions or problems, and seek emergency medical treatment and/or call 911 if deemed necessary.      Patient identification was verified at the start of the visit: Yes    Total time spent for this encounter: Not billed by time    Services were provided through a video synchronous discussion virtually to substitute for in-person clinic visit. Patient and provider were located at their individual homes. --Hailey Traore MD on 12/9/2020 at 1:46 PM    An electronic signature was used to authenticate this note.       Electronically signed by Hailey Traore MD on 12/9/2020 at 1:46 PM

## 2021-01-05 ASSESSMENT — ENCOUNTER SYMPTOMS
GASTROINTESTINAL NEGATIVE: 1
BACK PAIN: 1
SHORTNESS OF BREATH: 1

## 2021-01-06 ENCOUNTER — OFFICE VISIT (OUTPATIENT)
Dept: PAIN MANAGEMENT | Age: 56
End: 2021-01-06
Payer: COMMERCIAL

## 2021-01-06 ENCOUNTER — TELEPHONE (OUTPATIENT)
Dept: PULMONOLOGY | Age: 56
End: 2021-01-06

## 2021-01-06 VITALS
SYSTOLIC BLOOD PRESSURE: 178 MMHG | DIASTOLIC BLOOD PRESSURE: 101 MMHG | HEART RATE: 83 BPM | WEIGHT: 263 LBS | BODY MASS INDEX: 43.82 KG/M2 | TEMPERATURE: 97.6 F | HEIGHT: 65 IN | OXYGEN SATURATION: 100 %

## 2021-01-06 DIAGNOSIS — E11.21 DIABETIC NEPHROPATHY ASSOCIATED WITH TYPE 2 DIABETES MELLITUS (HCC): ICD-10-CM

## 2021-01-06 DIAGNOSIS — G89.29 CHRONIC BILATERAL LOW BACK PAIN WITH RIGHT-SIDED SCIATICA: Primary | Chronic | ICD-10-CM

## 2021-01-06 DIAGNOSIS — G47.33 OSA AND COPD OVERLAP SYNDROME (HCC): ICD-10-CM

## 2021-01-06 DIAGNOSIS — E66.01 MORBID OBESITY WITH BMI OF 40.0-44.9, ADULT (HCC): ICD-10-CM

## 2021-01-06 DIAGNOSIS — J44.9 OSA AND COPD OVERLAP SYNDROME (HCC): ICD-10-CM

## 2021-01-06 DIAGNOSIS — M54.41 CHRONIC BILATERAL LOW BACK PAIN WITH RIGHT-SIDED SCIATICA: Primary | Chronic | ICD-10-CM

## 2021-01-06 DIAGNOSIS — Z79.891 CHRONIC USE OF OPIATE DRUG FOR THERAPEUTIC PURPOSE: ICD-10-CM

## 2021-01-06 PROCEDURE — 99213 OFFICE O/P EST LOW 20 MIN: CPT | Performed by: ANESTHESIOLOGY

## 2021-01-06 RX ORDER — OXYCODONE HYDROCHLORIDE AND ACETAMINOPHEN 5; 325 MG/1; MG/1
1 TABLET ORAL 2 TIMES DAILY PRN
Qty: 60 TABLET | Refills: 0 | Status: SHIPPED | OUTPATIENT
Start: 2021-01-15 | End: 2021-02-08 | Stop reason: SDUPTHER

## 2021-01-07 RX ORDER — LISINOPRIL AND HYDROCHLOROTHIAZIDE 12.5; 1 MG/1; MG/1
TABLET ORAL
Qty: 90 TABLET | Refills: 0 | Status: SHIPPED | OUTPATIENT
Start: 2021-01-07 | Stop reason: SDUPTHER

## 2021-01-07 NOTE — TELEPHONE ENCOUNTER
Jerman Nguyễn is calling to request a refill on the following medication(s):    Medication Request:    Last filled 9/23/2020 #90 with 0 RF    Requested Prescriptions     Pending Prescriptions Disp Refills    lisinopril-hydroCHLOROthiazide (PRINZIDE;ZESTORETIC) 10-12.5 MG per tablet [Pharmacy Med Name: LISINOPRIL-HCTZ 10-12.5 MG TAB] 90 tablet 0     Sig: take 1 tablet by mouth once daily       Last Visit Date (If Applicable):  91/8/8401    Next Visit Date:    3/4/2021

## 2021-02-08 ENCOUNTER — OFFICE VISIT (OUTPATIENT)
Dept: PAIN MANAGEMENT | Age: 56
End: 2021-02-08
Payer: COMMERCIAL

## 2021-02-08 VITALS
SYSTOLIC BLOOD PRESSURE: 155 MMHG | WEIGHT: 265 LBS | BODY MASS INDEX: 44.15 KG/M2 | DIASTOLIC BLOOD PRESSURE: 92 MMHG | HEART RATE: 79 BPM | HEIGHT: 65 IN | TEMPERATURE: 98 F | OXYGEN SATURATION: 96 %

## 2021-02-08 DIAGNOSIS — M54.16 CHRONIC RADICULAR LUMBAR PAIN: Chronic | ICD-10-CM

## 2021-02-08 DIAGNOSIS — M51.9 LUMBAR DISC DISEASE: Chronic | ICD-10-CM

## 2021-02-08 DIAGNOSIS — M47.26 OSTEOARTHRITIS OF SPINE WITH RADICULOPATHY, LUMBAR REGION: Primary | ICD-10-CM

## 2021-02-08 DIAGNOSIS — Z79.891 CHRONIC USE OF OPIATE DRUG FOR THERAPEUTIC PURPOSE: ICD-10-CM

## 2021-02-08 DIAGNOSIS — G89.29 CHRONIC RADICULAR LUMBAR PAIN: Chronic | ICD-10-CM

## 2021-02-08 PROCEDURE — 99213 OFFICE O/P EST LOW 20 MIN: CPT | Performed by: NURSE PRACTITIONER

## 2021-02-08 RX ORDER — OXYCODONE HYDROCHLORIDE AND ACETAMINOPHEN 5; 325 MG/1; MG/1
1 TABLET ORAL 2 TIMES DAILY PRN
Qty: 60 TABLET | Refills: 0 | Status: SHIPPED | OUTPATIENT
Start: 2021-02-14 | End: 2021-03-15 | Stop reason: SDUPTHER

## 2021-02-08 ASSESSMENT — ENCOUNTER SYMPTOMS
SHORTNESS OF BREATH: 1
CONSTIPATION: 0
BACK PAIN: 1
COUGH: 0
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
## 2021-02-26 ENCOUNTER — TELEPHONE (OUTPATIENT)
Dept: PAIN MANAGEMENT | Age: 56
End: 2021-02-26

## 2021-03-04 ENCOUNTER — TELEPHONE (OUTPATIENT)
Dept: INTERNAL MEDICINE CLINIC | Age: 56
End: 2021-03-04

## 2021-03-08 ENCOUNTER — TELEPHONE (OUTPATIENT)
Dept: PAIN MANAGEMENT | Age: 56
End: 2021-03-08

## 2021-03-08 ENCOUNTER — OFFICE VISIT (OUTPATIENT)
Dept: INTERNAL MEDICINE CLINIC | Age: 56
End: 2021-03-08
Payer: COMMERCIAL

## 2021-03-08 VITALS
WEIGHT: 265 LBS | TEMPERATURE: 97.2 F | SYSTOLIC BLOOD PRESSURE: 140 MMHG | OXYGEN SATURATION: 98 % | HEART RATE: 69 BPM | HEIGHT: 65 IN | RESPIRATION RATE: 24 BRPM | DIASTOLIC BLOOD PRESSURE: 80 MMHG | BODY MASS INDEX: 44.15 KG/M2

## 2021-03-08 DIAGNOSIS — F41.9 ANXIETY AND DEPRESSION: ICD-10-CM

## 2021-03-08 DIAGNOSIS — G47.33 OSA AND COPD OVERLAP SYNDROME (HCC): ICD-10-CM

## 2021-03-08 DIAGNOSIS — J44.9 CHRONIC OBSTRUCTIVE PULMONARY DISEASE, UNSPECIFIED COPD TYPE (HCC): ICD-10-CM

## 2021-03-08 DIAGNOSIS — Z28.21 PNEUMOCOCCAL VACCINATION DECLINED: ICD-10-CM

## 2021-03-08 DIAGNOSIS — E66.01 MORBID OBESITY WITH BMI OF 40.0-44.9, ADULT (HCC): ICD-10-CM

## 2021-03-08 DIAGNOSIS — F32.A ANXIETY AND DEPRESSION: ICD-10-CM

## 2021-03-08 DIAGNOSIS — I10 ESSENTIAL HYPERTENSION: ICD-10-CM

## 2021-03-08 DIAGNOSIS — M54.41 CHRONIC BILATERAL LOW BACK PAIN WITH RIGHT-SIDED SCIATICA: Chronic | ICD-10-CM

## 2021-03-08 DIAGNOSIS — G89.29 CHRONIC RADICULAR LUMBAR PAIN: Chronic | ICD-10-CM

## 2021-03-08 DIAGNOSIS — Z79.891 CHRONIC USE OF OPIATE DRUG FOR THERAPEUTIC PURPOSE: ICD-10-CM

## 2021-03-08 DIAGNOSIS — M47.26 OSTEOARTHRITIS OF SPINE WITH RADICULOPATHY, LUMBAR REGION: ICD-10-CM

## 2021-03-08 DIAGNOSIS — J44.9 OSA AND COPD OVERLAP SYNDROME (HCC): ICD-10-CM

## 2021-03-08 DIAGNOSIS — M76.61 ACHILLES BURSITIS OF RIGHT LOWER EXTREMITY: Primary | ICD-10-CM

## 2021-03-08 DIAGNOSIS — M54.16 CHRONIC RADICULAR LUMBAR PAIN: Chronic | ICD-10-CM

## 2021-03-08 DIAGNOSIS — E11.21 DIABETIC NEPHROPATHY ASSOCIATED WITH TYPE 2 DIABETES MELLITUS (HCC): ICD-10-CM

## 2021-03-08 DIAGNOSIS — R51.9 DAILY HEADACHE: ICD-10-CM

## 2021-03-08 DIAGNOSIS — E78.2 MIXED HYPERLIPIDEMIA: ICD-10-CM

## 2021-03-08 DIAGNOSIS — Z28.21 INFLUENZA VACCINATION DECLINED: ICD-10-CM

## 2021-03-08 DIAGNOSIS — R73.03 PREDIABETES: ICD-10-CM

## 2021-03-08 DIAGNOSIS — E11.9 CONTROLLED TYPE 2 DIABETES MELLITUS WITHOUT COMPLICATION, WITHOUT LONG-TERM CURRENT USE OF INSULIN (HCC): ICD-10-CM

## 2021-03-08 DIAGNOSIS — M51.9 LUMBAR DISC DISEASE: Chronic | ICD-10-CM

## 2021-03-08 DIAGNOSIS — G89.29 CHRONIC BILATERAL LOW BACK PAIN WITH RIGHT-SIDED SCIATICA: Chronic | ICD-10-CM

## 2021-03-08 PROCEDURE — 99214 OFFICE O/P EST MOD 30 MIN: CPT | Performed by: FAMILY MEDICINE

## 2021-03-08 RX ORDER — ALBUTEROL SULFATE 90 UG/1
2 AEROSOL, METERED RESPIRATORY (INHALATION) 4 TIMES DAILY PRN
Qty: 3 INHALER | Refills: 1 | Status: SHIPPED | OUTPATIENT
Start: 2021-03-08

## 2021-03-08 RX ORDER — ATORVASTATIN CALCIUM 40 MG/1
40 TABLET, FILM COATED ORAL DAILY
Qty: 90 TABLET | Refills: 1 | Status: SHIPPED | OUTPATIENT
Start: 2021-03-08 | End: 2022-07-08 | Stop reason: SDUPTHER

## 2021-03-08 RX ORDER — BUDESONIDE AND FORMOTEROL FUMARATE DIHYDRATE 160; 4.5 UG/1; UG/1
2 AEROSOL RESPIRATORY (INHALATION) 2 TIMES DAILY
Qty: 3 INHALER | Refills: 1 | Status: SHIPPED | OUTPATIENT
Start: 2021-03-08 | End: 2022-01-13 | Stop reason: ALTCHOICE

## 2021-03-08 ASSESSMENT — ENCOUNTER SYMPTOMS
GASTROINTESTINAL NEGATIVE: 1
BACK PAIN: 1
RESPIRATORY NEGATIVE: 1
EYES NEGATIVE: 1
ALLERGIC/IMMUNOLOGIC NEGATIVE: 1

## 2021-03-08 NOTE — PROGRESS NOTES
Subjective:      Patient ID: Sobeida Ro is a 60 y.o. male. Ankle Pain   The incident occurred more than 1 week ago. There was no injury mechanism. Pain location: Right ankle, chronic back pain, hip pain. The quality of the pain is described as aching and burning. The pain is at a severity of 5/10. The pain is moderate. The pain has been constant since onset. It is unknown if a foreign body is present. The symptoms are aggravated by palpation and weight bearing. Treatments tried: Chronic opiate therapy. The treatment provided mild relief. Review of Systems   Constitutional: Negative. HENT: Negative. Eyes: Negative. Respiratory: Negative. Cardiovascular: Negative. Gastrointestinal: Negative. Endocrine: Negative. Musculoskeletal: Positive for arthralgias, back pain, gait problem and myalgias. Skin: Negative. Allergic/Immunologic: Negative. Hematological: Negative. Psychiatric/Behavioral: Positive for dysphoric mood. The patient is nervous/anxious. Past family and social history unremarkable. Diagnosis Orders   1. Achilles bursitis of right lower extremity     2. Chronic bilateral low back pain with right-sided sciatica     3. Lumbar disc disease     4. Chronic radicular lumbar pain     5. Daily headache     6. Essential hypertension     7. Mixed hyperlipidemia     8. Chronic use of opiate drugs therapeutic purposes     9. Morbid obesity with BMI of 40.0-44.9, adult ([address]     10. Osteoarthritis of spine with radiculopathy, lumbar region     11. Controlled type 2 diabetes mellitus without complication, without long-term current use of insulin ([address]     12. Chronic obstructive pulmonary disease, unspecified COPD type ([address]     13. MARLENE and COPD overlap syndrome ([address]     14. Anxiety and depression     15. Prediabetes     16. Pneumococcal vaccination declined     17. Influenza vaccination declined     25.  Diabetic nephropathy associated with type 2 diabetes mellitus ([address] Objective:   Physical Exam  Vitals signs and nursing note reviewed. Constitutional:       Appearance: He is well-developed. Comments: Morbid obesity with sleep apnea on positive pressure ventilation   HENT:      Head: Normocephalic and atraumatic. Right Ear: External ear normal.      Left Ear: External ear normal.      Nose: Nose normal.   Eyes:      Conjunctiva/sclera: Conjunctivae normal.      Pupils: Pupils are equal, round, and reactive to light. Neck:      Musculoskeletal: Normal range of motion and neck supple. Cardiovascular:      Rate and Rhythm: Normal rate and regular rhythm. Heart sounds: Normal heart sounds. Comments: Hypertension  Hyperlipidemia  Diabetes mellitus  Pulmonary:      Effort: Pulmonary effort is normal.      Breath sounds: Normal breath sounds. Comments: COPDactive smoker  Sleep apnea on CPAP  Morbid obesity  Abdominal:      General: Bowel sounds are normal.      Palpations: Abdomen is soft. Musculoskeletal: Normal range of motion. Comments: Right Achilles bursitis  Chronic pain syndrome opiate dependent as provided by pain management   Skin:     General: Skin is warm and dry. Neurological:      Mental Status: He is alert and oriented to person, place, and time. Deep Tendon Reflexes: Reflexes are normal and symmetric. Comments: Tension headaches   Psychiatric:      Comments: Anxiety/depression. Chronic pain syndrome         Assessment:       Diagnosis Orders   1. Achilles bursitis of right lower extremity     2. Chronic bilateral low back pain with right-sided sciatica     3. Lumbar disc disease     4. Chronic radicular lumbar pain     5. Daily headache     6. Essential hypertension     7. Mixed hyperlipidemia     8. Chronic use of opiate drugs therapeutic purposes     9. Morbid obesity with BMI of 40.0-44.9, adult ([address]     10. Osteoarthritis of spine with radiculopathy, lumbar region     11.  Controlled type 2 diabetes mellitus without complication, without long-term current use of insulin ([facility]     12. Chronic obstructive pulmonary disease, unspecified COPD type ([facility]     13. MARLENE and COPD overlap syndrome ([facility]     14. Anxiety and depression     15. Prediabetes     16. Pneumococcal vaccination declined     17. Influenza vaccination declined     25. Diabetic nephropathy associated with type 2 diabetes mellitus ([facility]             Plan:      49-year-old overweight -American male with known history of chronic pain syndrome opiate dependent as provided by pain management. He is presented with nontraumatic right Achilles bursitis. Reassurance provided. He would benefit from significant weight reduction. Wear comfortable shoes without heel support. Over-the-counter massage cream, heat application, Ace wrap. Continue follow-up with pain management  Morbid obesity with sleep apnea. In addition to weight loss to keep BMI around 25, advised to comply with CPAP  COPD. He continued to smoke despite advice and does not express any desire to quit anytime soon. He is established with pulmonology. He is advised to continue beta agonist, inhaled corticosteroid and Spiriva. Hypertension well-controlled to Zestoretic that he is tolerating well. Consume less than 2 g of salt a day  Diabetes mellitus with A1c of 6.6. Continue current regimen, ADA 1800 diet, daily moderate exercise and lifestyle change and consumption of less than 2 g of salt a day  Hyperlipidemia. For some unknown reason he has stopped taking statin. Prescription has been provided  Monofilament testing is unremarkable. He is counseled on diabetic foot care  Anxiety/depression with underlying chronic pain syndrome. I offered him Cymbalta that he declined.   He will benefit from psych consultation and close follow-up  He denies excessive alcohol or illicit drug use  Med list and available labs reviewed, discussed with patient, questions answered  He is encouraged to call for any concern  This note is created with a voice recognition program and while intend to generate a document that accurately reflects the content of the visit, no guarantee can be provided that every mistake has been identified and corrected by editing.           Talib Merida MD

## 2021-03-15 ENCOUNTER — OFFICE VISIT (OUTPATIENT)
Dept: PAIN MANAGEMENT | Age: 56
End: 2021-03-15
Payer: COMMERCIAL

## 2021-03-15 VITALS
WEIGHT: 265 LBS | OXYGEN SATURATION: 98 % | BODY MASS INDEX: 44.15 KG/M2 | HEART RATE: 81 BPM | HEIGHT: 65 IN | TEMPERATURE: 96.3 F | SYSTOLIC BLOOD PRESSURE: 139 MMHG | DIASTOLIC BLOOD PRESSURE: 87 MMHG

## 2021-03-15 DIAGNOSIS — G89.29 CHRONIC BILATERAL LOW BACK PAIN WITH RIGHT-SIDED SCIATICA: Chronic | ICD-10-CM

## 2021-03-15 DIAGNOSIS — M47.26 OSTEOARTHRITIS OF SPINE WITH RADICULOPATHY, LUMBAR REGION: Primary | ICD-10-CM

## 2021-03-15 DIAGNOSIS — Z79.891 CHRONIC USE OF OPIATE DRUG FOR THERAPEUTIC PURPOSE: ICD-10-CM

## 2021-03-15 DIAGNOSIS — M54.41 CHRONIC BILATERAL LOW BACK PAIN WITH RIGHT-SIDED SCIATICA: Chronic | ICD-10-CM

## 2021-03-15 PROCEDURE — 99213 OFFICE O/P EST LOW 20 MIN: CPT | Performed by: NURSE PRACTITIONER

## 2021-03-15 RX ORDER — OXYCODONE HYDROCHLORIDE AND ACETAMINOPHEN 5; 325 MG/1; MG/1
1 TABLET ORAL 2 TIMES DAILY PRN
Qty: 60 TABLET | Refills: 0 | Status: SHIPPED | OUTPATIENT
Start: 2021-03-16 | End: 2021-04-12 | Stop reason: SDUPTHER

## 2021-03-15 ASSESSMENT — ENCOUNTER SYMPTOMS
BACK PAIN: 1
CONSTIPATION: 0
COUGH: 0
SHORTNESS OF BREATH: 1
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
## 2021-03-17 ENCOUNTER — TELEPHONE (OUTPATIENT)
Dept: PAIN MANAGEMENT | Age: 56
End: 2021-03-17

## 2021-03-17 NOTE — TELEPHONE ENCOUNTER
Called patient to see if he wanted to reschedule his procedure and he said give him a few days to think about it

## 2021-04-10 ASSESSMENT — ENCOUNTER SYMPTOMS
BACK PAIN: 1
SHORTNESS OF BREATH: 1
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
## 2021-04-12 ENCOUNTER — OFFICE VISIT (OUTPATIENT)
Dept: PAIN MANAGEMENT | Age: 56
End: 2021-04-12
Payer: COMMERCIAL

## 2021-04-12 VITALS
HEIGHT: 65 IN | SYSTOLIC BLOOD PRESSURE: 125 MMHG | HEART RATE: 73 BPM | DIASTOLIC BLOOD PRESSURE: 82 MMHG | WEIGHT: 264 LBS | BODY MASS INDEX: 43.99 KG/M2 | OXYGEN SATURATION: 100 % | TEMPERATURE: 97 F

## 2021-04-12 DIAGNOSIS — M51.9 LUMBAR DISC DISEASE: Primary | Chronic | ICD-10-CM

## 2021-04-12 DIAGNOSIS — Z79.891 CHRONIC USE OF OPIATE DRUG FOR THERAPEUTIC PURPOSE: ICD-10-CM

## 2021-04-12 PROCEDURE — 99213 OFFICE O/P EST LOW 20 MIN: CPT | Performed by: NURSE PRACTITIONER

## 2021-04-12 RX ORDER — OXYCODONE HYDROCHLORIDE AND ACETAMINOPHEN 5; 325 MG/1; MG/1
1 TABLET ORAL 2 TIMES DAILY PRN
Qty: 60 TABLET | Refills: 0 | Status: SHIPPED | OUTPATIENT
Start: 2021-04-15 | End: 2021-05-10 | Stop reason: SDUPTHER

## 2021-04-12 ASSESSMENT — ENCOUNTER SYMPTOMS
COUGH: 0
CONSTIPATION: 0

## 2021-05-10 ENCOUNTER — OFFICE VISIT (OUTPATIENT)
Dept: PAIN MANAGEMENT | Age: 56
End: 2021-05-10
Payer: COMMERCIAL

## 2021-05-10 VITALS
DIASTOLIC BLOOD PRESSURE: 76 MMHG | HEIGHT: 65 IN | HEART RATE: 90 BPM | BODY MASS INDEX: 44.48 KG/M2 | OXYGEN SATURATION: 98 % | SYSTOLIC BLOOD PRESSURE: 136 MMHG | WEIGHT: 267 LBS

## 2021-05-10 DIAGNOSIS — M47.26 OSTEOARTHRITIS OF SPINE WITH RADICULOPATHY, LUMBAR REGION: Primary | ICD-10-CM

## 2021-05-10 DIAGNOSIS — M54.16 CHRONIC RADICULAR LUMBAR PAIN: Chronic | ICD-10-CM

## 2021-05-10 DIAGNOSIS — G89.29 CHRONIC RADICULAR LUMBAR PAIN: Chronic | ICD-10-CM

## 2021-05-10 DIAGNOSIS — Z79.891 CHRONIC USE OF OPIATE DRUG FOR THERAPEUTIC PURPOSE: ICD-10-CM

## 2021-05-10 PROCEDURE — 99213 OFFICE O/P EST LOW 20 MIN: CPT | Performed by: NURSE PRACTITIONER

## 2021-05-10 RX ORDER — OXYCODONE HYDROCHLORIDE AND ACETAMINOPHEN 5; 325 MG/1; MG/1
1 TABLET ORAL 2 TIMES DAILY PRN
Qty: 60 TABLET | Refills: 0 | Status: SHIPPED | OUTPATIENT
Start: 2021-05-15 | End: 2021-06-14 | Stop reason: SDUPTHER

## 2021-05-10 ASSESSMENT — ENCOUNTER SYMPTOMS
CONSTIPATION: 0
BACK PAIN: 1
COUGH: 0
SHORTNESS OF BREATH: 1
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
## 2021-06-14 ENCOUNTER — OFFICE VISIT (OUTPATIENT)
Dept: PAIN MANAGEMENT | Age: 56
End: 2021-06-14
Payer: COMMERCIAL

## 2021-06-14 VITALS — BODY MASS INDEX: 43.99 KG/M2 | WEIGHT: 264 LBS | HEIGHT: 65 IN

## 2021-06-14 DIAGNOSIS — M54.41 CHRONIC BILATERAL LOW BACK PAIN WITH RIGHT-SIDED SCIATICA: Chronic | ICD-10-CM

## 2021-06-14 DIAGNOSIS — Z79.891 CHRONIC USE OF OPIATE DRUG FOR THERAPEUTIC PURPOSE: ICD-10-CM

## 2021-06-14 DIAGNOSIS — G89.29 CHRONIC BILATERAL LOW BACK PAIN WITH RIGHT-SIDED SCIATICA: Chronic | ICD-10-CM

## 2021-06-14 DIAGNOSIS — M47.26 OSTEOARTHRITIS OF SPINE WITH RADICULOPATHY, LUMBAR REGION: Primary | ICD-10-CM

## 2021-06-14 PROCEDURE — 99213 OFFICE O/P EST LOW 20 MIN: CPT | Performed by: NURSE PRACTITIONER

## 2021-06-14 RX ORDER — OXYCODONE HYDROCHLORIDE AND ACETAMINOPHEN 5; 325 MG/1; MG/1
1 TABLET ORAL 2 TIMES DAILY PRN
Qty: 60 TABLET | Refills: 0 | Status: SHIPPED | OUTPATIENT
Start: 2021-06-14 | End: 2021-07-12 | Stop reason: SDUPTHER

## 2021-06-14 ASSESSMENT — ENCOUNTER SYMPTOMS
CONSTIPATION: 0
RESPIRATORY NEGATIVE: 1
BACK PAIN: 1
SHORTNESS OF BREATH: 0
COUGH: 0
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
## 2021-07-12 ENCOUNTER — OFFICE VISIT (OUTPATIENT)
Dept: PAIN MANAGEMENT | Age: 56
End: 2021-07-12
Payer: COMMERCIAL

## 2021-07-12 VITALS
HEART RATE: 76 BPM | HEIGHT: 65 IN | DIASTOLIC BLOOD PRESSURE: 97 MMHG | SYSTOLIC BLOOD PRESSURE: 168 MMHG | BODY MASS INDEX: 43.99 KG/M2 | WEIGHT: 264 LBS

## 2021-07-12 DIAGNOSIS — G89.29 CHRONIC RADICULAR LUMBAR PAIN: Chronic | ICD-10-CM

## 2021-07-12 DIAGNOSIS — G89.29 CHRONIC BILATERAL LOW BACK PAIN WITH RIGHT-SIDED SCIATICA: Primary | Chronic | ICD-10-CM

## 2021-07-12 DIAGNOSIS — M54.16 CHRONIC RADICULAR LUMBAR PAIN: Chronic | ICD-10-CM

## 2021-07-12 DIAGNOSIS — M47.26 OSTEOARTHRITIS OF SPINE WITH RADICULOPATHY, LUMBAR REGION: ICD-10-CM

## 2021-07-12 DIAGNOSIS — Z79.891 CHRONIC USE OF OPIATE DRUG FOR THERAPEUTIC PURPOSE: ICD-10-CM

## 2021-07-12 DIAGNOSIS — M54.41 CHRONIC BILATERAL LOW BACK PAIN WITH RIGHT-SIDED SCIATICA: Primary | Chronic | ICD-10-CM

## 2021-07-12 PROCEDURE — 99213 OFFICE O/P EST LOW 20 MIN: CPT | Performed by: NURSE PRACTITIONER

## 2021-07-12 RX ORDER — OXYCODONE HYDROCHLORIDE AND ACETAMINOPHEN 5; 325 MG/1; MG/1
1 TABLET ORAL 2 TIMES DAILY PRN
Qty: 60 TABLET | Refills: 0 | Status: SHIPPED | OUTPATIENT
Start: 2021-07-14 | End: 2021-08-10 | Stop reason: SDUPTHER

## 2021-07-12 ASSESSMENT — ENCOUNTER SYMPTOMS
CONSTIPATION: 0
BACK PAIN: 1
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
## 2021-07-14 RX ORDER — LISINOPRIL AND HYDROCHLOROTHIAZIDE 12.5; 1 MG/1; MG/1
TABLET ORAL
Qty: 90 TABLET | Refills: 0 | Status: SHIPPED | OUTPATIENT
Start: 2021-07-14 | End: 2021-12-06

## 2021-07-14 NOTE — TELEPHONE ENCOUNTER
James Mcintyre is calling to request a refill on the following medication(s):    Medication Request:  Requested Prescriptions     Pending Prescriptions Disp Refills    lisinopril-hydroCHLOROthiazide (PRINZIDE;ZESTORETIC) 10-12.5 MG per tablet [Pharmacy Med Name: LISINOPRIL-HCTZ 10-12.5 MG TAB] 90 tablet 0     Sig: take 1 tablet by mouth once daily     Last filled 1/7/21 90 day no refill    Last Visit Date (If Applicable):  7/7/4889    Next Visit Date:    7/23/2021

## 2021-07-23 ENCOUNTER — OFFICE VISIT (OUTPATIENT)
Dept: INTERNAL MEDICINE CLINIC | Age: 56
End: 2021-07-23
Payer: COMMERCIAL

## 2021-07-23 DIAGNOSIS — E78.2 MIXED HYPERLIPIDEMIA: ICD-10-CM

## 2021-07-23 DIAGNOSIS — G47.33 OSA AND COPD OVERLAP SYNDROME (HCC): ICD-10-CM

## 2021-07-23 DIAGNOSIS — I10 ESSENTIAL HYPERTENSION: ICD-10-CM

## 2021-07-23 DIAGNOSIS — M54.41 CHRONIC BILATERAL LOW BACK PAIN WITH RIGHT-SIDED SCIATICA: Chronic | ICD-10-CM

## 2021-07-23 DIAGNOSIS — F32.A ANXIETY AND DEPRESSION: ICD-10-CM

## 2021-07-23 DIAGNOSIS — Z28.21 PNEUMOCOCCAL VACCINATION DECLINED: ICD-10-CM

## 2021-07-23 DIAGNOSIS — M54.16 CHRONIC RADICULAR LUMBAR PAIN: Chronic | ICD-10-CM

## 2021-07-23 DIAGNOSIS — E66.01 MORBID OBESITY WITH BMI OF 40.0-44.9, ADULT (HCC): ICD-10-CM

## 2021-07-23 DIAGNOSIS — J44.9 CHRONIC OBSTRUCTIVE PULMONARY DISEASE, UNSPECIFIED COPD TYPE (HCC): ICD-10-CM

## 2021-07-23 DIAGNOSIS — M51.9 LUMBAR DISC DISEASE: Chronic | ICD-10-CM

## 2021-07-23 DIAGNOSIS — M47.26 OSTEOARTHRITIS OF SPINE WITH RADICULOPATHY, LUMBAR REGION: ICD-10-CM

## 2021-07-23 DIAGNOSIS — G89.29 CHRONIC RADICULAR LUMBAR PAIN: Chronic | ICD-10-CM

## 2021-07-23 DIAGNOSIS — E11.21 DIABETIC NEPHROPATHY ASSOCIATED WITH TYPE 2 DIABETES MELLITUS (HCC): ICD-10-CM

## 2021-07-23 DIAGNOSIS — Z79.891 CHRONIC USE OF OPIATE DRUG FOR THERAPEUTIC PURPOSE: ICD-10-CM

## 2021-07-23 DIAGNOSIS — F41.9 ANXIETY AND DEPRESSION: ICD-10-CM

## 2021-07-23 DIAGNOSIS — Z28.21 INFLUENZA VACCINATION DECLINED: ICD-10-CM

## 2021-07-23 DIAGNOSIS — G89.29 CHRONIC BILATERAL LOW BACK PAIN WITH RIGHT-SIDED SCIATICA: Chronic | ICD-10-CM

## 2021-07-23 DIAGNOSIS — J44.9 OSA AND COPD OVERLAP SYNDROME (HCC): ICD-10-CM

## 2021-07-23 DIAGNOSIS — R51.9 DAILY HEADACHE: ICD-10-CM

## 2021-07-23 DIAGNOSIS — E11.9 CONTROLLED TYPE 2 DIABETES MELLITUS WITHOUT COMPLICATION, WITHOUT LONG-TERM CURRENT USE OF INSULIN (HCC): Primary | ICD-10-CM

## 2021-07-23 PROCEDURE — 99214 OFFICE O/P EST MOD 30 MIN: CPT | Performed by: FAMILY MEDICINE

## 2021-07-23 SDOH — ECONOMIC STABILITY: FOOD INSECURITY: WITHIN THE PAST 12 MONTHS, THE FOOD YOU BOUGHT JUST DIDN'T LAST AND YOU DIDN'T HAVE MONEY TO GET MORE.: NEVER TRUE

## 2021-07-23 SDOH — ECONOMIC STABILITY: FOOD INSECURITY: WITHIN THE PAST 12 MONTHS, YOU WORRIED THAT YOUR FOOD WOULD RUN OUT BEFORE YOU GOT MONEY TO BUY MORE.: NEVER TRUE

## 2021-07-23 ASSESSMENT — SOCIAL DETERMINANTS OF HEALTH (SDOH): HOW HARD IS IT FOR YOU TO PAY FOR THE VERY BASICS LIKE FOOD, HOUSING, MEDICAL CARE, AND HEATING?: NOT HARD AT ALL

## 2021-07-23 NOTE — PROGRESS NOTES
Subjective:          2021    TELEHEALTH EVALUATION -- Audio/Visual ([facility])    HPI:    Siva Esaon (:  1965) has requested an audio/video evaluation for the following concern(s):    Non-insulin-dependent diabetes mellitus  Hypertension  Hyperlipidemia  Chronic back pain opiate dependent as provided by pain management  COPDsmoker  Obesity with sleep apnea  Tension headaches    Review of Systems    Prior to Visit Medications    Medication Sig Taking? Authorizing Provider   lisinopril-hydroCHLOROthiazide (PRINZIDE;ZESTORETIC) 10-12.5 MG per tablet take 1 tablet by mouth once daily  Vane Barlow MD   oxyCODONE-acetaminophen (PERCOCET) 5-325 MG per tablet Take 1 tablet by mouth 2 times daily as needed for Pain for up to 30 days. SANDRA Lopes - CON   budesonide-formoterol (SYMBICORT) 160-4.5 MCG/ACT AERO Inhale 2 puffs into the lungs 2 times daily  Vane Barlow MD   albuterol sulfate  (90 Base) MCG/ACT inhaler Inhale 2 puffs into the lungs 4 times daily as needed for Wheezing  Vane Barlow MD   atorvastatin (LIPITOR) 40 MG tablet Take 1 tablet by mouth daily  Vane Barlow MD   tiotropium (SPIRIVA RESPIMAT) 2.5 MCG/ACT AERS inhaler Inhale 2 puffs into the lungs daily  Boogie Thompson MD   lisinopril-hydroCHLOROthiazide (PRINZIDE;ZESTORETIC) 10-12.5 MG per tablet take 1 tablet by mouth once daily  Vane Barlow MD   metFORMIN (GLUCOPHAGE) 500 MG tablet TAKE 1 TABLET BY MOUTH TWICE DAILY WITH MEALS  Vane Barlow MD   aspirin EC 81 MG EC tablet Take 1 tablet by mouth daily  Vane Barlow MD       Social History     Tobacco Use    Smoking status: Current Every Day Smoker     Packs/day: 0.50     Years: 20.00     Pack years: 10.00     Types: Cigarettes     Start date: 10/2/1979    Smokeless tobacco: Never Used    Tobacco comment: down to 5 cigg. per day   Substance Use Topics    Alcohol use: No     Alcohol/week: 0.0 standard drinks    Drug use:  No Allergies   Allergen Reactions    Amitriptyline Anaphylaxis    Paroxetine Other (See Comments)    Paxil [Paroxetine Hcl] Other (See Comments)       PHYSICAL EXAMINATION:  [ INSTRUCTIONS:  \"[x]\" Indicates a positive item  \"[]\" Indicates a negative item  -- DELETE ALL ITEMS NOT EXAMINED]  Vital Signs: (As obtained by patient/caregiver or practitioner observation)    Blood pressure-  Heart rate-    Respiratory rate-    Temperature-  Pulse oximetry-     Constitutional: [x] Appears well-developed and well-nourished [x] No apparent distress      [] Abnormal-   Mental status  [x] Alert and awake  [x] Oriented to person/place/time [x]Able to follow commands      Eyes:  EOM    [x]  Normal  [] Abnormal-  Sclera  [x]  Normal  [] Abnormal -         Discharge [x]  None visible  [] Abnormal -    HENT:   [x] Normocephalic, atraumatic. [] Abnormal   [x] Mouth/Throat: Mucous membranes are moist.     External Ears [x] Normal  [] Abnormal-     Neck: [x] No visualized mass     Pulmonary/Chest: [] Respiratory effort normal.  [] No visualized signs of difficulty breathing or respiratory distress        [x] Abnormal-COPDsmoker     Musculoskeletal:   [] Normal gait with no signs of ataxia         [] Normal range of motion of neck        [x] Abnormal-chronic back pain. Opiate dependent as provided by pain management      Neurological:        [] No Facial Asymmetry (Cranial nerve 7 motor function) (limited exam to video visit)          [] No gaze palsy        [x] Abnormal-tension headaches      Skin:        [x] No significant exanthematous lesions or discoloration noted on facial skin         [] Abnormal-            Psychiatric:       [] Normal Affect [] No Hallucinations        [x] Abnormal-anxiety with chronic pain syndrome    Other pertinent observable physical exam findings-     ASSESSMENT/PLAN:  78-year-old pleasant male with underlying history significant for non-insulin-dependent diabetes mellitus with A1c of 6.6.   Patient states that he is compliant with Metformin that he is tolerating well. Patient states that he is gradually losing weight in response to diet, lifestyle change and daily moderate exercise  Hypertension. We aim to keep his blood pressure equal less than 130/80. Continue Zestoretic  COPD. He continued to smoke despite advice, however he states that he is gradually cutting down. No recent pulmonary decompensation. Continue Symbicort, albuterol gene. Obesity with sleep apnea. In addition to weight loss, he is advised to comply with CPAP  He is counseled on diabetic foot care  He is advised to update his annual dilated eye exam  Med list reviewed advised to continue  Further recommendations to follow labs  He is encouraged to call for any concern    No follow-ups on file. Elsi Andrea, was evaluated through a synchronous (real-time) audio-video encounter. The patient (or guardian if applicable) is aware that this is a billable service. Verbal consent to proceed has been obtained within the past 12 months. The visit was conducted pursuant to the emergency declaration under the [address] [place] [address], [address] [place] [address] authority and the [employer] and [place] [employer] Act. Patient identification was verified, and a caregiver was present when appropriate. The patient was located in a state where the provider was credentialed to provide care. Total time spent on this encounter: 24 minutes    --Alissa Clark MD on 7/23/2021 at 9:24 AM    An electronic signature was used to authenticate this note. This note is created with a voice recognition program and while intend to generate a document that accurately reflects the content of the visit, no guarantee can be provided that every mistake has been identified and corrected by editing.       Alissa Clark MD

## 2021-08-03 ENCOUNTER — HOSPITAL ENCOUNTER (OUTPATIENT)
Age: 56
Setting detail: SPECIMEN
Discharge: HOME | End: 2021-08-03
Payer: COMMERCIAL

## 2021-08-03 DIAGNOSIS — E11.21 DIABETIC NEPHROPATHY ASSOCIATED WITH TYPE 2 DIABETES MELLITUS (HCC): ICD-10-CM

## 2021-08-03 DIAGNOSIS — E11.9 CONTROLLED TYPE 2 DIABETES MELLITUS WITHOUT COMPLICATION, WITHOUT LONG-TERM CURRENT USE OF INSULIN (HCC): ICD-10-CM

## 2021-08-03 DIAGNOSIS — E78.2 MIXED HYPERLIPIDEMIA: ICD-10-CM

## 2021-08-03 DIAGNOSIS — I10 ESSENTIAL HYPERTENSION: ICD-10-CM

## 2021-08-03 LAB
-: ABNORMAL
ALBUMIN SERPL-MCNC: 4.2 G/DL (ref 3.5–5.2)
ALBUMIN/GLOBULIN RATIO: 1.4 (ref 1–2.5)
ALP BLD-CCNC: 52 U/L (ref 40–129)
ALT SERPL-CCNC: 18 U/L (ref 5–41)
AMORPHOUS: ABNORMAL
ANION GAP SERPL CALCULATED.3IONS-SCNC: 9 MMOL/L (ref 9–17)
AST SERPL-CCNC: 18 U/L
BACTERIA: ABNORMAL
BILIRUB SERPL-MCNC: 0.3 MG/DL (ref 0.3–1.2)
BILIRUBIN URINE: NEGATIVE
BUN BLDV-MCNC: 9 MG/DL (ref 6–20)
BUN/CREAT BLD: ABNORMAL (ref 9–20)
CALCIUM SERPL-MCNC: 9.2 MG/DL (ref 8.6–10.4)
CASTS UA: ABNORMAL /LPF (ref 0–8)
CHLORIDE BLD-SCNC: 107 MMOL/L (ref 98–107)
CHOLESTEROL/HDL RATIO: 5.3
CHOLESTEROL: 219 MG/DL
CO2: 26 MMOL/L (ref 20–31)
COLOR: ABNORMAL
CREAT SERPL-MCNC: 0.8 MG/DL (ref 0.7–1.2)
CREATININE URINE: 263.6 MG/DL (ref 39–259)
CRYSTALS, UA: ABNORMAL /HPF
EPITHELIAL CELLS UA: ABNORMAL /HPF (ref 0–5)
ESTIMATED AVERAGE GLUCOSE: 134 MG/DL
GFR AFRICAN AMERICAN: >60 ML/MIN
GFR NON-AFRICAN AMERICAN: >60 ML/MIN
GFR SERPL CREATININE-BSD FRML MDRD: ABNORMAL ML/MIN/{1.73_M2}
GFR SERPL CREATININE-BSD FRML MDRD: ABNORMAL ML/MIN/{1.73_M2}
GLUCOSE BLD-MCNC: 103 MG/DL (ref 70–99)
GLUCOSE URINE: NEGATIVE
HBA1C MFR BLD: 6.3 % (ref 4–6)
HCT VFR BLD CALC: 40.7 % (ref 40.7–50.3)
HDLC SERPL-MCNC: 41 MG/DL
HEMOGLOBIN: 13.2 G/DL (ref 13–17)
KETONES, URINE: ABNORMAL
LDL CHOLESTEROL: 151 MG/DL (ref 0–130)
LEUKOCYTE ESTERASE, URINE: NEGATIVE
MCH RBC QN AUTO: 29.3 PG (ref 25.2–33.5)
MCHC RBC AUTO-ENTMCNC: 32.4 G/DL (ref 28.4–34.8)
MCV RBC AUTO: 90.4 FL (ref 82.6–102.9)
MICROALBUMIN/CREAT 24H UR: <12 MG/L
MICROALBUMIN/CREAT UR-RTO: ABNORMAL MCG/MG CREAT
MUCUS: ABNORMAL
NITRITE, URINE: NEGATIVE
NRBC AUTOMATED: 0 PER 100 WBC
OTHER OBSERVATIONS UA: ABNORMAL
PDW BLD-RTO: 13.6 % (ref 11.8–14.4)
PH UA: 6 (ref 5–8)
PLATELET # BLD: 210 K/UL (ref 138–453)
PMV BLD AUTO: 11 FL (ref 8.1–13.5)
POTASSIUM SERPL-SCNC: 4.2 MMOL/L (ref 3.7–5.3)
PROSTATE SPECIFIC ANTIGEN: 0.45 UG/L
PROTEIN UA: NEGATIVE
RBC # BLD: 4.5 M/UL (ref 4.21–5.77)
RBC UA: ABNORMAL /HPF (ref 0–4)
RENAL EPITHELIAL, UA: ABNORMAL /HPF
SODIUM BLD-SCNC: 142 MMOL/L (ref 135–144)
SPECIFIC GRAVITY UA: 1.03 (ref 1–1.03)
TOTAL PROTEIN: 7.1 G/DL (ref 6.4–8.3)
TRICHOMONAS: ABNORMAL
TRIGL SERPL-MCNC: 135 MG/DL
TSH SERPL DL<=0.05 MIU/L-ACNC: 2.61 MIU/L (ref 0.3–5)
TURBIDITY: CLEAR
URINE HGB: ABNORMAL
UROBILINOGEN, URINE: NORMAL
VLDLC SERPL CALC-MCNC: ABNORMAL MG/DL (ref 1–30)
WBC # BLD: 7.6 K/UL (ref 3.5–11.3)
WBC UA: ABNORMAL /HPF (ref 0–5)
YEAST: ABNORMAL

## 2021-08-10 ENCOUNTER — OFFICE VISIT (OUTPATIENT)
Dept: PAIN MANAGEMENT | Age: 56
End: 2021-08-10
Payer: COMMERCIAL

## 2021-08-10 VITALS
HEIGHT: 65 IN | WEIGHT: 254 LBS | SYSTOLIC BLOOD PRESSURE: 137 MMHG | DIASTOLIC BLOOD PRESSURE: 70 MMHG | HEART RATE: 82 BPM | BODY MASS INDEX: 42.32 KG/M2 | OXYGEN SATURATION: 98 %

## 2021-08-10 DIAGNOSIS — G47.33 OSA AND COPD OVERLAP SYNDROME (HCC): ICD-10-CM

## 2021-08-10 DIAGNOSIS — J44.9 OSA AND COPD OVERLAP SYNDROME (HCC): ICD-10-CM

## 2021-08-10 DIAGNOSIS — M54.41 CHRONIC BILATERAL LOW BACK PAIN WITH RIGHT-SIDED SCIATICA: Chronic | ICD-10-CM

## 2021-08-10 DIAGNOSIS — Z79.891 CHRONIC USE OF OPIATE DRUG FOR THERAPEUTIC PURPOSE: Primary | ICD-10-CM

## 2021-08-10 DIAGNOSIS — G89.29 CHRONIC BILATERAL LOW BACK PAIN WITH RIGHT-SIDED SCIATICA: Chronic | ICD-10-CM

## 2021-08-10 DIAGNOSIS — E66.01 MORBID OBESITY WITH BMI OF 40.0-44.9, ADULT (HCC): ICD-10-CM

## 2021-08-10 PROCEDURE — 99214 OFFICE O/P EST MOD 30 MIN: CPT | Performed by: ANESTHESIOLOGY

## 2021-08-10 RX ORDER — OXYCODONE HYDROCHLORIDE AND ACETAMINOPHEN 5; 325 MG/1; MG/1
1 TABLET ORAL 2 TIMES DAILY PRN
Qty: 60 TABLET | Refills: 0 | Status: SHIPPED | OUTPATIENT
Start: 2021-08-13 | End: 2021-12-03

## 2021-08-10 ASSESSMENT — ENCOUNTER SYMPTOMS
BACK PAIN: 1
SHORTNESS OF BREATH: 1

## 2021-08-10 NOTE — PROGRESS NOTES
The patient is a 60 y. o. Non- / non  male. Chief Complaint   Patient presents with    Back Pain    Medication Refill        HPI    78-year-old man with history of chronic lower back pain onset many years ago located in the lower lumbar area and predominantly right side in the lumbosacral area with radiation down right leg associated with intermittent right leg numbness  Pain aggravated with exertion forward bending lifting  No changes in bladder or bowel control    Rates average pain score 9/10  Pain interfere with quality of life  Patient is done physical therapy multiple times in past  Had epidural injection in past her last injection was more than 1 year ago that provided him more than 50% relief lasting for more than 6 months  Pain is now back to the baseline    On chronic opioid therapy  Reports no side effect  Pain is poorly controlled with the medication  Further opioid dose escalation is not advisable considering risk factors of obesity and obstructive sleep apnea history  He has been working on weight loss and have lost 13 pounds in    Patient is interested in repeat epidural injection  No changes in bladder or bowel control    Medication Refill: Percocet    Pain score Today:  9  Adverse effects (Constipation / Nausea / Sedation / sexual Dysfunction / others) : no   Mood: fair  Sleep pattern and quality: poor  Activity level: fair to good     Pill count Today:Percocet 4  Last dose taken  8/10/21  OARRS report reviewed today: yes  ER/Hospitalizations/PCP visit related to pain since last visit:no   Any legal problems e.g. DUI etc.:No  Satisfied with current management: No    Opioid Contract:6/3/19  Last Urine Dug screen dated:4/12/21      Past Medical History, Past Surgical History, Social History, Allergies and Medications reviewed and updated in EPIC as indicated    Family History reviewed and is noncontributory.         Past Medical History:   Diagnosis Date    Asthma     Diabetes mellitus ([facility] [address]     Hyperlipidemia     Hypertension     Migraine     Neuropathy     Positive FIT (fecal immunochemical test)     Renal failure       Past Surgical History:   Procedure Laterality Date    ANESTHESIA NERVE BLOCK Bilateral 9/18/2017    NERVE BLOCK BILATERAL MEDIAL BRANCH L2-L5 performed by Adrian Boyd MD at [facility] [address] Bilateral 10/17/2017    [address] L2-L5 performed by Adrian Boyd MD at [address]  03/08/2017    COLONOSCOPY  03/08/2017    internal hemorrhoids; mild diverticulosis    ENDOSCOPY, COLON, DIAGNOSTIC      FIBULA FRACTURE SURGERY Left     LEG SURGERY Right     NERVE BLOCK N/A 10/24/2016    lumbar COLETTE    NERVE BLOCK Right 11/21/2016    lumbar COLETTE    NERVE BLOCK Left 08/13/2018    radiofrequency ablation medial branh block L2-L5    OTHER SURGICAL HISTORY Right 01/26/2017    right hip steroid injection    OTHER SURGICAL HISTORY Right 08/06/2018    NERVE RADIOFREQUENCY ABLATION RIGHT LUMBAR MEDIAL BRANCH L2-L5 (Right )    OTHER SURGICAL HISTORY  12/03/2018    RIGHT L4 L5 EPIDURAL STEROID INJECTION (Right )    OTHER SURGICAL HISTORY  03/25/2019    LUMBAR COLETTE (N/A )    PAIN MANAGEMENT PROCEDURE Right 5/28/2020    EPIDURAL STEROID INJECTION RIGHT L5S1 performed by Adrian Boyd MD at [address] [facility] Right 6/15/2020    EPIDURAL STEROID INJECTION RIGTH L5S1 performed by Adrian Boyd MD at [address] AA&/STRD TFRML EPI LUMBAR/SACRAL 1 LEVEL Right 12/3/2018    RIGHT L4 L5 EPIDURAL STEROID INJECTION performed by Adrian Boyd MD at [address] OFFICE/[address]PT [address] Right 8/6/2018    NERVE RADIOFREQUENCY ABLATION RIGHT LUMBAR MEDIAL BRANCH L2-L5 performed by Adrian Boyd MD at [address] Left 8/13/2018    NERVE RADIOFREQUENCY ABLATION LEFT LUMBAR MEDIAL BRANCH L2-L5 performed by Adrian Boyd MD at [address] [facility]  RADIOFREQUENCY ABLATION NERVES N/A 3/25/2019    LUMBAR COLETTE performed by aRj Winters MD at [address] [facility] History     Socioeconomic History    Marital status: Single     Spouse name: None    Number of children: None    Years of education: None    Highest education level: None   Occupational History    None   Tobacco Use    Smoking status: Current Every Day Smoker     Packs/day: 0.50     Years: 20.00     Pack years: 10.00     Types: Cigarettes     Start date: 10/2/1979    Smokeless tobacco: Never Used    Tobacco comment: down to 5 cigg. per day   Substance and Sexual Activity    Alcohol use: No     Alcohol/week: 0.0 standard drinks    Drug use: No    Sexual activity: None   Other Topics Concern    None   Social History Narrative    None     Social Determinants of Health     Financial Resource Strain: Low Risk     Difficulty of Paying Living Expenses: Not hard at all   Food Insecurity: No Food Insecurity    Worried About Running Out of Food in the Last Year: Never true    Jessica of Food in the Last Year: Never true   Transportation Needs:     Lack of Transportation (Medical):      Lack of Transportation (Non-Medical):    Physical Activity:     Days of Exercise per Week:     Minutes of Exercise per Session:    Stress:     Feeling of Stress :    Social Connections:     Frequency of Communication with Friends and Family:     Frequency of Social Gatherings with Friends and Family:     Attends Bahai Services:     Active Member of Clubs or Organizations:     Attends Club or Organization Meetings:     Marital Status:    Intimate Partner Violence:     Fear of Current or Ex-Partner:     Emotionally Abused:     Physically Abused:     Sexually Abused:      Family History   Problem Relation Age of Onset    Diabetes Mother     Heart Disease Mother     Cancer Mother     Heart Disease Maternal Grandmother      Allergies   Allergen Reactions    Amitriptyline Anaphylaxis    Paroxetine  meperidine (DEMEROL) injection 12.5 mg  12.5 mg Intravenous Q5 Min PRN Lorena Linn MD         Review of Systems   Constitutional: Negative. Negative for fever. Respiratory: Positive for shortness of breath. Musculoskeletal: Positive for arthralgias and back pain. Neurological: Positive for weakness and numbness. Tingling in the fingers        Objective:  General Appearance:  Uncomfortable and in pain. Vital signs: (most recent): Blood pressure 137/70, pulse 82, height 5' 5\" (1.651 m), weight 254 lb (115.2 kg), SpO2 98 %. Vital signs are normal.  No fever. Output: Producing urine and producing stool. HEENT: Normal HEENT exam.    Lungs:  Normal effort and normal respiratory rate. Breath sounds clear to auscultation. He is not in respiratory distress. Heart: Normal rate. Regular rhythm. Extremities: Normal range of motion. There is no deformity. Neurological: Patient is alert and oriented to person, place and time. Patient has normal coordination. Pupils:  Pupils are equal, round, and reactive to light. Pupils are equal.   Skin:  Warm and dry. No rash or cyanosis.       Assessment & Plan     78-year-old man with history of chronic lower back pain onset many years ago located in the lower lumbar area and predominantly right side in the lumbosacral area with radiation down right leg associated with intermittent right leg numbness  Pain aggravated with exertion forward bending lifting  No changes in bladder or bowel control    Rates average pain score 9/10  Pain interfere with quality of life  Patient is done physical therapy multiple times in past  Had epidural injection in past her last injection was more than 1 year ago that provided him more than 50% relief lasting for more than 6 months  Pain is now back to the baseline    On chronic opioid therapy  Reports no side effect  Pain is poorly controlled with the medication  Further opioid dose escalation is not advisable considering risk factors of obesity and obstructive sleep apnea history  He has been working on weight loss and have lost 13 pounds in  He did physical therapy in the past, continue to do home exercises as advised by the therapy  Patient is interested in repeat epidural injection  No changes in bladder or bowel control    1. Chronic use of opiate drugs therapeutic purposes    2. Chronic bilateral low back pain with right-sided sciatica    3. Morbid obesity with BMI of 40.0-44.9, adult ([facility] [address]    4. MARLENE and COPD overlap syndrome ([facility]        Orders Placed This Encounter   Procedures    OR NJX AA&/STRD TFRML EPI LUMBAR/SACRAL 1 LEVEL      Orders Placed This Encounter   Medications    oxyCODONE-acetaminophen (PERCOCET) 5-325 MG per tablet     Sig: Take 1 tablet by mouth 2 times daily as needed for Pain for up to 30 days. Dispense:  60 tablet     Refill:  0     Reduce doses taken as pain becomes manageable        Controlled Substance Monitoring:    Acute and Chronic Pain Monitoring:   RX Monitoring 8/10/2021   Attestation -   Periodic Controlled Substance Monitoring Possible medication side effects, risk of tolerance/dependence & alternative treatments discussed. ;No signs of potential drug abuse or diversion identified. ;Assessed functional status. Chronic Pain > 50 MEDD Obtained or confirmed \"Consent for Opioid Use\" on file.    Chronic Pain > 80 MEDD -         Electronically signed by Reyes Loyd MD on 8/10/2021 at 9:25 AM

## 2021-08-30 ENCOUNTER — HOSPITAL ENCOUNTER (OUTPATIENT)
Dept: PAIN MANAGEMENT | Facility: CLINIC | Age: 56
Discharge: HOME | End: 2021-08-30
Payer: COMMERCIAL

## 2021-09-13 ENCOUNTER — OFFICE VISIT (OUTPATIENT)
Dept: PAIN MANAGEMENT | Age: 56
End: 2021-09-13
Payer: COMMERCIAL

## 2021-09-13 VITALS
DIASTOLIC BLOOD PRESSURE: 105 MMHG | SYSTOLIC BLOOD PRESSURE: 165 MMHG | HEIGHT: 65 IN | BODY MASS INDEX: 42.99 KG/M2 | WEIGHT: 258 LBS | HEART RATE: 81 BPM | OXYGEN SATURATION: 96 %

## 2021-09-13 DIAGNOSIS — G89.29 CHRONIC BILATERAL LOW BACK PAIN WITH RIGHT-SIDED SCIATICA: Chronic | ICD-10-CM

## 2021-09-13 DIAGNOSIS — M54.41 CHRONIC BILATERAL LOW BACK PAIN WITH RIGHT-SIDED SCIATICA: Chronic | ICD-10-CM

## 2021-09-13 DIAGNOSIS — Z79.891 CHRONIC USE OF OPIATE DRUG FOR THERAPEUTIC PURPOSE: ICD-10-CM

## 2021-09-13 DIAGNOSIS — G89.29 CHRONIC RADICULAR LUMBAR PAIN: Primary | Chronic | ICD-10-CM

## 2021-09-13 DIAGNOSIS — M54.16 CHRONIC RADICULAR LUMBAR PAIN: Primary | Chronic | ICD-10-CM

## 2021-09-13 PROCEDURE — 99213 OFFICE O/P EST LOW 20 MIN: CPT | Performed by: NURSE PRACTITIONER

## 2021-09-13 RX ORDER — OXYCODONE HYDROCHLORIDE AND ACETAMINOPHEN 5; 325 MG/1; MG/1
1 TABLET ORAL 2 TIMES DAILY PRN
Qty: 60 TABLET | Refills: 0 | Status: SHIPPED | OUTPATIENT
Start: 2021-09-13 | End: 2021-10-11 | Stop reason: SDUPTHER

## 2021-09-13 ASSESSMENT — ENCOUNTER SYMPTOMS
BACK PAIN: 1
CONSTIPATION: 0
RESPIRATORY NEGATIVE: 1
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
## 2021-09-17 ENCOUNTER — TELEPHONE (OUTPATIENT)
Dept: PAIN MANAGEMENT | Age: 56
End: 2021-09-17

## 2021-09-17 NOTE — TELEPHONE ENCOUNTER
Spoke with patient and confirmed procedure scheduled 9/20/21 at [facility]. Pt confirmed correct address and arrival time of 9;00 am and will park on right side of the building. Pt reminded to remain NPO 8 hours prior to procedure. Pt confirmed a  will be available to stay during procedure and after to drive pt home.  Pt states he already received instructions on which medications to hold and when

## 2021-09-20 ENCOUNTER — HOSPITAL ENCOUNTER (OUTPATIENT)
Dept: PAIN MANAGEMENT | Facility: CLINIC | Age: 56
Discharge: HOME | End: 2021-09-20
Payer: MEDICAID

## 2021-09-20 VITALS
BODY MASS INDEX: 42.99 KG/M2 | RESPIRATION RATE: 8 BRPM | SYSTOLIC BLOOD PRESSURE: 132 MMHG | HEART RATE: 95 BPM | OXYGEN SATURATION: 96 % | HEIGHT: 65 IN | WEIGHT: 258 LBS | TEMPERATURE: 97.7 F | DIASTOLIC BLOOD PRESSURE: 82 MMHG

## 2021-09-20 VITALS
OXYGEN SATURATION: 96 % | DIASTOLIC BLOOD PRESSURE: 90 MMHG | HEART RATE: 83 BPM | SYSTOLIC BLOOD PRESSURE: 133 MMHG | RESPIRATION RATE: 24 BRPM

## 2021-09-20 DIAGNOSIS — G89.29 CHRONIC BILATERAL LOW BACK PAIN WITH RIGHT-SIDED SCIATICA: Primary | Chronic | ICD-10-CM

## 2021-09-20 DIAGNOSIS — R52 PAIN MANAGEMENT: ICD-10-CM

## 2021-09-20 DIAGNOSIS — M54.41 CHRONIC BILATERAL LOW BACK PAIN WITH RIGHT-SIDED SCIATICA: Primary | Chronic | ICD-10-CM

## 2021-09-20 LAB — GLUCOSE BLD-MCNC: 99 MG/DL (ref 75–110)

## 2021-09-20 PROCEDURE — 62323 NJX INTERLAMINAR LMBR/SAC: CPT | Performed by: ANESTHESIOLOGY

## 2021-09-20 PROCEDURE — 6360000004 HC RX CONTRAST MEDICATION: Performed by: ANESTHESIOLOGY

## 2021-09-20 PROCEDURE — 2500000003 HC RX 250 WO HCPCS: Performed by: ANESTHESIOLOGY

## 2021-09-20 PROCEDURE — 6360000002 HC RX W HCPCS: Performed by: ANESTHESIOLOGY

## 2021-09-20 PROCEDURE — 62323 NJX INTERLAMINAR LMBR/SAC: CPT

## 2021-09-20 PROCEDURE — 82947 ASSAY GLUCOSE BLOOD QUANT: CPT

## 2021-09-20 RX ORDER — MIDAZOLAM HYDROCHLORIDE 2 MG/2ML
INJECTION, SOLUTION INTRAMUSCULAR; INTRAVENOUS
Status: COMPLETED | OUTPATIENT
Start: 2021-09-20 | End: 2021-09-20

## 2021-09-20 RX ORDER — DEXAMETHASONE SODIUM PHOSPHATE 10 MG/ML
INJECTION, SOLUTION INTRAMUSCULAR; INTRAVENOUS
Status: COMPLETED | OUTPATIENT
Start: 2021-09-20 | End: 2021-09-20

## 2021-09-20 RX ORDER — LIDOCAINE HYDROCHLORIDE 10 MG/ML
INJECTION, SOLUTION EPIDURAL; INFILTRATION; INTRACAUDAL; PERINEURAL
Status: COMPLETED | OUTPATIENT
Start: 2021-09-20 | End: 2021-09-20

## 2021-09-20 RX ORDER — FENTANYL CITRATE 50 UG/ML
INJECTION, SOLUTION INTRAMUSCULAR; INTRAVENOUS
Status: COMPLETED | OUTPATIENT
Start: 2021-09-20 | End: 2021-09-20

## 2021-09-20 RX ADMIN — MIDAZOLAM HYDROCHLORIDE 2 MG: 1 INJECTION, SOLUTION INTRAMUSCULAR; INTRAVENOUS at 11:10

## 2021-09-20 RX ADMIN — IOHEXOL 3 ML: 180 INJECTION INTRAVENOUS at 11:12

## 2021-09-20 RX ADMIN — LIDOCAINE HYDROCHLORIDE 3 ML: 10 INJECTION, SOLUTION EPIDURAL; INFILTRATION; INTRACAUDAL at 11:12

## 2021-09-20 RX ADMIN — FENTANYL CITRATE 50 MCG: 50 INJECTION INTRAMUSCULAR; INTRAVENOUS at 11:10

## 2021-09-20 RX ADMIN — DEXAMETHASONE SODIUM PHOSPHATE 10 MG: 10 INJECTION, SOLUTION INTRAMUSCULAR; INTRAVENOUS at 11:14

## 2021-09-20 ASSESSMENT — PAIN DESCRIPTION - DESCRIPTORS: DESCRIPTORS: STABBING

## 2021-09-20 ASSESSMENT — PAIN - FUNCTIONAL ASSESSMENT
PAIN_FUNCTIONAL_ASSESSMENT: PREVENTS OR INTERFERES WITH ALL ACTIVE AND SOME PASSIVE ACTIVITIES
PAIN_FUNCTIONAL_ASSESSMENT: 0-10
PAIN_FUNCTIONAL_ASSESSMENT: 0-10

## 2021-09-20 NOTE — H&P
UPDATE:  Office visit pain clinic in [facility] with all required elements of H&P dated 09/13/2021  Patient seen preop, chart reviewed, AAO x 3, in NAD, VSS,. No changes in medical history since last evaluation. Risk / Benefits explained to patient, patient agree to proceed with plan.   ASA 2  MP 3

## 2021-10-11 ENCOUNTER — OFFICE VISIT (OUTPATIENT)
Dept: PAIN MANAGEMENT | Age: 56
End: 2021-10-11
Payer: COMMERCIAL

## 2021-10-11 VITALS
RESPIRATION RATE: 18 BRPM | HEART RATE: 79 BPM | SYSTOLIC BLOOD PRESSURE: 138 MMHG | HEIGHT: 65 IN | BODY MASS INDEX: 42.99 KG/M2 | WEIGHT: 258 LBS | OXYGEN SATURATION: 98 % | DIASTOLIC BLOOD PRESSURE: 72 MMHG

## 2021-10-11 DIAGNOSIS — Z79.891 CHRONIC USE OF OPIATE DRUG FOR THERAPEUTIC PURPOSE: ICD-10-CM

## 2021-10-11 DIAGNOSIS — M25.552 ACUTE PAIN OF LEFT HIP: Primary | ICD-10-CM

## 2021-10-11 PROCEDURE — 99213 OFFICE O/P EST LOW 20 MIN: CPT | Performed by: NURSE PRACTITIONER

## 2021-10-11 RX ORDER — OXYCODONE HYDROCHLORIDE AND ACETAMINOPHEN 5; 325 MG/1; MG/1
1 TABLET ORAL 2 TIMES DAILY PRN
Qty: 60 TABLET | Refills: 0 | Status: SHIPPED | OUTPATIENT
Start: 2021-10-13 | End: 2021-11-08 | Stop reason: SDUPTHER

## 2021-10-11 ASSESSMENT — ENCOUNTER SYMPTOMS
BACK PAIN: 1
COUGH: 0
SHORTNESS OF BREATH: 0
CONSTIPATION: 0
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
## 2021-11-08 ENCOUNTER — OFFICE VISIT (OUTPATIENT)
Dept: PAIN MANAGEMENT | Age: 56
End: 2021-11-08
Payer: COMMERCIAL

## 2021-11-08 VITALS — OXYGEN SATURATION: 97 % | SYSTOLIC BLOOD PRESSURE: 143 MMHG | DIASTOLIC BLOOD PRESSURE: 91 MMHG | HEART RATE: 78 BPM

## 2021-11-08 DIAGNOSIS — M54.41 CHRONIC BILATERAL LOW BACK PAIN WITH RIGHT-SIDED SCIATICA: Primary | Chronic | ICD-10-CM

## 2021-11-08 DIAGNOSIS — M51.9 LUMBAR DISC DISEASE: Chronic | ICD-10-CM

## 2021-11-08 DIAGNOSIS — G89.29 CHRONIC BILATERAL LOW BACK PAIN WITH RIGHT-SIDED SCIATICA: Primary | Chronic | ICD-10-CM

## 2021-11-08 DIAGNOSIS — Z79.891 CHRONIC USE OF OPIATE DRUG FOR THERAPEUTIC PURPOSE: ICD-10-CM

## 2021-11-08 PROCEDURE — 99213 OFFICE O/P EST LOW 20 MIN: CPT | Performed by: NURSE PRACTITIONER

## 2021-11-08 RX ORDER — OXYCODONE HYDROCHLORIDE AND ACETAMINOPHEN 5; 325 MG/1; MG/1
1 TABLET ORAL 2 TIMES DAILY PRN
Qty: 60 TABLET | Refills: 0 | Status: SHIPPED | OUTPATIENT
Start: 2021-11-12 | End: 2021-12-10 | Stop reason: SDUPTHER

## 2021-11-08 ASSESSMENT — ENCOUNTER SYMPTOMS
BACK PAIN: 1
RESPIRATORY NEGATIVE: 1
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
## 2021-11-19 ENCOUNTER — HOSPITAL ENCOUNTER (EMERGENCY)
Age: 56
Discharge: HOME | End: 2021-11-19
Attending: EMERGENCY MEDICINE
Payer: MEDICAID

## 2021-11-19 ENCOUNTER — APPOINTMENT (OUTPATIENT)
Dept: GENERAL RADIOLOGY | Age: 56
End: 2021-11-19
Payer: MEDICAID

## 2021-11-19 ENCOUNTER — NURSE TRIAGE (OUTPATIENT)
Dept: OTHER | Facility: CLINIC | Age: 56
End: 2021-11-19

## 2021-11-19 VITALS
RESPIRATION RATE: 16 BRPM | HEIGHT: 65 IN | TEMPERATURE: 98.6 F | WEIGHT: 254 LBS | BODY MASS INDEX: 42.32 KG/M2 | DIASTOLIC BLOOD PRESSURE: 101 MMHG | OXYGEN SATURATION: 100 % | HEART RATE: 92 BPM | SYSTOLIC BLOOD PRESSURE: 175 MMHG

## 2021-11-19 DIAGNOSIS — S39.012A STRAIN OF LUMBAR REGION, INITIAL ENCOUNTER: ICD-10-CM
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
## 2021-11-19 DIAGNOSIS — S16.1XXA ACUTE STRAIN OF NECK MUSCLE, INITIAL ENCOUNTER: ICD-10-CM

## 2021-11-19 PROCEDURE — 99284 EMERGENCY DEPT VISIT MOD MDM: CPT

## 2021-11-19 PROCEDURE — 72100 X-RAY EXAM L-S SPINE 2/3 VWS: CPT

## 2021-11-19 PROCEDURE — 6370000000 HC RX 637 (ALT 250 FOR IP): Performed by: NURSE PRACTITIONER

## 2021-11-19 PROCEDURE — 72040 X-RAY EXAM NECK SPINE 2-3 VW: CPT

## 2021-11-19 PROCEDURE — 6360000002 HC RX W HCPCS: Performed by: NURSE PRACTITIONER

## 2021-11-19 RX ORDER — DEXAMETHASONE 4 MG/1
8 TABLET ORAL ONCE
Status: COMPLETED | OUTPATIENT
Start: 2021-11-19 | End: 2021-11-19

## 2021-11-19 RX ORDER — LIDOCAINE 4 G/G
1 PATCH TOPICAL ONCE
Status: DISCONTINUED | OUTPATIENT
Start: 2021-11-19 | End: 2021-11-19 | Stop reason: HOSPADM

## 2021-11-19 RX ADMIN — DEXAMETHASONE 8 MG: 4 TABLET ORAL at 17:29

## 2021-11-19 ASSESSMENT — ENCOUNTER SYMPTOMS
VOMITING: 0
COLOR CHANGE: 0
PHOTOPHOBIA: 0
FACIAL SWELLING: 0
VOICE CHANGE: 0
TROUBLE SWALLOWING: 0
SHORTNESS OF BREATH: 0
SORE THROAT: 0
ABDOMINAL PAIN: 0
BACK PAIN: 1
NAUSEA: 0
EYE PAIN: 0

## 2021-11-19 ASSESSMENT — PAIN SCALES - GENERAL: PAINLEVEL_OUTOF10: 10
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
## 2021-11-19 NOTE — DISCHARGE INSTR - COC
OR    [place] [facility] AA&/STRD TFRML EPI LUMBAR/SACRAL 1 LEVEL Right 12/3/2018    RIGHT L4 L5 EPIDURAL STEROID INJECTION performed by Estevan Duckworth MD at [address] OFFICE/OUTPT [address] [facility] [address] Right 8/6/2018    NERVE RADIOFREQUENCY ABLATION RIGHT LUMBAR MEDIAL BRANCH L2-L5 performed by Estevan Duckworth MD at [address] OFFICE/OUTPT [address] [facility] [address] Left 8/13/2018    NERVE RADIOFREQUENCY ABLATION LEFT LUMBAR MEDIAL BRANCH L2-L5 performed by Estevan Duckworth MD at [address] [facility] N/A 3/25/2019    LUMBAR COLETTE performed by Estevan Duckworth MD at [facility] History:   Immunization History   Administered Date(s) Administered    COVID-19, Chris Roselia, Primary or Immunocompromised, PF, 100mcg/0.5mL 03/08/2021, 04/05/2021       Active Problems:  Patient Active Problem List   Diagnosis Code    Daily headache R51.9    Chronic bilateral low back pain with right-sided sciatica M54.41, G89.29    Hypertension I10    Hyperlipidemia E78.5    Chronic use of opiate drugs therapeutic purposes Z79.891    Morbid obesity with BMI of 40.0-44.9, adult ([facility]) E66.01, Z68.41    Osteoarthritis of spine with radiculopathy, lumbar region M47.26    Controlled type 2 diabetes mellitus without complication, without long-term current use of insulin ([facility]) E11.9    COPD (chronic obstructive pulmonary disease) ([facility]) J44.9    MARLENE and COPD overlap syndrome ([facility]) G47.33, J44.9    Anxiety and depression F41.9, F32. A    Prediabetes R73.03    Pneumococcal vaccination declined Z28.21    Influenza vaccination declined Z28.21    Lumbar disc disease M51.9    Chronic radicular lumbar pain M54.16, G89.29    Diabetic nephropathy associated with type 2 diabetes mellitus ([facility] [address] E11.21       Isolation/Infection:   Isolation            No Isolation          Patient Infection Status       Infection Onset Added Last Indicated Last Indicated By Review Planned Expiration Resolved Resolved By    None active    Resolved    COVID-19 (Rule Out) 20 COVID-19 (Ordered)   20 Rule-Out Test Resulted    COVID-19 (Rule Out) 20 COVID-19 (Ordered)   20 Rule-Out Test Resulted            Nurse Assessment:  Last Vital Signs: BP (!) 201/119   Pulse 92   Temp 98.6 °F (37 °C) (Oral)   Resp 16   Ht 5' 5\" (1.651 m)   Wt 254 lb (115.2 kg)   SpO2 100%   BMI 42.27 kg/m²     Last documented pain score (0-10 scale): Pain Level: 10  Last Weight:   Wt Readings from Last 1 Encounters:   21 254 lb (115.2 kg)     Mental Status:  {IP PT MENTAL STATUS:}    IV Access:  { JUSTINE IV ACCESS:750529133}    Nursing Mobility/ADLs:  Walking   {CHP DME NFRV:321425968}  Transfer  {CHP DME WIIJ:830210421}  Bathing  {CHP DME XGNS:033583458}  Dressing  {CHP DME FVDS:448059871}  Toileting  {CHP DME GGE}  Feeding  {P DME SBDK:858547821}  Med Admin  {CHP DME YTYP:683964707}  Med Delivery   { JUSTINE MED Delivery:595668171}    Wound Care Documentation and Therapy:        Elimination:  Continence: Bowel: {YES / FH:65999}  Bladder: {YES / QF:49555}  Urinary Catheter: {Urinary Catheter:573029267}   Colostomy/Ileostomy/Ileal Conduit: {YES / TS:40528}       Date of Last BM: ***  No intake or output data in the 24 hours ending 21 1712  No intake/output data recorded.     Safety Concerns:     [address] Safety Concerns:572263515}    Impairments/Disabilities:      [address] Impairments/Disabilities:161997027}    Nutrition Therapy:  Current Nutrition Therapy:   [address] Diet List:298063732}    Routes of Feeding: {CHP DME Other Feedings:757937573}  Liquids: {Slp liquid thickness:11461}  Daily Fluid Restriction: {CHP DME Yes amt example:297813939}  Last Modified Barium Swallow with Video (Video Swallowing Test): {Done Not Done NMMM:638367595}    Treatments at the Time of Hospital Discharge:   Respiratory Treatments: ***  Oxygen Therapy:  {Therapy; copd oxygen:53814}  Ventilator:    {MH CC Vent VMWX:170361308}    Rehab Therapies: {THERAPEUTIC INTERVENTION:0525528978}  Weight Bearing Status/Restrictions: {[facility] Weight Bearin}  Other Medical Equipment (for information only, NOT a DME order):  {EQUIPMENT:720754121}  Other Treatments: ***    Patient's personal belongings (please select all that are sent with patient):  {[facility] DME Belongings:711561558}    RN SIGNATURE:  {Esignature:392285961}    CASE MANAGEMENT/SOCIAL WORK SECTION    Inpatient Status Date: ***    Readmission Risk Assessment Score:  Readmission Risk              Risk of Unplanned Readmission:  0           Discharging to Facility/ Agency   Name:   Address:  Phone:  Fax:    Dialysis Facility (if applicable)   Name:  Address:  Dialysis Schedule:  Phone:  Fax:    / signature: {Esignature:961104744}    PHYSICIAN SECTION    Prognosis: {Prognosis:2542081252}    Condition at Discharge: [address] [place] [address] Patient Condition:462564303}    Rehab Potential (if transferring to Rehab): {Prognosis:3555723142}    Recommended Labs or Other Treatments After Discharge: ***    Physician Certification: I certify the above information and transfer of Rian Mccormick  is necessary for the continuing treatment of the diagnosis listed and that he requires {Admit to Appropriate Level of Care:04967} for {GREATER/LESS:116416036} 30 days.      Update Admission H&P: {CHP DME Changes in AAJVJ:789754766}    PHYSICIAN SIGNATURE:  {Esignature:353243813}
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
## 2021-12-03 ENCOUNTER — OFFICE VISIT (OUTPATIENT)
Dept: INTERNAL MEDICINE CLINIC | Age: 56
End: 2021-12-03
Payer: MEDICAID

## 2021-12-03 VITALS
SYSTOLIC BLOOD PRESSURE: 146 MMHG | HEART RATE: 82 BPM | OXYGEN SATURATION: 98 % | WEIGHT: 258 LBS | BODY MASS INDEX: 42.99 KG/M2 | HEIGHT: 65 IN | DIASTOLIC BLOOD PRESSURE: 82 MMHG | TEMPERATURE: 97.5 F

## 2021-12-03 DIAGNOSIS — J44.9 CHRONIC OBSTRUCTIVE PULMONARY DISEASE, UNSPECIFIED COPD TYPE (HCC): ICD-10-CM

## 2021-12-03 DIAGNOSIS — E66.01 MORBID OBESITY WITH BMI OF 40.0-44.9, ADULT (HCC): ICD-10-CM

## 2021-12-03 DIAGNOSIS — M76.61 ACHILLES TENDINITIS OF RIGHT LOWER EXTREMITY: Primary | ICD-10-CM

## 2021-12-03 DIAGNOSIS — R51.9 DAILY HEADACHE: ICD-10-CM

## 2021-12-03 DIAGNOSIS — F41.9 ANXIETY AND DEPRESSION: ICD-10-CM

## 2021-12-03 DIAGNOSIS — I10 HYPERTENSION, UNSPECIFIED TYPE: ICD-10-CM

## 2021-12-03 DIAGNOSIS — L60.2 ONYCHOGRYPOSIS OF TOENAIL: ICD-10-CM

## 2021-12-03 DIAGNOSIS — M51.9 LUMBAR DISC DISEASE: Chronic | ICD-10-CM

## 2021-12-03 DIAGNOSIS — Z28.21 INFLUENZA VACCINATION DECLINED: ICD-10-CM

## 2021-12-03 DIAGNOSIS — Q66.6 PES PLANOVALGUS: ICD-10-CM

## 2021-12-03 DIAGNOSIS — J44.9 OSA AND COPD OVERLAP SYNDROME (HCC): ICD-10-CM

## 2021-12-03 DIAGNOSIS — F32.A ANXIETY AND DEPRESSION: ICD-10-CM

## 2021-12-03 DIAGNOSIS — G89.29 CHRONIC BILATERAL LOW BACK PAIN WITH RIGHT-SIDED SCIATICA: Chronic | ICD-10-CM

## 2021-12-03 DIAGNOSIS — Z28.21 PNEUMOCOCCAL VACCINATION DECLINED: ICD-10-CM

## 2021-12-03 DIAGNOSIS — M54.16 CHRONIC RADICULAR LUMBAR PAIN: Chronic | ICD-10-CM

## 2021-12-03 DIAGNOSIS — G47.33 OSA AND COPD OVERLAP SYNDROME (HCC): ICD-10-CM

## 2021-12-03 DIAGNOSIS — Z79.891 CHRONIC USE OF OPIATE DRUG FOR THERAPEUTIC PURPOSE: ICD-10-CM

## 2021-12-03 DIAGNOSIS — E78.2 MIXED HYPERLIPIDEMIA: ICD-10-CM

## 2021-12-03 DIAGNOSIS — M47.26 OSTEOARTHRITIS OF SPINE WITH RADICULOPATHY, LUMBAR REGION: ICD-10-CM

## 2021-12-03 DIAGNOSIS — E11.9 CONTROLLED TYPE 2 DIABETES MELLITUS WITHOUT COMPLICATION, WITHOUT LONG-TERM CURRENT USE OF INSULIN (HCC): ICD-10-CM

## 2021-12-03 DIAGNOSIS — G89.29 CHRONIC RADICULAR LUMBAR PAIN: Chronic | ICD-10-CM

## 2021-12-03 DIAGNOSIS — M54.41 CHRONIC BILATERAL LOW BACK PAIN WITH RIGHT-SIDED SCIATICA: Chronic | ICD-10-CM

## 2021-12-03 PROBLEM — R73.03 PREDIABETES: Status: RESOLVED | Noted: 2019-05-20 | Resolved: 2021-12-03

## 2021-12-03 PROBLEM — E11.21 DIABETIC NEPHROPATHY ASSOCIATED WITH TYPE 2 DIABETES MELLITUS (HCC): Status: RESOLVED | Noted: 2020-09-23 | Resolved: 2021-12-03

## 2021-12-03 PROCEDURE — 99214 OFFICE O/P EST MOD 30 MIN: CPT | Performed by: FAMILY MEDICINE

## 2021-12-03 ASSESSMENT — PATIENT HEALTH QUESTIONNAIRE - PHQ9
SUM OF ALL RESPONSES TO PHQ QUESTIONS 1-9: 0
2. FEELING DOWN, DEPRESSED OR HOPELESS: 0
SUM OF ALL RESPONSES TO PHQ QUESTIONS 1-9: 0
SUM OF ALL RESPONSES TO PHQ9 QUESTIONS 1 & 2: 0
SUM OF ALL RESPONSES TO PHQ QUESTIONS 1-9: 0
1. LITTLE INTEREST OR PLEASURE IN DOING THINGS: 0

## 2021-12-03 ASSESSMENT — ENCOUNTER SYMPTOMS
GASTROINTESTINAL NEGATIVE: 1
ALLERGIC/IMMUNOLOGIC NEGATIVE: 1
EYES NEGATIVE: 1
BACK PAIN: 1
RESPIRATORY NEGATIVE: 1

## 2021-12-06 RX ORDER — TIOTROPIUM BROMIDE INHALATION SPRAY 3.12 UG/1
SPRAY, METERED RESPIRATORY (INHALATION)
Qty: 4 G | OUTPATIENT
Start: 2021-12-06

## 2021-12-06 RX ORDER — LISINOPRIL AND HYDROCHLOROTHIAZIDE 12.5; 1 MG/1; MG/1
TABLET ORAL
Qty: 90 TABLET | Refills: 1 | Status: SHIPPED | OUTPATIENT
Start: 2021-12-06 | End: 2022-08-15

## 2021-12-06 NOTE — TELEPHONE ENCOUNTER
Dr Raúl Partida, patient last seen in the office on 10/16/20 and cancelled and no showed to appointment since then. No follow up scheduled. I refused refill request with note to pharmacy that patient needs an appointment. Please make any changes needed. Thank you.

## 2021-12-06 NOTE — TELEPHONE ENCOUNTER
Deo Watson is calling to request a refill on the following medication(s):    Medication Request:  Requested Prescriptions     Pending Prescriptions Disp Refills    lisinopril-hydroCHLOROthiazide (PRINZIDE;ZESTORETIC) 10-12.5 MG per tablet [Pharmacy Med Name: LISINOPRIL-HCTZ 10-12.5 MG TAB] 90 tablet 0     Sig: take 1 tablet by mouth once daily     Last filled 7/14/21 90 day no refill      Last Visit Date (If Applicable):  96/9/2459    Next Visit Date:    4/8/2022

## 2021-12-10 ENCOUNTER — OFFICE VISIT (OUTPATIENT)
Dept: PAIN MANAGEMENT | Age: 56
End: 2021-12-10
Payer: COMMERCIAL

## 2021-12-10 VITALS
OXYGEN SATURATION: 99 % | DIASTOLIC BLOOD PRESSURE: 81 MMHG | BODY MASS INDEX: 42.99 KG/M2 | HEART RATE: 81 BPM | SYSTOLIC BLOOD PRESSURE: 125 MMHG | HEIGHT: 65 IN | WEIGHT: 258 LBS

## 2021-12-10 DIAGNOSIS — G89.29 CHRONIC BILATERAL LOW BACK PAIN WITH RIGHT-SIDED SCIATICA: Primary | Chronic | ICD-10-CM

## 2021-12-10 DIAGNOSIS — Z79.891 CHRONIC USE OF OPIATE DRUG FOR THERAPEUTIC PURPOSE: ICD-10-CM

## 2021-12-10 DIAGNOSIS — M47.26 OSTEOARTHRITIS OF SPINE WITH RADICULOPATHY, LUMBAR REGION: ICD-10-CM

## 2021-12-10 DIAGNOSIS — G89.29 CHRONIC RADICULAR LUMBAR PAIN: Chronic | ICD-10-CM

## 2021-12-10 DIAGNOSIS — M54.16 CHRONIC RADICULAR LUMBAR PAIN: Chronic | ICD-10-CM

## 2021-12-10 DIAGNOSIS — M54.41 CHRONIC BILATERAL LOW BACK PAIN WITH RIGHT-SIDED SCIATICA: Primary | Chronic | ICD-10-CM

## 2021-12-10 PROCEDURE — 99213 OFFICE O/P EST LOW 20 MIN: CPT | Performed by: NURSE PRACTITIONER

## 2021-12-10 RX ORDER — OXYCODONE HYDROCHLORIDE AND ACETAMINOPHEN 5; 325 MG/1; MG/1
1 TABLET ORAL 2 TIMES DAILY PRN
Qty: 60 TABLET | Refills: 0 | Status: SHIPPED | OUTPATIENT
Start: 2021-12-12 | End: 2021-12-20 | Stop reason: SDUPTHER

## 2021-12-10 RX ORDER — METAXALONE 800 MG/1
800 TABLET ORAL 2 TIMES DAILY
Qty: 60 TABLET | Refills: 0 | Status: SHIPPED | OUTPATIENT
Start: 2021-12-10 | End: 2021-12-27 | Stop reason: SDUPTHER

## 2021-12-10 ASSESSMENT — ENCOUNTER SYMPTOMS
SHORTNESS OF BREATH: 0
BACK PAIN: 1
CONSTIPATION: 0
COUGH: 0
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
## 2021-12-14 ENCOUNTER — TELEPHONE (OUTPATIENT)
Dept: ADMINISTRATIVE | Age: 56
End: 2021-12-14

## 2021-12-15 NOTE — TELEPHONE ENCOUNTER
Pt states pharmacy told him his insurance will not pay for the metaxalone and he will have to pay $300+ out of pocket. Pt states he is unable to take generic. Informed pt we could attempt PA.  Pt states he will call pharmacy to have them fax over PA request.

## 2021-12-20 ENCOUNTER — TELEPHONE (OUTPATIENT)
Dept: PAIN MANAGEMENT | Age: 56
End: 2021-12-20

## 2021-12-20 ENCOUNTER — OFFICE VISIT (OUTPATIENT)
Dept: PODIATRY | Age: 56
End: 2021-12-20
Payer: MEDICAID

## 2021-12-20 VITALS
SYSTOLIC BLOOD PRESSURE: 120 MMHG | HEIGHT: 65 IN | DIASTOLIC BLOOD PRESSURE: 65 MMHG | WEIGHT: 265 LBS | BODY MASS INDEX: 44.15 KG/M2 | HEART RATE: 81 BPM

## 2021-12-20 DIAGNOSIS — M79.671 RIGHT FOOT PAIN: Primary | ICD-10-CM

## 2021-12-20 DIAGNOSIS — Z79.891 CHRONIC USE OF OPIATE DRUG FOR THERAPEUTIC PURPOSE: ICD-10-CM

## 2021-12-20 DIAGNOSIS — M76.61 ACHILLES TENDINITIS OF RIGHT LOWER EXTREMITY: ICD-10-CM

## 2021-12-20 DIAGNOSIS — M72.2 PLANTAR FASCIITIS OF RIGHT FOOT: ICD-10-CM

## 2021-12-20 DIAGNOSIS — M79.672 LEFT FOOT PAIN: ICD-10-CM

## 2021-12-20 DIAGNOSIS — M24.571 EQUINUS CONTRACTURE OF RIGHT ANKLE: ICD-10-CM

## 2021-12-20 PROCEDURE — 99204 OFFICE O/P NEW MOD 45 MIN: CPT | Performed by: STUDENT IN AN ORGANIZED HEALTH CARE EDUCATION/TRAINING PROGRAM

## 2021-12-20 RX ORDER — PRENATAL VIT 91/IRON/FOLIC/DHA 28-975-200
COMBINATION PACKAGE (EA) ORAL
Qty: 36 G | Refills: 3 | Status: SHIPPED | OUTPATIENT
Start: 2021-12-20

## 2021-12-20 NOTE — PROGRESS NOTES
[employer] [facility]  [address] [place] [address] [address] [facility], [address]  Tel: [phone]   Fax: [phone]    Subjective     CC: Right foot pain    HPI:  Krzysztof Garner is a 60y.o. year old male who presents to clinic today for evaluation of right foot/ankle pain. Patient states that years ago he was hospitalized and in a coma. Patient developed the need for a fasciotomy to his right hip, during the fasciotomy, the sciatic nerve was damaged which resulted in paralysis of his right lower extremity. Patient has recently begun to regain feeling to this limb as well as pain, especially with ROM of the extremity. Patient also complaining of pain to the bottom of his heel and back of his heel. Denies any recent traumas, no nausea vomiting or fever. Patient has no other pedal complaints. Primary care physician is Luciana Halsted, MD.    ROS:    Constitutional: Denies nausea, vomiting, fever, chills. Neurologic: Denies numbness, tingling, and burning in the feet. Vascular: Denies symptoms of lower extremity claudication. Skin: Denies open wounds. Musculoskeletal: Right foot and ankle pain. Otherwise negative except as noted in the HPI.      PMH:  Past Medical History:   Diagnosis Date    Asthma     Diabetes mellitus ([facility] [address]     Hyperlipidemia     Hypertension     Migraine     Neuropathy     Positive FIT (fecal immunochemical test)     Renal failure        Surgical History:   Past Surgical History:   Procedure Laterality Date    ANESTHESIA NERVE BLOCK Bilateral 9/18/2017    NERVE BLOCK BILATERAL MEDIAL BRANCH L2-L5 performed by Anuel Flores MD at [facility] Bilateral 10/17/2017    [address] L2-L5 performed by Anuel Flores MD at [place] [address]  03/08/2017    COLONOSCOPY  03/08/2017    internal hemorrhoids; mild diverticulosis    ENDOSCOPY, COLON, DIAGNOSTIC      FIBULA FRACTURE SURGERY Left     LEG SURGERY Right  NERVE BLOCK N/A 10/24/2016    lumbar COLETTE    NERVE BLOCK Right 11/21/2016    lumbar COLETTE    NERVE BLOCK Left 08/13/2018    radiofrequency ablation medial branh block L2-L5    OTHER SURGICAL HISTORY Right 01/26/2017    right hip steroid injection    OTHER SURGICAL HISTORY Right 08/06/2018    NERVE RADIOFREQUENCY ABLATION RIGHT LUMBAR MEDIAL BRANCH L2-L5 (Right )    OTHER SURGICAL HISTORY  12/03/2018    RIGHT L4 L5 EPIDURAL STEROID INJECTION (Right )    OTHER SURGICAL HISTORY  03/25/2019    LUMBAR COLETTE (N/A )    PAIN MANAGEMENT PROCEDURE Right 5/28/2020    EPIDURAL STEROID INJECTION RIGHT L5S1 performed by Mainor Juárez MD at [address] [facility] Right 6/15/2020    EPIDURAL STEROID INJECTION RIGTH L5S1 performed by Mainor Juárez MD at [address] [address] [place] [address] AA&/STRD TFRML EPI LUMBAR/SACRAL 1 LEVEL Right 12/3/2018    RIGHT L4 L5 EPIDURAL STEROID INJECTION performed by Mainor Juárez MD at [address] OFFICE/OUTPT [address] [facility] [address] Right 8/6/2018    NERVE RADIOFREQUENCY ABLATION RIGHT LUMBAR MEDIAL BRANCH L2-L5 performed by Mainor Juárez MD at [address] OFFICE/OUTPT [address] [facility] [address] Left 8/13/2018    NERVE RADIOFREQUENCY ABLATION LEFT LUMBAR MEDIAL BRANCH L2-L5 performed by Mainor Juárez MD at [address] [place] [address] N/A 3/25/2019    LUMBAR COLETTE performed by Mainor Juárez MD at [place] [address] History:  Social History     Tobacco Use    Smoking status: Current Every Day Smoker     Packs/day: 0.50     Years: 20.00     Pack years: 10.00     Types: Cigarettes     Start date: 10/2/1979    Smokeless tobacco: Never Used    Tobacco comment: down to 5 cigg. per day   Substance Use Topics    Alcohol use: No     Alcohol/week: 0.0 standard drinks    Drug use: No       Medications:  Prior to Admission medications    Medication Sig Start Date End Date Taking? Authorizing Provider   terbinafine (LAMISIL) 1 % cream Apply topically 2 times daily.  12/20/21  Yes Mike Block CHANDRAKANT Gordillo DPM   lisinopril-hydroCHLOROthiazide (PRINZIDE;ZESTORETIC) 10-12.5 MG per tablet take 1 tablet by mouth once daily 12/6/21  Yes Mauricia Apley, MD   budesonide-formoterol (SYMBICORT) 160-4.5 MCG/ACT AERO Inhale 2 puffs into the lungs 2 times daily 3/8/21  Yes Mauricia Apley, MD   albuterol sulfate  (90 Base) MCG/ACT inhaler Inhale 2 puffs into the lungs 4 times daily as needed for Wheezing 3/8/21  Yes Mauricia Apley, MD   atorvastatin (LIPITOR) 40 MG tablet Take 1 tablet by mouth daily 3/8/21  Yes Mauricia Apley, MD   tiotropium (SPIRIVA RESPIMAT) 2.5 MCG/ACT AERS inhaler Inhale 2 puffs into the lungs daily 2/12/21  Yes Surjit Ty MD   metFORMIN (GLUCOPHAGE) 500 MG tablet TAKE 1 TABLET BY MOUTH TWICE DAILY WITH MEALS 11/4/20  Yes Mauricia Apley, MD   metaxalone [facility]) 800 MG tablet Take 1 tablet by mouth 2 times daily 12/27/21 1/26/22  SANDRA Flowers CNP   oxyCODONE-acetaminophen (PERCOCET) 5-325 MG per tablet Take 1 tablet by mouth 2 times daily as needed for Pain for up to 30 days. 1/11/22 2/10/22  SANDRA Flowers CNP   lisinopril-hydroCHLOROthiazide (PRINZIDE;ZESTORETIC) 10-12.5 MG per tablet take 1 tablet by mouth once daily 1/7/21   Mauricia Apley, MD       Objective     Vitals:    12/20/21 1334   BP: 120/65   Pulse: 81       Lab Results   Component Value Date    LABA1C 6.3 (H) 08/03/2021       Physical Exam:  General:  Alert and oriented x3. In no acute distress. leftLower Extremity Physical Exam:    Vascular: DP and PT pulses are palpable. CFT <3 seconds to all digits. Mild, non pitting edema. Hair growth is absent to the level of the digits. Neuro: Saph/sural/SP/DP/plantar sensation diminished to light touch. Protective sensation is intact to 10/10 sites to left, 8/10 to the right. as tested with a 5.07g SWMF. Musculoskeletal: Muscle strength tests 5/5 to all major muscle groups of the left. Muscle strength testing 4/5 to all major muscle groups of the right leg. Tenderness to palpation of right achilles tendon and the achilles insertion. Pain to palpation of medial tubercle. Decrease ankle ROM of right ankle. Feeling of shooting pain to the foot with ROM of digits 1-5 of right foot. Dermatologic:Nails 1-5 b/l are discolored, with apparent subungual debris. Dry, scaly skin noted in moccasin distribution b/l. No other dermatologic concerns. Imaging: ordered    Assessment   Cindy Santamaria is a 60 y.o. male with     Diagnosis Orders   1. Right foot pain  XR FOOT RIGHT (MIN 3 VIEWS)   2. Left foot pain  XR FOOT LEFT (MIN 3 VIEWS)   3. Achilles tendinitis of right lower extremity     4. Equinus contracture of right ankle          Plan   The patient presented to our clinic today for evaluation of right foot/heel pain. Explained to patient the etiology of achilles tendon pathology and plantar fasciitis. Much of the nerve like foot pain is likely from lack of use of the right foot. Achilles tendon and plantar fasciitis exercises given to the patient. Radiographs of the right and left foot ordered. Follow-up in clinic in 1-2 weeks. Please call the office with any questions or concerns. Orders Placed This Encounter   Medications    terbinafine (LAMISIL) 1 % cream     Sig: Apply topically 2 times daily.      Dispense:  36 g     Refill:  3     Orders Placed This Encounter   Procedures    XR FOOT LEFT (MIN 3 VIEWS)     Standing Status:   Future     Standing Expiration Date:   12/20/2022     Order Specific Question:   Reason for exam:     Answer:   evaluate foot structure    XR FOOT RIGHT (MIN 3 VIEWS)     Standing Status:   Future     Standing Expiration Date:   12/20/2022     Order Specific Question:   Reason for exam:     Answer:   evaluate foot structure       Cheng Barajas DPM   Podiatric Medicine & Surgery   12/27/2021 at 2:19 PM

## 2021-12-20 NOTE — TELEPHONE ENCOUNTER
Dr. Mazie Severance wanted patient to see Jonathan Patel. Only opening was 1/13/2022 at 820am.  He will need his medication by the 11th of January. Can you please send this in and he will be here on the 13th.   Thank you

## 2021-12-20 NOTE — PROGRESS NOTES
Patient instructed to remove shoes and socks and instructed to sit in exam chair. Current PCP is Ignacio Garza MD and date of last visit was 12/3/21. Do you have a follow up visit scheduled? Yes  If yes, the date is 4/8/22    Diabetic visit information    Blood pressure (Control is BP <140/90)  BP Readings from Last 3 Encounters:   12/10/21 125/81   12/03/21 (!) 146/82   11/19/21 (!) 175/101       BP taken with correct size cuff? - Yes   Repeated if > 140/90 Yes      Tobacco use:  Patient  reports that he has been smoking cigarettes. He started smoking about 42 years ago. He has a 10.00 pack-year smoking history. He has never used smokeless tobacco.  If Smoker - Cessation materials given? - Yes       Diabetic Health Maintenance Items due  There are no preventive care reminders to display for this patient. Diabetic retinal exam done in last year? - Yes   If No: remind patient that it is due and they should schedule an exam    Medications  Is patient taking any medications for diabetes? -   Yes  Have blood sugars been controlled? Fasting blood sugars under 120   -   Yes   Random home sugars or today's POCT glucose is under 180 -   Yes   []  If No to the above then patient should schedule appt with PCP.      Diabetic Plan    A1C Plan  Lab Results   Component Value Date    LABA1C 6.3 (H) 08/03/2021    LABA1C 6.6 (H) 09/23/2020    LABA1C 6.1 10/29/2019      []  If A1C over 8 and last result >3 months ago - Order A1C and refer to PCP   []  If last A1C over 6 months ago - Order A1C and refer to PCP for follow up   []  If elevated blood sugars > 180 - refer to PCP for follow up    []  Blood sugar controlled - A1C under 8 and last check was < 6 months      Cholesterol Plan   Lab Results   Component Value Date    LDLCHOLESTEROL 151 (H) 08/03/2021      []  If LDL > 100 and last result >3 months ago - order Fasting lipids and refer to PCP for follow up   []  If LDL < 100 and over 1 year ago - Order Fasting lipids and refer to PCP for follow up   [] LDL is controlled. LDL < 100 and checked within the last year     Blood Pressure  BP Readings from Last 3 Encounters:   12/10/21 125/81   12/03/21 (!) 146/82   11/19/21 (!) 175/101      []  If SBP >140 mmhg - refer to PCP for follow up   []  If DBP > 90 mmhg - refer to PCP for follow up   [] BP is controlled <140/90     Order labs as PCP ordered.   (ie: Lipids, A1C, CMP)

## 2021-12-20 NOTE — PATIENT INSTRUCTIONS
Patient Education        Achilles Tendon: Exercises  Introduction  Here are some examples of exercises for you to try. The exercises may be suggested for a condition or for rehabilitation. Start each exercise slowly. Ease off the exercises if you start to have pain. You will be told when to start these exercises and which ones will work best for you. How to do the exercises  Toe stretch    1. Sit in a chair, and extend your affected leg so that your heel is on the floor. 2. With your hand, reach down and pull your big toe up and back. Pull toward your ankle and away from the floor. 3. Hold the position for at least 15 to 30 seconds. 4. Repeat 2 to 4 times a session, several times a day. Calf-plantar fascia stretch    1. Sit with your legs extended and knees straight. 2. Place a towel around your foot just under the toes. 3. Hold each end of the towel in each hand, with your hands above your knees. 4. Pull back with the towel so that your foot stretches toward you. 5. Hold the position for at least 15 to 30 seconds. 6. Repeat 2 to 4 times a session, up to 5 sessions a day. Floor stretch    1. Stand about 2 feet from a wall, and place your hands on the wall at about shoulder height. Or you can stand behind a chair, placing your hands on the back of it for balance. 2. Step back with the leg you want to stretch. Keep the leg straight, and press your heel into the floor with your toe turned slightly in.  3. Lean forward, and bend your other leg slightly. Feel the stretch in the Achilles tendon of your back leg. Hold for at least 15 to 30 seconds. 4. Repeat 2 to 4 times a session, up to 5 sessions a day. Stair stretch    1. Stand with the balls of both feet on the edge of a step or curb (or a medium-sized phone book). With at least one hand, hold onto something solid for balance, such as a banister or handrail.   2. Keeping your affected leg straight, slowly let that heel hang down off of the step or curb until you feel a stretch in the back of your calf and/or Achilles area. Some of your weight should still be on the other leg. 3. Hold this position for at least 15 to 30 seconds. 4. Repeat 2 to 4 times a session, up to 5 times a day or whenever your Achilles tendon starts to feel tight. This stretch can also be done with your knee slightly bent. Strength exercise    1. This exercise will get you started on building strength after an Achilles tendon injury. Your doctor or physical therapist can help you move on to more challenging exercises as you heal and get stronger. 2. Stand on a step with your heel off the edge of the step. Hold on to a handrail or wall for balance. 3. Push up on your toes, then slowly count to 10 as you lower yourself back down until your heel is below the step. If it hurts to push up on your toes, try putting most of your weight on your other foot as you push up, or try using your arms to help you. If you can't do this exercise without causing pain, stop the exercise and talk to your doctor. 4. Repeat the exercise 8 to 12 times, half with the knee straight and half with the knee bent. Follow-up care is a key part of your treatment and safety. Be sure to make and go to all appointments, and call your doctor if you are having problems. It's also a good idea to know your test results and keep a list of the medicines you take. Where can you learn more? Go to [email or web address] org and sign in to your [employer] account. Enter O152 in the [facility] box to learn more about \"Achilles Tendon: Exercises. \"     If you do not have an account, please click on the \"Sign Up Now\" link. Current as of: July 1, 2021               Content Version: 13.0  © 2006-2021 Healthwise, Incorporated. Care instructions adapted under license by [facility] [employer] [facility] ([facility]).  If you have questions about a medical condition or this instruction, always ask your healthcare professional. [employer] disclaims any warranty or liability for your use of this information. Patient Education        Plantar Fasciitis: Exercises  Introduction  Here are some examples of exercises for you to try. The exercises may be suggested for a condition or for rehabilitation. Start each exercise slowly. Ease off the exercises if you start to have pain. You will be told when to start these exercises and which ones will work best for you. How to do the exercises  Towel stretch    1. Sit with your legs extended and knees straight. 2. Place a towel around your foot just under the toes. 3. Hold each end of the towel in each hand, with your hands above your knees. 4. Pull back with the towel so that your foot stretches toward you. 5. Hold the position for at least 15 to 30 seconds. 6. Repeat 2 to 4 times a session, up to 5 sessions a day. Calf stretch    This exercise stretches the muscles at the back of the lower leg (the calf) and the Achilles tendon. Do this exercise 3 or 4 times a day, 5 days a week. 1. Stand facing a wall with your hands on the wall at about eye level. Put the leg you want to stretch about a step behind your other leg. 2. Keeping your back heel on the floor, bend your front knee until you feel a stretch in the back leg. 3. Hold the stretch for 15 to 30 seconds. Repeat 2 to 4 times. Plantar fascia and calf stretch    Stretching the plantar fascia and calf muscles can increase flexibility and decrease heel pain. You can do this exercise several times each day and before and after activity. 1. Stand on a step as shown above. Be sure to hold on to the banister. 2. Slowly let your heels down over the edge of the step as you relax your calf muscles. You should feel a gentle stretch across the bottom of your foot and up the back of your leg to your knee. 3. Hold the stretch about 15 to 30 seconds, and then tighten your calf muscle a little to bring your heel back up to the level of the step.

## 2021-12-27 RX ORDER — OXYCODONE HYDROCHLORIDE AND ACETAMINOPHEN 5; 325 MG/1; MG/1
1 TABLET ORAL 2 TIMES DAILY PRN
Qty: 60 TABLET | Refills: 0 | Status: SHIPPED | OUTPATIENT
Start: 2022-01-11 | End: 2022-02-07 | Stop reason: SDUPTHER

## 2021-12-27 RX ORDER — METAXALONE 800 MG/1
800 TABLET ORAL 2 TIMES DAILY
Qty: 60 TABLET | Refills: 0 | Status: SHIPPED | OUTPATIENT
Start: 2021-12-27 | End: 2022-01-13

## 2021-12-31 ENCOUNTER — HOSPITAL ENCOUNTER (OUTPATIENT)
Dept: GENERAL RADIOLOGY | Age: 56
Discharge: HOME | End: 2022-01-02
Payer: COMMERCIAL

## 2021-12-31 ENCOUNTER — HOSPITAL ENCOUNTER (OUTPATIENT)
Age: 56
Discharge: HOME | End: 2022-01-02
Payer: COMMERCIAL

## 2021-12-31 DIAGNOSIS — M79.671 RIGHT FOOT PAIN: ICD-10-CM

## 2021-12-31 DIAGNOSIS — M79.672 LEFT FOOT PAIN: ICD-10-CM

## 2021-12-31 DIAGNOSIS — M25.552 ACUTE PAIN OF LEFT HIP: ICD-10-CM

## 2021-12-31 PROCEDURE — 73502 X-RAY EXAM HIP UNI 2-3 VIEWS: CPT

## 2021-12-31 PROCEDURE — 73630 X-RAY EXAM OF FOOT: CPT

## 2022-01-03 ENCOUNTER — OFFICE VISIT (OUTPATIENT)
Dept: PODIATRY | Age: 57
End: 2022-01-03
Payer: COMMERCIAL

## 2022-01-03 VITALS — SYSTOLIC BLOOD PRESSURE: 151 MMHG | DIASTOLIC BLOOD PRESSURE: 92 MMHG

## 2022-01-03 DIAGNOSIS — M24.571 EQUINUS CONTRACTURE OF RIGHT ANKLE: ICD-10-CM

## 2022-01-03 DIAGNOSIS — M79.671 RIGHT FOOT PAIN: Primary | ICD-10-CM

## 2022-01-03 DIAGNOSIS — M79.672 LEFT FOOT PAIN: ICD-10-CM

## 2022-01-03 DIAGNOSIS — M72.2 PLANTAR FASCIITIS OF RIGHT FOOT: ICD-10-CM

## 2022-01-03 DIAGNOSIS — M76.61 ACHILLES TENDINITIS OF RIGHT LOWER EXTREMITY: ICD-10-CM

## 2022-01-03 PROCEDURE — 99212 OFFICE O/P EST SF 10 MIN: CPT | Performed by: STUDENT IN AN ORGANIZED HEALTH CARE EDUCATION/TRAINING PROGRAM

## 2022-01-03 PROCEDURE — 99213 OFFICE O/P EST LOW 20 MIN: CPT | Performed by: STUDENT IN AN ORGANIZED HEALTH CARE EDUCATION/TRAINING PROGRAM

## 2022-01-03 NOTE — PROGRESS NOTES
diverticulosis    ENDOSCOPY, COLON, DIAGNOSTIC      FIBULA FRACTURE SURGERY Left     LEG SURGERY Right     NERVE BLOCK N/A 10/24/2016    lumbar COLETTE    NERVE BLOCK Right 11/21/2016    lumbar COLETTE    NERVE BLOCK Left 08/13/2018    radiofrequency ablation medial branh block L2-L5    OTHER SURGICAL HISTORY Right 01/26/2017    right hip steroid injection    OTHER SURGICAL HISTORY Right 08/06/2018    NERVE RADIOFREQUENCY ABLATION RIGHT LUMBAR MEDIAL BRANCH L2-L5 (Right )    OTHER SURGICAL HISTORY  12/03/2018    RIGHT L4 L5 EPIDURAL STEROID INJECTION (Right )    OTHER SURGICAL HISTORY  03/25/2019    LUMBAR COLETTE (N/A )    PAIN MANAGEMENT PROCEDURE Right 5/28/2020    EPIDURAL STEROID INJECTION RIGHT L5S1 performed by Bernie Miranda MD at [address] [facility] Right 6/15/2020    EPIDURAL STEROID INJECTION RIGTH L5S1 performed [facility] MD at [facility]/STRD TFRML EPI LUMBAR/SACRAL 1 LEVEL Right 12/3/2018    RIGHT L4 L5 EPIDURAL STEROID INJECTION performed by Bernie Miranda MD at [address] OFFICE/OUTPT [facility] Right 8/6/2018    NERVE RADIOFREQUENCY ABLATION RIGHT LUMBAR MEDIAL BRANCH L2-L5 performed by Bernie Miranda MD at [address] OFFICE/OUTPT [facility] Left 8/13/2018    NERVE RADIOFREQUENCY ABLATION LEFT LUMBAR MEDIAL BRANCH L2-L5 performed by Bernie Miranda MD at [address] N/A 3/25/2019    LUMBAR COLETTE performed by Bernie Miranda MD at [address] History:  Social History     Tobacco Use    Smoking status: Current Every Day Smoker     Packs/day: 0.50     Years: 20.00     Pack years: 10.00     Types: Cigarettes     Start date: 10/2/1979    Smokeless tobacco: Never Used    Tobacco comment: down to 5 cigg. per day   Substance Use Topics    Alcohol use: No     Alcohol/week: 0.0 standard drinks    Drug use: No       Medications:  Prior to Admission medications    Medication Sig Start Date End Date Taking? Authorizing Provider   metaxalone [facility]) 800 MG tablet Take 1 tablet by mouth 2 times daily 12/27/21 1/26/22 Yes SANDRA Ren CNP   oxyCODONE-acetaminophen (PERCOCET) 5-325 MG per tablet Take 1 tablet by mouth 2 times daily as needed for Pain for up to 30 days. 1/11/22 2/10/22 Yes SANDRA Ren CNP   terbinafine (LAMISIL) 1 % cream Apply topically 2 times daily. 12/20/21  Yes Chase Greer DPM   lisinopril-hydroCHLOROthiazide (PRINZIDE;ZESTORETIC) 10-12.5 MG per tablet take 1 tablet by mouth once daily 12/6/21  Yes Karen Campos MD   budesonide-formoterol (SYMBICORT) 160-4.5 MCG/ACT AERO Inhale 2 puffs into the lungs 2 times daily 3/8/21  Yes Karen Campos MD   albuterol sulfate  (90 Base) MCG/ACT inhaler Inhale 2 puffs into the lungs 4 times daily as needed for Wheezing 3/8/21  Yes Karen Campos MD   atorvastatin (LIPITOR) 40 MG tablet Take 1 tablet by mouth daily 3/8/21  Yes Karen Campos MD   tiotropium (SPIRIVA RESPIMAT) 2.5 MCG/ACT AERS inhaler Inhale 2 puffs into the lungs daily 2/12/21  Yes Linda Sinclair MD   metFORMIN (GLUCOPHAGE) 500 MG tablet TAKE 1 TABLET BY MOUTH TWICE DAILY WITH MEALS 11/4/20  Yes Karen Campos MD   lisinopril-hydroCHLOROthiazide (PRINZIDE;ZESTORETIC) 10-12.5 MG per tablet take 1 tablet by mouth once daily 1/7/21   Karen Campos MD       Objective     Vitals:    01/03/22 1249   BP: (!) 151/92       Lab Results   Component Value Date    LABA1C 6.3 (H) 08/03/2021       Physical Exam:  General:  Alert and oriented x3. In no acute distress. leftLower Extremity Physical Exam:    Vascular: DP and PT pulses are palpable. CFT <3 seconds to all digits. Mild, non pitting edema. Hair growth is absent to the level of the digits. Neuro: Saph/sural/SP/DP/plantar sensation diminished to light touch. Protective sensation is intact to 10/10 sites to left, 8/10 to the right. as tested with a 5.07g SWMF.     Musculoskeletal: Muscle strength tests 5/5 to all major muscle groups of the left. Muscle strength testing 4/5 to all major muscle groups of the right leg. Tenderness to palpation of right achilles tendon and the achilles insertion. Pain to palpation of medial tubercle. Decrease ankle ROM of right ankle. Feeling of shooting pain to the foot with ROM of digits 1-5 of right foot. Dermatologic:Nails 1-5 b/l are discolored, with apparent subungual debris. Dry, scaly skin noted in moccasin distribution b/l. No other dermatologic concerns. Imaging: ordered    Assessment   Edy Dobson is a 60 y.o. male with     Diagnosis Orders   1. Right foot pain     2. Left foot pain     3. Achilles tendinitis of right lower extremity     4. Equinus contracture of right ankle     5. Plantar fasciitis of right foot          Plan   · The patient presented to our clinic today for evaluation of right foot/heel pain. 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 · Explained to patient the etiology of achilles tendon pathology and plantar fasciitis. Much of the nerve like foot pain is likely from lack of use of the right foot. · Patient to continue chilles tendon and plantar fasciitis exercises. · Order for physical therapy placed. · Return to clinic 2 months. · Please call the office with any questions or concerns. No orders of the defined types were placed in this encounter. No orders of the defined types were placed in this encounter.       Brittni Barraza DPM   Podiatric Medicine & Surgery   1/3/2022 at 1:31 PM

## 2022-01-13 ENCOUNTER — OFFICE VISIT (OUTPATIENT)
Dept: PAIN MANAGEMENT | Age: 57
End: 2022-01-13
Payer: COMMERCIAL

## 2022-01-13 VITALS
WEIGHT: 260 LBS | DIASTOLIC BLOOD PRESSURE: 95 MMHG | SYSTOLIC BLOOD PRESSURE: 147 MMHG | HEIGHT: 65 IN | HEART RATE: 93 BPM | BODY MASS INDEX: 43.32 KG/M2

## 2022-01-13 DIAGNOSIS — M47.26 OSTEOARTHRITIS OF SPINE WITH RADICULOPATHY, LUMBAR REGION: ICD-10-CM

## 2022-01-13 DIAGNOSIS — Z79.891 CHRONIC USE OF OPIATE DRUG FOR THERAPEUTIC PURPOSE: ICD-10-CM

## 2022-01-13 DIAGNOSIS — M54.41 CHRONIC BILATERAL LOW BACK PAIN WITH RIGHT-SIDED SCIATICA: Primary | Chronic | ICD-10-CM

## 2022-01-13 DIAGNOSIS — M54.16 CHRONIC RADICULAR LUMBAR PAIN: Chronic | ICD-10-CM

## 2022-01-13 DIAGNOSIS — G89.29 CHRONIC BILATERAL LOW BACK PAIN WITH RIGHT-SIDED SCIATICA: Primary | Chronic | ICD-10-CM

## 2022-01-13 DIAGNOSIS — G89.29 CHRONIC RADICULAR LUMBAR PAIN: Chronic | ICD-10-CM

## 2022-01-13 PROCEDURE — 99213 OFFICE O/P EST LOW 20 MIN: CPT | Performed by: NURSE PRACTITIONER

## 2022-01-13 RX ORDER — TIZANIDINE 4 MG/1
4 TABLET ORAL 2 TIMES DAILY
Qty: 60 TABLET | Refills: 2 | Status: SHIPPED | OUTPATIENT
Start: 2022-01-13 | End: 2022-02-12

## 2022-01-13 RX ORDER — OXYCODONE HYDROCHLORIDE AND ACETAMINOPHEN 5; 325 MG/1; MG/1
1 TABLET ORAL 2 TIMES DAILY PRN
Qty: 60 TABLET | Refills: 0 | Status: CANCELLED | OUTPATIENT
Start: 2022-02-10 | End: 2022-03-12

## 2022-01-13 ASSESSMENT — ENCOUNTER SYMPTOMS
CONSTIPATION: 0
RESPIRATORY NEGATIVE: 1
BACK PAIN: 1
EYES NEGATIVE: 1
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
## 2022-02-07 ENCOUNTER — OFFICE VISIT (OUTPATIENT)
Dept: PAIN MANAGEMENT | Age: 57
End: 2022-02-07
Payer: COMMERCIAL

## 2022-02-07 VITALS
OXYGEN SATURATION: 85 % | SYSTOLIC BLOOD PRESSURE: 145 MMHG | BODY MASS INDEX: 43.32 KG/M2 | HEART RATE: 96 BPM | HEIGHT: 65 IN | WEIGHT: 260 LBS | DIASTOLIC BLOOD PRESSURE: 92 MMHG

## 2022-02-07 DIAGNOSIS — Z79.891 CHRONIC USE OF OPIATE DRUG FOR THERAPEUTIC PURPOSE: ICD-10-CM

## 2022-02-07 DIAGNOSIS — M54.41 CHRONIC BILATERAL LOW BACK PAIN WITH RIGHT-SIDED SCIATICA: Primary | Chronic | ICD-10-CM

## 2022-02-07 DIAGNOSIS — M51.9 LUMBAR DISC DISEASE: Chronic | ICD-10-CM

## 2022-02-07 DIAGNOSIS — M47.26 OSTEOARTHRITIS OF SPINE WITH RADICULOPATHY, LUMBAR REGION: ICD-10-CM

## 2022-02-07 DIAGNOSIS — G89.29 CHRONIC BILATERAL LOW BACK PAIN WITH RIGHT-SIDED SCIATICA: Primary | Chronic | ICD-10-CM

## 2022-02-07 PROCEDURE — 99213 OFFICE O/P EST LOW 20 MIN: CPT | Performed by: NURSE PRACTITIONER

## 2022-02-07 RX ORDER — OXYCODONE HYDROCHLORIDE AND ACETAMINOPHEN 5; 325 MG/1; MG/1
1 TABLET ORAL 2 TIMES DAILY PRN
Qty: 60 TABLET | Refills: 0 | Status: SHIPPED | OUTPATIENT
Start: 2022-02-10 | End: 2022-03-11 | Stop reason: SDUPTHER

## 2022-02-07 ASSESSMENT — ENCOUNTER SYMPTOMS
SHORTNESS OF BREATH: 0
BACK PAIN: 1
RESPIRATORY NEGATIVE: 1
CONSTIPATION: 0
COUGH: 0
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
## 2022-02-10 ENCOUNTER — OFFICE VISIT (OUTPATIENT)
Dept: PULMONOLOGY | Age: 57
End: 2022-02-10
Payer: COMMERCIAL

## 2022-02-10 ENCOUNTER — HOSPITAL ENCOUNTER (OUTPATIENT)
Dept: PHYSICAL THERAPY | Facility: CLINIC | Age: 57
Setting detail: THERAPIES SERIES
Discharge: HOME | End: 2022-02-10
Payer: COMMERCIAL

## 2022-02-10 VITALS
RESPIRATION RATE: 22 BRPM | DIASTOLIC BLOOD PRESSURE: 80 MMHG | OXYGEN SATURATION: 99 % | HEIGHT: 65 IN | BODY MASS INDEX: 45.32 KG/M2 | WEIGHT: 272 LBS | HEART RATE: 91 BPM | SYSTOLIC BLOOD PRESSURE: 140 MMHG

## 2022-02-10 DIAGNOSIS — J44.9 OSA AND COPD OVERLAP SYNDROME (HCC): Primary | ICD-10-CM

## 2022-02-10 DIAGNOSIS — G47.33 OSA AND COPD OVERLAP SYNDROME (HCC): Primary | ICD-10-CM

## 2022-02-10 PROCEDURE — 99214 OFFICE O/P EST MOD 30 MIN: CPT | Performed by: INTERNAL MEDICINE

## 2022-02-10 PROCEDURE — 97161 PT EVAL LOW COMPLEX 20 MIN: CPT

## 2022-02-10 RX ORDER — TIOTROPIUM BROMIDE 18 UG/1
18 CAPSULE ORAL; RESPIRATORY (INHALATION) DAILY
Qty: 90 CAPSULE | Refills: 1 | Status: SHIPPED | OUTPATIENT
Start: 2022-02-10 | End: 2022-08-08 | Stop reason: SDUPTHER

## 2022-02-10 ASSESSMENT — SLEEP AND FATIGUE QUESTIONNAIRES
HOW LIKELY ARE YOU TO NOD OFF OR FALL ASLEEP WHILE SITTING AND READING: 3
HOW LIKELY ARE YOU TO NOD OFF OR FALL ASLEEP WHILE LYING DOWN TO REST IN THE AFTERNOON WHEN CIRCUMSTANCES PERMIT: 3
HOW LIKELY ARE YOU TO NOD OFF OR FALL ASLEEP WHILE SITTING QUIETLY AFTER LUNCH WITHOUT ALCOHOL: 3
ESS TOTAL SCORE: 17
HOW LIKELY ARE YOU TO NOD OFF OR FALL ASLEEP WHILE WATCHING TV: 3
HOW LIKELY ARE YOU TO NOD OFF OR FALL ASLEEP WHEN YOU ARE A PASSENGER IN A CAR FOR AN HOUR WITHOUT A BREAK: 3
HOW LIKELY ARE YOU TO NOD OFF OR FALL ASLEEP WHILE SITTING INACTIVE IN A PUBLIC PLACE: 0
HOW LIKELY ARE YOU TO NOD OFF OR FALL ASLEEP WHILE SITTING AND TALKING TO SOMEONE: 1
HOW LIKELY ARE YOU TO NOD OFF OR FALL ASLEEP IN A CAR, WHILE STOPPED FOR A FEW MINUTES IN TRAFFIC: 1

## 2022-02-10 NOTE — PLAN OF CARE
[] [facility]) - [facility] &  Therapy  [address]  [phone]  F: [phone] [x] [address]  [address]\"" [address]   [address]  P: [phone]  F: [phone] [] [address] &  Therapy  [address]  P: [phone]  F: [phone] [] [address]  P: [phone]  F: [phone] [] [address]  [facility]   [address]   [place]: [phone]  F: [phone]        Physical Therapy Plan of Care    Date:  2/10/2022  Patient: Henrry Quiñonez  : 1965  MRN: [id number]  Physician: Lucero Carrizales DPM  Insurance: Sweta Senna  Medical Diagnosis: R foot pain, L foot pain, Achilles tendinitis R LE; Equinus contracture R ankle; plantar fascitis R foot  Rehab Codes: R26.2; M79.661; M25.471; M25.671M62.571  Onset date: 2021               Next 's appt.:     Assessment:   The patient is a pleasant 60year old male who has a history of R LE difficulties for many years. Approximately 8 months ago he had insidious onset pain in the R achilles with a nodule and swelling that appeared. He has pain with first standing up and taking steps. Normally it does subside once he has been walking a bit but at times he is unable to walk due to the pain. It has been gradually worsening. The patient has much tightness in the gastrocsoleus complex and weakness and stiffness in the entire R LE. He will benefit from strengthening and stretching and manual work to further improve flexibility. He has the complicating factor of lower back pain for which he is currently receiving treatment.  He also has pes planus and often wears non supportive shoes.      Patient would benefit from skilled physical therapy services in order to: reduce pain, swelling and improve walking ability by improving flexibility, strength and balance.     Problems:    [x]? ? Pain: 8/10 R achilles   [x]? ? ROM: R knee and all ankle movements and R hip flexor tightness (part of this is long term stiffness)  [x]? ? Strength: R ankle, LE (part of this is long term weakness)  [x]? ? Function: 67.5% deficit per LEFI  [x]? Other: swelling and fascial restrictions in R LE       X pes planus and poor/fair shoe selection     STG: (to be met in 6  treatments)  1. ? Pain: below 8/10 with improved ability to get up from a chair with less pain  2. ? ROM:  5 degrees PROM R ankle with stretching to help reduce pain and improve gait mechanics  3. ? Strength: tolerate strength exercises of the R LE to address weakness present and to give the foot and ankle more support so there is less pain  4. ? Function: wear supportive shoes  5. Patient to be independent with home exercise program as demonstrated by performance with correct form without cues. 6. Demonstrate Knowledge of fall prevention  LTG: (to be met in 12  treatments)  1. Pain below 4/10 and have no perceptible antalgia with getting up from a chair and walking  2. Minimal swelling R lateral ankle/achilles  3. Function below 50% affected  4. Flexibility and strength sufficient for normal function                  Patient goals: lose weight and make my ankle better.      Rehab Potential:  [x]? Good  []? Fair  []? Poor    Suggested Professional Referral:  [x]? No  []? Yes:  Barriers to Goal Achievement:  []? No  [x]? Yes: chronic R LE weakness/stiffness  Domestic Concerns:  [x]? No  []? Yes:     Treatment Plan:  [x]? Therapeutic Exercise   44138               [x]? Therapeutic Activity  16006   [x]? Gait Training    90842   [x]? Neuromuscular Re-education  [id number]   [x]? Manual Therapy  59302   [x]? Instruction in HEP         [x]? Dry Needling, 3 or more muscles  29199   [x]? Vasopneumatic cold with compression  37877               [x]?  Cold/hotpack         Frequency:  2 x/week for 12  visits         Electronically signed by: Red Syed PT        Physician Signature:________________________________Date:__________________  By signing above or cosigning this note, I have reviewed this plan of care and certify a need for medically necessary rehabilitation services.      *PLEASE SIGN ABOVE AND FAX BACK ALL PAGES*

## 2022-02-10 NOTE — PROGRESS NOTES
Ashleyrt Valdes  2/10/2022    Chief Complaint   Patient presents with    COPD    Sleep Apnea    Other     having issues with water weight gain of 10 lbs in 4 days but has started HTN meds correctly     Dyspnea. Morbid obesity. Eleni Christensen i has obstructive sleep apnea syndrome which is being treated with CPAP. However he is not using the CPAP for some time now because he really have no good reason for that. He is very sleepy during the daytime and [place] Sleepiness Scale given to the patient in the office revealed a score of 17 indicating severe degree of daytime sleepiness. He is tired fatigue during the daytime. He also has systemic hypertension. He has no diabetes. Patient has COPD which is being treated with Spiriva Symbicort and use of albuterol on as-needed basis. His COPD is under good control no evidence of acute exacerbation no hemoptysis no chest pain. Unfortunately he continues smoke half pack a day still. He is complaining of pain along the right leg. He is on opioids. These are being managed by the physical therapy people. He has not been able to lose much weight. He already taken the COVID vaccine and flu shot and flu vaccine. Sleep Medicine 2/10/2022 10/16/2020 7/15/2019   Sitting and reading 3 0 2   Watching TV 3 0 3   Sitting, inactive in a public place (e.g. a theatre or a meeting) 0 0 3   As a passenger in a car for an hour without a break 3 1 3   Lying down to rest in the afternoon when circumstances permit 3 0 0   Sitting and talking to someone 1 0 0   Sitting quietly after a lunch without alcohol 3 0 0   In a car, while stopped for a few minutes in traffic 1 0 0   Total score 17 1 11   . She did not complete and upper sleepiness scale. We'll get one last the next visit. Review of Systems -  General ROS: negative for - chills, fatigue, fever or weight loss. He is morbidly obese. No nasal stuffiness. No polyps.   The throat examination with significant redundant tissue. ENT ROS: negative for - headaches, oral lesions or sore throat  Cardiovascular ROS: no chest pain , orthopnea or pnd . He is known to have hypertension, no heart disease. No angina. No proximal legs and her dyspnea. Gastrointestinal ROS: no abdominal pain, change in bowel habits, or black or bloody stools. Peptic ulcer disease. No acid reflux. Skin - no rash   Neuro - no blurry vision , no loc . No focal weakness . He does have some weakness of the right leg since the injury and surgery many years back. msk - no jt tenderness or swelling  . No acute arthritis. He haven't had a soft tissue injury to the back of his right hand and the left hand which has been taken care and is healing well. Vascular - no claudication , rest completed and negative . No evidence of DVT  Lymphatic - complete and negative for large lymph node  Hematology - oncology - complete and negative . Now anemia, bleeding disorder  Allergy immunology - complete and negative    no burning or hematuria . No hematuria      LUNG CANCER SCREENING     1. CRITERIA MET    [x]     CT ORDERED  [x]      2. CRITERIA NOT MET   []      3. REFUSED                    []        REASON CRITERIA NOT MET     1. SMOKING LESS THAN 30 PY  []      2. AGE LESS THAN 55 or GREATER 77 YEARS  []      3. QUIT SMOKING 15 YEARS OR GREATER   []      4. RECENT CT WITH IN 11 MONTHS    []      5. LIFE EXPECTANCY < 5 YEARS   []      6.  SIGNS  AND SYMPTOMS OF LUNG CANCER   []           Immunization   Immunization History   Administered Date(s) Administered    COVID-19, Zaria Kaitlin, Primary or Immunocompromised, PF, 100mcg/0.5mL 03/08/2021, 04/05/2021        Pneumococcal Vaccine     [] Up to date    [x] Indicated   [] Refused  [] Contraindicated       Influenza Vaccine   [x] Up to date    [] Indicated   [] Refused  [] Contraindicated       PAST MEDICAL HISTORY:         Diagnosis Date    Asthma     Diabetes mellitus ([facility] [address]     HTN (hypertension)     Hyperlipidemia     Hypertension     Lung disease     Migraine     Neuropathy     Positive FIT (fecal immunochemical test)     Renal failure     Sleep apnea        Family History:       Problem Relation Age of Onset    Diabetes Mother     Heart Disease Mother     Cancer Mother     Heart Disease Maternal Grandmother        SURGICAL HISTORY:   Past Surgical History:   Procedure Laterality Date    ANESTHESIA NERVE BLOCK Bilateral 9/18/2017    NERVE BLOCK BILATERAL MEDIAL BRANCH L2-L5 performed by Priscilla Ghosh MD at [facility] Bilateral 10/17/2017    [address] L2-L5 performed by Priscilla Ghosh MD at [address]  03/08/2017    COLONOSCOPY  03/08/2017    internal hemorrhoids; mild diverticulosis    ENDOSCOPY, COLON, DIAGNOSTIC      FIBULA FRACTURE SURGERY Left     LEG SURGERY Right     NERVE BLOCK N/A 10/24/2016    lumbar COLETTE    NERVE BLOCK Right 11/21/2016    lumbar COLETTE    NERVE BLOCK Left 08/13/2018    radiofrequency ablation medial branh block L2-L5    OTHER SURGICAL HISTORY Right 01/26/2017    right hip steroid injection    OTHER SURGICAL HISTORY Right 08/06/2018    NERVE RADIOFREQUENCY ABLATION RIGHT LUMBAR MEDIAL BRANCH L2-L5 (Right )    OTHER SURGICAL HISTORY  12/03/2018    RIGHT L4 L5 EPIDURAL STEROID INJECTION (Right )    OTHER SURGICAL HISTORY  03/25/2019    LUMBAR COLETTE (N/A )    PAIN MANAGEMENT PROCEDURE Right 5/28/2020    EPIDURAL STEROID INJECTION RIGHT L5S1 performed by Priscilla Ghosh MD at [address] Right 6/15/2020    EPIDURAL STEROID INJECTION RIGTH L5S1 performed by Priscilla Ghosh MD at [address] AA&/STRD TFRML EPI LUMBAR/SACRAL 1 LEVEL Right 12/3/2018    RIGHT L4 L5 EPIDURAL STEROID INJECTION performed by Priscilla Ghosh MD at [address] OFFICE/OUTPT [address] [facility] Right 8/6/2018    NERVE RADIOFREQUENCY ABLATION RIGHT LUMBAR MEDIAL BRANCH L2-L5 performed by Ashia Pires MD at [address] OFFICE/OUTPT [address] Left 8/13/2018    NERVE RADIOFREQUENCY ABLATION LEFT LUMBAR MEDIAL BRANCH L2-L5 performed by Ashia Pires MD at [facility] N/A 3/25/2019    LUMBAR COLETTE performed by Ashia Pires MD at [address] [facility]              Not in a hospital admission. Allergies   Allergen Reactions    Amitriptyline Anaphylaxis    Paroxetine Other (See Comments)    Paxil [Paroxetine Hcl] Other (See Comments)     Social History     Tobacco Use   Smoking Status Current Every Day Smoker    Packs/day: 0.50    Years: 20.00    Pack years: 10.00    Types: Cigarettes    Start date: 10/2/1979   Smokeless Tobacco Never Used   Tobacco Comment    down to 5 cigg. per day     Prior to Admission medications    Medication Sig Start Date End Date Taking? Authorizing Provider   tiotropium (SPIRIVA HANDIHALER) 18 MCG inhalation capsule Inhale 1 capsule into the lungs daily 2/10/22  Yes Leeanna Brink MD   oxyCODONE-acetaminophen (PERCOCET) 5-325 MG per tablet Take 1 tablet by mouth 2 times daily as needed for Pain for up to 30 days. 2/10/22 3/12/22 Yes SANDRA Cho CNP   tiZANidine (ZANAFLEX) 4 MG tablet Take 1 tablet by mouth 2 times daily 1/13/22 2/12/22 Yes SANDRA Cho CNP   terbinafine (LAMISIL) 1 % cream Apply topically 2 times daily.  12/20/21  Yes Casi Sandoval DPM   lisinopril-hydroCHLOROthiazide (PRINZIDE;ZESTORETIC) 10-12.5 MG per tablet take 1 tablet by mouth once daily 12/6/21  Yes Brittney Jauregui MD   albuterol sulfate  (90 Base) MCG/ACT inhaler Inhale 2 puffs into the lungs 4 times daily as needed for Wheezing 3/8/21  Yes Brittney Jauregui MD   atorvastatin (LIPITOR) 40 MG tablet Take 1 tablet by mouth daily 3/8/21  Yes Brittney Jauregui MD   tiotropium (SPIRIVA RESPIMAT) 2.5 MCG/ACT AERS inhaler Inhale 2 puffs into the lungs daily 2/12/21  Yes Kely Mackey MD   metFORMIN (GLUCOPHAGE) 500 MG tablet TAKE 1 TABLET BY MOUTH TWICE DAILY WITH MEALS 11/4/20  Yes Jabier Garcia MD   lisinopril-hydroCHLOROthiazide (PRINZIDE;ZESTORETIC) 10-12.5 MG per tablet take 1 tablet by mouth once daily 1/7/21   Jabier Garcia MD         Physical Exam unchanged no rales or rhonchi no respiratory distress  General Appearance:    Alert, cooperative, no distress, appears stated age, morbid obesity, not in any distress, not using any accessory muscles    Head:    Normocephalic, without obvious abnormality, atraumatic   Examination revealed no jaundice. No Rod's syndrome. No other real no polyps. No sinus tenderness. Throat examination revealed a redundant tissue in the oropharynx. Uvula is swollen and low    Ear examination is normal                :    Neck:   Supple, symmetrical, trachea midline, no adenopathy;     thyroid:  no enlargement/tenderness/nodules; no carotid    bruit or JVD. Neck is short and fat    Back:     Symmetric, no curvature, ROM normal, no CVA tenderness   Lungs:       AP diameter is increased. Percussion note is hyperresonant expiration prolonged. No rales, rhonchi are audible. No pleural friction rub is audible    Chest Wall:    No tenderness or deformity      Heart:    Regular rate and rhythm, S1 and S2 normal, no murmur, rub        or gallop no rvh                           Abdomen:                                                 Pulses:                                            Lymph nodes:                    Neurologic:                  Soft, non-tender, bowel sounds active all four quadrants,     no masses, no organomegaly         2+ and symmetric all extremities            Cervical, supraclavicular not enlarged or matted or tender      CNII-XII intact, normal strength 5/5 .   Sensation grossly normal  and reflexes normal 2+  throughout     Clubbing No  Lower ext edema No1+   [] , 2 +  [] , 3+   []  Upper ext edema No       Musculoskeletal - no joint swelling or tenderness or synovitis               BP (!) 140/80   Pulse 91   Resp 22   Ht 5' 5\" (1.651 m)   Wt 272 lb (123.4 kg)   SpO2 99% Comment: Room air  BMI 45.26 kg/m²     CXR  No recent chest x-ray      CT Scans  No recent. No recent CT scan    Echo  No echocardiogram        Assessment  COPD  Chronic smoker  Obstructive sleep apnea syndrome  Hypertension  Controlled diabetes  Plan:  Patient pulmonary . Status is very stable. He has no evidence of acute exacerbation of COPD using his bronchodilators regularly. His prescription for was refilled. Unfortunately he continues smoke. I spent an hour trying to convince the patient to give up smoking altogether. He will try to cut down progressively and hopefully get off of the smoking habit. He will continue to use pain control measures as suggested by physical therapy. His hypertension is under good control blood sugar is stable    He is very sleepy during the daytime. He denies using a CPAP. He has sleep apnea syndrome. I arrange for him to go back to the sleep center for retitration. I encouraged him to lose weight. He already have taken the COVID vaccine. Plan to see in follow-up after the sleep study is performed dictated by Dr. David Foster MD dictation over thank you      5/3/19 at 10:35 AM  Please note that this chart was generated using voice recognition Dragon dictation software. Although every effort was made to ensure the accuracy of this automated transcription, some errors in transcription may have occurred. Detail Level: Zone Continue Regimen: Continue otc cerave anti-itch moisturizer \\nContinue hydroxyzine as previously prescribed by pcp.

## 2022-02-10 NOTE — CONSULTS
Tests: [x] X-Ray: [] MRI:  [] Other:  1. No acute bony process is seen involving the left hip or feet. 2. Minimal degenerative changes of the left foot. 3. Right foot demonstrates gross demineralization with mild degenerative   changes particularly involving the forefoot. lumbar xray was negative;    Medications: [x] Refer to full medical record  [x] Other: mm relaxers help with pain  Allergies:      [x] Refer to full medical record     Function:  Hand Dominance    [x] Left  Patient lives with: With 2 children   In what type of home    [x] Two story      Bathroom has a   [x] Tub/shower combo holds wall; Washing machine is on   [x] Basement- takes his time. Does stairs one at a time. Employer    Job Status    [x] Disability     Work activities/duties Works on cars. If it is a bad day he won't do it. Was walking for years about 20-30 min. (stopping to talk); it has been 8 months since he has done it. Does stretches, band exercise at home       ADL/IADL Previous level of function Current level of function Who currently assists the patient with task   Bathing  [x] Independent [x] Independent    Dress/grooming [x] Independent [x] Independent    Transfer/mobility [x] Independent [x] Independent    Feeding [x] Independent [x] Independent    Toileting [x] Independent [x] Independent    Driving [x] Independent [x] Independent    Housekeeping [x] Independent [x] Independent    Grocery shop/meal prep [x] Independent [x] Independent      Gait Prior level of function Current level of function    [x] Independent   [x] Independent     Device: [x] Independent [x] Independent     [x] Straight Cane only uses it if he absolutely needs it     Pain:  [x] Yes   Location: R achilles Pain Rating: (0-10 scale) 5-7-8-10/10- hip/LB; achilles 8/10  Pain altered Tx:    [x] No      Symptoms:   [x] Worsening   Better:    [x] Sit     [x] Other: contrast bath about every other day.   Worse:    [x]Stand 1st few steps   [x] Walk  Sleep: [x] OK    [x] Disturbed- not due to achilles    Objective: L ankle with pin in distal tibfib. ROM  ° A/P STRENGTH    Left Right Left Right   Hip Flex  tight 5 5   Ext    4   ER       IR       ABD       ADD       Knee Flex  120     Ext  -11/-6 5 4-   Ankle DF 5 -15burns in ach 5 4-   PF 50 42 5 3+   INV 22 5P 5 2-   EVER 10 5 4+ 2-              OBSERVATION No Deficit Deficit Not Tested Comments   Posture       Genu Valgus [] [x] []    Genu Varus [x] [] []    Genu Recurvatum [] [] []    Pronation [] [x] [] Pes planus L more than R no orthotic   Supination [] [] []    Leg Length Discrp [] [] []    Slumped Sitting [] [] []    Palpation [] [x] []  tender distal tendon above calcaneous    Sensation [] [] []    Edema [] [x] [] Swelling just lateral to achilles tendon at level of malleolus; Neurological [] [] []    Gait [] [x] [] Analysis: Gait: ambulates with ERd R LE; \"appears\" short. First few steps increased antalgia due to heel pain. FUNCTION Normal Difficult Unable   Sitting [x] [] []   Standing [] [x] []   Ambulation [] [x] []   Groom/Dress [x] [] []   Lift/Carry [] [x] []   Stairs [] [] []   Bending [] [] []   Squat [] [] []   Kneel [] [] []     BALANCE/PROPRIOCEPTION              [] Not tested   Single leg stance       R                     L                                PAIN   Eyes open                  <1      Sec. 12   Sec                       . [x]    Eyes closed                          Sec. Sec                  . []        FUNCTIONAL TESTS PAIN NO PAIN COMMENTS   Step Test 4 [] []    6 [] []    8 [] []    Squat [x] [] Squats on L side; avoids R LE- mostly due to hip issues on R     Functional Test: LEFI Score: 67.5% functionally impaired     Comments:    Assessment:  The patient is a pleasant 60year old male who has a history of R LE difficulties for many years.  Approximately 8 months ago he had insidious onset pain in the R achilles with a nodule and swelling that appeared. He has pain with first standing up and taking steps. Normally it does subside once he has been walking a bit but at times he is unable to walk due to the pain. It has been gradually worsening. The patient has much tightness in the gastrocsoleus complex and weakness and stiffness in the entire R LE. He will benefit from strengthening and stretching and manual work to further improve flexibility. He has the complicating factor of lower back pain for which he is currently receiving treatment. He also has pes planus and often wears non supportive shoes. Patient would benefit from skilled physical therapy services in order to: reduce pain, swelling and improve walking ability by improving flexibility, strength and balance. Problems:    [x] ? Pain: 8/10 R achilles   [x] ? ROM: R knee and all ankle movements and R hip flexor tightness (part of this is long term stiffness)  [x] ? Strength: R ankle, LE (part of this is long term weakness)  [x] ? Function: 67.5% deficit per LEFI  [x] Other: swelling and fascial restrictions in R LE    X pes planus and poor/fair shoe selection    STG: (to be met in 6  treatments)  1. ? Pain: below 8/10 with improved ability to get up from a chair with less pain  2. ? ROM:  5 degrees PROM R ankle with stretching to help reduce pain and improve gait mechanics  3. ? Strength: tolerate strength exercises of the R LE to address weakness present and to give the foot and ankle more support so there is less pain  4. ? Function: wear supportive shoes  5. Patient to be independent with home exercise program as demonstrated by performance with correct form without cues. 6. Demonstrate Knowledge of fall prevention  LTG: (to be met in 12  treatments)  1. Pain below 4/10 and have no perceptible antalgia with getting up from a chair and walking  2. Minimal swelling R lateral ankle/achilles  3. Function below 50% affected  4.  Flexibility and strength sufficient for normal function                  Patient goals: lose weight and make my ankle better. Rehab Potential:  [x] Good  [] Fair  [] Poor   Suggested Professional Referral:  [x] No  [] Yes:  Barriers to Goal Achievement:  [] No  [x] Yes: chronic R LE weakness/stiffness  Domestic Concerns:  [x] No  [] Yes:    Pt. Education:  [x] Plans/Goals, Risks/Benefits discussed  [] Home exercise program    Method of Education: [x] Verbal  [] Demo  [] Written  Comprehension of Education:  [x] Verbalizes understanding. [] Demonstrates understanding. [] Needs Review. [] Demonstrates/verbalizes understanding of HEP/Ed previously given. Treatment Plan:  [x] Therapeutic Exercise   37842    [x] Therapeutic Activity  14869   [x] Gait Training   [phone]   [x] Neuromuscular Re-education  09363   [x] Manual Therapy  26241   [x] Instruction in HEP    [x] Dry Needling, 3 or more muscles  58902   [x] Vasopneumatic cold with compression  21402    [x] Cold/hotpack       Frequency:  2  x/week for 12  visits        Todays Treatment:  Modalities:   Precautions:  Exercises:  Exercise Reps/ Time Weight/ Level Comments                                 Other:    Specific Instructions for next treatment: stretch gastrocsoleus complex, knee extension, hip flexors (careful with LB and chronic R LE problems). Strengthen LE; manual work to achilles and plantar fascia. FALL PREVENTION STRATEGIES ? kinesiotape; ?DN      Evaluation Complexity:  History (Personal factors, comorbidities) [] 0 [] 1-2 [x] 3+   Exam (limitations, restrictions) [] 1-2 [] 3 [x] 4+   Clinical presentation (progression) [x] Stable [] Evolving  [] Unstable   Decision Making [x] Low [] Moderate [] High    [x] Low Complexity [] Moderate Complexity [] High Complexity       Treatment Charges: Mins Units   [x] Evaluation       [x]  Low       []  Moderate       []  High 38 1   []  Modalities     []  Ther Exercise     []  Manual Therapy     []  Ther Activities     []  Aquatics     [] Vasocompression     []  Other     MEDICARE TRACKING: as of 2/10/22: $98.01    TOTAL TREATMENT TIME: 40 min    Time in: 11:12 am   Time Out: 12:00 pm    Electronically signed by: Jorge Chapman PT        Physician Signature:________________________________Date:__________________  By signing above or cosigning this note, I have reviewed this plan of care and certify a need for medically necessary rehabilitation services.      *PLEASE SIGN ABOVE AND FAX BACK ALL PAGES*

## 2022-02-10 NOTE — CONSULTS
[facility] Fall Risk Assessment    Patient Name:  Kvng Marie  : 1965        Risk Factor Scale  Score   History of Falls [] Yes  [x] No 25  0 0   Secondary Diagnosis [x] Yes  [] No 15  0 15   Ambulatory Aid [] Furniture  [] Crutches/cane/walker  [x] None/bedrest/wheelchair/nurse 30  15  0 0   IV/Heparin Lock [] Yes  [x] No 20  0 0   Gait/Transferring [x] Impaired  [] Weak  [] Normal/bedrest/immobile 20  10  0 20   Mental Status [] Forgets limitations  [x] Oriented to own ability 15  0 0      Total:   35     Based on the Assessment score: check the appropriate box.     []  No intervention needed   Low =   Score of 0-24    [x]  Use standard prevention interventions Moderate =  Score of 24-44   [x] Give patient handout and discuss fall prevention strategies   [x] Establish goal of education for patient/family RE: fall prevention strategies    []  Use high risk prevention interventions High = Score of 45 and higher   [] Give patient handout and discuss fall prevention strategies   [] Establish goal of education for patient/family Re: fall prevention strategies   [] Discuss lifeline / other resources    Electronically signed by:   Ashley Kearney, PT  Date: 2/10/2022

## 2022-02-14 ENCOUNTER — TELEPHONE (OUTPATIENT)
Dept: PULMONOLOGY | Age: 57
End: 2022-02-14

## 2022-02-16 NOTE — TELEPHONE ENCOUNTER
CALLED PHONE NUMBER ON BACK OF PT'S INSURANCE CARD [phone] SPOKE WITH MARKO. HE IS PROCESSING A PA FORM FOR THIS. WAITING ON REPLY.

## 2022-02-21 ENCOUNTER — HOSPITAL ENCOUNTER (OUTPATIENT)
Dept: PHYSICAL THERAPY | Facility: CLINIC | Age: 57
Setting detail: THERAPIES SERIES
Discharge: HOME | End: 2022-02-21
Payer: COMMERCIAL

## 2022-02-21 PROCEDURE — 97110 THERAPEUTIC EXERCISES: CPT

## 2022-02-21 PROCEDURE — 97016 VASOPNEUMATIC DEVICE THERAPY: CPT

## 2022-02-21 NOTE — FLOWSHEET NOTE
[] [facility]) - [facility] &  Therapy  [address]  [phone]  F: [phone] [x] [address]  [facility]   [address]  P: [phone]  F: [phone] [] [address] &  Therapy  [address]  P: [phone]  F: [phone] [] [address]  P: [phone]  F: [phone] [] [address]  [facility]   [address]   [place]: [phone]  F: [phone]      Physical Therapy Daily Treatment Note    Date:  2022  Patient Name:  Jerry Andersen    :  1965  MRN: [id number]  Physician: Deven Nolan DPM  Insurance: "[employer]"  Medical Diagnosis: R foot pain, L foot pain, Achilles tendinitis R LE; Equinus contracture R ankle; plantar fascitis R foot  Rehab Codes: R26.2; H93.409; M25.471; M25.671M62.571  Onset date: 2021               Next 's appt. :   Visit# / total visits: ; Progress note for Medicare patient due at visit 10     Cancels/No Shows: 0    Subjective:    Pain:  [x] Yes  [] No Location: R LE Pain Rating: (0-10 scale) 9/10  Pain altered Tx:  [] No  [x] Yes  Action: d/t hypersensitivity pt declined manual treatment this date, exercises completed weight bearing limited positions. Comments:  Pt with unknown cause for high pain levels. Requests LE to not be touched this date as he is hypersensitive.      Objective:  Modalities: vasocompression- 10' at end of session, min compression, 34 degrees  Precautions: hypersensitivity   Exercises:  Exercise Reps/ Time Weight/ Level Comments   Nustep  5' Lv 1 Warm up         Soleus stretch 3x 30\" Using strap, seated   gastroc stretch 3x 30\" Using strap, long seated   HS stretch with DF 3x 30\" Using strap   Hip flexor stretch 1x 1' Leg off edge of plinth   DF/PF  2x10 AROM by performance with correct form without cues. 6. Demonstrate Knowledge of fall prevention- met, handout issued 2/21/22  LTG: (to be met in 12  treatments)  1. Pain below 4/10 and have no perceptible antalgia with getting up from a chair and walking  2. Minimal swelling R lateral ankle/achilles  3. Function below 50% affected  4. Flexibility and strength sufficient for normal function                  Patient goals: lose weight and make my ankle better. Pt. Education:  [x] Yes  [] No  [] Reviewed Prior HEP/Ed  Method of Education: [x] Verbal  [x] Demo  [x] Written [employer] HEP + fall prevention handout   Access Code: [email or web address]  URL: [email or web address]  Date: 02/21/2022  Prepared by: Lopez Daniels  Exercises  Long Sitting Calf Stretch with Strap - 1 x daily - 7 x weekly - 3 reps - 30 second hold  Seated Soleus Stretch with Strap - 1 x daily - 7 x weekly - 3 reps - 30 second hold  Supine Hamstring Stretch with Strap - 1 x daily - 7 x weekly - 3 reps - 30 second hold  Modified Maldonado Stretch - 1 x daily - 7 x weekly - 1 reps - 1 minute hold  Long Sitting Ankle Pumps - 1 x daily - 7 x weekly - 2 sets - 10 reps  Supine Ankle Circles - 1 x daily - 7 x weekly - 2 sets - 10 reps  Seated Ankle Alphabet - 1 x daily - 7 x weekly - 1 reps  Seated Heel Toe Raises - 1 x daily - 7 x weekly - 2 sets - 10 reps  Ankle Inversion Eversion Towel Slide - 1 x daily - 7 x weekly - 2 sets - 10 reps  Towel Scrunches - 1 x daily - 7 x weekly - 1 minute hold  Comprehension of Education:  [x] Verbalizes understanding. [x] Demonstrates understanding. [x] Needs review. [] Demonstrates/verbalizes HEP/Ed previously given. Plan: [x] Continue current frequency toward long and short term goals. [x] Specific Instructions for subsequent treatments: stretch gastrocsoleus complex, knee extension, hip flexors (careful with LB and chronic R LE problems). Strengthen LE; manual work to achilles and plantar fascia. ? kinesiotape; ?DN      Time In: 12:00pm             Time Out: 1:01pm    Electronically signed by:  Collins Martinez PTA

## 2022-02-22 ENCOUNTER — APPOINTMENT (OUTPATIENT)
Dept: PHYSICAL THERAPY | Facility: CLINIC | Age: 57
End: 2022-02-22
Payer: COMMERCIAL

## 2022-02-24 ENCOUNTER — HOSPITAL ENCOUNTER (OUTPATIENT)
Dept: PHYSICAL THERAPY | Facility: CLINIC | Age: 57
Setting detail: THERAPIES SERIES
Discharge: HOME | End: 2022-02-24
Payer: COMMERCIAL

## 2022-02-24 PROCEDURE — 97140 MANUAL THERAPY 1/> REGIONS: CPT

## 2022-02-24 PROCEDURE — 97110 THERAPEUTIC EXERCISES: CPT

## 2022-02-24 NOTE — FLOWSHEET NOTE
[] [facility]) - [facility] &  Therapy  [address]  [phone]  F: [phone] [x] [address]  [facility]   [address]  P: [phone]  F: [phone] [] [address] &  Therapy  [address] [facility]  P: [phone]  F: [phone] [] [address]  P: [phone]  F: [phone] [] [address]  [facility]   [address]   [place]: [phone]  F: [phone]      Physical Therapy Daily Treatment Note    Date:  2022  Patient Name:  Eli Ahn    :  1965  MRN: [id number]  Physician: Trista Fu DPM  Insurance: [employer] 12 visits approved 2/10-4/10/2022  Medical Diagnosis: R foot pain, L foot pain, Achilles tendinitis R LE; Equinus contracture R ankle; plantar fascitis R foot  Rehab Codes: R26.2; Z16.191; M25.471; M25.671M62.571  Onset date: 2021               Next 's appt. :   Visit# / total visits: 3/12; Progress note for Medicare patient due at visit 10     Cancels/No Shows: 0    Subjective:    Pain:  [x] Yes  [] No Location: R LE Pain Rating: (0-10 scale) 8/10  Pain altered Tx:  [x] No  [] Yes  Action     Comments:  Pt walked around the track this morning one time which is 0.25 miles. He feels this helps his ability to move when here in therapy. Reports he has been doing his HEP \"like crazy\". Objective: 22: wearing athletic shoes into therapy today.    Modalities:  Not 22, therapist forgot: vasocompression- 10' at end of session, min compression, 34 degrees  Precautions: hypersensitivity  - paralyzed for \"a long time\"; fasciotomy R hip- has chronic pain/issues with  R LE  Exercises:  Exercise Reps/ Time Weight/ Level Comments   Nustep  6' Lv 1 Warm up- inc 22         Soleus stretch 3x 30\" Using strap, seated   gastroc stretch 3x 30\" Using strap, long seated   HS stretch with DF 3x 30\" Using strap   Hip flexor stretch 1x 1' Leg off edge of plinth   DF/PF  2x10 AROM    INV/EV 10 AA Attempted but unable to isolate in this plane of motion   Ankle circles 10x ea AROM CW/CCW   Ankle alphabet  1x  At home   Toe flexion 5 AA/isometric Started 2/24/22 - work on mm contraction and hold 5 sec   Toe extension 4 AA/isometric More difficult than flexion; Fatigues more readily         Seated heel raises 2x10     Seated toe raises 2x10     Inversion/eversion towel slide 2x10     Toe curls with towel  1'           standing      Step up 10ea 6\" Started 2/24/22   Step down 10ea L4\"R2\" (L,R= which leg is on step); started 2/24/22   Eccentric calf lowers 10R only At steps Started 2/24/22: up with both, shift weight to R (but L still on floor), lower primarily with R         Slant board soleus stretch 2 45 sec Started 2/24/22   Slant board calf stretch 2x 1'    Other:   Manual therapy: L side lying (pt with difficulty lying prone): Myofascial release to calf, achilles to foot, being careful to avoid sensitivity, especially top of foot. 2/24/22: B heel lift issued (size M). Treatment Charges: Mins Units   []  Modalities     [x]  Ther Exercise 43 3   [x]  Manual Therapy 10 1   []  Ther Activities     []  Aquatics     []  Vasocompression     []  Other     Total Treatment time 53 4   MEDICARE TRACKING: as of 02/24/22 $278.34    Assessment: [x] Progressing toward goals. The patient had improved AROM with ankle inversion and eversion. Toe curls and extension were challenging and fatiguing. The patient continues to be sensitive to touch top of foot especially and declined to try k-tape. The patient tolerated some Myofascial release but had to do it over the sock for the achilles and foot due to hypersensitivity.   The patient felt the heel lifts did take some pressure off the achilles with the brief amount of time he was in the clinic. The patient was instructed to wear them bilaterally as long as it didn't pose any difficulties. The patient is unable to take full weight on R LE with eccentric calf work. Also, painful with step down with R leg on step and 2\" maximum step height used this date. [] No change. [] Other:  [x] Patient would continue to benefit from skilled physical therapy services in order to: reduce pain, swelling and improve walking ability by improving flexibility, strength and balance. Problems:    [x]? ? Pain: 8/10 R achilles   [x]? ? ROM: R knee and all ankle movements and R hip flexor tightness (part of this is long term stiffness)  [x]? ? Strength: R ankle, LE (part of this is long term weakness)  [x]? ? Function: 67.5% deficit per LEFI  [x]? Other: swelling and fascial restrictions in R LE       X pes planus and poor/fair shoe selection     STG: (to be met in 6  treatments)  1. ? Pain: below 8/10 with improved ability to get up from a chair with less pain  2. ? ROM:  5 degrees PROM R ankle with stretching to help reduce pain and improve gait mechanics  3. ? Strength: tolerate strength exercises of the R LE to address weakness present and to give the foot and ankle more support so there is less pain  4. ? Function: wear supportive shoes: MET 2/24/22  5. Patient to be independent with home exercise program as demonstrated by performance with correct form without cues. 6. Demonstrate Knowledge of fall prevention- met, handout issued 2/21/22  LTG: (to be met in 12  treatments)  1. Pain below 4/10 and have no perceptible antalgia with getting up from a chair and walking  2. Minimal swelling R lateral ankle/achilles  3. Function below 50% affected  4. Flexibility and strength sufficient for normal function                  Patient goals: lose weight and make my ankle better.     Pt. Education:  [x] Yes  [] No  [x] Reviewed Prior HEP/Ed  Method of Education: [x] Verbal  [x] Demo 2/24/22:  [] Written [employer] HEP + fall prevention handout   Access Code: [email or web address]  URL: [email or web address]  Date: 02/21/2022  Prepared by: Monika Cherry  Exercises  Long Sitting Calf Stretch with Strap - 1 x daily - 7 x weekly - 3 reps - 30 second hold  Seated Soleus Stretch with Strap - 1 x daily - 7 x weekly - 3 reps - 30 second hold  Supine Hamstring Stretch with Strap - 1 x daily - 7 x weekly - 3 reps - 30 second hold  Modified Maldonado Stretch - 1 x daily - 7 x weekly - 1 reps - 1 minute hold  Long Sitting Ankle Pumps - 1 x daily - 7 x weekly - 2 sets - 10 reps  Supine Ankle Circles - 1 x daily - 7 x weekly - 2 sets - 10 reps  Seated Ankle Alphabet - 1 x daily - 7 x weekly - 1 reps  Seated Heel Toe Raises - 1 x daily - 7 x weekly - 2 sets - 10 reps  Ankle Inversion Eversion Towel Slide - 1 x daily - 7 x weekly - 2 sets - 10 reps  Towel Scrunches - 1 x daily - 7 x weekly - 1 minute hold  Comprehension of Education:  [x] Verbalizes understanding. [x] Demonstrates understanding. [x] Needs review. [] Demonstrates/verbalizes HEP/Ed previously given. Plan: [x] Continue current frequency toward long and short term goals. [x] Specific Instructions for subsequent treatments: stretch gastrocsoleus complex, knee extension, hip flexors (careful with LB and chronic R LE problems). Strengthen LE; manual work to achilles and plantar fascia. ? kinesiotape; ?DN      Time In: 10:01 am             Time Out: 11:00 am    Electronically signed by:  Shanice Gómez, PT

## 2022-03-01 ENCOUNTER — HOSPITAL ENCOUNTER (OUTPATIENT)
Dept: PHYSICAL THERAPY | Facility: CLINIC | Age: 57
Setting detail: THERAPIES SERIES
Discharge: HOME | End: 2022-03-01
Payer: COMMERCIAL

## 2022-03-01 NOTE — FLOWSHEET NOTE
[] [facility] [address] [facility]  [address]    [phone]  F: [phone]   [x] [address]  [facility]   [address]  P: [phone]  F: [phone]  [] [place]  [address]  P: [phone]  F: [phone] [] [address]  P: [phone]  F: [phone]  [] [address]  [address] [facility] [address]   [address]   [place]: [phone]  F: [phone]   [] [place] [address] [facility] [address]  [place]: [phone]   F: [phone]     Physical Therapy Cancel/No Show note    Date: 3/1/2022  Patient: Lucas Grullon  : 1965  MRN: [id number]    Cancels/No Shows to date:     For today's appointment patient:    [x]  Cancelled    [] Rescheduled appointment    [] No-show     Reason given by patient:    [x]  Patient ill    []  Conflicting appointment    [] No transportation      [] Conflict with work    [] No reason given    [] Weather related    [] [id number]    [] Other:      Comments:        [x] Next appointment was confirmed    Electronically signed by: Rustam Amin PTA

## 2022-03-03 ENCOUNTER — HOSPITAL ENCOUNTER (OUTPATIENT)
Dept: PHYSICAL THERAPY | Facility: CLINIC | Age: 57
Setting detail: THERAPIES SERIES
Discharge: HOME | End: 2022-03-03
Payer: COMMERCIAL

## 2022-03-03 PROCEDURE — 97140 MANUAL THERAPY 1/> REGIONS: CPT

## 2022-03-03 PROCEDURE — 97110 THERAPEUTIC EXERCISES: CPT

## 2022-03-03 NOTE — FLOWSHEET NOTE
therapy today. Modalities:  Not 03/03/22,pt declined: vasocompression- 10' at end of session, min compression, 34 degrees  Precautions: hypersensitivity 2002 - paralyzed for \"a long time\"; fasciotomy R hip- has chronic pain/issues with  R LE  Exercises: bolded 03/03/22   Exercise Reps/ Time Weight/ Level Comments   Nustep  6' Lv 1 Warm up- inc 2/24/22         Soleus stretch 3x 30\" Using strap, seated   gastroc stretch 3x 30\" Using strap, long seated   HS stretch with DF 3x 30\" Using strap   Hip flexor stretch 1x 1' Leg off edge of plinth   DF/PF  2x10 AROM    INV/EV 10 AA Attempted but unable to isolate in this plane of motion   Ankle circles 10x ea AROM CW/CCW   Ankle alphabet  1x  At home   Toe flexion 5 AA/isometric Started 2/24/22 - work on mm contraction and hold 5 sec   Toe extension 4 AA/isometric More difficult than flexion; Fatigues more readily         Seated heel raises 2x10     Seated toe raises 2x10     Inversion/eversion towel slide 2x10     Toe curls with towel  1'           standing      Side stepping 30ft ea way A Started 3/3/22   Step up 15ea 6\" With opposite knee drive; progressed 6/4/39   Step down 15ea L4\"R2\" (L,R= which leg is on step); progressed 3/3/22   Eccentric calf lowers 15R only At steps Started 2/24/22: up with both, shift weight to R (but L still on floor), lower primarily with R         Slant board soleus stretch 2 45 sec Started 2/24/22   Slant board calf stretch 2x 1'    Other:   Manual therapy: L side lying (pt with difficulty lying prone): Myofascial release to calf, achilles to foot, being careful to avoid sensitivity, especially top of foot. R hip flexor release  2/24/22: B heel lift issued (size M). - not wearing 3/3/22    kinesiotape : unloading to R achilles with Y strip overlaid with butterfly strip.  Instructed in wear/removal.     Treatment Charges: Mins Units   []  Modalities     [x]  Ther Exercise 43 3   [x]  Manual Therapy 10 1   []  Ther Activities     []  Aquatics []  Vasocompression     []  Other     Total Treatment time 53 4   MEDICARE TRACKING: as of 03/03/22 $374.58    Assessment: [x] Progressing toward goals. The patient has high pain levels but works through it for the above exercises. Did not do all exercises so more manual work could be done. The patient with hypersensitivity to removing sock off heel. The patient has fair tolerance to Myofascial release and I am unsure it is worth the effort due to hypersensitivity that has been long standing. The patient is displaying improved ROM and gradually improving strength with exercises. Will assess effectiveness of tape at the next visit, however, therapist had a difficult time getting a good amount of ROM during application of the tape. [] No change. [] Other:  [x] Patient would continue to benefit from skilled physical therapy services in order to: reduce pain, swelling and improve walking ability by improving flexibility, strength and balance. Problems:    [x]? ? Pain: 8/10 R achilles   [x]? ? ROM: R knee and all ankle movements and R hip flexor tightness (part of this is long term stiffness)  [x]? ? Strength: R ankle, LE (part of this is long term weakness)  [x]? ? Function: 67.5% deficit per LEFI  [x]? Other: swelling and fascial restrictions in R LE       X pes planus and poor/fair shoe selection     STG: (to be met in 6  treatments)  1. ? Pain: below 8/10 with improved ability to get up from a chair with less pain  2. ? ROM:  5 degrees PROM R ankle with stretching to help reduce pain and improve gait mechanics  3. ? Strength: tolerate strength exercises of the R LE to address weakness present and to give the foot and ankle more support so there is less pain  4. ? Function: wear supportive shoes: MET 2/24/22  5. Patient to be independent with home exercise program as demonstrated by performance with correct form without cues.   6. Demonstrate Knowledge of fall prevention- met, handout issued 2/21/22  LTG: (to be met in 12  treatments)  1. Pain below 4/10 and have no perceptible antalgia with getting up from a chair and walking  2. Minimal swelling R lateral ankle/achilles  3. Function below 50% affected  4. Flexibility and strength sufficient for normal function                  Patient goals: lose weight and make my ankle better. Pt. Education:  [] Yes  [] No  [x] Reviewed Prior HEP/Ed  Method of Education: [x] Verbal  [x] Demo 3/3/22: clinic ex  [x] Written 3/3/22: kinesiotape  instructions   [facility] + [facility]out   Access Code: [email or web address]  URL: [email or web address]  Date: 02/21/2022  Prepared by: Reba Thompson  Exercises  Long Sitting Calf Stretch with Strap - 1 x daily - 7 x weekly - 3 reps - 30 second hold  Seated Soleus Stretch with Strap - 1 x daily - 7 x weekly - 3 reps - 30 second hold  Supine Hamstring Stretch with Strap - 1 x daily - 7 x weekly - 3 reps - 30 second hold  Modified Maldonado Stretch - 1 x daily - 7 x weekly - 1 reps - 1 minute hold  Long Sitting Ankle Pumps - 1 x daily - 7 x weekly - 2 sets - 10 reps  Supine Ankle Circles - 1 x daily - 7 x weekly - 2 sets - 10 reps  Seated Ankle Alphabet - 1 x daily - 7 x weekly - 1 reps  Seated Heel Toe Raises - 1 x daily - 7 x weekly - 2 sets - 10 reps  Ankle Inversion Eversion Towel Slide - 1 x daily - 7 x weekly - 2 sets - 10 reps  Towel Scrunches - 1 x daily - 7 x weekly - 1 minute hold  Comprehension of Education:  [x] Verbalizes understanding. [x] Demonstrates understanding. [x] Needs review. [] Demonstrates/verbalizes HEP/Ed previously given. Plan: [x] Continue current frequency toward long and short term goals. [x] Specific Instructions for subsequent treatments: stretch gastrocsoleus complex, knee extension, hip flexors (careful with LB and chronic R LE problems). Strengthen LE; manual work to achilles and plantar fascia. ? kinesiotape; ?DN      Time In: 10:57 am             Time Out: 11:59 am    Electronically signed by:  Mariely Castro PT

## 2022-03-07 ENCOUNTER — HOSPITAL ENCOUNTER (OUTPATIENT)
Dept: PHYSICAL THERAPY | Facility: CLINIC | Age: 57
Setting detail: THERAPIES SERIES
Discharge: HOME | End: 2022-03-07
Payer: COMMERCIAL

## 2022-03-07 PROCEDURE — 97110 THERAPEUTIC EXERCISES: CPT

## 2022-03-07 NOTE — FLOWSHEET NOTE
[] [facility]) - [facility] &  Therapy  [address]  [phone]  F: [phone] [x] [address]  [facility]   [address]  P: [phone]  F: [phone] [] [address] &  Therapy  [address] [facility]  P: [phone]  F: [phone] [] [address]  P: [phone]  F: [phone] [] [address]  [facility]   [address]   [place]: [phone]  F: [phone]      Physical Therapy Daily Treatment Note    Date:  3/7/2022  Patient Name:  Andie Garcia    :  1965  MRN: [id number]  Physician: Trisha Morgan DPM  Insurance: [employer] 12 visits approved 2/10-4/10/2022  Medical Diagnosis: R foot pain, L foot pain, Achilles tendinitis R LE; Equinus contracture R ankle; plantar fascitis R foot  Rehab Codes: R26.2; S61.820; M25.471; M25.671M62.571  Onset date: 2021               Next 's appt. :   Visit# / total visits: ; Progress note for Medicare patient due at visit 10     Cancels/No Shows: 1 cx    Subjective:    Pain:  [x] Yes  [] No Location: R leg Pain Rating: (0-10 scale) 8/10   Pain altered Tx:  [] No  [x] Yes  Action- extreme limitations to treatment secondary to R hip pain    Comments:  Pt reports going to a concert yesterday and thinking he over did it. Objective: 3/3/22: wearing athletic shoes into therapy today.    Modalities:  Not 22,pt declined: vasocompression- 10' at end of session, min compression, 34 degrees  Precautions: hypersensitivity  - paralyzed for \"a long time\"; fasciotomy R hip- has chronic pain/issues with  R LE  Exercises: bolded 22   Exercise Reps/ Time Weight/ Level Comments   Nustep  6' Lv 1 Warm up- inc 22         Soleus stretch 3x 30\" Using strap, seated   gastroc stretch 3x 30\" Using strap, long seated   HS stretch with DF 3x 30\" Using strap   Hip flexor stretch 1x 1' Leg off edge of plinth   DF/PF  2x10 AROM    INV/EV 10 AA Attempted but unable to isolate in this plane of motion   Ankle circles 10x ea AROM CW/CCW   Ankle alphabet  1x  At home   Toe flexion 5 AA/isometric Started 2/24/22 - work on mm contraction and hold 5 sec   Toe extension 4 AA/isometric More difficult than flexion; Fatigues more readily         Seated heel raises 2x10     Seated toe raises 2x10     Inversion/eversion towel slide 2x10     Toe curls with towel  1'           standing      Side stepping 30ft ea way A Started 3/3/22   Step up 15ea 6\" With opposite knee drive; progressed 3/7/19   Step down 15ea L4\"R2\" (L,R= which leg is on step); progressed 3/3/22   Eccentric calf lowers 15R only At steps Started 2/24/22: up with both, shift weight to R (but L still on floor), lower primarily with R         Slant board soleus stretch 2 45 sec Started 2/24/22   Slant board calf stretch 2x 1'    Other:   Manual therapy: L side lying (pt with difficulty lying prone): Myofascial release to calf, achilles to foot, being careful to avoid sensitivity, especially top of foot. R hip flexor release  2/24/22: B heel lift issued (size M). - not wearing 3/7/22 as he has gotten shoes with good support    kinesiotape : unloading to R achilles with Y strip overlaid with butterfly strip. Instructed in wear/removal.- not 3/7/22    Treatment Charges: Mins Units   []  Modalities     [x]  Ther Exercise 18 1   []  Manual Therapy     []  Ther Activities     []  Aquatics     []  Vasocompression     []  Other     Total Treatment time 18 1   MEDICARE TRACKING: as of 03/07/22 $403.79    Assessment: [] Progressing toward goals. No change. [x] Other: pt enters with 8/10 pain, however quickly increases to 10/10. Pt states pain is primarily in his R hip. Started with warm up on Nustep then exercises in standing.  Very limited completion of exercises d/t pain increase. Pt requested to stop treatment d/t the severity of his R hip pain. Offered moving pt to sitting or supine, however pt declined all further interventions. Pt states he plans to contact Dr. Lana Catalan to see if he can be seen earlier than is follow up in April, but d/t his R hip complaints. Resume treatment next session as able      [x] Patient would continue to benefit from skilled physical therapy services in order to: reduce pain, swelling and improve walking ability by improving flexibility, strength and balance. Problems:    [x]? ? Pain: 8/10 R achilles   [x]? ? ROM: R knee and all ankle movements and R hip flexor tightness (part of this is long term stiffness)  [x]? ? Strength: R ankle, LE (part of this is long term weakness)  [x]? ? Function: 67.5% deficit per LEFI  [x]? Other: swelling and fascial restrictions in R LE       X pes planus and poor/fair shoe selection     STG: (to be met in 6  treatments)  1. ? Pain: below 8/10 with improved ability to get up from a chair with less pain  2. ? ROM:  5 degrees PROM R ankle with stretching to help reduce pain and improve gait mechanics  3. ? Strength: tolerate strength exercises of the R LE to address weakness present and to give the foot and ankle more support so there is less pain  4. ? Function: wear supportive shoes: MET 2/24/22  5. Patient to be independent with home exercise program as demonstrated by performance with correct form without cues. 6. Demonstrate Knowledge of fall prevention- met, handout issued 2/21/22  LTG: (to be met in 12  treatments)  1. Pain below 4/10 and have no perceptible antalgia with getting up from a chair and walking  2. Minimal swelling R lateral ankle/achilles  3. Function below 50% affected  4. Flexibility and strength sufficient for normal function                  Patient goals: lose weight and make my ankle better.     Pt. Education:  [] Yes  [] No  [x] Reviewed Prior HEP/Ed  Method of Education: [x]

## 2022-03-11 ENCOUNTER — OFFICE VISIT (OUTPATIENT)
Dept: PAIN MANAGEMENT | Age: 57
End: 2022-03-11
Payer: COMMERCIAL

## 2022-03-11 VITALS
DIASTOLIC BLOOD PRESSURE: 65 MMHG | HEIGHT: 65 IN | SYSTOLIC BLOOD PRESSURE: 123 MMHG | BODY MASS INDEX: 44.15 KG/M2 | HEART RATE: 85 BPM | WEIGHT: 265 LBS | OXYGEN SATURATION: 97 %

## 2022-03-11 DIAGNOSIS — Z79.891 CHRONIC USE OF OPIATE DRUG FOR THERAPEUTIC PURPOSE: ICD-10-CM

## 2022-03-11 DIAGNOSIS — M25.551 ACUTE PAIN OF RIGHT HIP: ICD-10-CM

## 2022-03-11 DIAGNOSIS — Z79.891 CHRONIC USE OF OPIATE FOR THERAPEUTIC PURPOSE: ICD-10-CM

## 2022-03-11 DIAGNOSIS — M54.41 CHRONIC BILATERAL LOW BACK PAIN WITH RIGHT-SIDED SCIATICA: Primary | Chronic | ICD-10-CM

## 2022-03-11 DIAGNOSIS — G89.29 CHRONIC RADICULAR LUMBAR PAIN: Chronic | ICD-10-CM

## 2022-03-11 DIAGNOSIS — G89.29 CHRONIC BILATERAL LOW BACK PAIN WITH RIGHT-SIDED SCIATICA: Primary | Chronic | ICD-10-CM

## 2022-03-11 DIAGNOSIS — M54.16 CHRONIC RADICULAR LUMBAR PAIN: Chronic | ICD-10-CM

## 2022-03-11 PROCEDURE — 99213 OFFICE O/P EST LOW 20 MIN: CPT | Performed by: NURSE PRACTITIONER

## 2022-03-11 RX ORDER — OXYCODONE HYDROCHLORIDE AND ACETAMINOPHEN 5; 325 MG/1; MG/1
1 TABLET ORAL 2 TIMES DAILY PRN
Qty: 60 TABLET | Refills: 0 | Status: SHIPPED | OUTPATIENT
Start: 2022-03-12 | End: 2022-04-11 | Stop reason: SDUPTHER

## 2022-03-11 ASSESSMENT — ENCOUNTER SYMPTOMS
CONSTIPATION: 0
EYE PAIN: 0
DOUBLE VISION: 0
COUGH: 0
WHEEZING: 0
BLURRED VISION: 0
BACK PAIN: 1
SHORTNESS OF BREATH: 0
BOWEL INCONTINENCE: 0
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
## 2022-04-08 ENCOUNTER — OFFICE VISIT (OUTPATIENT)
Dept: INTERNAL MEDICINE CLINIC | Age: 57
End: 2022-04-08
Payer: COMMERCIAL

## 2022-04-08 VITALS
SYSTOLIC BLOOD PRESSURE: 158 MMHG | HEART RATE: 93 BPM | RESPIRATION RATE: 16 BRPM | WEIGHT: 265 LBS | HEIGHT: 65 IN | DIASTOLIC BLOOD PRESSURE: 80 MMHG | TEMPERATURE: 97.5 F | BODY MASS INDEX: 44.15 KG/M2 | OXYGEN SATURATION: 97 %

## 2022-04-08 DIAGNOSIS — F17.200 SMOKER: ICD-10-CM

## 2022-04-08 DIAGNOSIS — G47.33 OSA AND COPD OVERLAP SYNDROME (HCC): ICD-10-CM

## 2022-04-08 DIAGNOSIS — Z28.21 PNEUMOCOCCAL VACCINATION DECLINED: ICD-10-CM

## 2022-04-08 DIAGNOSIS — I10 ESSENTIAL HYPERTENSION: ICD-10-CM

## 2022-04-08 DIAGNOSIS — G89.29 CHRONIC BILATERAL LOW BACK PAIN WITH RIGHT-SIDED SCIATICA: Primary | Chronic | ICD-10-CM

## 2022-04-08 DIAGNOSIS — M54.41 CHRONIC BILATERAL LOW BACK PAIN WITH RIGHT-SIDED SCIATICA: Primary | Chronic | ICD-10-CM

## 2022-04-08 DIAGNOSIS — Z79.891 CHRONIC USE OF OPIATE DRUG FOR THERAPEUTIC PURPOSE: ICD-10-CM

## 2022-04-08 DIAGNOSIS — F32.A ANXIETY AND DEPRESSION: ICD-10-CM

## 2022-04-08 DIAGNOSIS — F41.9 ANXIETY AND DEPRESSION: ICD-10-CM

## 2022-04-08 DIAGNOSIS — J44.9 OSA AND COPD OVERLAP SYNDROME (HCC): ICD-10-CM

## 2022-04-08 DIAGNOSIS — E11.9 CONTROLLED TYPE 2 DIABETES MELLITUS WITHOUT COMPLICATION, WITHOUT LONG-TERM CURRENT USE OF INSULIN (HCC): ICD-10-CM

## 2022-04-08 DIAGNOSIS — R51.9 DAILY HEADACHE: ICD-10-CM

## 2022-04-08 DIAGNOSIS — E66.01 MORBID OBESITY WITH BMI OF 40.0-44.9, ADULT (HCC): ICD-10-CM

## 2022-04-08 DIAGNOSIS — J44.9 CHRONIC OBSTRUCTIVE PULMONARY DISEASE, UNSPECIFIED COPD TYPE (HCC): ICD-10-CM

## 2022-04-08 DIAGNOSIS — Z28.21 INFLUENZA VACCINATION DECLINED: ICD-10-CM

## 2022-04-08 DIAGNOSIS — E78.2 MIXED HYPERLIPIDEMIA: ICD-10-CM

## 2022-04-08 PROBLEM — M51.9 LUMBAR DISC DISEASE: Chronic | Status: RESOLVED | Noted: 2020-05-28 | Resolved: 2022-04-08

## 2022-04-08 PROBLEM — M47.26 OSTEOARTHRITIS OF SPINE WITH RADICULOPATHY, LUMBAR REGION: Status: RESOLVED | Noted: 2017-09-18 | Resolved: 2022-04-08

## 2022-04-08 PROBLEM — M54.16 CHRONIC RADICULAR LUMBAR PAIN: Chronic | Status: RESOLVED | Noted: 2020-05-28 | Resolved: 2022-04-08

## 2022-04-08 PROCEDURE — 99214 OFFICE O/P EST MOD 30 MIN: CPT | Performed by: FAMILY MEDICINE

## 2022-04-08 ASSESSMENT — ENCOUNTER SYMPTOMS
EYES NEGATIVE: 1
GASTROINTESTINAL NEGATIVE: 1
BACK PAIN: 1
RESPIRATORY NEGATIVE: 1
ALLERGIC/IMMUNOLOGIC NEGATIVE: 1

## 2022-04-08 NOTE — PROGRESS NOTES
Subjective:      Patient ID: Maru Le is a 60 y.o. male. Diabetes  He presents for his follow-up diabetic visit. He has type 2 diabetes mellitus. His disease course has been stable. Hypoglycemia symptoms include nervousness/anxiousness. There are no diabetic associated symptoms. There are no hypoglycemic complications. Symptoms are stable. There are no diabetic complications. Risk factors for coronary artery disease include diabetes mellitus, dyslipidemia, male sex, obesity, stress, tobacco exposure and sedentary lifestyle. Current diabetic treatment includes oral agent (monotherapy). He is compliant with treatment most of the time. His weight is fluctuating minimally. He is following a generally healthy diet. Meal planning includes ADA exchanges, avoidance of concentrated sweets, calorie counting and carbohydrate counting. He has had a previous visit with a dietitian. He participates in exercise three times a week. Home blood sugar record trend: He did not bring Accu-Chek log. An ACE inhibitor/angiotensin II receptor blocker is being taken. Review of Systems   Constitutional: Negative. HENT: Negative. Eyes: Negative. Respiratory: Negative. Cardiovascular: Negative. Gastrointestinal: Negative. Endocrine: Negative. Musculoskeletal: Positive for arthralgias, back pain and myalgias. Skin: Negative. Allergic/Immunologic: Negative. Neurological: Negative. Hematological: Negative. Psychiatric/Behavioral: The patient is nervous/anxious. Past family and social history unremarkable. Diagnosis Orders   1. Chronic bilateral low back pain with right-sided sciatica     2. Controlled type 2 diabetes mellitus without complication, without long-term current use of insulin (HCC)  CBC    Comprehensive Metabolic Panel    Hemoglobin A1C    Lipid Panel    Microalbumin, Ur    Urinalysis with Microscopic    TSH    PSA Screening   3.  Daily headache  CBC    Comprehensive Metabolic Panel Hemoglobin A1C    Lipid Panel    Microalbumin, Ur    Urinalysis with Microscopic    TSH    PSA Screening   4. Essential hypertension     5. Mixed hyperlipidemia  CBC    Comprehensive Metabolic Panel    Hemoglobin A1C    Lipid Panel    Microalbumin, Ur    Urinalysis with Microscopic    TSH    PSA Screening   6. Chronic use of opiate drugs therapeutic purposes     7. Morbid obesity with BMI of 40.0-44.9, adult ([facility]     8. Chronic obstructive pulmonary disease, unspecified COPD type ([facility]     9. MARLENE and COPD overlap syndrome ([facility]  Baseline Diagnostic Sleep Study   10. Anxiety and depression     11. Pneumococcal vaccination declined     12. Influenza vaccination declined     15. Smoker           Objective:   Physical Exam  Vitals and nursing note reviewed. Constitutional:       Appearance: He is well-developed. Comments: Obesity with sleep apnea   HENT:      Head: Normocephalic and atraumatic. Right Ear: External ear normal.      Left Ear: External ear normal.      Nose: Nose normal.   Eyes:      Conjunctiva/sclera: Conjunctivae normal.      Pupils: Pupils are equal, round, and reactive to light. Cardiovascular:      Rate and Rhythm: Normal rate and regular rhythm. Heart sounds: Normal heart sounds. Comments: Diabetes mellitus  Hypertension  Hyperlipidemia  Pulmonary:      Effort: Pulmonary effort is normal.      Breath sounds: Normal breath sounds. Comments: COPDsmoker  Abdominal:      General: Bowel sounds are normal.      Palpations: Abdomen is soft. Musculoskeletal:         General: Normal range of motion. Cervical back: Normal range of motion and neck supple. Comments: Chronic pain syndrome opiate dependent   Skin:     General: Skin is warm and dry. Neurological:      Mental Status: He is alert and oriented to person, place, and time. Deep Tendon Reflexes: Reflexes are normal and symmetric.    Psychiatric:      Comments: Anxiety, chronic pain syndrome Assessment:       Diagnosis Orders   1. Chronic bilateral low back pain with right-sided sciatica     2. Controlled type 2 diabetes mellitus without complication, without long-term current use of insulin (HCC)  CBC    Comprehensive Metabolic Panel    Hemoglobin A1C    Lipid Panel    Microalbumin, Ur    Urinalysis with Microscopic    TSH    PSA Screening   3. Daily headache  CBC    Comprehensive Metabolic Panel    Hemoglobin A1C    Lipid Panel    Microalbumin, Ur    Urinalysis with Microscopic    TSH    PSA Screening   4. Essential hypertension     5. Mixed hyperlipidemia  CBC    Comprehensive Metabolic Panel    Hemoglobin A1C    Lipid Panel    Microalbumin, Ur    Urinalysis with Microscopic    TSH    PSA Screening   6. Chronic use of opiate drugs therapeutic purposes     7. Morbid obesity with BMI of 40.0-44.9, adult ([facility]     8. Chronic obstructive pulmonary disease, unspecified COPD type ([facility]     9. MARLENE and COPD overlap syndrome ([facility]  Baseline Diagnostic Sleep Study   10. Anxiety and depression     11. Pneumococcal vaccination declined     12. Influenza vaccination declined     15. Smoker             Plan:      20-year-old overweight -American male returns for follow-up. He does not voice any new distress, afebrile hemodynamically stable, clinical examination is benign  COPD. He continued to smoke despite advice and does not express any desire to quit anytime soon. Once again, he is counseled, quit date, alternative to consider. Patient states that he will [facility] to get acupuncture. Is advised to continue beta agonist inhaled corticosteroid and Spiriva to which is responded to appropriately. Diabetes mellitus on Metformin A1c of 6.2. He is advised to continue current regimen, ADA 1800 diet, daily moderate exercise  Hypertension well-controlled to Zestoretic that he is tolerating well.   Consume less than 2 g of salt a day  Hyperlipidemia on statin that he is tolerating well  Chronic pain syndrome. Opiate dependent as provided by pain management. Avoid constipation  Monofilament testing is unremarkable. He is counseled on diabetic foot care  He is advised to update annual dilated eye exam  Obesity with sleep apnea. I will order baseline sleep study. Review of the record shows that he is also established with dermatologist  He denies excessive alcohol or illicit drug use  He is updated on COVID however reluctant to influenza, pneumococcal vaccine and Tdap  He appears anxious however denies being depressed  Further recommendations to follow labs  Call for any concern  This note is created with a voice recognition program and while intend to generate a document that accurately reflects the content of the visit, no guarantee can be provided that every mistake has been identified and corrected by editing.           Elizabeth Barakat MD

## 2022-04-11 ENCOUNTER — OFFICE VISIT (OUTPATIENT)
Dept: PAIN MANAGEMENT | Age: 57
End: 2022-04-11
Payer: COMMERCIAL

## 2022-04-11 VITALS
HEART RATE: 82 BPM | BODY MASS INDEX: 44.15 KG/M2 | WEIGHT: 265 LBS | HEIGHT: 65 IN | OXYGEN SATURATION: 97 % | DIASTOLIC BLOOD PRESSURE: 88 MMHG | SYSTOLIC BLOOD PRESSURE: 150 MMHG

## 2022-04-11 DIAGNOSIS — G89.29 CHRONIC BILATERAL LOW BACK PAIN WITH RIGHT-SIDED SCIATICA: Primary | Chronic | ICD-10-CM

## 2022-04-11 DIAGNOSIS — M54.41 CHRONIC BILATERAL LOW BACK PAIN WITH RIGHT-SIDED SCIATICA: Primary | Chronic | ICD-10-CM

## 2022-04-11 DIAGNOSIS — Z79.891 CHRONIC USE OF OPIATE DRUG FOR THERAPEUTIC PURPOSE: ICD-10-CM

## 2022-04-11 DIAGNOSIS — M47.26 OSTEOARTHRITIS OF SPINE WITH RADICULOPATHY, LUMBAR REGION: ICD-10-CM

## 2022-04-11 PROCEDURE — 99213 OFFICE O/P EST LOW 20 MIN: CPT | Performed by: NURSE PRACTITIONER

## 2022-04-11 RX ORDER — OXYCODONE HYDROCHLORIDE AND ACETAMINOPHEN 5; 325 MG/1; MG/1
1 TABLET ORAL 2 TIMES DAILY PRN
Qty: 60 TABLET | Refills: 0 | Status: SHIPPED | OUTPATIENT
Start: 2022-04-11 | End: 2022-05-11 | Stop reason: SDUPTHER

## 2022-04-11 ASSESSMENT — ENCOUNTER SYMPTOMS
BACK PAIN: 1
CONSTIPATION: 0
COUGH: 0
VOMITING: 0
WHEEZING: 0
NAUSEA: 0
DIARRHEA: 0
SORE THROAT: 0
SHORTNESS OF BREATH: 0
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
## 2022-05-11 ENCOUNTER — OFFICE VISIT (OUTPATIENT)
Dept: PAIN MANAGEMENT | Age: 57
End: 2022-05-11
Payer: COMMERCIAL

## 2022-05-11 VITALS
DIASTOLIC BLOOD PRESSURE: 97 MMHG | HEART RATE: 95 BPM | OXYGEN SATURATION: 97 % | HEIGHT: 65 IN | BODY MASS INDEX: 44.15 KG/M2 | SYSTOLIC BLOOD PRESSURE: 150 MMHG | WEIGHT: 265 LBS

## 2022-05-11 DIAGNOSIS — M54.41 CHRONIC BILATERAL LOW BACK PAIN WITH RIGHT-SIDED SCIATICA: Primary | Chronic | ICD-10-CM

## 2022-05-11 DIAGNOSIS — Z79.891 CHRONIC USE OF OPIATE DRUG FOR THERAPEUTIC PURPOSE: ICD-10-CM

## 2022-05-11 DIAGNOSIS — G89.29 CHRONIC BILATERAL LOW BACK PAIN WITH RIGHT-SIDED SCIATICA: Primary | Chronic | ICD-10-CM

## 2022-05-11 DIAGNOSIS — M47.816 LUMBAR FACET ARTHROPATHY: ICD-10-CM

## 2022-05-11 PROCEDURE — 99213 OFFICE O/P EST LOW 20 MIN: CPT | Performed by: NURSE PRACTITIONER

## 2022-05-11 RX ORDER — OXYCODONE HYDROCHLORIDE AND ACETAMINOPHEN 5; 325 MG/1; MG/1
1 TABLET ORAL 2 TIMES DAILY PRN
Qty: 60 TABLET | Refills: 0 | Status: SHIPPED | OUTPATIENT
Start: 2022-05-11 | End: 2022-06-06 | Stop reason: SDUPTHER

## 2022-05-11 ASSESSMENT — ENCOUNTER SYMPTOMS
NAUSEA: 0
COUGH: 0
CONSTIPATION: 0
BACK PAIN: 1
VOMITING: 0
SHORTNESS OF BREATH: 0
DIARRHEA: 0
WHEEZING: 0
SORE THROAT: 0

## 2022-05-17 NOTE — TELEPHONE ENCOUNTER
Mesha Brown is calling to request a refill on the following medication(s):    Medication Request:  Requested Prescriptions     Pending Prescriptions Disp Refills    metFORMIN (GLUCOPHAGE) 500 MG tablet [Pharmacy Med Name: METFORMIN  MG TABLET] 180 tablet 1     Sig: take 1 tablet by mouth twice a day with meals     Last filled 11/4/20     Last Visit Date (If Applicable):  6/0/2469    Next Visit Date:    7/8/2022

## 2022-06-06 ENCOUNTER — TELEPHONE (OUTPATIENT)
Dept: PAIN MANAGEMENT | Age: 57
End: 2022-06-06

## 2022-06-06 DIAGNOSIS — Z79.891 CHRONIC USE OF OPIATE DRUG FOR THERAPEUTIC PURPOSE: ICD-10-CM

## 2022-06-06 RX ORDER — OXYCODONE HYDROCHLORIDE AND ACETAMINOPHEN 5; 325 MG/1; MG/1
1 TABLET ORAL 2 TIMES DAILY PRN
Qty: 60 TABLET | Refills: 0 | Status: SHIPPED | OUTPATIENT
Start: 2022-06-10 | End: 2022-07-07 | Stop reason: SDUPTHER

## 2022-06-06 NOTE — TELEPHONE ENCOUNTER
----- Message from Rae Garcia sent at 6/6/2022  9:45 AM EDT -----  Regarding: Patient called for medication refills  The Pt called this morning needing medication refills. The Pt is requesting a call back this morning. (If at all possible)  The Pt can be reached at [phone].

## 2022-06-20 ENCOUNTER — OFFICE VISIT (OUTPATIENT)
Dept: PAIN MANAGEMENT | Age: 57
End: 2022-06-20
Payer: COMMERCIAL

## 2022-06-20 VITALS
SYSTOLIC BLOOD PRESSURE: 143 MMHG | DIASTOLIC BLOOD PRESSURE: 88 MMHG | BODY MASS INDEX: 45.41 KG/M2 | WEIGHT: 266 LBS | HEIGHT: 64 IN | HEART RATE: 80 BPM | OXYGEN SATURATION: 100 %

## 2022-06-20 DIAGNOSIS — Z79.891 CHRONIC USE OF OPIATE DRUG FOR THERAPEUTIC PURPOSE: ICD-10-CM

## 2022-06-20 DIAGNOSIS — M47.816 LUMBAR FACET ARTHROPATHY: ICD-10-CM

## 2022-06-20 DIAGNOSIS — G89.29 CHRONIC BILATERAL LOW BACK PAIN WITH RIGHT-SIDED SCIATICA: Primary | Chronic | ICD-10-CM

## 2022-06-20 DIAGNOSIS — M54.41 CHRONIC BILATERAL LOW BACK PAIN WITH RIGHT-SIDED SCIATICA: Primary | Chronic | ICD-10-CM

## 2022-06-20 PROCEDURE — 99213 OFFICE O/P EST LOW 20 MIN: CPT | Performed by: NURSE PRACTITIONER

## 2022-06-20 RX ORDER — OXYCODONE HYDROCHLORIDE AND ACETAMINOPHEN 5; 325 MG/1; MG/1
1 TABLET ORAL 2 TIMES DAILY PRN
Qty: 60 TABLET | Refills: 0 | Status: CANCELLED | OUTPATIENT
Start: 2022-06-20 | End: 2022-07-20

## 2022-06-20 ASSESSMENT — ENCOUNTER SYMPTOMS
CONSTIPATION: 0
COUGH: 0
VOMITING: 0
SHORTNESS OF BREATH: 0
DIARRHEA: 1
BACK PAIN: 1
WHEEZING: 0
NAUSEA: 0
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
## 2022-06-24 ENCOUNTER — OFFICE VISIT (OUTPATIENT)
Dept: PULMONOLOGY | Age: 57
End: 2022-06-24
Payer: COMMERCIAL

## 2022-06-24 VITALS
BODY MASS INDEX: 43.49 KG/M2 | OXYGEN SATURATION: 97 % | HEART RATE: 85 BPM | SYSTOLIC BLOOD PRESSURE: 151 MMHG | DIASTOLIC BLOOD PRESSURE: 90 MMHG | RESPIRATION RATE: 16 BRPM | WEIGHT: 261 LBS | HEIGHT: 65 IN

## 2022-06-24 DIAGNOSIS — Z87.891 PERSONAL HISTORY OF TOBACCO USE: ICD-10-CM

## 2022-06-24 DIAGNOSIS — Z87.891 PERSONAL HISTORY OF TOBACCO USE, PRESENTING HAZARDS TO HEALTH: Primary | ICD-10-CM

## 2022-06-24 PROCEDURE — G0296 VISIT TO DETERM LDCT ELIG: HCPCS | Performed by: INTERNAL MEDICINE

## 2022-06-24 PROCEDURE — 99214 OFFICE O/P EST MOD 30 MIN: CPT | Performed by: INTERNAL MEDICINE

## 2022-06-24 ASSESSMENT — SLEEP AND FATIGUE QUESTIONNAIRES
HOW LIKELY ARE YOU TO NOD OFF OR FALL ASLEEP IN A CAR, WHILE STOPPED FOR A FEW MINUTES IN TRAFFIC: 0
HOW LIKELY ARE YOU TO NOD OFF OR FALL ASLEEP WHILE SITTING AND TALKING TO SOMEONE: 0
ESS TOTAL SCORE: 9
HOW LIKELY ARE YOU TO NOD OFF OR FALL ASLEEP WHILE SITTING QUIETLY AFTER LUNCH WITHOUT ALCOHOL: 0
HOW LIKELY ARE YOU TO NOD OFF OR FALL ASLEEP WHILE WATCHING TV: 2
HOW LIKELY ARE YOU TO NOD OFF OR FALL ASLEEP WHEN YOU ARE A PASSENGER IN A CAR FOR AN HOUR WITHOUT A BREAK: 3
HOW LIKELY ARE YOU TO NOD OFF OR FALL ASLEEP WHILE SITTING AND READING: 2
HOW LIKELY ARE YOU TO NOD OFF OR FALL ASLEEP WHILE SITTING INACTIVE IN A PUBLIC PLACE: 2
HOW LIKELY ARE YOU TO NOD OFF OR FALL ASLEEP WHILE LYING DOWN TO REST IN THE AFTERNOON WHEN CIRCUMSTANCES PERMIT: 0

## 2022-06-24 NOTE — PROGRESS NOTES
David Valdes  6/24/2022    Chief Complaint   Patient presents with    Sleep Apnea     3 month f/u    COPD    Dyspnea. Morbid obesity. Arron Lamas i is known to have a chronic obstructive lung disease due to chronic bronchitis and emphysema is being treated with Spiriva, Symbicort and uses albuterol on as-needed basis. COPD is well controlled there is no evidence of acute exacerbation of COPD no yellow sputum no hemoptysis no chest pain no fever. Unfortunately continues smoke in spite of repeated advised to give up smoking. He also has clinical features of sleep apnea syndrome advised to get a sleep study. However unfortunately he has not done the sleep study yet. If features of sleep apnea continue to persist    He is morbidly obese has lost 20 pounds. He seems to be motivated to lose weight. He also has systemic hypertension that is under good control no diabetes. He already had COVID-vaccine and influenza vaccines. Blood sugar has been stable. And [place] Sleepiness Scale revealed a score of 9 indicating daytime sleepiness. Sleep Medicine 6/24/2022 2/10/2022 10/16/2020 7/15/2019   Sitting and reading 2 3 0 2   Watching TV 2 3 0 3   Sitting, inactive in a public place (e.g. a theatre or a meeting) 2 0 0 3   As a passenger in a car for an hour without a break 3 3 1 3   Lying down to rest in the afternoon when circumstances permit 0 3 0 0   Sitting and talking to someone 0 1 0 0   Sitting quietly after a lunch without alcohol 0 3 0 0   In a car, while stopped for a few minutes in traffic 0 1 0 0   Total score 9 17 1 11   . She did not complete and upper sleepiness scale. We'll get one last the next visit. Review of Systems -unchanged General ROS: negative for - chills, fatigue, fever or weight loss. He is morbidly obese. No nasal stuffiness. No polyps. The throat examination with significant redundant tissue.   ENT ROS: negative for - headaches, oral lesions or sore throat  Cardiovascular ROS: no chest pain , orthopnea or pnd . He is known to have hypertension, no heart disease. No angina. No proximal legs and her dyspnea. Gastrointestinal ROS: no abdominal pain, change in bowel habits, or black or bloody stools. Peptic ulcer disease. No acid reflux. Skin - no rash   Neuro - no blurry vision , no loc . No focal weakness . He does have some weakness of the right leg since the injury and surgery many years back. msk - no jt tenderness or swelling  . No acute arthritis. He haven't had a soft tissue injury to the back of his right hand and the left hand which has been taken care and is healing well. Vascular - no claudication , rest completed and negative . No evidence of DVT  Lymphatic - complete and negative for large lymph node  Hematology - oncology - complete and negative . Now anemia, bleeding disorder  Allergy immunology - complete and negative    no burning or hematuria . No hematuria      LUNG CANCER SCREENING     1. CRITERIA MET    [x]     CT ORDERED  [x]      2. CRITERIA NOT MET   []      3. REFUSED                    []        REASON CRITERIA NOT MET     1. SMOKING LESS THAN 30 PY  []      2. AGE LESS THAN 55 or GREATER 77 YEARS  []      3. QUIT SMOKING 15 YEARS OR GREATER   []      4. RECENT CT WITH IN 11 MONTHS    []      5. LIFE EXPECTANCY < 5 YEARS   []      6.  SIGNS  AND SYMPTOMS OF LUNG CANCER   []           Immunization   Immunization History   Administered Date(s) Administered    COVID-19, Mojgan Amy, Primary or Immunocompromised, PF, 100mcg/0.5mL 03/08/2021, 04/05/2021        Pneumococcal Vaccine     [] Up to date    [x] Indicated   [] Refused  [] Contraindicated       Influenza Vaccine   [x] Up to date    [] Indicated   [] Refused  [] Contraindicated       PAST MEDICAL HISTORY:         Diagnosis Date    Asthma     Diabetes mellitus ([address]     HTN (hypertension)     Hyperlipidemia     Hypertension     Lung disease     Migraine     Neuropathy     Positive FIT (fecal immunochemical test)     Renal failure     Sleep apnea        Family History:       Problem Relation Age of Onset    Diabetes Mother     Heart Disease Mother     Cancer Mother     Heart Disease Maternal Grandmother        SURGICAL HISTORY:   Past Surgical History:   Procedure Laterality Date    ANESTHESIA NERVE BLOCK Bilateral 9/18/2017    NERVE BLOCK BILATERAL MEDIAL BRANCH L2-L5 performed by Porsha Gomez MD at [facility] Bilateral 10/17/2017    [address] L2-L5 performed by Porsha Gomez MD at [address]  03/08/2017    COLONOSCOPY  03/08/2017    internal hemorrhoids; mild diverticulosis    ENDOSCOPY, COLON, DIAGNOSTIC      FIBULA FRACTURE SURGERY Left     LEG SURGERY Right     NERVE BLOCK N/A 10/24/2016    lumbar COLETTE    NERVE BLOCK Right 11/21/2016    lumbar COLETTE    NERVE BLOCK Left 08/13/2018    radiofrequency ablation medial branh block L2-L5    OTHER SURGICAL HISTORY Right 01/26/2017    right hip steroid injection    OTHER SURGICAL HISTORY Right 08/06/2018    NERVE RADIOFREQUENCY ABLATION RIGHT LUMBAR MEDIAL BRANCH L2-L5 (Right )    OTHER SURGICAL HISTORY  12/03/2018    RIGHT L4 L5 EPIDURAL STEROID INJECTION (Right )    OTHER SURGICAL HISTORY  03/25/2019    LUMBAR COLETTE (N/A )    PAIN MANAGEMENT PROCEDURE Right 5/28/2020    EPIDURAL STEROID INJECTION RIGHT L5S1 performed by Porsha Gomez MD at [address] [facility] Right 6/15/2020    EPIDURAL STEROID INJECTION RIGTH L5S1 performed by Porsha Gomez MD at [address] AA&/STRD TFRML EPI LUMBAR/SACRAL 1 LEVEL Right 12/3/2018    RIGHT L4 L5 EPIDURAL STEROID INJECTION performed by Porsha Gomez MD at [address] OFFICE/OUTPT 3601 Loc[facility] within [facility] Right 8/6/2018    NERVE RADIOFREQUENCY ABLATION RIGHT LUMBAR MEDIAL BRANCH L2-L5 performed by Porsha Gomez MD at [address] OFFICE/OUTPT [address] [facility] ONLY Left 8/13/2018    NERVE RADIOFREQUENCY ABLATION LEFT LUMBAR MEDIAL BRANCH L2-L5 performed by Shawnie Canavan, MD at [address] [place] [address] N/A 3/25/2019    LUMBAR COLETTE performed by Shawnie Canavan, MD at [address] [place] [address]              Not in a hospital admission. Allergies   Allergen Reactions    Amitriptyline Anaphylaxis    Paroxetine Other (See Comments)    Paxil [Paroxetine Hcl] Other (See Comments)     Social History     Tobacco Use   Smoking Status Current Every Day Smoker    Packs/day: 1.50    Years: 20.00    Pack years: 30.00    Types: Cigarettes    Start date: 10/2/1979   Smokeless Tobacco Never Used   Tobacco Comment    down to 5 cigg. per day     Prior to Admission medications    Medication Sig Start Date End Date Taking? Authorizing Provider   oxyCODONE-acetaminophen (PERCOCET) 5-325 MG per tablet Take 1 tablet by mouth 2 times daily as needed for Pain for up to 30 days. 6/10/22 7/10/22  SANDRA Hines - CNP   metFORMIN (GLUCOPHAGE) 500 MG tablet take 1 tablet by mouth twice a day with meals 5/18/22   Jessica Cushing, MD   tiotropium (Mitzi Natty) 18 MCG inhalation capsule Inhale 1 capsule into the lungs daily 2/10/22   Adi Gilbert MD   terbinafine (LAMISIL) 1 % cream Apply topically 2 times daily.  12/20/21   Mildred Acevedo DPM   lisinopril-hydroCHLOROthiazide (PRINZIDE;ZESTORETIC) 10-12.5 MG per tablet take 1 tablet by mouth once daily 12/6/21   Jessica Cushing, MD   albuterol sulfate  (90 Base) MCG/ACT inhaler Inhale 2 puffs into the lungs 4 times daily as needed for Wheezing 3/8/21   Jessica Cushing, MD   atorvastatin (LIPITOR) 40 MG tablet Take 1 tablet by mouth daily 3/8/21   Jessica Cushing, MD   tiotropium (SPIRIVA RESPIMAT) 2.5 MCG/ACT AERS inhaler Inhale 2 puffs into the lungs daily 2/12/21   Taylor Choi MD   lisinopril-hydroCHLOROthiazide (PRINZIDE;ZESTORETIC) 10-12.5 MG per tablet take 1 tablet by mouth once daily 1/7/21   Jessica Cushing, MD Physical Exam unchanged no rales or rhonchi no respiratory distress not using any accessory muscles  General Appearance:    Alert, cooperative, no distress, appears stated age, morbid obesity, not in any distress, not using any accessory muscles    Head:    Normocephalic, without obvious abnormality, atraumatic   Examination revealed no jaundice. No Rod's syndrome. No other real no polyps. No sinus tenderness. Throat examination revealed a redundant tissue in the oropharynx. Uvula is swollen and low    Ear examination is normal                :    Neck:   Supple, symmetrical, trachea midline, no adenopathy;     thyroid:  no enlargement/tenderness/nodules; no carotid    bruit or JVD. Neck is short and fat    Back:     Symmetric, no curvature, ROM normal, no CVA tenderness   Lungs:       AP diameter is increased. Percussion note is hyperresonant expiration prolonged. No rales, rhonchi are audible. No pleural friction rub is audible    Chest Wall:    No tenderness or deformity      Heart:    Regular rate and rhythm, S1 and S2 normal, no murmur, rub        or gallop no rvh                           Abdomen:                                                 Pulses:                                            Lymph nodes:                    Neurologic:                  Soft, non-tender, bowel sounds active all four quadrants,     no masses, no organomegaly         2+ and symmetric all extremities            Cervical, supraclavicular not enlarged or matted or tender      CNII-XII intact, normal strength 5/5 .   Sensation grossly normal  and reflexes normal 2+  throughout     Clubbing No  Lower ext edema No1+   [] , 2 +  [] , 3+   []  Upper ext edema No       Musculoskeletal - no joint swelling or tenderness or synovitis               BP (!) 151/90 (Site: Right Upper Arm, Position: Sitting, Cuff Size: Medium Adult)   Pulse 85   Resp 16   Ht 5' 5\" (1.651 m)   Wt 261 lb (118.4 kg)   SpO2 97%   BMI continue lung cancer screening effectiveness     Risks associated with radiation from annual LDCT- Radiation exposure is about the same as for a mammogram, which is about 1/3 of the annual background radiation exposure from everyday life. Starting screening at age 54 is not likely to increase cancer risk from radiation exposure. Patients with comorbidities resulting in life expectancy of < 10 years, or that would preclude treatment of an abnormality identified on CT, should not be screened due to lack of benefit.     To obtain maximal benefit from this screening, smoking cessation and long-term abstinence from smoking is critical

## 2022-06-28 ENCOUNTER — TELEPHONE (OUTPATIENT)
Dept: ONCOLOGY | Age: 57
End: 2022-06-28

## 2022-06-28 NOTE — LETTER
6/28/2022        [facility]  [address] [facility]    Dear Mark Castillo:    Your healthcare provider has ordered a low dose CT scan of the chest for lung cancer screening. Enclosed you will find information about CT lung screening and smoking cessation resources. If you are unable to keep you appointment for you CT lung screening, please call our scheduling department at [phone]    Keep in mind that CT lung screening does not take the place of smoking cessation. Please do not hesitate to contact me if you have any questions or concerns.     [address] [facility] Lung Screening Program  [phone]

## 2022-06-29 NOTE — DISCHARGE SUMMARY
[] [facility]) [facility] &  Therapy  [address]  [phone]  F: [phone] [] [address]  [facility]   [address]  P: [phone]  F: [phone] [] [address] &  Therapy  [address]  P: [phone]  F: [phone] [] [address]  P: [phone]  F: [phone] [] [address]  [facility]   [address]   [place]: [phone]  F: [phone]      Physical Therapy Discharge Note    Date: 2022      Patient: Chesley Saint  : 1965  MRN: [id number]    [address] DPM  Insurance: [employer] 12 visits approved 2/10-4/10/2022  Medical Diagnosis: R foot pain, L foot pain, Achilles tendinitis R LE; Equinus contracture R ankle; plantar fascitis R foot  Rehab Codes: R26.2; M79.661; M25.471; M25.671M62.571  Onset date: 2021               Next Dr's appt. :   Visit# / total visits: ; Progress note for Medicare patient due at visit 10                                    Cancels/No Shows: 1 cx  Date of initial visit: 2/10/22               Date of final visit: 3/7/22        Assessment:at last visit: pt enters with 8/10 pain, however quickly increases to 10/10. Pt states pain is primarily in his R hip. Started with warm up on Nustep then exercises in standing. Very limited completion of exercises d/t pain increase. Pt requested to stop treatment d/t the severity of his R hip pain. Offered moving pt to sitting or supine, however pt declined all further interventions. Pt states he plans to contact Dr. Sanam Muir to see if he can be seen earlier than is follow up in April, but d/t his R hip complaints.  Resume treatment next session as able    STG: (to be met in 6  treatments)  1. ? Pain: below 8/10 with improved ability to get up from a chair with less pain  2. ? ROM:  5 degrees PROM R ankle with stretching to help reduce pain and improve gait mechanics  3. ? Strength: tolerate strength exercises of the R LE to address weakness present and to give the foot and ankle more support so there is less pain  4. ? Function: wear supportive shoes: MET 2/24/22  5. Patient to be independent with home exercise program as demonstrated by performance with correct form without cues. 6. Demonstrate Knowledge of fall prevention- met, handout issued 2/21/22  LTG: (to be met in 12  treatments)  1. Pain below 4/10 and have no perceptible antalgia with getting up from a chair and walking  2. Minimal swelling R lateral ankle/achilles  3. Function below 50% affected  4. Flexibility and strength sufficient for normal function                  Patient goals: lose weight and make my ankle better. STG:      Treatment to Date:  [x] Therapeutic Exercise      [x] Instruction in Home Exercise Program              [x] Manual Therapy                           [x] Vasocompression/               Game Ready    Discharge Status:        [x] Therapy interrupted due to: pt did not return           Electronically signed by Jace Mahoney PT on 6/29/2022 at 3:23 PM      If you have any questions or concerns, please don't hesitate to call.   Thank you for your referral.

## 2022-07-06 ENCOUNTER — TELEPHONE (OUTPATIENT)
Dept: PAIN MANAGEMENT | Age: 57
End: 2022-07-06

## 2022-07-07 DIAGNOSIS — Z79.891 CHRONIC USE OF OPIATE DRUG FOR THERAPEUTIC PURPOSE: ICD-10-CM

## 2022-07-07 RX ORDER — OXYCODONE HYDROCHLORIDE AND ACETAMINOPHEN 5; 325 MG/1; MG/1
1 TABLET ORAL 2 TIMES DAILY PRN
Qty: 60 TABLET | Refills: 0 | Status: SHIPPED | OUTPATIENT
Start: 2022-07-10 | End: 2022-08-08 | Stop reason: SDUPTHER

## 2022-07-08 ENCOUNTER — OFFICE VISIT (OUTPATIENT)
Dept: INTERNAL MEDICINE CLINIC | Age: 57
End: 2022-07-08
Payer: COMMERCIAL

## 2022-07-08 ENCOUNTER — HOSPITAL ENCOUNTER (OUTPATIENT)
Age: 57
Setting detail: SPECIMEN
Discharge: HOME | End: 2022-07-08

## 2022-07-08 VITALS
HEART RATE: 94 BPM | WEIGHT: 261 LBS | SYSTOLIC BLOOD PRESSURE: 130 MMHG | RESPIRATION RATE: 16 BRPM | HEIGHT: 65 IN | DIASTOLIC BLOOD PRESSURE: 82 MMHG | OXYGEN SATURATION: 95 % | BODY MASS INDEX: 43.49 KG/M2 | TEMPERATURE: 98.2 F

## 2022-07-08 DIAGNOSIS — I10 ESSENTIAL HYPERTENSION: ICD-10-CM

## 2022-07-08 DIAGNOSIS — J44.9 OSA AND COPD OVERLAP SYNDROME (HCC): ICD-10-CM

## 2022-07-08 DIAGNOSIS — F17.200 SMOKER: ICD-10-CM

## 2022-07-08 DIAGNOSIS — E66.01 MORBID OBESITY WITH BMI OF 40.0-44.9, ADULT (HCC): ICD-10-CM

## 2022-07-08 DIAGNOSIS — R51.9 DAILY HEADACHE: ICD-10-CM

## 2022-07-08 DIAGNOSIS — Z28.21 INFLUENZA VACCINATION DECLINED: ICD-10-CM

## 2022-07-08 DIAGNOSIS — E78.2 MIXED HYPERLIPIDEMIA: ICD-10-CM

## 2022-07-08 DIAGNOSIS — E11.9 CONTROLLED TYPE 2 DIABETES MELLITUS WITHOUT COMPLICATION, WITHOUT LONG-TERM CURRENT USE OF INSULIN (HCC): ICD-10-CM

## 2022-07-08 DIAGNOSIS — G47.33 OSA AND COPD OVERLAP SYNDROME (HCC): ICD-10-CM

## 2022-07-08 DIAGNOSIS — J44.9 CHRONIC OBSTRUCTIVE PULMONARY DISEASE, UNSPECIFIED COPD TYPE (HCC): Primary | ICD-10-CM

## 2022-07-08 DIAGNOSIS — Z28.21 PNEUMOCOCCAL VACCINATION DECLINED: ICD-10-CM

## 2022-07-08 DIAGNOSIS — Z79.891 CHRONIC USE OF OPIATE DRUG FOR THERAPEUTIC PURPOSE: ICD-10-CM

## 2022-07-08 DIAGNOSIS — F32.A ANXIETY AND DEPRESSION: ICD-10-CM

## 2022-07-08 DIAGNOSIS — F41.9 ANXIETY AND DEPRESSION: ICD-10-CM

## 2022-07-08 DIAGNOSIS — M47.816 LUMBAR FACET ARTHROPATHY: ICD-10-CM

## 2022-07-08 LAB
-: ABNORMAL
ALBUMIN SERPL-MCNC: 4.3 G/DL (ref 3.5–5.2)
ALBUMIN/GLOBULIN RATIO: 1.5 (ref 1–2.5)
ALP BLD-CCNC: 43 U/L (ref 40–129)
ALT SERPL-CCNC: 15 U/L (ref 5–41)
AMORPHOUS: ABNORMAL
ANION GAP SERPL CALCULATED.3IONS-SCNC: 12 MMOL/L (ref 9–17)
AST SERPL-CCNC: 15 U/L
BACTERIA: ABNORMAL
BILIRUB SERPL-MCNC: 0.41 MG/DL (ref 0.3–1.2)
BILIRUBIN URINE: NEGATIVE
BUN BLDV-MCNC: 12 MG/DL (ref 6–20)
CALCIUM SERPL-MCNC: 9.6 MG/DL (ref 8.6–10.4)
CASTS UA: ABNORMAL /LPF (ref 0–2)
CASTS UA: ABNORMAL /LPF (ref 0–2)
CHLORIDE BLD-SCNC: 103 MMOL/L (ref 98–107)
CHOLESTEROL/HDL RATIO: 5.5
CHOLESTEROL: 237 MG/DL
CO2: 24 MMOL/L (ref 20–31)
COLOR: YELLOW
CREAT SERPL-MCNC: 0.81 MG/DL (ref 0.7–1.2)
CREATININE URINE: 162.9 MG/DL (ref 39–259)
EPITHELIAL CELLS UA: ABNORMAL /HPF (ref 0–5)
ESTIMATED AVERAGE GLUCOSE: 137 MG/DL
GFR AFRICAN AMERICAN: >60 ML/MIN
GFR NON-AFRICAN AMERICAN: >60 ML/MIN
GFR SERPL CREATININE-BSD FRML MDRD: ABNORMAL ML/MIN/{1.73_M2}
GLUCOSE BLD-MCNC: 102 MG/DL (ref 70–99)
GLUCOSE URINE: NEGATIVE
HBA1C MFR BLD: 6.4 % (ref 4–6)
HCT VFR BLD CALC: 42.2 % (ref 40.7–50.3)
HDLC SERPL-MCNC: 43 MG/DL
HEMOGLOBIN: 14.3 G/DL (ref 13–17)
KETONES, URINE: NEGATIVE
LDL CHOLESTEROL: 169 MG/DL (ref 0–130)
LEUKOCYTE ESTERASE, URINE: NEGATIVE
MCH RBC QN AUTO: 30.4 PG (ref 25.2–33.5)
MCHC RBC AUTO-ENTMCNC: 33.9 G/DL (ref 28.4–34.8)
MCV RBC AUTO: 89.6 FL (ref 82.6–102.9)
MICROALBUMIN/CREAT 24H UR: <12 MG/L
MICROALBUMIN/CREAT UR-RTO: NORMAL MCG/MG CREAT
NITRITE, URINE: NEGATIVE
NRBC AUTOMATED: 0 PER 100 WBC
PDW BLD-RTO: 13.4 % (ref 11.8–14.4)
PH UA: 8 (ref 5–8)
PLATELET # BLD: 223 K/UL (ref 138–453)
PMV BLD AUTO: 10.7 FL (ref 8.1–13.5)
POTASSIUM SERPL-SCNC: 4.3 MMOL/L (ref 3.7–5.3)
PROSTATE SPECIFIC ANTIGEN: 0.51 NG/ML
PROTEIN UA: ABNORMAL
RBC # BLD: 4.71 M/UL (ref 4.21–5.77)
RBC UA: ABNORMAL /HPF (ref 0–2)
SODIUM BLD-SCNC: 139 MMOL/L (ref 135–144)
SPECIFIC GRAVITY UA: 1.02 (ref 1–1.03)
TOTAL PROTEIN: 7.2 G/DL (ref 6.4–8.3)
TRIGL SERPL-MCNC: 125 MG/DL
TSH SERPL DL<=0.05 MIU/L-ACNC: 0.88 UIU/ML (ref 0.3–5)
TURBIDITY: ABNORMAL
URINE HGB: ABNORMAL
UROBILINOGEN, URINE: NORMAL
WBC # BLD: 8.2 K/UL (ref 3.5–11.3)
WBC UA: ABNORMAL /HPF (ref 0–5)

## 2022-07-08 PROCEDURE — 99214 OFFICE O/P EST MOD 30 MIN: CPT | Performed by: FAMILY MEDICINE

## 2022-07-08 RX ORDER — ATORVASTATIN CALCIUM 40 MG/1
40 TABLET, FILM COATED ORAL DAILY
Qty: 90 TABLET | Refills: 1 | Status: SHIPPED | OUTPATIENT
Start: 2022-07-08

## 2022-07-08 ASSESSMENT — PATIENT HEALTH QUESTIONNAIRE - PHQ9
9. THOUGHTS THAT YOU WOULD BE BETTER OFF DEAD, OR OF HURTING YOURSELF: 0
1. LITTLE INTEREST OR PLEASURE IN DOING THINGS: 0
8. MOVING OR SPEAKING SO SLOWLY THAT OTHER PEOPLE COULD HAVE NOTICED. OR THE OPPOSITE, BEING SO FIGETY OR RESTLESS THAT YOU HAVE BEEN MOVING AROUND A LOT MORE THAN USUAL: 0
SUM OF ALL RESPONSES TO PHQ QUESTIONS 1-9: 0
3. TROUBLE FALLING OR STAYING ASLEEP: 0
2. FEELING DOWN, DEPRESSED OR HOPELESS: 0
5. POOR APPETITE OR OVEREATING: 0
4. FEELING TIRED OR HAVING LITTLE ENERGY: 0
SUM OF ALL RESPONSES TO PHQ QUESTIONS 1-9: 0
SUM OF ALL RESPONSES TO PHQ QUESTIONS 1-9: 0
7. TROUBLE CONCENTRATING ON THINGS, SUCH AS READING THE NEWSPAPER OR WATCHING TELEVISION: 0
SUM OF ALL RESPONSES TO PHQ9 QUESTIONS 1 & 2: 0
SUM OF ALL RESPONSES TO PHQ QUESTIONS 1-9: 0
6. FEELING BAD ABOUT YOURSELF - OR THAT YOU ARE A FAILURE OR HAVE LET YOURSELF OR YOUR FAMILY DOWN: 0
10. IF YOU CHECKED OFF ANY PROBLEMS, HOW DIFFICULT HAVE THESE PROBLEMS MADE IT FOR YOU TO DO YOUR WORK, TAKE CARE OF THINGS AT HOME, OR GET ALONG WITH OTHER PEOPLE: 0

## 2022-07-08 ASSESSMENT — ENCOUNTER SYMPTOMS
SHORTNESS OF BREATH: 1
EYES NEGATIVE: 1
BACK PAIN: 1
GASTROINTESTINAL NEGATIVE: 1
COUGH: 1
ALLERGIC/IMMUNOLOGIC NEGATIVE: 1

## 2022-07-08 ASSESSMENT — COPD QUESTIONNAIRES: COPD: 1

## 2022-07-08 NOTE — PROGRESS NOTES
Subjective:      Patient ID: Carol Ann Mccracken is a 60 y.o. male. COPD  He complains of cough and shortness of breath. This is a chronic problem. The current episode started more than 1 month ago. The problem occurs intermittently. The problem has been waxing and waning. The cough is non-productive. Associated symptoms include myalgias. His symptoms are aggravated by emotional stress, change in weather, exposure to fumes, exposure to smoke and pollen. His symptoms are alleviated by anxiolytics and leukotriene antagonist. He reports moderate improvement on treatment. Risk factors for lung disease include smoking/tobacco exposure. His past medical history is significant for COPD. Review of Systems   Constitutional: Negative. HENT: Negative. Eyes: Negative. Respiratory: Positive for cough and shortness of breath. Cardiovascular: Negative. Gastrointestinal: Negative. Endocrine: Negative. Musculoskeletal: Positive for arthralgias, back pain and myalgias. Skin: Negative. Allergic/Immunologic: Negative. Neurological: Negative. Hematological: Negative. Psychiatric/Behavioral: Positive for dysphoric mood. The patient is nervous/anxious. Past family and social history unremarkable. Diagnosis Orders   1. Chronic obstructive pulmonary disease, unspecified COPD type ([facility] [address]     2. Lumbar facet arthropathy     3. Daily headache     4. Chronic use of opiate drugs therapeutic purposes     5. Morbid obesity with BMI of 40.0-44.9, adult ([address]     6. Controlled type 2 diabetes mellitus without complication, without long-term current use of insulin ([address]     7. MARLENE and COPD overlap syndrome ([address]     8. Anxiety and depression     9. Pneumococcal vaccination declined     10. Influenza vaccination declined     11. Essential hypertension     12. Mixed hyperlipidemia     13. Smoker         Objective:   Physical Exam  Vitals and nursing note reviewed.    Constitutional:       Appearance: He is well-developed. Comments: Obesity with sleep apnea   HENT:      Head: Normocephalic and atraumatic. Right Ear: External ear normal.      Left Ear: External ear normal.      Nose: Nose normal.   Eyes:      Conjunctiva/sclera: Conjunctivae normal.      Pupils: Pupils are equal, round, and reactive to light. Cardiovascular:      Rate and Rhythm: Normal rate and regular rhythm. Heart sounds: Normal heart sounds. Comments: Diabetes mellitus  Hypertension  Hyperlipidemia  Pulmonary:      Effort: Pulmonary effort is normal.      Breath sounds: Normal breath sounds. Comments: COPD-smoker  Abdominal:      General: Bowel sounds are normal.      Palpations: Abdomen is soft. Musculoskeletal:         General: Normal range of motion. Cervical back: Normal range of motion and neck supple. Comments: Chronic back pain without any sign of myelopathy. Opiate dependent as provided by pain management   Skin:     General: Skin is warm and dry. Neurological:      Mental Status: He is alert and oriented to person, place, and time. Deep Tendon Reflexes: Reflexes are normal and symmetric. Comments: Tension headaches   Psychiatric:      Comments: Anxiety/depression  Chronic pain syndrome  Declined influenza pneumococcal vaccine         Assessment:       Diagnosis Orders   1. Chronic obstructive pulmonary disease, unspecified COPD type ([address]     2. Lumbar facet arthropathy     3. Daily headache     4. Chronic use of opiate drugs therapeutic purposes     5. Morbid obesity with BMI of 40.0-44.9, adult ([address]     6. Controlled type 2 diabetes mellitus without complication, without long-term current use of insulin ([address]     7. MARLENE and COPD overlap syndrome ([address]     8. Anxiety and depression     9. Pneumococcal vaccination declined     10. Influenza vaccination declined     11. Essential hypertension     12. Mixed hyperlipidemia     13.  Smoker             Plan:      24-year-old -American male returns for follow-up. He is afebrile hemodynamically stable, clinical examination is stable at baseline  COPD. He continued to smoke despite advice and does not express any desire to quit anytime soon. Once again he is counseled, quit date, alternative to consider. He is established with pulmonology. He is on Spiriva with positive response. He would benefit from smoke cessation, consider nicotine replacement/patches  Morbid obesity with sleep apnea. He would benefit from significant weight reduction, low-fat high-fiber diet, daily moderate exercise and lifestyle change. Patient states that he has not gotten his sleep study or positive pressure machine yet. However he states that he is in contact with his pulmonologist  Diabetes mellitus with A1c of 6.3. Continue metformin, ADA 1800 diet,  Hemodynamically stable in response to labetalol and Zestoretic. Consume less than 2 g of salt a day. Hyperlipidemia on statin that he is tolerating well  Chronic pain syndrome with fibromyalgia and hyperalgesia. Opiate dependent as provided by pain management. He has been noncompliant with labs those were ordered. However he states that he will proceed today  Once again he declined influenza and pneumococcal vaccine  Further recommendations to follow labs  Medication list provided per request  Follow-up in 3 months  This note is created with a voice recognition program and while intend to generate a document that accurately reflects the content of the visit, no guarantee can be provided that every mistake has been identified and corrected by editing.           Abel Olivares MD

## 2022-07-22 ENCOUNTER — TELEPHONE (OUTPATIENT)
Dept: PULMONOLOGY | Age: 57
End: 2022-07-22

## 2022-07-22 NOTE — TELEPHONE ENCOUNTER
Received NO SHOW CT NOTICE - called pt, left voice mail, provided phone number to Kellie Hollins Scheduling Dept - advised him to completed prior to his October f/u appt with Dr Martha Jones.

## 2022-08-04 ENCOUNTER — HOSPITAL ENCOUNTER (OUTPATIENT)
Dept: CT IMAGING | Age: 57
Discharge: HOME | End: 2022-08-06
Payer: COMMERCIAL

## 2022-08-04 VITALS — WEIGHT: 258 LBS | BODY MASS INDEX: 42.99 KG/M2 | HEIGHT: 65 IN

## 2022-08-04 DIAGNOSIS — Z87.891 PERSONAL HISTORY OF TOBACCO USE: ICD-10-CM

## 2022-08-04 PROCEDURE — 71271 CT THORAX LUNG CANCER SCR C-: CPT

## 2022-08-08 ENCOUNTER — OFFICE VISIT (OUTPATIENT)
Dept: PAIN MANAGEMENT | Age: 57
End: 2022-08-08
Payer: COMMERCIAL

## 2022-08-08 VITALS
WEIGHT: 258 LBS | HEART RATE: 77 BPM | SYSTOLIC BLOOD PRESSURE: 131 MMHG | OXYGEN SATURATION: 96 % | BODY MASS INDEX: 40.49 KG/M2 | DIASTOLIC BLOOD PRESSURE: 87 MMHG | HEIGHT: 67 IN

## 2022-08-08 DIAGNOSIS — Z79.891 CHRONIC USE OF OPIATE DRUG FOR THERAPEUTIC PURPOSE: ICD-10-CM

## 2022-08-08 DIAGNOSIS — M47.816 LUMBAR FACET ARTHROPATHY: Primary | ICD-10-CM

## 2022-08-08 PROCEDURE — 99213 OFFICE O/P EST LOW 20 MIN: CPT | Performed by: NURSE PRACTITIONER

## 2022-08-08 RX ORDER — OXYCODONE HYDROCHLORIDE AND ACETAMINOPHEN 5; 325 MG/1; MG/1
1 TABLET ORAL 2 TIMES DAILY PRN
Qty: 60 TABLET | Refills: 0 | Status: SHIPPED | OUTPATIENT
Start: 2022-08-09 | End: 2022-09-06 | Stop reason: SDUPTHER

## 2022-08-08 ASSESSMENT — ENCOUNTER SYMPTOMS
BACK PAIN: 1
SHORTNESS OF BREATH: 0
CONSTIPATION: 0
COUGH: 0
VOMITING: 0
NAUSEA: 0
DIARRHEA: 0
WHEEZING: 0
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
## 2022-08-08 NOTE — TELEPHONE ENCOUNTER
LAST VISIT: 6/24/22  NEXT VISIT: 10/28/22    Per last dictation patient is on this medication. Please sign for refill if ok. Thank you.

## 2022-08-12 RX ORDER — TIOTROPIUM BROMIDE 18 UG/1
18 CAPSULE ORAL; RESPIRATORY (INHALATION) DAILY
Qty: 90 CAPSULE | Refills: 2 | Status: SHIPPED | OUTPATIENT
Start: 2022-08-12

## 2022-08-15 RX ORDER — LISINOPRIL AND HYDROCHLOROTHIAZIDE 12.5; 1 MG/1; MG/1
TABLET ORAL
Qty: 90 TABLET | Refills: 1 | Status: SHIPPED | OUTPATIENT
Start: 2022-08-15

## 2022-08-15 NOTE — TELEPHONE ENCOUNTER
Last Visit:  7/8/2022     Next Visit Date:  Future Appointments   Date Time Provider Chelly Bermani   9/6/2022 11:20 AM MD Clayton Rao [facility]   10/10/2022  9:15 AM MD Sharlene Palencia PC [facility]   10/28/2022 11:00 AM Vance Oconnor MD Resp Spec [address] Maintenance   Topic Date Due    Annual Wellness Visit (AWV)  Never done    Hepatitis B vaccine (1 of 3 - Risk 3-dose series) Never done    COVID-19 Vaccine (3 - Booster for Moderna series) 09/05/2021    Diabetic foot exam  12/03/2022 (Originally 6/1/2018)    DTaP/Tdap/Td vaccine (1 - Tdap) 12/03/2022 (Originally 9/17/1982)    Shingles vaccine (1 of 2) 12/03/2022 (Originally 9/17/2013)    Pneumococcal 0-64 years Vaccine (1 - PCV) 04/20/2023 (Originally 9/17/1969)    Flu vaccine (1) 09/01/2022    Diabetic retinal exam  09/29/2022    A1C test (Diabetic or Prediabetic)  07/08/2023    Diabetic microalbuminuria test  07/08/2023    Lipids  07/08/2023    Depression Monitoring  07/08/2023    Low dose CT lung screening  08/04/2023    Colorectal Cancer Screen  03/08/2024    Hepatitis C screen  Completed    HIV screen  Completed    Hepatitis A vaccine  Aged Out    Hib vaccine  Aged Out    Meningococcal (ACWY) vaccine  Aged Out       Hemoglobin A1C (%)   Date Value   07/08/2022 6.4 (H)   08/03/2021 6.3 (H)   09/23/2020 6.6 (H)             ( goal A1C is < 7)   Microalb/Crt.  Ratio (mcg/mg creat)   Date Value   07/08/2022 Can not be calculated     LDL Cholesterol (mg/dL)   Date Value   07/08/2022 169 (H)   08/03/2021 151 (H)       (goal LDL is <100)   AST (U/L)   Date Value   07/08/2022 15     ALT (U/L)   Date Value   07/08/2022 15     BUN (mg/dL)   Date Value   07/08/2022 12     BP Readings from Last 3 Encounters:   08/08/22 131/87   07/08/22 130/82   06/24/22 (!) 151/90          (goal 120/80)    All Future Testing planned in [facility]  Lab Frequency Next Occurrence   XR HIP RIGHT MIN 4VW W PELVIS Once 03/11/2022   DRUG SCREEN, PAIN Once 03/11/2022   Baseline Diagnostic Sleep Study Once 09/24/2022   Sleep Study with PAP Titration Once 07/24/2022   Phase I - warming device PRN    Phase I - cardiac monitor PRN    Phase I & II - metered glucose PRN                Patient Active Problem List:     Daily headache     Chronic use of opiate drugs therapeutic purposes     Morbid obesity with BMI of 40.0-44.9, adult ([facility] [address]     Controlled type 2 diabetes mellitus without complication, without long-term current use of insulin (HCC)     MARLENE and COPD overlap syndrome ([facility])     Anxiety and depression     Pneumococcal vaccination declined     Influenza vaccination declined     Essential hypertension     Mixed hyperlipidemia     Chronic obstructive pulmonary disease, unspecified ([facility]     Smoker     Lumbar facet arthropathy

## 2022-09-06 ENCOUNTER — OFFICE VISIT (OUTPATIENT)
Dept: PAIN MANAGEMENT | Age: 57
End: 2022-09-06
Payer: COMMERCIAL

## 2022-09-06 VITALS
BODY MASS INDEX: 40.49 KG/M2 | HEART RATE: 77 BPM | OXYGEN SATURATION: 96 % | DIASTOLIC BLOOD PRESSURE: 101 MMHG | SYSTOLIC BLOOD PRESSURE: 159 MMHG | WEIGHT: 258 LBS | HEIGHT: 67 IN

## 2022-09-06 DIAGNOSIS — J44.9 OSA AND COPD OVERLAP SYNDROME (HCC): ICD-10-CM

## 2022-09-06 DIAGNOSIS — M48.062 SPINAL STENOSIS OF LUMBAR REGION WITH NEUROGENIC CLAUDICATION: Primary | ICD-10-CM

## 2022-09-06 DIAGNOSIS — E66.01 MORBID OBESITY WITH BMI OF 40.0-44.9, ADULT (HCC): ICD-10-CM

## 2022-09-06 DIAGNOSIS — Z79.891 CHRONIC USE OF OPIATE DRUG FOR THERAPEUTIC PURPOSE: ICD-10-CM

## 2022-09-06 DIAGNOSIS — G47.33 OSA AND COPD OVERLAP SYNDROME (HCC): ICD-10-CM

## 2022-09-06 PROCEDURE — 99214 OFFICE O/P EST MOD 30 MIN: CPT | Performed by: ANESTHESIOLOGY

## 2022-09-06 RX ORDER — OXYCODONE HYDROCHLORIDE AND ACETAMINOPHEN 5; 325 MG/1; MG/1
1 TABLET ORAL 2 TIMES DAILY PRN
Qty: 60 TABLET | Refills: 0 | Status: SHIPPED | OUTPATIENT
Start: 2022-09-07 | End: 2022-10-04 | Stop reason: SDUPTHER

## 2022-09-06 ASSESSMENT — ENCOUNTER SYMPTOMS
DIARRHEA: 0
COUGH: 0
VOMITING: 0
BACK PAIN: 1
SORE THROAT: 0
WHEEZING: 0
CONSTIPATION: 0
NAUSEA: 0
SHORTNESS OF BREATH: 0

## 2022-09-06 NOTE — PROGRESS NOTES
The patient is a 60 y. o. Non- / non  male. Chief Complaint   Patient presents with    Back Pain    Medication Refill     Percocet        HPI    Medication Refill: Percocet    Pain score Today:  9  Adverse effects (Constipation / Nausea / Sedation / sexual Dysfunction / others) : no  Mood: fair  Sleep pattern and quality: poor  Activity level: fair    Pill count Today:4  Last dose taken  09/06/2022 AM  OARRS report reviewed today: yes  ER/Hospitalizations/PCP visit related to pain since last visit:no   Any legal problems e.g. DUI etc.:No  Satisfied with current management: Yes    Opioid Contract:03/11/2022  Last Urine Dug screen dated:03/11/2022    Lab Results   Component Value Date    LABA1C 6.4 (H) 07/08/2022     Lab Results   Component Value Date     07/08/2022       Past Medical History, Past Surgical History, Social History, Allergies and Medications reviewed and updated in EPIC as indicated    Family History reviewed and is noncontributory.       Past Medical History:   Diagnosis Date    Asthma     Diabetes mellitus ([facility] [address]     HTN (hypertension)     Hyperlipidemia     Hypertension     Lung disease     Migraine     Neuropathy     Positive FIT (fecal immunochemical test)     Renal failure     Sleep apnea         Past Surgical History:   Procedure Laterality Date    ANESTHESIA NERVE BLOCK Bilateral 9/18/2017    NERVE BLOCK BILATERAL MEDIAL BRANCH L2-L5 performed by Orly Hunter MD at [facility] Bilateral 10/17/2017    [address] L2-L5 performed by Orly Hunter MD at [place] [address]  03/08/2017    COLONOSCOPY  03/08/2017    internal hemorrhoids; mild diverticulosis    ENDOSCOPY, COLON, DIAGNOSTIC      FIBULA FRACTURE SURGERY Left     LEG SURGERY Right     NERVE BLOCK N/A 10/24/2016    lumbar COLETTE    NERVE BLOCK Right 11/21/2016    lumbar COLETTE    NERVE BLOCK Left 08/13/2018    radiofrequency ablation lyndsey palacios block L2-L5    OTHER SURGICAL HISTORY Right 01/26/2017    right hip steroid injection    OTHER SURGICAL HISTORY Right 08/06/2018    NERVE RADIOFREQUENCY ABLATION RIGHT LUMBAR MEDIAL BRANCH L2-L5 (Right )    OTHER SURGICAL HISTORY  12/03/2018    RIGHT L4 L5 EPIDURAL STEROID INJECTION (Right )    OTHER SURGICAL HISTORY  03/25/2019    LUMBAR COLETTE (N/A )    PAIN MANAGEMENT PROCEDURE Right 5/28/2020    EPIDURAL STEROID INJECTION RIGHT L5S1 performed by Lopez Rosales MD at [facility] Right 6/15/2020    EPIDURAL STEROID INJECTION RIGTH L5S1 performed by Lopez Rosales MD at [facility] AA&/STRD TFRML EPI LUMBAR/SACRAL 1 LEVEL Right 12/3/2018    RIGHT L4 L5 EPIDURAL STEROID INJECTION performed by Lopez Rosales MD at [address] OFFICE/OUTPT [address] Right 8/6/2018    NERVE RADIOFREQUENCY ABLATION RIGHT LUMBAR MEDIAL BRANCH L2-L5 performed by Lopez Rosales MD at [address] OFFICE/OUTPT [address] Left 8/13/2018    NERVE RADIOFREQUENCY ABLATION LEFT LUMBAR MEDIAL BRANCH L2-L5 performed by Lopez Rosales MD at [address] N/A 3/25/2019    LUMBAR COLETTE performed by Lopez Rosales MD at [address] [place] [address] History     Socioeconomic History    Marital status: Single   Tobacco Use    Smoking status: Every Day     Packs/day: 1.50     Years: 20.00     Pack years: 30.00     Types: Cigarettes     Start date: 10/2/1979    Smokeless tobacco: Never    Tobacco comments:     down to 5 cigg.  per day   Substance and Sexual Activity    Alcohol use: No     Alcohol/week: 0.0 standard drinks    Drug use: No       Family History   Problem Relation Age of Onset    Diabetes Mother     Heart Disease Mother     Cancer Mother     Heart Disease Maternal Grandmother        Allergies   Allergen Reactions    Amitriptyline Anaphylaxis    Paroxetine Other (See Comments)    Paxil [Paroxetine Hcl] Other (See Comments)       There were no vitals filed for this visit. Current Outpatient Medications   Medication Sig Dispense Refill    lisinopril-hydroCHLOROthiazide (PRINZIDE;ZESTORETIC) 10-12.5 MG per tablet take 1 tablet by mouth once daily 90 tablet 1    tiotropium (SPIRIVA HANDIHALER) 18 MCG inhalation capsule Inhale 1 capsule into the lungs in the morning. 90 capsule 2    oxyCODONE-acetaminophen (PERCOCET) 5-325 MG per tablet Take 1 tablet by mouth 2 times daily as needed for Pain for up to 30 days. 60 tablet 0    atorvastatin (LIPITOR) 40 MG tablet Take 1 tablet by mouth daily 90 tablet 1    metFORMIN (GLUCOPHAGE) 500 MG tablet take 1 tablet by mouth twice a day with meals 180 tablet 1    terbinafine (LAMISIL) 1 % cream Apply topically 2 times daily. 36 g 3    albuterol sulfate  (90 Base) MCG/ACT inhaler Inhale 2 puffs into the lungs 4 times daily as needed for Wheezing 3 Inhaler 1    tiotropium (SPIRIVA RESPIMAT) 2.5 MCG/ACT AERS inhaler Inhale 2 puffs into the lungs daily 1 g 5     No current facility-administered medications for this visit. Facility-Administered Medications Ordered in Other Visits   Medication Dose Route Frequency Provider Last Rate Last Admin    fentaNYL (SUBLIMAZE) injection 25 mcg  25 mcg IntraVENous Q5 Min PRN Lorena Geiger MD        HYDROmorphone (DILAUDID) injection 0.5 mg  0.5 mg IntraVENous Q5 Min PRN Lorena Geiger MD        fentaNYL (SUBLIMAZE) injection 25 mcg  25 mcg IntraVENous Q5 Min PRN Lorena Geiger MD        HYDROmorphone (DILAUDID) injection 0.5 mg  0.5 mg IntraVENous Q5 Min PRN Lorena Geiger MD        labetalol (NORMODYNE;TRANDATE) injection 5 mg  5 mg IntraVENous Q10 Min PRN Lorena Geiger MD        hydrALAZINE (APRESOLINE) injection 5 mg  5 mg IntraVENous Q10 Min PRN Lorena Geiger MD        meperidine (DEMEROL) injection 12.5 mg  12.5 mg IntraVENous Q5 Min PRN Seamus Maynard MD           Review of Systems   Constitutional:  Negative for chills and fever.    HENT:  Negative for congestion and sore throat. Respiratory:  Negative for cough, shortness of breath and wheezing. Gastrointestinal:  Negative for constipation, diarrhea, nausea and vomiting. Musculoskeletal:  Positive for back pain and gait problem. Objective:  Vital signs: (most recent): Height 5' 7\" (1.702 m), weight 258 lb (117 kg). No fever. Assessment & Plan  1. Chronic use of opiate drugs therapeutic purposes        No orders of the defined types were placed in this encounter. No orders of the defined types were placed in this encounter.            Electronically signed by Pako Romo MA on 9/6/2022 at 11:21 AM

## 2022-09-06 NOTE — PROGRESS NOTES
The patient is a 60 y. o. Non- / non  male. Chief Complaint   Patient presents with    Back Pain    Medication Refill     Percocet        HPI    Back pain  Chronic going on for many years located in the lower lumbar area  Also involve pain going down legs right more than left describes as aching throbbing sensation  Symptoms aggravated with standing and walking and improves with rest  He is on chronic opioid therapy  Prescribed Percocet 5 mg twice a day  Daily morphine equaling 15  Reports poor pain control  Rating intensity 9 out of 10  Recently did physical therapy  High risk for opioid related complication considering obstructive sleep apnea and morbid obesity history    Last diagnostic work-up was MRI lumbar spine in 2018 that showed a moderate to severe stenosis at lower to lumbar level  He denies any loss of bladder or bowel control but worsening pain warrants new imaging    Will avoid further opioid dose escalation    Had an epidural injection in September 2021  May consider for appropriate interventional procedure in future as needed after diagnostic work-up    Medication Refill: Percocet    Pain score Today:  9  Adverse effects (Constipation / Nausea / Sedation / sexual Dysfunction / others) : no  Mood: fair  Sleep pattern and quality: poor  Activity level: fair    Pill count Today:4  Last dose taken  09/06/2022 AM  OARRS report reviewed today: yes  ER/Hospitalizations/PCP visit related to pain since last visit:no   Any legal problems e.g. DUI etc.:No  Satisfied with current management: Yes    Opioid Contract:03/11/2022  Last Urine Dug screen dated:03/11/2022    Lab Results   Component Value Date    LABA1C 6.4 (H) 07/08/2022     Lab Results   Component Value Date     07/08/2022       Past Medical History, Past Surgical History, Social History, Allergies and Medications reviewed and updated in EPIC as indicated    Family History reviewed and is noncontributory.       Past Medical History:   Diagnosis Date    Asthma     Diabetes mellitus ([facility] [address]     HTN (hypertension)     Hyperlipidemia     Hypertension     Lung disease     Migraine     Neuropathy     Positive FIT (fecal immunochemical test)     Renal failure     Sleep apnea         Past Surgical History:   Procedure Laterality Date    ANESTHESIA NERVE BLOCK Bilateral 9/18/2017    NERVE BLOCK BILATERAL MEDIAL BRANCH L2-L5 performed by [facility]evan [facility]worth [facility] at [facility] Bilateral 10/17/2017    [address] L2-L5 performed by Estevan Duckworth MD at [facility] 98  03/08/2017    COLONOSCOPY  03/08/2017    internal hemorrhoids; mild diverticulosis    ENDOSCOPY, COLON, DIAGNOSTIC      FIBULA FRACTURE SURGERY Left     LEG SURGERY Right     NERVE BLOCK N/A 10/24/2016    lumbar COLETTE    NERVE BLOCK Right 11/21/2016    lumbar COLETTE    NERVE BLOCK Left 08/13/2018    radiofrequency ablation medial branh block L2-L5    OTHER SURGICAL HISTORY Right 01/26/2017    right hip steroid injection    OTHER SURGICAL HISTORY Right 08/06/2018    NERVE RADIOFREQUENCY ABLATION RIGHT LUMBAR MEDIAL BRANCH L2-L5 (Right )    OTHER SURGICAL HISTORY  12/03/2018    RIGHT L4 L5 EPIDURAL STEROID INJECTION (Right )    OTHER SURGICAL HISTORY  03/25/2019    LUMBAR COELTTE (N/A )    PAIN MANAGEMENT PROCEDURE Right 5/28/2020    EPIDURAL STEROID INJECTION RIGHT L5S1 performed by Estevan Duckworth MD at [address] [facility] Right 6/15/2020    EPIDURAL STEROID INJECTION RIGTH L5S1 performed by Estevan Duckworth MD at [address]&/STRD TFRML EPI LUMBAR/SACRAL 1 LEVEL Right 12/3/2018    RIGHT L4 L5 EPIDURAL STEROID INJECTION performed by Estevan Duckworth MD at [address] OFFICE/[address]PT [address] Right 8/6/2018    NERVE RADIOFREQUENCY ABLATION RIGHT LUMBAR MEDIAL BRANCH L2-L5 performed by Estevan Duckworth MD at [address] OFFICE/OUTPT [address] Left 8/13/2018    NERVE RADIOFREQUENCY ABLATION LEFT LUMBAR MEDIAL BRANCH L2-L5 performed by Sulma Chowdary MD at [address] N/A 3/25/2019    LUMBAR COLETTE performed by Sulma Chowdary MD at [facility] 57 History     Socioeconomic History    Marital status: Single     Spouse name: None    Number of children: None    Years of education: None    Highest education level: None   Tobacco Use    Smoking status: Every Day     Packs/day: 1.50     Years: 20.00     Pack years: 30.00     Types: Cigarettes     Start date: 10/2/1979    Smokeless tobacco: Never    Tobacco comments:     down to 5 cigg. per day   Substance and Sexual Activity    Alcohol use: No     Alcohol/week: 0.0 standard drinks    Drug use: No       Family History   Problem Relation Age of Onset    Diabetes Mother     Heart Disease Mother     Cancer Mother     Heart Disease Maternal Grandmother        Allergies   Allergen Reactions    Amitriptyline Anaphylaxis    Paroxetine Other (See Comments)    Paxil [Paroxetine Hcl] Other (See Comments)       Vitals:    09/06/22 1124   BP: (!) 159/101   Pulse: 77   SpO2: 96%       Current Outpatient Medications   Medication Sig Dispense Refill    [START ON 9/7/2022] oxyCODONE-acetaminophen (PERCOCET) 5-325 MG per tablet Take 1 tablet by mouth 2 times daily as needed for Pain for up to 30 days. 60 tablet 0    lisinopril-hydroCHLOROthiazide (PRINZIDE;ZESTORETIC) 10-12.5 MG per tablet take 1 tablet by mouth once daily 90 tablet 1    tiotropium (SPIRIVA HANDIHALER) 18 MCG inhalation capsule Inhale 1 capsule into the lungs in the morning. 90 capsule 2    atorvastatin (LIPITOR) 40 MG tablet Take 1 tablet by mouth daily 90 tablet 1    metFORMIN (GLUCOPHAGE) 500 MG tablet take 1 tablet by mouth twice a day with meals 180 tablet 1    terbinafine (LAMISIL) 1 % cream Apply topically 2 times daily.  36 g 3    albuterol sulfate  (90 Base) MCG/ACT inhaler Inhale 2 puffs into the lungs 4 times daily as needed for Wheezing 3 Inhaler 1 tiotropium (SPIRIVA RESPIMAT) 2.5 MCG/ACT AERS inhaler Inhale 2 puffs into the lungs daily 1 g 5     No current facility-administered medications for this visit. Facility-Administered Medications Ordered in Other Visits   Medication Dose Route Frequency Provider Last Rate Last Admin    fentaNYL (SUBLIMAZE) injection 25 mcg  25 mcg IntraVENous Q5 Min PRN Lorena Geiger MD        HYDROmorphone (DILAUDID) injection 0.5 mg  0.5 mg IntraVENous Q5 Min PRN Lorena Geiger MD        fentaNYL (SUBLIMAZE) injection 25 mcg  25 mcg IntraVENous Q5 Min PRN Lorena Geiger MD        HYDROmorphone (DILAUDID) injection 0.5 mg  0.5 mg IntraVENous Q5 Min PRN Lorena Geiger MD        labetalol (NORMODYNE;TRANDATE) injection 5 mg  5 mg IntraVENous Q10 Min PRN Lorena Geiger MD        hydrALAZINE (APRESOLINE) injection 5 mg  5 mg IntraVENous Q10 Min PRN Lorena Geiger MD        meperidine (DEMEROL) injection 12.5 mg  12.5 mg IntraVENous Q5 Min PRN Toni Singh MD           Review of Systems   Constitutional:  Negative for chills and fever. HENT:  Negative for congestion and sore throat. Respiratory:  Negative for cough, shortness of breath and wheezing. Gastrointestinal:  Negative for constipation, diarrhea, nausea and vomiting. Musculoskeletal:  Positive for back pain and gait problem. Objective:  General Appearance: In no acute distress, in pain and uncomfortable. Vital signs: (most recent): Blood pressure (!) 159/101, pulse 77, height 5' 7\" (1.702 m), weight 258 lb (117 kg), SpO2 96 %. Vital signs are normal.  No fever. Output: Producing urine and producing stool. Lungs:  Normal effort. He is not in respiratory distress. Heart: Normal rate. Neurological: Patient is alert and oriented to person, place and time.       Assessment & Plan  Back pain  Chronic going on for many years located in the lower lumbar area  Also involve pain going down legs right more than left describes as aching throbbing sensation  Symptoms aggravated with standing and walking and improves with rest  He is on chronic opioid therapy  Prescribed Percocet 5 mg twice a day  Daily morphine equaling 15  Reports poor pain control  Rating intensity 9 out of 10  Recently did physical therapy  High risk for opioid related complication considering obstructive sleep apnea and morbid obesity history    Last diagnostic work-up was MRI lumbar spine in 2018 that showed a moderate to severe stenosis at lower to lumbar level  He denies any loss of bladder or bowel control but worsening pain warrants new imaging    Will avoid further opioid dose escalation    Had an epidural injection in September 2021  May consider for appropriate interventional procedure in future as needed after diagnostic work-up      1. Spinal stenosis of lumbar region with neurogenic claudication    2. Chronic use of opiate drugs therapeutic purposes    3. Morbid obesity with BMI of 40.0-44.9, adult ([facility] [address]    4. MARLENE and COPD overlap syndrome ([facility] [address]          Orders Placed This Encounter   Procedures    MRI LUMBAR SPINE WO CONTRAST        Orders Placed This Encounter   Medications    oxyCODONE-acetaminophen (PERCOCET) 5-325 MG per tablet     Sig: Take 1 tablet by mouth 2 times daily as needed for Pain for up to 30 days. Dispense:  60 tablet     Refill:  0     Reduce doses taken as pain becomes manageable      Controlled Substance Monitoring:    Acute and Chronic Pain Monitoring:   RX Monitoring 9/6/2022   Attestation -   Periodic Controlled Substance Monitoring No signs of potential drug abuse or diversion identified. ;Possible medication side effects, risk of tolerance/dependence & alternative treatments discussed. ;Assessed functional status. Chronic Pain > 50 MEDD Obtained or confirmed \"Consent for Opioid Use\" on file.    Chronic Pain > 80 MEDD -               Electronically signed by Moise Pelaez MD on 9/6/2022 at 11:58 AM

## 2022-09-23 ENCOUNTER — HOSPITAL ENCOUNTER (OUTPATIENT)
Dept: MRI IMAGING | Age: 57
Discharge: HOME | End: 2022-09-25
Payer: COMMERCIAL

## 2022-09-23 DIAGNOSIS — M48.062 SPINAL STENOSIS OF LUMBAR REGION WITH NEUROGENIC CLAUDICATION: ICD-10-CM

## 2022-09-23 PROCEDURE — 72148 MRI LUMBAR SPINE W/O DYE: CPT

## 2022-10-04 ENCOUNTER — OFFICE VISIT (OUTPATIENT)
Dept: PAIN MANAGEMENT | Age: 57
End: 2022-10-04
Payer: COMMERCIAL

## 2022-10-04 VITALS — HEART RATE: 85 BPM | WEIGHT: 265 LBS | HEIGHT: 67 IN | BODY MASS INDEX: 41.59 KG/M2 | OXYGEN SATURATION: 98 %

## 2022-10-04 DIAGNOSIS — G47.33 OSA AND COPD OVERLAP SYNDROME (HCC): ICD-10-CM

## 2022-10-04 DIAGNOSIS — M54.16 CHRONIC LUMBAR RADICULOPATHY: Primary | ICD-10-CM

## 2022-10-04 DIAGNOSIS — Z79.891 CHRONIC USE OF OPIATE DRUG FOR THERAPEUTIC PURPOSE: ICD-10-CM

## 2022-10-04 DIAGNOSIS — E66.01 MORBID OBESITY WITH BMI OF 40.0-44.9, ADULT (HCC): ICD-10-CM

## 2022-10-04 DIAGNOSIS — J44.9 OSA AND COPD OVERLAP SYNDROME (HCC): ICD-10-CM

## 2022-10-04 PROCEDURE — 99215 OFFICE O/P EST HI 40 MIN: CPT | Performed by: ANESTHESIOLOGY

## 2022-10-04 RX ORDER — OXYCODONE HYDROCHLORIDE AND ACETAMINOPHEN 5; 325 MG/1; MG/1
1 TABLET ORAL 2 TIMES DAILY PRN
Qty: 60 TABLET | Refills: 0 | Status: CANCELLED | OUTPATIENT
Start: 2022-10-04 | End: 2022-11-03

## 2022-10-04 RX ORDER — OXYCODONE HYDROCHLORIDE AND ACETAMINOPHEN 5; 325 MG/1; MG/1
1 TABLET ORAL 2 TIMES DAILY PRN
Qty: 60 TABLET | Refills: 0 | Status: SHIPPED | OUTPATIENT
Start: 2022-10-07 | End: 2022-11-02 | Stop reason: SDUPTHER

## 2022-10-04 ASSESSMENT — ENCOUNTER SYMPTOMS
CONSTIPATION: 0
SORE THROAT: 0
DIARRHEA: 0
SHORTNESS OF BREATH: 0
COUGH: 0
BACK PAIN: 1
NAUSEA: 0
WHEEZING: 0
VOMITING: 0

## 2022-10-04 NOTE — PROGRESS NOTES
The patient is a 59 y. o. Non- / non  male. Chief Complaint   Patient presents with    Back Pain    Medication Refill     Percocet        HPI  Medication Refill:  Percocet    Pain score Today:  9  Adverse effects (Constipation / Nausea / Sedation / sexual Dysfunction / others) : no  Mood: fair  Sleep pattern and quality: poor  Activity level: fair    Pill count Today:2   Last dose taken  10/04/2022 AM  OARRS report reviewed today: yes  ER/Hospitalizations/PCP visit related to pain since last visit:no   Any legal problems e.g. DUI etc.:No  Satisfied with current management: Yes    Opioid Contract:03/11/2022  Last Urine Dug screen dated:03/11/2022    Lab Results   Component Value Date    LABA1C 6.4 (H) 07/08/2022     Lab Results   Component Value Date     07/08/2022       Past Medical History, Past Surgical History, Social History, Allergies and Medications reviewed and updated in EPIC as indicated    Family History reviewed and is noncontributory.         Past Medical History:   Diagnosis Date    Asthma     Diabetes mellitus ([facility] [address]     HTN (hypertension)     Hyperlipidemia     Hypertension     Lung disease     Migraine     Neuropathy     Positive FIT (fecal immunochemical test)     Renal failure     Sleep apnea         Past Surgical History:   Procedure Laterality Date    ANESTHESIA NERVE BLOCK Bilateral 9/18/2017    NERVE BLOCK BILATERAL MEDIAL BRANCH L2-L5 performed by Gretchen Raines MD at [facility] 83 Bilateral 10/17/2017    [address] L2-L5 performed by Gretchen Raines MD at [place] [address]  03/08/2017    COLONOSCOPY  03/08/2017    internal hemorrhoids; mild diverticulosis    ENDOSCOPY, COLON, DIAGNOSTIC      FIBULA FRACTURE SURGERY Left     LEG SURGERY Right     NERVE BLOCK N/A 10/24/2016    lumbar COLETTE    NERVE BLOCK Right 11/21/2016    lumbar COLETTE    NERVE BLOCK Left 08/13/2018    radiofrequency ablation lyndsey palacios block L2-L5    OTHER SURGICAL HISTORY Right 01/26/2017    right hip steroid injection    OTHER SURGICAL HISTORY Right 08/06/2018    NERVE RADIOFREQUENCY ABLATION RIGHT LUMBAR MEDIAL BRANCH L2-L5 (Right )    OTHER SURGICAL HISTORY  12/03/2018    RIGHT L4 L5 EPIDURAL STEROID INJECTION (Right )    OTHER SURGICAL HISTORY  03/25/2019    LUMBAR COLETTE (N/A )    PAIN MANAGEMENT PROCEDURE Right 5/28/2020    EPIDURAL STEROID INJECTION RIGHT L5S1 performed by Erik Cabrera MD at [address] [place] [address] Right 6/15/2020    EPIDURAL STEROID INJECTION RIGTH L5S1 performed by Erik Cabrera MD at [facility]&/STRD TFRML EPI LUMBAR/SACRAL 1 LEVEL Right 12/3/2018    RIGHT L4 L5 EPIDURAL STEROID INJECTION performed by Erik Cabrera MD at [address] [place] [address] OFFICE/OUTPT [address] [place] [address] Road Right 8/6/2018    NERVE RADIOFREQUENCY ABLATION RIGHT LUMBAR MEDIAL BRANCH L2-L5 performed by Erik Cabrera MD at [address] [place] [address] OFFICE/OUTPT [address] [facility] [address] [facility] [address] Left 8/13/2018    NERVE RADIOFREQUENCY ABLATION LEFT LUMBAR MEDIAL BRANCH L2-L5 performed by Erik Cabrera MD at [facility] N/A 3/25/2019    LUMBAR COLETTE performed by Erik Cabrera MD at [place] [address] History     Socioeconomic History    Marital status: Single   Tobacco Use    Smoking status: Every Day     Packs/day: 1.50     Years: 20.00     Pack years: 30.00     Types: Cigarettes     Start date: 10/2/1979    Smokeless tobacco: Never    Tobacco comments:     down to 5 cigg.  per day   Substance and Sexual Activity    Alcohol use: No     Alcohol/week: 0.0 standard drinks    Drug use: No       Family History   Problem Relation Age of Onset    Diabetes Mother     Heart Disease Mother     Cancer Mother     Heart Disease Maternal Grandmother        Allergies   Allergen Reactions    Amitriptyline Anaphylaxis    Paroxetine Other (See Comments)    Paxil [Paroxetine Hcl] Other (See Comments)       There were no vitals filed for this visit. Current Outpatient Medications   Medication Sig Dispense Refill    oxyCODONE-acetaminophen (PERCOCET) 5-325 MG per tablet Take 1 tablet by mouth 2 times daily as needed for Pain for up to 30 days. 60 tablet 0    lisinopril-hydroCHLOROthiazide (PRINZIDE;ZESTORETIC) 10-12.5 MG per tablet take 1 tablet by mouth once daily 90 tablet 1    tiotropium (SPIRIVA HANDIHALER) 18 MCG inhalation capsule Inhale 1 capsule into the lungs in the morning. 90 capsule 2    atorvastatin (LIPITOR) 40 MG tablet Take 1 tablet by mouth daily 90 tablet 1    metFORMIN (GLUCOPHAGE) 500 MG tablet take 1 tablet by mouth twice a day with meals 180 tablet 1    terbinafine (LAMISIL) 1 % cream Apply topically 2 times daily. 36 g 3    albuterol sulfate  (90 Base) MCG/ACT inhaler Inhale 2 puffs into the lungs 4 times daily as needed for Wheezing 3 Inhaler 1    tiotropium (SPIRIVA RESPIMAT) 2.5 MCG/ACT AERS inhaler Inhale 2 puffs into the lungs daily 1 g 5     No current facility-administered medications for this visit. Facility-Administered Medications Ordered in Other Visits   Medication Dose Route Frequency Provider Last Rate Last Admin    fentaNYL (SUBLIMAZE) injection 25 mcg  25 mcg IntraVENous Q5 Min PRN Lorena Geiger MD        HYDROmorphone (DILAUDID) injection 0.5 mg  0.5 mg IntraVENous Q5 Min PRN Lorena Geiger MD        fentaNYL (SUBLIMAZE) injection 25 mcg  25 mcg IntraVENous Q5 Min PRN Lorena Geiger MD        HYDROmorphone (DILAUDID) injection 0.5 mg  0.5 mg IntraVENous Q5 Min PRN Lorena Geiger MD        labetalol (NORMODYNE;TRANDATE) injection 5 mg  5 mg IntraVENous Q10 Min PRN Lorena Geiger MD        hydrALAZINE (APRESOLINE) injection 5 mg  5 mg IntraVENous Q10 Min PRN Lorena Geiger MD        meperidine (DEMEROL) injection 12.5 mg  12.5 mg IntraVENous Q5 Min PRN Christopher Pena MD           Review of Systems   Constitutional:  Negative for chills and fever.    HENT:  Negative for congestion and sore throat. Respiratory:  Negative for cough, shortness of breath and wheezing. Gastrointestinal:  Negative for constipation, diarrhea, nausea and vomiting. Musculoskeletal:  Positive for back pain and gait problem. Objective:  Vital signs: (most recent): Height 5' 7\" (1.702 m), weight 265 lb (120.2 kg). No fever. Assessment & Plan  1. Chronic use of opiate drugs therapeutic purposes        No orders of the defined types were placed in this encounter. No orders of the defined types were placed in this encounter.            Electronically signed by Rojelio Castaneda MA on 10/4/2022 at 9:43 AM

## 2022-10-04 NOTE — PROGRESS NOTES
The patient is a 59 y. o. Non- / non  male. Chief Complaint   Patient presents with    Back Pain    Medication Refill     Percocet    Follow-up     MRI        HPI  68-year-old man with past medical history significant for obesity, obstructive sleep apnea    Patient seen clinical history of chronic low back and right lower extremity pain  Recently completed physical therapy  Have tried NSAIDs  On chronic low-dose opioid therapy  Pain is poorly controlled rated intensity 8-9 over 10  Had recent MRI lumbar spine that did not show any significant pathology  EMG nerve testing have shown chronic right lumbar radiculopathy    Patient is compliant with the treatment medications  No history of illicit substance use  No side effects        Medication Refill:  Percocet    Pain score Today:  9  Adverse effects (Constipation / Nausea / Sedation / sexual Dysfunction / others) : no  Mood: fair  Sleep pattern and quality: poor  Activity level: fair    Pill count Today:2   Last dose taken  10/04/2022 AM  OARRS report reviewed today: yes  ER/Hospitalizations/PCP visit related to pain since last visit:no   Any legal problems e.g. DUI etc.:No  Satisfied with current management: Yes    Opioid Contract:03/11/2022  Last Urine Dug screen dated:03/11/2022    Lab Results   Component Value Date    LABA1C 6.4 (H) 07/08/2022     Lab Results   Component Value Date     07/08/2022       Past Medical History, Past Surgical History, Social History, Allergies and Medications reviewed and updated in EPIC as indicated    Family History reviewed and is noncontributory.         Past Medical History:   Diagnosis Date    Asthma     Diabetes mellitus ([address]     HTN (hypertension)     Hyperlipidemia     Hypertension     Lung disease     Migraine     Neuropathy     Positive FIT (fecal immunochemical test)     Renal failure     Sleep apnea         Past Surgical History:   Procedure Laterality Date    ANESTHESIA NERVE BLOCK Bilateral 9/18/2017    NERVE BLOCK BILATERAL MEDIAL BRANCH L2-L5 performed by Stephen Vines MD at [facility] Bilateral 10/17/2017    NERVE BLOCK BILATERAL MEDIAL BRANCH L2-L5 performed by Stephen Vines MD at [facility]  03/08/2017    COLONOSCOPY  03/08/2017    internal hemorrhoids; mild diverticulosis    ENDOSCOPY, COLON, DIAGNOSTIC      FIBULA FRACTURE SURGERY Left     LEG SURGERY Right     NERVE BLOCK N/A 10/24/2016    lumbar COLETTE    NERVE BLOCK Right 11/21/2016    lumbar COLETTE    NERVE BLOCK Left 08/13/2018    radiofrequency ablation medial branh block L2-L5    OTHER SURGICAL HISTORY Right 01/26/2017    right hip steroid injection    OTHER SURGICAL HISTORY Right 08/06/2018    NERVE RADIOFREQUENCY ABLATION RIGHT LUMBAR MEDIAL BRANCH L2-L5 (Right )    OTHER SURGICAL HISTORY  12/03/2018    RIGHT L4 L5 EPIDURAL STEROID INJECTION (Right )    OTHER SURGICAL HISTORY  03/25/2019    LUMBAR COLETTE (N/A )    PAIN MANAGEMENT PROCEDURE Right 5/28/2020    EPIDURAL STEROID INJECTION RIGHT L5S1 performed by Stephen Vines MD at [address] [place] [address] Right 6/15/2020    EPIDURAL STEROID INJECTION RIGTH L5S1 performed by Stephen Vines MD at [facility]&/STRD TFRML EPI LUMBAR/SACRAL 1 LEVEL Right 12/3/2018    RIGHT L4 L5 EPIDURAL STEROID INJECTION performed by Stephen Vines MD at [address] [place] [address] OFFICE/OUTPT [address] [place] [address] Right 8/6/2018    NERVE RADIOFREQUENCY ABLATION RIGHT LUMBAR MEDIAL BRANCH L2-L5 performed by Stephen Vines MD at [address] [place] [address] OFFICE/OUTPT [address] [place] [address] Left 8/13/2018    NERVE RADIOFREQUENCY ABLATION LEFT LUMBAR MEDIAL BRANCH L2-L5 performed by Stephen Vines MD at [facility] N/A 3/25/2019    LUMBAR COLETTE performed by Stephen Vines MD at [place] [address] History     Socioeconomic History    Marital status: Single     Spouse name: None    Number of children: None    Years of education: None Highest education level: None   Tobacco Use    Smoking status: Every Day     Packs/day: 1.50     Years: 20.00     Pack years: 30.00     Types: Cigarettes     Start date: 10/2/1979    Smokeless tobacco: Never    Tobacco comments:     down to 5 cigg. per day   Substance and Sexual Activity    Alcohol use: No     Alcohol/week: 0.0 standard drinks    Drug use: No       Family History   Problem Relation Age of Onset    Diabetes Mother     Heart Disease Mother     Cancer Mother     Heart Disease Maternal Grandmother        Allergies   Allergen Reactions    Amitriptyline Anaphylaxis    Paroxetine Other (See Comments)    Paxil [Paroxetine Hcl] Other (See Comments)       Vitals:    10/04/22 0947   Pulse: 85   SpO2: 98%       Current Outpatient Medications   Medication Sig Dispense Refill    [START ON 10/7/2022] oxyCODONE-acetaminophen (PERCOCET) 5-325 MG per tablet Take 1 tablet by mouth 2 times daily as needed for Pain for up to 30 days. 60 tablet 0    lisinopril-hydroCHLOROthiazide (PRINZIDE;ZESTORETIC) 10-12.5 MG per tablet take 1 tablet by mouth once daily 90 tablet 1    tiotropium (SPIRIVA HANDIHALER) 18 MCG inhalation capsule Inhale 1 capsule into the lungs in the morning. 90 capsule 2    atorvastatin (LIPITOR) 40 MG tablet Take 1 tablet by mouth daily 90 tablet 1    metFORMIN (GLUCOPHAGE) 500 MG tablet take 1 tablet by mouth twice a day with meals 180 tablet 1    terbinafine (LAMISIL) 1 % cream Apply topically 2 times daily. 36 g 3    albuterol sulfate  (90 Base) MCG/ACT inhaler Inhale 2 puffs into the lungs 4 times daily as needed for Wheezing 3 Inhaler 1    tiotropium (SPIRIVA RESPIMAT) 2.5 MCG/ACT AERS inhaler Inhale 2 puffs into the lungs daily 1 g 5     No current facility-administered medications for this visit.      Facility-Administered Medications Ordered in Other Visits   Medication Dose Route Frequency Provider Last Rate Last Admin    fentaNYL (SUBLIMAZE) injection 25 mcg  25 mcg IntraVENous Q5 Min PRN Saúl Grimes MD        HYDROmorphone (DILAUDID) injection 0.5 mg  0.5 mg IntraVENous Q5 Min PRN Lorena Geiger MD        fentaNYL (SUBLIMAZE) injection 25 mcg  25 mcg IntraVENous Q5 Min PRN Lorena Geiger MD        HYDROmorphone (DILAUDID) injection 0.5 mg  0.5 mg IntraVENous Q5 Min PRN Lorena Geiger MD        labetalol (NORMODYNE;TRANDATE) injection 5 mg  5 mg IntraVENous Q10 Min PRN Lorena Geiger MD        hydrALAZINE (APRESOLINE) injection 5 mg  5 mg IntraVENous Q10 Min PRN Lorena Geigre MD        meperidine (DEMEROL) injection 12.5 mg  12.5 mg IntraVENous Q5 Min PRN Saúl Grimes MD           Review of Systems   Constitutional:  Negative for chills and fever. HENT:  Negative for congestion and sore throat. Respiratory:  Negative for cough, shortness of breath and wheezing. Gastrointestinal:  Negative for constipation, diarrhea, nausea and vomiting. Musculoskeletal:  Positive for back pain and gait problem. Objective:  General Appearance:  Well-appearing, in no acute distress, uncomfortable and in pain. Vital signs: (most recent): Pulse 85, height 5' 7\" (1.702 m), weight 265 lb (120.2 kg), SpO2 98 %. Vital signs are normal.  No fever. Output: Producing urine and producing stool. HEENT: Normal HEENT exam.    Lungs:  Normal effort and normal respiratory rate. He is not in respiratory distress. Heart: Normal rate. Extremities: Normal range of motion. There is no deformity. Neurological: Patient is alert and oriented to person, place and time. Patient has normal coordination. Pupils:  Pupils are equal, round, and reactive to light. Pupils are equal.   Skin:  Warm and dry. No rash or cyanosis.        Assessment & Plan      61-year-old man with past medical history significant for obesity, obstructive sleep apnea    Patient seen clinical history of chronic low back and right lower extremity pain  Recently completed physical therapy  Have tried NSAIDs  On chronic low-dose opioid therapy  Pain is poorly controlled rated intensity 8-9 over 10  Had recent MRI lumbar spine that did not show any significant pathology  EMG nerve testing have shown chronic right lumbar radiculopathy    Patient is compliant with the treatment medications  No history of illicit substance use  No side effects    EXAMINATION: MRI OF THE LUMBAR SPINE WITHOUT CONTRAST, 9/23/2022 6:46 pm      L3-L4: There is a circumferential disc bulge with facet and ligamentous hypertrophy. There is no canal stenosis. There is mild left and moderate right foraminal narrowing. L4-L5: There is a circumferential disc bulge with facet hypertrophy. There is canal stenosis measuring 9 mm in AP dimension. There is moderate bilateral foraminal narrowing. L5-S1: There is a circumferential disc bulge with facet hypertrophy. There is no canal stenosis or left foraminal narrowing. There is moderate to severe right foraminal narrowing. 1. Chronic lumbar radiculopathy    2. Chronic use of opiate drugs therapeutic purposes    3. MARLENE and COPD overlap syndrome ([facility]    4.  Morbid obesity with BMI of 40.0-44.9, adult ([facility]        Neck lower back pain  Chronic right lower extremity pain  Diagnosed with lumbar radiculopathy  MRI lumbar spine  Report is reviewed and images reviewed independently  No apparent pathology to suggest for need for surgery or related to his symptom    EMG is confirmed radiculopathy  I recommended neuromodulation trial as he has failed different medications and pain procedures in past    Information material is provided  Patient is interested  Will obtain surgical evaluation is required by the insurance meant no surgical pathology in the spine  We will also obtain psychological evaluation    Refill ordered for Percocet  Orders Placed This Encounter   Procedures    External Referral To Psychology    [address] [place] [address], Berhane Franklin DO, Neurosurgery, [place]      Orders Placed This Encounter Medications    oxyCODONE-acetaminophen (PERCOCET) 5-325 MG per tablet     Sig: Take 1 tablet by mouth 2 times daily as needed for Pain for up to 30 days. Dispense:  60 tablet     Refill:  0     Reduce doses taken as pain becomes manageable          Controlled Substance Monitoring:    Acute and Chronic Pain Monitoring:   RX Monitoring 10/4/2022   Attestation -   Periodic Controlled Substance Monitoring No signs of potential drug abuse or diversion identified. ;Possible medication side effects, risk of tolerance/dependence & alternative treatments discussed. ;Assessed functional status. Chronic Pain > 50 MEDD Obtained or confirmed \"Consent for Opioid Use\" on file.    Chronic Pain > 80 MEDD -               Electronically signed by Annette Halsted, MD on 10/4/2022 at 10:32 AM

## 2022-10-19 ENCOUNTER — OFFICE VISIT (OUTPATIENT)
Dept: INTERNAL MEDICINE CLINIC | Age: 57
End: 2022-10-19
Payer: COMMERCIAL

## 2022-10-19 VITALS
SYSTOLIC BLOOD PRESSURE: 110 MMHG | OXYGEN SATURATION: 98 % | DIASTOLIC BLOOD PRESSURE: 84 MMHG | HEIGHT: 67 IN | BODY MASS INDEX: 41.59 KG/M2 | TEMPERATURE: 98 F | HEART RATE: 75 BPM | WEIGHT: 265 LBS

## 2022-10-19 DIAGNOSIS — R51.9 DAILY HEADACHE: ICD-10-CM

## 2022-10-19 DIAGNOSIS — E78.2 MIXED HYPERLIPIDEMIA: ICD-10-CM

## 2022-10-19 DIAGNOSIS — E11.9 CONTROLLED TYPE 2 DIABETES MELLITUS WITHOUT COMPLICATION, WITHOUT LONG-TERM CURRENT USE OF INSULIN (HCC): ICD-10-CM

## 2022-10-19 DIAGNOSIS — J44.9 OSA AND COPD OVERLAP SYNDROME (HCC): ICD-10-CM

## 2022-10-19 DIAGNOSIS — G47.33 OSA AND COPD OVERLAP SYNDROME (HCC): ICD-10-CM

## 2022-10-19 DIAGNOSIS — Z79.891 CHRONIC USE OF OPIATE DRUG FOR THERAPEUTIC PURPOSE: ICD-10-CM

## 2022-10-19 DIAGNOSIS — Z28.21 INFLUENZA VACCINATION DECLINED: ICD-10-CM

## 2022-10-19 DIAGNOSIS — M47.816 LUMBAR FACET ARTHROPATHY: Primary | ICD-10-CM

## 2022-10-19 DIAGNOSIS — F32.A ANXIETY AND DEPRESSION: ICD-10-CM

## 2022-10-19 DIAGNOSIS — Z23 NEED FOR INFLUENZA VACCINATION: ICD-10-CM

## 2022-10-19 DIAGNOSIS — E66.01 MORBID OBESITY WITH BMI OF 40.0-44.9, ADULT (HCC): ICD-10-CM

## 2022-10-19 DIAGNOSIS — I10 ESSENTIAL HYPERTENSION: ICD-10-CM

## 2022-10-19 DIAGNOSIS — F17.200 SMOKER: ICD-10-CM

## 2022-10-19 DIAGNOSIS — Z28.21 PNEUMOCOCCAL VACCINATION DECLINED: ICD-10-CM

## 2022-10-19 DIAGNOSIS — F41.9 ANXIETY AND DEPRESSION: ICD-10-CM

## 2022-10-19 PROBLEM — M48.062 SPINAL STENOSIS OF LUMBAR REGION WITH NEUROGENIC CLAUDICATION: Status: RESOLVED | Noted: 2022-09-06 | Resolved: 2022-10-19

## 2022-10-19 PROBLEM — M54.16 CHRONIC LUMBAR RADICULOPATHY: Status: RESOLVED | Noted: 2020-05-28 | Resolved: 2022-10-19

## 2022-10-19 PROCEDURE — 99214 OFFICE O/P EST MOD 30 MIN: CPT | Performed by: FAMILY MEDICINE

## 2022-10-19 PROCEDURE — 90674 CCIIV4 VAC NO PRSV 0.5 ML IM: CPT | Performed by: FAMILY MEDICINE

## 2022-10-19 PROCEDURE — G0008 ADMIN INFLUENZA VIRUS VAC: HCPCS | Performed by: FAMILY MEDICINE

## 2022-10-19 PROCEDURE — 3044F HG A1C LEVEL LT 7.0%: CPT | Performed by: FAMILY MEDICINE

## 2022-10-19 NOTE — PROGRESS NOTES
Chronic Disease Visit Information    BP Readings from Last 3 Encounters:   10/19/22 110/84   09/06/22 (!) 159/101   08/08/22 131/87          Hemoglobin A1C (%)   Date Value   07/08/2022 6.4 (H)   08/03/2021 6.3 (H)   09/23/2020 6.6 (H)     Microalb/Crt. Ratio (mcg/mg creat)   Date Value   07/08/2022 Can not be calculated     LDL Cholesterol (mg/dL)   Date Value   07/08/2022 169 (H)     HDL (mg/dL)   Date Value   07/08/2022 43     BUN (mg/dL)   Date Value   07/08/2022 12     Creatinine (mg/dL)   Date Value   07/08/2022 0.81     Glucose (mg/dL)   Date Value   07/08/2022 102 (H)            Have you changed or started any medications since your last visit including any over-the-counter medicines, vitamins, or herbal medicines? no   Are you having any side effects from any of your medications? -  no  Have you stopped taking any of your medications? Is so, why? -  no    Have you seen any other physician or provider since your last visit? No  Have you had any other diagnostic tests since your last visit? No  Have you been seen in the emergency room and/or had an admission to a hospital since we last saw you? No  Have you had your annual diabetic retinal (eye) exam? No  Have you had your routine dental cleaning in the past 6 months? no    Have you activated your [employer] account? If not, what are your barriers?  Yes     Patient Care Team:  Ney Kaba MD as PCP - General (Family Medicine)  Ney Kaba MD as PCP - [facility]  Ronan Nova MD as Consulting Physician (Neurology)  Tino Chicas MD as Consulting Physician (Pain Management)  Elis Davis MD as Consulting Physician (Gastroenterology)  Otilia Genao MD as Consulting Physician (Plastic Surgery)  Trent Juares DO as Consulting Physician (Pulmonary Disease)         Medical History Review  Past Medical, Family, and Social History reviewed and does contribute to the patient presenting condition    Health Maintenance   Topic Date Due Hepatitis B vaccine (1 of 3 - Risk 3-dose series) Never done    COVID-19 Vaccine (3 - Booster for Moderna series) 09/05/2021    Annual Wellness Visit (AWV)  Never done    Flu vaccine (1) Never done    Diabetic retinal exam  09/29/2022    Diabetic foot exam  12/03/2022 (Originally 6/1/2018)    DTaP/Tdap/Td vaccine (1 - Tdap) 12/03/2022 (Originally 9/17/1982)    Shingles vaccine (1 of 2) 12/03/2022 (Originally 9/17/2013)    Pneumococcal 0-64 years Vaccine (1 - PCV) 04/20/2023 (Originally 9/17/1969)    A1C test (Diabetic or Prediabetic)  07/08/2023    Diabetic microalbuminuria test  07/08/2023    Lipids  07/08/2023    Depression Monitoring  07/08/2023    Low dose CT lung screening  08/04/2023    Colorectal Cancer Screen  03/08/2024    Hepatitis C screen  Completed    HIV screen  Completed    Hepatitis A vaccine  Aged Out    Hib vaccine  Aged Out    Meningococcal (ACWY) vaccine  Aged Out

## 2022-11-02 DIAGNOSIS — Z79.891 CHRONIC USE OF OPIATE DRUG FOR THERAPEUTIC PURPOSE: ICD-10-CM

## 2022-11-02 RX ORDER — OXYCODONE HYDROCHLORIDE AND ACETAMINOPHEN 5; 325 MG/1; MG/1
1 TABLET ORAL 2 TIMES DAILY PRN
Qty: 6 TABLET | Refills: 0 | Status: SHIPPED | OUTPATIENT
Start: 2022-11-05 | End: 2022-11-07 | Stop reason: SDUPTHER

## 2022-11-02 NOTE — TELEPHONE ENCOUNTER
Pt is concerned about running out of his meds he had an appointment with Dr Taylor Lucas yesterday and do to Dr Dalila Desai all appointments for yesterday he would like to know can you  please fill this on the 6th as we could not get him in with you on 11/07/2022 @ 440 pm he stated even going with out his meds for one day can make him worse please advise

## 2022-11-07 ENCOUNTER — OFFICE VISIT (OUTPATIENT)
Dept: PAIN MANAGEMENT | Age: 57
End: 2022-11-07
Payer: COMMERCIAL

## 2022-11-07 VITALS
HEIGHT: 67 IN | OXYGEN SATURATION: 96 % | DIASTOLIC BLOOD PRESSURE: 105 MMHG | BODY MASS INDEX: 41.59 KG/M2 | SYSTOLIC BLOOD PRESSURE: 190 MMHG | WEIGHT: 265 LBS | HEART RATE: 94 BPM

## 2022-11-07 DIAGNOSIS — M54.41 CHRONIC BILATERAL LOW BACK PAIN WITH RIGHT-SIDED SCIATICA: Primary | Chronic | ICD-10-CM

## 2022-11-07 DIAGNOSIS — Z79.891 CHRONIC USE OF OPIATE DRUG FOR THERAPEUTIC PURPOSE: ICD-10-CM

## 2022-11-07 DIAGNOSIS — G89.29 CHRONIC BILATERAL LOW BACK PAIN WITH RIGHT-SIDED SCIATICA: Primary | Chronic | ICD-10-CM

## 2022-11-07 PROCEDURE — 99213 OFFICE O/P EST LOW 20 MIN: CPT | Performed by: NURSE PRACTITIONER

## 2022-11-07 PROCEDURE — 3074F SYST BP LT 130 MM HG: CPT | Performed by: NURSE PRACTITIONER

## 2022-11-07 PROCEDURE — 3078F DIAST BP <80 MM HG: CPT | Performed by: NURSE PRACTITIONER

## 2022-11-07 RX ORDER — OXYCODONE HYDROCHLORIDE AND ACETAMINOPHEN 5; 325 MG/1; MG/1
1 TABLET ORAL 2 TIMES DAILY PRN
Qty: 60 TABLET | Refills: 0 | Status: SHIPPED | OUTPATIENT
Start: 2022-11-08 | End: 2022-12-08

## 2022-11-07 ASSESSMENT — ENCOUNTER SYMPTOMS
WHEEZING: 0
CONSTIPATION: 0
CHANGE IN BOWEL HABIT: 0
SORE THROAT: 0
SHORTNESS OF BREATH: 0
BOWEL INCONTINENCE: 0
COUGH: 0
BACK PAIN: 1
VOMITING: 0
NAUSEA: 0

## 2022-11-07 NOTE — PROGRESS NOTES
COLON, DIAGNOSTIC      FIBULA FRACTURE SURGERY Left     LEG SURGERY Right     NERVE BLOCK N/A 10/24/2016    lumbar COLETTE    NERVE BLOCK Right 11/21/2016    lumbar COLETTE    NERVE BLOCK Left 08/13/2018    radiofrequency ablation medial branh block L2-L5    OTHER SURGICAL HISTORY Right 01/26/2017    right hip steroid injection    OTHER SURGICAL HISTORY Right 08/06/2018    NERVE RADIOFREQUENCY ABLATION RIGHT LUMBAR MEDIAL BRANCH L2-L5 (Right )    OTHER SURGICAL HISTORY  12/03/2018    RIGHT L4 L5 EPIDURAL STEROID INJECTION (Right )    OTHER SURGICAL HISTORY  03/25/2019    LUMBAR COLETTE (N/A )    PAIN MANAGEMENT PROCEDURE Right 5/28/2020    EPIDURAL STEROID INJECTION RIGHT L5S1 performed by Noah Vaca MD at [facility] Right 6/15/2020    EPIDURAL STEROID INJECTION RIGTH L5S1 performed by Noah Vaca MD at [facility]&/STRD TFRML EPI LUMBAR/SACRAL 1 LEVEL Right 12/3/2018    RIGHT L4 L5 EPIDURAL STEROID INJECTION performed by Noah Vaca MD at [address] [place] [address] OFFICE/OUTPT [address] [place] [address] Road Right 8/6/2018    NERVE RADIOFREQUENCY ABLATION RIGHT LUMBAR MEDIAL BRANCH L2-L5 performed by Noah Vaca MD at [address] [place] [address] OFFICE/OUTPT [address] [facility] [address] Left 8/13/2018    NERVE RADIOFREQUENCY ABLATION LEFT LUMBAR MEDIAL BRANCH L2-L5 performed by Noah Vaca MD at [address] N/A 3/25/2019    LUMBAR COLETTE performed by Noah Vaca MD at [address] [facility]   Allergen Reactions    Amitriptyline Anaphylaxis    Paroxetine Other (See Comments)    Paxil [Paroxetine Hcl] Other (See Comments)         Current Outpatient Medications:     [START ON 11/8/2022] oxyCODONE-acetaminophen (PERCOCET) 5-325 MG per tablet, Take 1 tablet by mouth 2 times daily as needed for Pain for up to 30 days. , Disp: 60 tablet, Rfl: 0    lisinopril-hydroCHLOROthiazide (PRINZIDE;ZESTORETIC) 10-12.5 MG per tablet, take 1 tablet by mouth once daily, Disp: 90 tablet, Rfl: 1    tiotropium (Jiang Riis) 18 MCG inhalation capsule, Inhale 1 capsule into the lungs in the morning., Disp: 90 capsule, Rfl: 2    atorvastatin (LIPITOR) 40 MG tablet, Take 1 tablet by mouth daily, Disp: 90 tablet, Rfl: 1    metFORMIN (GLUCOPHAGE) 500 MG tablet, take 1 tablet by mouth twice a day with meals, Disp: 180 tablet, Rfl: 1    terbinafine (LAMISIL) 1 % cream, Apply topically 2 times daily. , Disp: 36 g, Rfl: 3    albuterol sulfate  (90 Base) MCG/ACT inhaler, Inhale 2 puffs into the lungs 4 times daily as needed for Wheezing, Disp: 3 Inhaler, Rfl: 1    tiotropium (SPIRIVA RESPIMAT) 2.5 MCG/ACT AERS inhaler, Inhale 2 puffs into the lungs daily, Disp: 1 g, Rfl: 5    Family History   Problem Relation Age of Onset    Diabetes Mother     Heart Disease Mother     Cancer Mother     Heart Disease Maternal Grandmother        Social History     Socioeconomic History    Marital status: Single     Spouse name: Not on file    Number of children: Not on file    Years of education: Not on file    Highest education level: Not on file   Occupational History    Not on file   Tobacco Use    Smoking status: Every Day     Packs/day: 1.50     Years: 20.00     Pack years: 30.00     Types: Cigarettes     Start date: 10/2/1979    Smokeless tobacco: Never    Tobacco comments:     down to 5 cigg. per day   Substance and Sexual Activity    Alcohol use: No     Alcohol/week: 0.0 standard drinks    Drug use: No    Sexual activity: Not on file   Other Topics Concern    Not on file   Social History Narrative    Not on file     Social Determinants of Health     Financial Resource Strain: Not on file   Food Insecurity: Not on file   Transportation Needs: Not on file   Physical Activity: Not on file   Stress: Not on file   Social Connections: Not on file   Intimate Partner Violence: Not on file   Housing Stability: Not on file       Review of Systems:  Review of Systems   Constitutional: Negative for chills and fever.    HENT:  Negative for congestion and sore throat. Cardiovascular:  Negative for chest pain. Respiratory:  Negative for cough, shortness of breath and wheezing. Musculoskeletal:  Positive for back pain. Gastrointestinal:  Negative for change in bowel habit, bowel incontinence, constipation, nausea and vomiting. Physical Exam:  BP (!) 190/105   Pulse 94   Ht 5' 7\" (1.702 m)   Wt 265 lb (120.2 kg)   SpO2 96%   BMI 41.50 kg/m²     Physical Exam  Cardiovascular:      Rate and Rhythm: Normal rate. Pulmonary:      Effort: Pulmonary effort is normal.   Musculoskeletal:         General: Normal range of motion. Skin:     General: Skin is warm and dry. Neurological:      Mental Status: He is alert and oriented to person, place, and time. Assessment:  Problem List Items Addressed This Visit       Chronic use of opiate drugs therapeutic purposes    Relevant Medications    oxyCODONE-acetaminophen (PERCOCET) 5-325 MG per tablet (Start on 11/8/2022)    RESOLVED: Chronic bilateral low back pain with right-sided sciatica - Primary (Chronic)    Relevant Medications    oxyCODONE-acetaminophen (PERCOCET) 5-325 MG per tablet (Start on 11/8/2022)          Treatment Plan:  Patient relates current medications are helping the pain. Patient reports taking pain medications as prescribed, denies obtaining medications from different sources and denies use of illegal drugs. Patient denies side effects from medications like nausea, vomiting, constipation or drowsiness. Patient reports current activities of daily living are possible due to medications and would like to continue them. As always, we encourage daily stretching and strengthening exercises, and recommend minimizing use of pain medications unless patient cannot get through daily activities due to pain. Contract requirements met. Continue opioid therapy.  Script written for percocet  Follow up appointment made for 4 weeks    I have reviewed the chief complaint and history of present illness (including ROS and PFSH) and vital documentation by my staff and I agree with their documentation and have added where applicable.

## 2022-11-08 NOTE — TELEPHONE ENCOUNTER
Blair Butler is calling to request a refill on the following medication(s):    Medication Request:  Requested Prescriptions     Pending Prescriptions Disp Refills    metFORMIN (GLUCOPHAGE) 500 MG tablet [Pharmacy Med Name: METFORMIN  MG TABLET] 180 tablet 0     Sig: take 1 tablet by mouth twice a day with meals       Last Visit Date (If Applicable):  81/96/1975    Next Visit Date:    2/22/2023

## 2022-12-09 ENCOUNTER — OFFICE VISIT (OUTPATIENT)
Dept: PAIN MANAGEMENT | Age: 57
End: 2022-12-09
Payer: COMMERCIAL

## 2022-12-09 VITALS
HEIGHT: 67 IN | BODY MASS INDEX: 41.91 KG/M2 | HEART RATE: 84 BPM | DIASTOLIC BLOOD PRESSURE: 77 MMHG | WEIGHT: 267 LBS | OXYGEN SATURATION: 97 % | SYSTOLIC BLOOD PRESSURE: 128 MMHG

## 2022-12-09 DIAGNOSIS — Z79.891 CHRONIC USE OF OPIATE DRUG FOR THERAPEUTIC PURPOSE: ICD-10-CM

## 2022-12-09 DIAGNOSIS — M47.816 LUMBAR FACET ARTHROPATHY: Primary | ICD-10-CM

## 2022-12-09 DIAGNOSIS — M51.36 DDD (DEGENERATIVE DISC DISEASE), LUMBAR: ICD-10-CM

## 2022-12-09 DIAGNOSIS — M54.16 CHRONIC LUMBAR RADICULOPATHY: ICD-10-CM

## 2022-12-09 PROBLEM — M51.369 DDD (DEGENERATIVE DISC DISEASE), LUMBAR: Status: ACTIVE | Noted: 2020-05-28

## 2022-12-09 PROCEDURE — 3078F DIAST BP <80 MM HG: CPT | Performed by: NURSE PRACTITIONER

## 2022-12-09 PROCEDURE — 99213 OFFICE O/P EST LOW 20 MIN: CPT | Performed by: NURSE PRACTITIONER

## 2022-12-09 PROCEDURE — 3074F SYST BP LT 130 MM HG: CPT | Performed by: NURSE PRACTITIONER

## 2022-12-09 RX ORDER — OXYCODONE HYDROCHLORIDE AND ACETAMINOPHEN 5; 325 MG/1; MG/1
1 TABLET ORAL 2 TIMES DAILY PRN
Qty: 60 TABLET | Refills: 0 | Status: SHIPPED | OUTPATIENT
Start: 2022-12-09 | End: 2023-01-08

## 2022-12-09 ASSESSMENT — ENCOUNTER SYMPTOMS
BACK PAIN: 1
DIARRHEA: 0
COUGH: 0
SHORTNESS OF BREATH: 0
NAUSEA: 0
CONSTIPATION: 0
VOMITING: 0
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
## 2022-12-27 NOTE — TELEPHONE ENCOUNTER
Perri Delgado is calling to request a refill on the following medication(s):    Medication Request:  Requested Prescriptions     Pending Prescriptions Disp Refills    atorvastatin (LIPITOR) 40 MG tablet [Pharmacy Med Name: ATORVASTATIN 40 MG TABLET] 90 tablet 1     Sig: take 1 tablet by mouth once daily       Last Visit Date (If Applicable):  97/90/2993    Next Visit Date:    2/22/2023

## 2022-12-28 RX ORDER — ATORVASTATIN CALCIUM 40 MG/1
TABLET, FILM COATED ORAL
Qty: 90 TABLET | Refills: 0 | Status: SHIPPED | OUTPATIENT
Start: 2022-12-28

## 2023-01-04 ENCOUNTER — TELEPHONE (OUTPATIENT)
Dept: PAIN MANAGEMENT | Age: 58
End: 2023-01-04

## 2023-01-04 NOTE — TELEPHONE ENCOUNTER
----- Message from Krzysztof Garner sent at 1/4/2023  8:25 AM EST -----  Regarding: Medication refill  Piter Nava I was supposed to call to get my refill done by the 8th so you can reach me at [phone]

## 2023-01-05 DIAGNOSIS — Z79.891 CHRONIC USE OF OPIATE DRUG FOR THERAPEUTIC PURPOSE: ICD-10-CM

## 2023-01-05 RX ORDER — OXYCODONE HYDROCHLORIDE AND ACETAMINOPHEN 5; 325 MG/1; MG/1
1 TABLET ORAL 2 TIMES DAILY PRN
Qty: 60 TABLET | Refills: 0 | Status: SHIPPED | OUTPATIENT
Start: 2023-01-08 | End: 2023-02-07

## 2023-01-18 ENCOUNTER — TELEPHONE (OUTPATIENT)
Dept: INTERNAL MEDICINE CLINIC | Age: 58
End: 2023-01-18

## 2023-01-30 NOTE — TELEPHONE ENCOUNTER
Forms are disability forms he has done every few years. Last we did them was in 2018. Forms place on your desk with copy of last one.

## 2023-02-07 ENCOUNTER — OFFICE VISIT (OUTPATIENT)
Dept: PAIN MANAGEMENT | Age: 58
End: 2023-02-07

## 2023-02-07 VITALS
HEART RATE: 78 BPM | DIASTOLIC BLOOD PRESSURE: 99 MMHG | BODY MASS INDEX: 41.91 KG/M2 | OXYGEN SATURATION: 97 % | HEIGHT: 67 IN | SYSTOLIC BLOOD PRESSURE: 167 MMHG | WEIGHT: 267 LBS

## 2023-02-07 DIAGNOSIS — E66.01 MORBID OBESITY WITH BMI OF 40.0-44.9, ADULT (HCC): ICD-10-CM

## 2023-02-07 DIAGNOSIS — Z79.891 CHRONIC USE OF OPIATE DRUG FOR THERAPEUTIC PURPOSE: Primary | ICD-10-CM

## 2023-02-07 DIAGNOSIS — G47.33 OSA AND COPD OVERLAP SYNDROME (HCC): ICD-10-CM

## 2023-02-07 DIAGNOSIS — J44.9 OSA AND COPD OVERLAP SYNDROME (HCC): ICD-10-CM

## 2023-02-07 DIAGNOSIS — M54.16 CHRONIC LUMBAR RADICULOPATHY: ICD-10-CM

## 2023-02-07 DIAGNOSIS — F11.20 OPIOID DEPENDENCE WITH CURRENT USE (HCC): ICD-10-CM

## 2023-02-07 RX ORDER — OXYCODONE HYDROCHLORIDE AND ACETAMINOPHEN 5; 325 MG/1; MG/1
1 TABLET ORAL 2 TIMES DAILY PRN
Qty: 60 TABLET | Refills: 0 | Status: SHIPPED | OUTPATIENT
Start: 2023-02-07 | End: 2023-03-09

## 2023-02-07 NOTE — PROGRESS NOTES
The patient is a 59 y. o. Non- / non  male. Chief Complaint   Patient presents with    Back Pain     Percocet          Medication Refill: Percocet    Pain score Today:  9  Adverse effects (Constipation / Nausea / Sedation / sexual Dysfunction / others) : no  Mood: good  Sleep pattern and quality: poor  Activity level: fair    Pill count Today:0  Last dose taken  02/07/2023 AM  OARRS report reviewed today: yes  ER/Hospitalizations/PCP visit related to pain since last visit:no   Any legal problems e.g. DUI etc.:No  Satisfied with current management: Yes    Opioid Contract:11/07/2022  Last Urine Dug screen dated:03/11/2022    Hemoglobin A1C   Date Value Ref Range Status   07/08/2022 6.4 (H) 4.0 - 6.0 % Final       Past Medical History, Past Surgical History, Social History, Allergies and Medications reviewed and updated in EPIC as indicated    Family History reviewed and is noncontributory.         Past Medical History:   Diagnosis Date    Asthma     Diabetes mellitus ([facility] [address]     HTN (hypertension)     Hyperlipidemia     Hypertension     Lung disease     Migraine     Neuropathy     Positive FIT (fecal immunochemical test)     Renal failure     Sleep apnea         Past Surgical History:   Procedure Laterality Date    ANESTHESIA NERVE BLOCK Bilateral 9/18/2017    NERVE BLOCK BILATERAL MEDIAL BRANCH L2-L5 performed by Mina Keen MD at [facility] Bilateral 10/17/2017    [address] L2-L5 performed by Mina Keen MD at [place] [address]  03/08/2017    COLONOSCOPY  03/08/2017    internal hemorrhoids; mild diverticulosis    ENDOSCOPY, COLON, DIAGNOSTIC      FIBULA FRACTURE SURGERY Left     LEG SURGERY Right     NERVE BLOCK N/A 10/24/2016    lumbar COLETTE    NERVE BLOCK Right 11/21/2016    lumbar COLETTE    NERVE BLOCK Left 08/13/2018    radiofrequency ablation medial branh block L2-L5    OTHER SURGICAL HISTORY Right 01/26/2017    right hip steroid injection    OTHER SURGICAL HISTORY Right 08/06/2018    NERVE RADIOFREQUENCY ABLATION RIGHT LUMBAR MEDIAL BRANCH L2-L5 (Right )    OTHER SURGICAL HISTORY  12/03/2018    RIGHT L4 L5 EPIDURAL STEROID INJECTION (Right )    OTHER SURGICAL HISTORY  03/25/2019    LUMBAR COLETTE (N/A )    PAIN MANAGEMENT PROCEDURE Right 5/28/2020    EPIDURAL STEROID INJECTION RIGHT L5S1 performed by Samantha Gupta MD at [address] [place] [address] Right 6/15/2020    EPIDURAL STEROID INJECTION RIGTH L5S1 performed by Samantha Gupta MD at [facility]&/STRD TFRML EPI LUMBAR/SACRAL 1 LEVEL Right 12/3/2018    RIGHT L4 L5 EPIDURAL STEROID INJECTION performed by Samantha Gupta MD at [facility]/OUTPT [address] [place] [address] Right 8/6/2018    NERVE RADIOFREQUENCY ABLATION RIGHT LUMBAR MEDIAL BRANCH L2-L5 performed by Samantha Gupta MD at [facility]/OUTPT [address] [place] [address] Left 8/13/2018    NERVE RADIOFREQUENCY ABLATION LEFT LUMBAR MEDIAL BRANCH L2-L5 performed by Samantha Gupta MD at [facility] N/A 3/25/2019    LUMBAR COLETTE performed by Samantha Gupta MD at [place] [address] History     Socioeconomic History    Marital status: Single     Spouse name: None    Number of children: None    Years of education: None    Highest education level: None   Tobacco Use    Smoking status: Every Day     Packs/day: 1.50     Years: 20.00     Pack years: 30.00     Types: Cigarettes     Start date: 10/2/1979    Smokeless tobacco: Never    Tobacco comments:     down to 5 cigg.  per day   Substance and Sexual Activity    Alcohol use: No     Alcohol/week: 0.0 standard drinks    Drug use: No       Family History   Problem Relation Age of Onset    Diabetes Mother     Heart Disease Mother     Cancer Mother     Heart Disease Maternal Grandmother        Allergies   Allergen Reactions    Amitriptyline Anaphylaxis    Paroxetine Other (See Comments)    Paxil [Paroxetine Hcl] Other (See Comments) There were no vitals filed for this visit. Current Outpatient Medications   Medication Sig Dispense Refill    oxyCODONE-acetaminophen (PERCOCET) 5-325 MG per tablet Take 1 tablet by mouth 2 times daily as needed for Pain for up to 30 days. 60 tablet 0    atorvastatin (LIPITOR) 40 MG tablet take 1 tablet by mouth once daily 90 tablet 0    metFORMIN (GLUCOPHAGE) 500 MG tablet take 1 tablet by mouth twice a day with meals 180 tablet 0    lisinopril-hydroCHLOROthiazide (PRINZIDE;ZESTORETIC) 10-12.5 MG per tablet take 1 tablet by mouth once daily 90 tablet 1    tiotropium (SPIRIVA HANDIHALER) 18 MCG inhalation capsule Inhale 1 capsule into the lungs in the morning. 90 capsule 2    terbinafine (LAMISIL) 1 % cream Apply topically 2 times daily. 36 g 3    albuterol sulfate  (90 Base) MCG/ACT inhaler Inhale 2 puffs into the lungs 4 times daily as needed for Wheezing 3 Inhaler 1    tiotropium (SPIRIVA RESPIMAT) 2.5 MCG/ACT AERS inhaler Inhale 2 puffs into the lungs daily 1 g 5     No current facility-administered medications for this visit.      Facility-Administered Medications Ordered in Other Visits   Medication Dose Route Frequency Provider Last Rate Last Admin    fentaNYL (SUBLIMAZE) injection 25 mcg  25 mcg IntraVENous Q5 Min PRN Lorena Geiger MD        HYDROmorphone (DILAUDID) injection 0.5 mg  0.5 mg IntraVENous Q5 Min PRN Lorena Geiger MD        fentaNYL (SUBLIMAZE) injection 25 mcg  25 mcg IntraVENous Q5 Min PRN Lorena Geiger MD        HYDROmorphone (DILAUDID) injection 0.5 mg  0.5 mg IntraVENous Q5 Min PRN Lorena Geiger MD        labetalol (NORMODYNE;TRANDATE) injection 5 mg  5 mg IntraVENous Q10 Min PRN Lorena Geiger MD        hydrALAZINE (APRESOLINE) injection 5 mg  5 mg IntraVENous Q10 Min PRN Lorena Geiger MD        meperidine (DEMEROL) injection 12.5 mg  12.5 mg IntraVENous Q5 Min PRN Dora Pascual MD           Review of Systems      Objective:  Vital signs: (most recent): Height 5' 7\" (1.702 m), weight 267 lb (121.1 kg). Assessment & Plan  1. Chronic use of opiate drugs therapeutic purposes        No orders of the defined types were placed in this encounter. No orders of the defined types were placed in this encounter.            Electronically signed by Aimee Wiseman MA on 2/7/2023 at 10:06 AM

## 2023-02-07 NOTE — PROGRESS NOTES
The patient is a 59 y. o. Non- / non  male. Chief Complaint   Patient presents with    Back Pain     Percocet      58-year-old man with history of multiple comorbidities including morbid obesity and obstructive sleep apnea  He is seen in our clinic for history of chronic low back and right lower extremity pain  Recently completed therapy without any benefit  Have tried NSAIDs  On chronic low-dose opioid therapy  Finds the medication helpful  Daily morphine equaling 15  Reports no side effect  No sign or symptom of any aberrancy  Last toxicology was 10 months ago  We will collect urine for toxicology  Automated prescription report reviewed  Medication refill ordered  Safe use of medication discussed    His recent MRI imaging did not show any significant surgical pathology  EMG showed chronic lumbar radiculopathy  We recommended neuromodulation trial  Patient is not interested      Medication Refill: Percocet    Pain score Today:  9  Adverse effects (Constipation / Nausea / Sedation / sexual Dysfunction / others) : no  Mood: good  Sleep pattern and quality: poor  Activity level: fair    Pill count Today:0  Last dose taken  02/07/2023 AM  OARRS report reviewed today: yes  ER/Hospitalizations/PCP visit related to pain since last visit:no   Any legal problems e.g. DUI etc.:No  Satisfied with current management: Yes    Opioid Contract:11/07/2022  Last Urine Dug screen dated:03/11/2022    Hemoglobin A1C   Date Value Ref Range Status   07/08/2022 6.4 (H) 4.0 - 6.0 % Final       Past Medical History, Past Surgical History, Social History, Allergies and Medications reviewed and updated in EPIC as indicated    Family History reviewed and is noncontributory.         Past Medical History:   Diagnosis Date    Asthma     Diabetes mellitus (HCC)     HTN (hypertension)     Hyperlipidemia     Hypertension     Lung disease     Migraine     Neuropathy     Positive FIT (fecal immunochemical test)     Renal failure Sleep apnea         Past Surgical History:   Procedure Laterality Date    ANESTHESIA NERVE BLOCK Bilateral 9/18/2017    NERVE BLOCK BILATERAL MEDIAL BRANCH L2-L5 performed by Whit Andrew MD at [facility] 83 Bilateral 10/17/2017    NERVE BLOCK BILATERAL MEDIAL BRANCH L2-L5 performed by Whit Andrew MD at [facility] 98  03/08/2017    COLONOSCOPY  03/08/2017    internal hemorrhoids; mild diverticulosis    ENDOSCOPY, COLON, DIAGNOSTIC      FIBULA FRACTURE SURGERY Left     LEG SURGERY Right     NERVE BLOCK N/A 10/24/2016    lumbar COLETTE    NERVE BLOCK Right 11/21/2016    lumbar COLETTE    NERVE BLOCK Left 08/13/2018    radiofrequency ablation medial branh block L2-L5    OTHER SURGICAL HISTORY Right 01/26/2017    right hip steroid injection    OTHER SURGICAL HISTORY Right 08/06/2018    NERVE RADIOFREQUENCY ABLATION RIGHT LUMBAR MEDIAL BRANCH L2-L5 (Right )    OTHER SURGICAL HISTORY  12/03/2018    RIGHT L4 L5 EPIDURAL STEROID INJECTION (Right )    OTHER SURGICAL HISTORY  03/25/2019    LUMBAR COLETTE (N/A )    PAIN MANAGEMENT PROCEDURE Right 5/28/2020    EPIDURAL STEROID INJECTION RIGHT L5S1 performed by Whit Andrew MD at [facility] Right 6/15/2020    EPIDURAL STEROID INJECTION RIGTH L5S1 performed by Whit Andrew MD at [facility]/STRD TFRML EPI LUMBAR/SACRAL 1 LEVEL Right 12/3/2018    RIGHT L4 L5 EPIDURAL STEROID INJECTION performed by Whit Andrew MD at [facility]/OUTPT [address] [place] [address] Right 8/6/2018    NERVE RADIOFREQUENCY ABLATION RIGHT LUMBAR MEDIAL BRANCH L2-L5 performed by Whit Andrew MD at [facility]/OUTPT [address] [place] [address] Left 8/13/2018    NERVE RADIOFREQUENCY ABLATION LEFT LUMBAR MEDIAL BRANCH L2-L5 performed by Whit Andrew MD at [facility] N/A 3/25/2019    LUMBAR COLETTE performed by Whit Andrew MD at [facility] 57 History     Socioeconomic History Marital status: Single     Spouse name: None    Number of children: None    Years of education: None    Highest education level: None   Tobacco Use    Smoking status: Former     Packs/day: 1.50     Years: 20.00     Pack years: 30.00     Types: Cigarettes     Start date: 10/2/1979     Quit date: 2023     Years since quittin.0    Smokeless tobacco: Never    Tobacco comments:     down to 5 cigg. per day   Substance and Sexual Activity    Alcohol use: No     Alcohol/week: 0.0 standard drinks    Drug use: No       Family History   Problem Relation Age of Onset    Diabetes Mother     Heart Disease Mother     Cancer Mother     Heart Disease Maternal Grandmother        Allergies   Allergen Reactions    Amitriptyline Anaphylaxis    Paroxetine Other (See Comments)    Paxil [Paroxetine Hcl] Other (See Comments)       Vitals:    23 1005   BP: (!) 167/99   Pulse: 78   SpO2: 97%       Current Outpatient Medications   Medication Sig Dispense Refill    oxyCODONE-acetaminophen (PERCOCET) 5-325 MG per tablet Take 1 tablet by mouth 2 times daily as needed for Pain for up to 30 days. 60 tablet 0    atorvastatin (LIPITOR) 40 MG tablet take 1 tablet by mouth once daily 90 tablet 0    metFORMIN (GLUCOPHAGE) 500 MG tablet take 1 tablet by mouth twice a day with meals 180 tablet 0    lisinopril-hydroCHLOROthiazide (PRINZIDE;ZESTORETIC) 10-12.5 MG per tablet take 1 tablet by mouth once daily 90 tablet 1    tiotropium (SPIRIVA HANDIHALER) 18 MCG inhalation capsule Inhale 1 capsule into the lungs in the morning. 90 capsule 2    terbinafine (LAMISIL) 1 % cream Apply topically 2 times daily. 36 g 3    albuterol sulfate  (90 Base) MCG/ACT inhaler Inhale 2 puffs into the lungs 4 times daily as needed for Wheezing 3 Inhaler 1    tiotropium (SPIRIVA RESPIMAT) 2.5 MCG/ACT AERS inhaler Inhale 2 puffs into the lungs daily 1 g 5     No current facility-administered medications for this visit.      Facility-Administered Medications Ordered in Other Visits   Medication Dose Route Frequency Provider Last Rate Last Admin    fentaNYL (SUBLIMAZE) injection 25 mcg  25 mcg IntraVENous Q5 Min PRN Lorena Geiger MD        HYDROmorphone (DILAUDID) injection 0.5 mg  0.5 mg IntraVENous Q5 Min PRN Lorena Geiger MD        fentaNYL (SUBLIMAZE) injection 25 mcg  25 mcg IntraVENous Q5 Min PRN Lorena Geiger MD        HYDROmorphone (DILAUDID) injection 0.5 mg  0.5 mg IntraVENous Q5 Min PRN Lorena Geiger MD        labetalol (NORMODYNE;TRANDATE) injection 5 mg  5 mg IntraVENous Q10 Min PRN Lorena Geiger MD        hydrALAZINE (APRESOLINE) injection 5 mg  5 mg IntraVENous Q10 Min PRN Lorena Geiger MD        meperidine (DEMEROL) injection 12.5 mg  12.5 mg IntraVENous Q5 Min PRN Wilton Masters MD           Review of Systems   Constitutional:  Negative for fever. Objective:  General Appearance: In no acute distress, in pain and uncomfortable. Vital signs: (most recent): Blood pressure (!) 167/99, pulse 78, height 5' 7\" (1.702 m), weight 267 lb (121.1 kg), SpO2 97 %. Vital signs are normal.  No fever. Output: Producing urine and producing stool. Lungs:  Normal effort. He is not in respiratory distress. Heart: Normal rate. Neurological: Patient is alert and oriented to person, place and time. Assessment & Plan    1. Chronic use of opiate drugs therapeutic purposes    2. Morbid obesity with BMI of 40.0-44.9, adult ([facility]    3. MARLENE and COPD overlap syndrome ([facility] [address]    4. Chronic lumbar radiculopathy    5. Opioid dependence with current use ([facility] [address]        Orders Placed This Encounter   Procedures    Urine Drug Screen, Comprehensive      Orders Placed This Encounter   Medications    oxyCODONE-acetaminophen (PERCOCET) 5-325 MG per tablet     Sig: Take 1 tablet by mouth 2 times daily as needed for Pain for up to 30 days.      Dispense:  60 tablet     Refill:  0     Reduce doses taken as pain becomes manageable      Controlled Substance Monitoring:    Acute and Chronic Pain Monitoring:   RX Monitoring 2/7/2023   Attestation -   Periodic Controlled Substance Monitoring No signs of potential drug abuse or diversion identified. ;Possible medication side effects, risk of tolerance/dependence & alternative treatments discussed. ;Assessed functional status. Chronic Pain > 50 MEDD Obtained or confirmed \"Consent for Opioid Use\" on file.    Chronic Pain > 80 MEDD -               Electronically signed by Caesar Brock MD on 2/7/2023 at 11:13 AM

## 2023-02-14 ENCOUNTER — TELEPHONE (OUTPATIENT)
Dept: PAIN MANAGEMENT | Age: 58
End: 2023-02-14

## 2023-02-15 NOTE — TELEPHONE ENCOUNTER
Pt called back today stated he was very sorry he has his meds and his sons meds together in a lock box and he must if been taking his instead he said he's very sorry and said this will never happen again and he will be back in town on fri if you need him to come drop again

## 2023-02-23 ENCOUNTER — HOSPITAL ENCOUNTER (OUTPATIENT)
Age: 58
Setting detail: SPECIMEN
Discharge: HOME | End: 2023-02-23

## 2023-02-23 ENCOUNTER — OFFICE VISIT (OUTPATIENT)
Dept: INTERNAL MEDICINE CLINIC | Age: 58
End: 2023-02-23

## 2023-02-23 VITALS
HEIGHT: 67 IN | OXYGEN SATURATION: 99 % | WEIGHT: 261 LBS | TEMPERATURE: 97.4 F | HEART RATE: 91 BPM | SYSTOLIC BLOOD PRESSURE: 138 MMHG | DIASTOLIC BLOOD PRESSURE: 86 MMHG | BODY MASS INDEX: 40.97 KG/M2 | RESPIRATION RATE: 16 BRPM

## 2023-02-23 DIAGNOSIS — Z79.891 CHRONIC USE OF OPIATE DRUG FOR THERAPEUTIC PURPOSE: ICD-10-CM

## 2023-02-23 DIAGNOSIS — F41.9 ANXIETY AND DEPRESSION: ICD-10-CM

## 2023-02-23 DIAGNOSIS — E78.2 MIXED HYPERLIPIDEMIA: ICD-10-CM

## 2023-02-23 DIAGNOSIS — F11.20 OPIOID DEPENDENCE WITH CURRENT USE (HCC): ICD-10-CM

## 2023-02-23 DIAGNOSIS — Z87.891 QUIT SMOKING: ICD-10-CM

## 2023-02-23 DIAGNOSIS — E66.01 MORBID OBESITY WITH BMI OF 40.0-44.9, ADULT (HCC): ICD-10-CM

## 2023-02-23 DIAGNOSIS — R51.9 DAILY HEADACHE: ICD-10-CM

## 2023-02-23 DIAGNOSIS — F32.A ANXIETY AND DEPRESSION: ICD-10-CM

## 2023-02-23 DIAGNOSIS — Z28.21 INFLUENZA VACCINATION DECLINED: ICD-10-CM

## 2023-02-23 DIAGNOSIS — M47.816 LUMBAR FACET ARTHROPATHY: Primary | ICD-10-CM

## 2023-02-23 DIAGNOSIS — Z28.21 PNEUMOCOCCAL VACCINATION DECLINED: ICD-10-CM

## 2023-02-23 DIAGNOSIS — E11.9 CONTROLLED TYPE 2 DIABETES MELLITUS WITHOUT COMPLICATION, WITHOUT LONG-TERM CURRENT USE OF INSULIN (HCC): ICD-10-CM

## 2023-02-23 DIAGNOSIS — J44.9 OSA AND COPD OVERLAP SYNDROME (HCC): ICD-10-CM

## 2023-02-23 DIAGNOSIS — I10 ESSENTIAL HYPERTENSION: ICD-10-CM

## 2023-02-23 DIAGNOSIS — G47.33 OSA AND COPD OVERLAP SYNDROME (HCC): ICD-10-CM

## 2023-02-23 DIAGNOSIS — M51.36 DDD (DEGENERATIVE DISC DISEASE), LUMBAR: ICD-10-CM

## 2023-02-23 PROBLEM — F17.200 SMOKER: Status: RESOLVED | Noted: 2022-04-08 | Resolved: 2023-02-23

## 2023-02-23 PROBLEM — M54.16 CHRONIC LUMBAR RADICULOPATHY: Status: RESOLVED | Noted: 2020-05-28 | Resolved: 2023-02-23

## 2023-02-23 LAB
ALBUMIN SERPL-MCNC: 4.7 G/DL (ref 3.5–5.2)
ALBUMIN/GLOBULIN RATIO: 1.4 (ref 1–2.5)
ALP SERPL-CCNC: 52 U/L (ref 40–129)
ALT SERPL-CCNC: 25 U/L (ref 5–41)
ANION GAP SERPL CALCULATED.3IONS-SCNC: 13 MMOL/L (ref 9–17)
AST SERPL-CCNC: 21 U/L
BILIRUB SERPL-MCNC: 0.2 MG/DL (ref 0.3–1.2)
BILIRUBIN URINE: NEGATIVE
BUN SERPL-MCNC: 12 MG/DL (ref 6–20)
CALCIUM SERPL-MCNC: 9.5 MG/DL (ref 8.6–10.4)
CASTS UA: ABNORMAL /LPF (ref 0–8)
CHLORIDE SERPL-SCNC: 104 MMOL/L (ref 98–107)
CHOLEST SERPL-MCNC: 262 MG/DL
CHOLESTEROL/HDL RATIO: 5.2
CO2 SERPL-SCNC: 23 MMOL/L (ref 20–31)
COLOR: YELLOW
CREAT SERPL-MCNC: 0.87 MG/DL (ref 0.7–1.2)
EPITHELIAL CELLS UA: ABNORMAL /HPF (ref 0–5)
GFR SERPL CREATININE-BSD FRML MDRD: >60 ML/MIN/1.73M2
GLUCOSE SERPL-MCNC: 96 MG/DL (ref 70–99)
GLUCOSE UR STRIP.AUTO-MCNC: NEGATIVE MG/DL
HCT VFR BLD AUTO: 46.3 % (ref 40.7–50.3)
HDLC SERPL-MCNC: 50 MG/DL
HGB BLD-MCNC: 15.1 G/DL (ref 13–17)
KETONES UR STRIP.AUTO-MCNC: NEGATIVE MG/DL
LDLC SERPL CALC-MCNC: 182 MG/DL (ref 0–130)
LEUKOCYTE ESTERASE UR QL STRIP.AUTO: NEGATIVE
MCH RBC QN AUTO: 29.8 PG (ref 25.2–33.5)
MCHC RBC AUTO-ENTMCNC: 32.6 G/DL (ref 28.4–34.8)
MCV RBC AUTO: 91.3 FL (ref 82.6–102.9)
NITRITE UR QL STRIP.AUTO: NEGATIVE
NRBC AUTOMATED: 0 PER 100 WBC
PDW BLD-RTO: 13.3 % (ref 11.8–14.4)
PLATELET # BLD AUTO: 251 K/UL (ref 138–453)
PMV BLD AUTO: 10.6 FL (ref 8.1–13.5)
POTASSIUM SERPL-SCNC: 4.1 MMOL/L (ref 3.7–5.3)
PROSTATE SPECIFIC ANTIGEN: 0.64 NG/ML
PROT SERPL-MCNC: 8 G/DL (ref 6.4–8.3)
PROT UR STRIP.AUTO-MCNC: 5.5 MG/DL (ref 5–8)
PROT UR STRIP.AUTO-MCNC: NEGATIVE MG/DL
RBC # BLD: 5.07 M/UL (ref 4.21–5.77)
RBC CLUMPS #/AREA URNS AUTO: ABNORMAL /HPF (ref 0–4)
SODIUM SERPL-SCNC: 140 MMOL/L (ref 135–144)
SPECIFIC GRAVITY UA: 1.03 (ref 1–1.03)
TRIGL SERPL-MCNC: 152 MG/DL
TSH SERPL-ACNC: 1.61 UIU/ML (ref 0.3–5)
TURBIDITY: CLEAR
URINE HGB: ABNORMAL
UROBILINOGEN, URINE: NORMAL
WBC # BLD AUTO: 9.3 K/UL (ref 3.5–11.3)
WBC UA: ABNORMAL /HPF (ref 0–5)

## 2023-02-23 SDOH — ECONOMIC STABILITY: FOOD INSECURITY: WITHIN THE PAST 12 MONTHS, THE FOOD YOU BOUGHT JUST DIDN'T LAST AND YOU DIDN'T HAVE MONEY TO GET MORE.: PATIENT DECLINED

## 2023-02-23 SDOH — ECONOMIC STABILITY: INCOME INSECURITY: HOW HARD IS IT FOR YOU TO PAY FOR THE VERY BASICS LIKE FOOD, HOUSING, MEDICAL CARE, AND HEATING?: NOT VERY HARD

## 2023-02-23 SDOH — ECONOMIC STABILITY: TRANSPORTATION INSECURITY
IN THE PAST 12 MONTHS, HAS LACK OF TRANSPORTATION KEPT YOU FROM MEETINGS, WORK, OR FROM GETTING THINGS NEEDED FOR DAILY LIVING?: PATIENT DECLINED

## 2023-02-23 SDOH — ECONOMIC STABILITY: FOOD INSECURITY: WITHIN THE PAST 12 MONTHS, YOU WORRIED THAT YOUR FOOD WOULD RUN OUT BEFORE YOU GOT MONEY TO BUY MORE.: PATIENT DECLINED

## 2023-02-23 SDOH — ECONOMIC STABILITY: HOUSING INSECURITY
IN THE LAST 12 MONTHS, WAS THERE A TIME WHEN YOU DID NOT HAVE A STEADY PLACE TO SLEEP OR SLEPT IN A SHELTER (INCLUDING NOW)?: PATIENT REFUSED

## 2023-02-23 ASSESSMENT — PATIENT HEALTH QUESTIONNAIRE - PHQ9
SUM OF ALL RESPONSES TO PHQ QUESTIONS 1-9: 0
SUM OF ALL RESPONSES TO PHQ QUESTIONS 1-9: 0
6. FEELING BAD ABOUT YOURSELF - OR THAT YOU ARE A FAILURE OR HAVE LET YOURSELF OR YOUR FAMILY DOWN: 0
5. POOR APPETITE OR OVEREATING: 0
8. MOVING OR SPEAKING SO SLOWLY THAT OTHER PEOPLE COULD HAVE NOTICED. OR THE OPPOSITE, BEING SO FIGETY OR RESTLESS THAT YOU HAVE BEEN MOVING AROUND A LOT MORE THAN USUAL: 0
3. TROUBLE FALLING OR STAYING ASLEEP: 0
9. THOUGHTS THAT YOU WOULD BE BETTER OFF DEAD, OR OF HURTING YOURSELF: 0
2. FEELING DOWN, DEPRESSED OR HOPELESS: 0
SUM OF ALL RESPONSES TO PHQ9 QUESTIONS 1 & 2: 0
SUM OF ALL RESPONSES TO PHQ QUESTIONS 1-9: 0
SUM OF ALL RESPONSES TO PHQ QUESTIONS 1-9: 0
1. LITTLE INTEREST OR PLEASURE IN DOING THINGS: 0
10. IF YOU CHECKED OFF ANY PROBLEMS, HOW DIFFICULT HAVE THESE PROBLEMS MADE IT FOR YOU TO DO YOUR WORK, TAKE CARE OF THINGS AT HOME, OR GET ALONG WITH OTHER PEOPLE: 0
4. FEELING TIRED OR HAVING LITTLE ENERGY: 0
7. TROUBLE CONCENTRATING ON THINGS, SUCH AS READING THE NEWSPAPER OR WATCHING TELEVISION: 0

## 2023-02-23 ASSESSMENT — ENCOUNTER SYMPTOMS
SHORTNESS OF BREATH: 1
GASTROINTESTINAL NEGATIVE: 1
EYES NEGATIVE: 1
ALLERGIC/IMMUNOLOGIC NEGATIVE: 1
COUGH: 1
BACK PAIN: 1

## 2023-02-23 ASSESSMENT — COPD QUESTIONNAIRES: COPD: 1

## 2023-02-23 NOTE — PROGRESS NOTES
Subjective:      Patient ID: Sina Lancaster is a 59 y.o. male. COPD  He complains of cough and shortness of breath. This is a chronic problem. The current episode started more than 1 month ago. The problem occurs intermittently. The problem has been waxing and waning. The cough is non-productive. His symptoms are aggravated by change in weather, exposure to fumes, exposure to smoke, pollen, strenuous activity and climbing stairs. His symptoms are alleviated by beta-agonist, steroid inhaler and leukotriene antagonist. He reports moderate improvement on treatment. Risk factors: Recently quit smoking. His past medical history is significant for COPD. Review of Systems   Constitutional: Negative. HENT: Negative. Eyes: Negative. Respiratory:  Positive for cough and shortness of breath. Cardiovascular: Negative. Gastrointestinal: Negative. Endocrine: Negative. Musculoskeletal:  Positive for arthralgias and back pain. Skin: Negative. Allergic/Immunologic: Negative. Neurological: Negative. Hematological: Negative. Psychiatric/Behavioral:  Positive for dysphoric mood. The patient is nervous/anxious. Past family and social history unremarkable. Diagnosis Orders   1. Lumbar facet arthropathy        2. Controlled type 2 diabetes mellitus without complication, without long-term current use of insulin ([facility])  CBC    Comprehensive Metabolic Panel    Hemoglobin A1C    Lipid Panel    Urinalysis with Microscopic    TSH    PSA Screening      3. Opioid dependence with current use ([address]        4. Daily headache        5. Chronic use of opiate drugs therapeutic purposes        6. Morbid obesity with BMI of 40.0-44.9, adult ([address]        7. MARLENE and COPD overlap syndrome ([facility] [address]        8. Anxiety and depression        9. Pneumococcal vaccination declined        10. Influenza vaccination declined        11. DDD (degenerative disc disease), lumbar        12. Essential hypertension        13.  Mixed hyperlipidemia  CBC    Comprehensive Metabolic Panel    Hemoglobin A1C    Lipid Panel    Urinalysis with Microscopic    TSH    PSA Screening      14. Quit smoking            Objective:   Physical Exam  Vitals and nursing note reviewed. Constitutional:       Appearance: He is well-developed. Comments: Morbid obesity with sleep apnea   HENT:      Head: Normocephalic and atraumatic. Right Ear: External ear normal.      Left Ear: External ear normal.      Nose: Nose normal.   Eyes:      Conjunctiva/sclera: Conjunctivae normal.      Pupils: Pupils are equal, round, and reactive to light. Cardiovascular:      Rate and Rhythm: Normal rate and regular rhythm. Heart sounds: Normal heart sounds. Comments: Diabetes  Hypertension  Hyperlipidemia  Pulmonary:      Effort: Pulmonary effort is normal.      Breath sounds: Normal breath sounds. Comments: COPD. Recently quit smoking  Abdominal:      General: Bowel sounds are normal.      Palpations: Abdomen is soft. Musculoskeletal:         General: Normal range of motion. Cervical back: Normal range of motion and neck supple. Comments: Chronic pain syndrome. Opiate dependent as provided by pain management   Skin:     General: Skin is warm and dry. Neurological:      Mental Status: He is alert and oriented to person, place, and time. Deep Tendon Reflexes: Reflexes are normal and symmetric. Comments: Cephalgia   Psychiatric:         Thought Content: Thought content normal.      Comments: Anxiety depression  Chronic pain syndrome  Declined age-appropriate vaccinations       Assessment:       Diagnosis Orders   1. Lumbar facet arthropathy        2. Controlled type 2 diabetes mellitus without complication, without long-term current use of insulin ([facility])  CBC    Comprehensive Metabolic Panel    Hemoglobin A1C    Lipid Panel    Urinalysis with Microscopic    TSH    PSA Screening      3. Opioid dependence with current use ([address]        4. Daily headache        5. Chronic use of opiate drugs therapeutic purposes        6. Morbid obesity with BMI of 40.0-44.9, adult ([facility] [address]        7. MARLENE and COPD overlap syndrome ([facility] [address]        8. Anxiety and depression        9. Pneumococcal vaccination declined        10. Influenza vaccination declined        11. DDD (degenerative disc disease), lumbar        12. Essential hypertension        13. Mixed hyperlipidemia  CBC    Comprehensive Metabolic Panel    Hemoglobin A1C    Lipid Panel    Urinalysis with Microscopic    TSH    PSA Screening      14. Quit smoking                Plan:      56-year-old morbidly obese -American male with known history of COPD-not on home O2 dependent. Patient states that he recently quit smoking. He is compliant with beta agonist inhaled corticosteroid and Spiriva and coordination with his pulmonologist  Sleep apnea. Patient states that he has not had access to his CPAP per pulmonology, sleep center. Increased BMI. He will benefit from low-fat high-fiber diet, daily moderate exercise, lifestyle change and weight reduction to keep BMI around 25  Chronic pain syndrome opiate dependent as provided by pain management. Avoid constipation  Once again he declined influenza, pneumococcal vaccine. Patient states that inadvertently he has been taken his son's opiates. He is cautioned against.  Patient states that his pain management service is aware-he states that he has been warned  Cephalgia. Lose weight, compliant with CPAP. Stress reduction is advised, improve sleep hygiene. Sparing use of over-the-counter pain medication. He is already on opiates as provided by pain management  Hyperlipidemia. He is advised to continue Lipitor  Hemodynamically stable. Continue Zestoretic.   Hydralazine, labetalol consume less than 2 g of salt a day  Diabetes mellitus well controlled to metformin that he is tolerating well  Med list reviewed advised to continue  Further recommendations to follow updated labs  Call for any concern  He denies excessive alcohol or illicit drug use  This note is created with a voice recognition program and while intend to generate a document that accurately reflects the content of the visit, no guarantee can be provided that every mistake has been identified and corrected by editing.             Clarita Pollard MD

## 2023-02-24 ENCOUNTER — TELEPHONE (OUTPATIENT)
Dept: INTERNAL MEDICINE CLINIC | Age: 58
End: 2023-02-24

## 2023-02-24 LAB
EST. AVERAGE GLUCOSE BLD GHB EST-MCNC: 126 MG/DL
HBA1C MFR BLD: 6 % (ref 4–6)

## 2023-02-24 RX ORDER — ROSUVASTATIN CALCIUM 40 MG/1
40 TABLET, COATED ORAL DAILY
Qty: 90 TABLET | Refills: 1 | Status: SHIPPED | OUTPATIENT
Start: 2023-02-24

## 2023-02-24 NOTE — TELEPHONE ENCOUNTER
----- Message from Abhilash Wheeler MD sent at 2/24/2023  8:21 AM EST -----  Significant worsening of lipid panel. He is advised to stop Lipitor. I have ordered Crestor.   He is advised to comply with low-fat high-fiber diet, daily moderate exercise

## 2023-02-24 NOTE — PROGRESS NOTES
Subjective:    A user error has taken place: encounter opened in error, closed for administrative reasons. Patient ID: Sahil Cazares is a 59 y.o. male.     HPI    Review of Systems    Objective:   Physical Exam    Assessment:            Plan:              Elisha Lamar MD

## 2023-03-06 ENCOUNTER — OFFICE VISIT (OUTPATIENT)
Dept: PAIN MANAGEMENT | Age: 58
End: 2023-03-06
Payer: COMMERCIAL

## 2023-03-06 VITALS — BODY MASS INDEX: 40.81 KG/M2 | HEART RATE: 94 BPM | HEIGHT: 67 IN | OXYGEN SATURATION: 96 % | WEIGHT: 260 LBS

## 2023-03-06 DIAGNOSIS — M51.36 DDD (DEGENERATIVE DISC DISEASE), LUMBAR: ICD-10-CM

## 2023-03-06 DIAGNOSIS — Z79.891 CHRONIC USE OF OPIATE DRUG FOR THERAPEUTIC PURPOSE: ICD-10-CM

## 2023-03-06 DIAGNOSIS — M47.26 OSTEOARTHRITIS OF SPINE WITH RADICULOPATHY, LUMBAR REGION: Primary | ICD-10-CM

## 2023-03-06 PROCEDURE — 99213 OFFICE O/P EST LOW 20 MIN: CPT | Performed by: NURSE PRACTITIONER

## 2023-03-06 RX ORDER — OXYCODONE HYDROCHLORIDE AND ACETAMINOPHEN 5; 325 MG/1; MG/1
1 TABLET ORAL 2 TIMES DAILY PRN
Qty: 60 TABLET | Refills: 0 | Status: SHIPPED | OUTPATIENT
Start: 2023-03-09 | End: 2023-04-08

## 2023-03-06 ASSESSMENT — ENCOUNTER SYMPTOMS
DIARRHEA: 0
VOMITING: 0
COUGH: 0
BACK PAIN: 1
SHORTNESS OF BREATH: 0
CONSTIPATION: 0
NAUSEA: 0
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
## 2023-03-24 ENCOUNTER — TELEPHONE (OUTPATIENT)
Dept: PULMONOLOGY | Age: 58
End: 2023-03-24

## 2023-03-24 DIAGNOSIS — G47.33 OSA AND COPD OVERLAP SYNDROME (HCC): Primary | ICD-10-CM

## 2023-03-24 DIAGNOSIS — J44.9 OSA AND COPD OVERLAP SYNDROME (HCC): Primary | ICD-10-CM

## 2023-04-03 ENCOUNTER — OFFICE VISIT (OUTPATIENT)
Dept: PAIN MANAGEMENT | Age: 58
End: 2023-04-03
Payer: COMMERCIAL

## 2023-04-03 VITALS
SYSTOLIC BLOOD PRESSURE: 163 MMHG | BODY MASS INDEX: 43.32 KG/M2 | WEIGHT: 260 LBS | HEIGHT: 65 IN | DIASTOLIC BLOOD PRESSURE: 102 MMHG | HEART RATE: 78 BPM | OXYGEN SATURATION: 98 %

## 2023-04-03 DIAGNOSIS — M47.816 LUMBAR FACET ARTHROPATHY: ICD-10-CM

## 2023-04-03 DIAGNOSIS — Z79.891 CHRONIC USE OF OPIATE DRUG FOR THERAPEUTIC PURPOSE: ICD-10-CM

## 2023-04-03 DIAGNOSIS — M51.36 DDD (DEGENERATIVE DISC DISEASE), LUMBAR: Primary | ICD-10-CM

## 2023-04-03 PROBLEM — F11.20 OPIOID DEPENDENCE WITH CURRENT USE (HCC): Status: RESOLVED | Noted: 2023-02-07 | Resolved: 2023-04-03

## 2023-04-03 PROCEDURE — 3077F SYST BP >= 140 MM HG: CPT | Performed by: NURSE PRACTITIONER

## 2023-04-03 PROCEDURE — 99214 OFFICE O/P EST MOD 30 MIN: CPT | Performed by: NURSE PRACTITIONER

## 2023-04-03 PROCEDURE — 3080F DIAST BP >= 90 MM HG: CPT | Performed by: NURSE PRACTITIONER

## 2023-04-03 RX ORDER — OXYCODONE HYDROCHLORIDE AND ACETAMINOPHEN 5; 325 MG/1; MG/1
1 TABLET ORAL 2 TIMES DAILY PRN
Qty: 60 TABLET | Refills: 0 | Status: SHIPPED | OUTPATIENT
Start: 2023-04-08 | End: 2023-05-08

## 2023-04-03 ASSESSMENT — ENCOUNTER SYMPTOMS
CONSTIPATION: 0
DIARRHEA: 0
VOMITING: 0
SHORTNESS OF BREATH: 0
NAUSEA: 0
BACK PAIN: 1
COUGH: 0

## 2023-04-03 NOTE — PROGRESS NOTES
daily stretching and strengthening exercises, and recommend minimizing use of pain medications unless patient cannot get through daily activities due to pain. Due to the high risk nature of this patient's pain medication close monitoring is required. Continue current medication management, pt has been stable and compliant. Script written for percocet  Discussed elevated BP denies CP SOB or HA declined ER eval, will recheck when he gets home and understands to f/u with PCP  Also discussed stopping energy drinks  Follow up appointment made for 4 weeks    I have reviewed the chief complaint and history of present illness (including ROS and PFSH) and vital documentation by my staff and I agree with their documentation and have added where applicable.

## 2023-04-26 ENCOUNTER — HOSPITAL ENCOUNTER (OUTPATIENT)
Dept: SLEEP CENTER | Age: 58
Discharge: HOME | End: 2023-04-28
Payer: COMMERCIAL

## 2023-04-26 DIAGNOSIS — J44.9 OSA AND COPD OVERLAP SYNDROME (HCC): ICD-10-CM

## 2023-04-26 DIAGNOSIS — G47.33 OSA AND COPD OVERLAP SYNDROME (HCC): ICD-10-CM

## 2023-04-26 PROCEDURE — 95810 POLYSOM 6/> YRS 4/> PARAM: CPT

## 2023-04-27 VITALS
RESPIRATION RATE: 16 BRPM | BODY MASS INDEX: 43.32 KG/M2 | WEIGHT: 260 LBS | HEIGHT: 65 IN | HEART RATE: 80 BPM | OXYGEN SATURATION: 93 %

## 2023-05-05 ENCOUNTER — OFFICE VISIT (OUTPATIENT)
Dept: PAIN MANAGEMENT | Age: 58
End: 2023-05-05
Payer: COMMERCIAL

## 2023-05-05 VITALS
OXYGEN SATURATION: 98 % | BODY MASS INDEX: 43.32 KG/M2 | WEIGHT: 260 LBS | SYSTOLIC BLOOD PRESSURE: 166 MMHG | HEART RATE: 84 BPM | DIASTOLIC BLOOD PRESSURE: 86 MMHG | HEIGHT: 65 IN

## 2023-05-05 DIAGNOSIS — M51.36 DDD (DEGENERATIVE DISC DISEASE), LUMBAR: ICD-10-CM

## 2023-05-05 DIAGNOSIS — M47.816 LUMBAR FACET ARTHROPATHY: Primary | ICD-10-CM

## 2023-05-05 DIAGNOSIS — Z79.891 CHRONIC USE OF OPIATE DRUG FOR THERAPEUTIC PURPOSE: ICD-10-CM

## 2023-05-05 PROCEDURE — 99213 OFFICE O/P EST LOW 20 MIN: CPT | Performed by: NURSE PRACTITIONER

## 2023-05-05 PROCEDURE — 3077F SYST BP >= 140 MM HG: CPT | Performed by: NURSE PRACTITIONER

## 2023-05-05 PROCEDURE — 3079F DIAST BP 80-89 MM HG: CPT | Performed by: NURSE PRACTITIONER

## 2023-05-05 RX ORDER — OXYCODONE HYDROCHLORIDE AND ACETAMINOPHEN 5; 325 MG/1; MG/1
1 TABLET ORAL 2 TIMES DAILY PRN
Qty: 60 TABLET | Refills: 0 | Status: SHIPPED | OUTPATIENT
Start: 2023-05-08 | End: 2023-06-07

## 2023-05-05 ASSESSMENT — ENCOUNTER SYMPTOMS
VOMITING: 0
COUGH: 0
NAUSEA: 0
BACK PAIN: 1
DIARRHEA: 0
CONSTIPATION: 0
SHORTNESS OF BREATH: 0

## 2023-05-05 NOTE — PROGRESS NOTES
living are possible due to medications and would like to continue them. As always, we encourage daily stretching and strengthening exercises, and recommend minimizing use of pain medications unless patient cannot get through daily activities due to pain. Due to the high risk nature of this patient's pain medication close monitoring is required. Continue current medication management, pt has been stable and compliant. Script written for percocet  VERNA obtained today for monitoring purposes. Last dose of medication was today  Follow up appointment made for 4 weeks    I have reviewed the chief complaint and history of present illness (including ROS and PFSH) and vital documentation by my staff and I agree with their documentation and have added where applicable. Hydroquinone Counseling:  Patient advised that medication may result in skin irritation, lightening (hypopigmentation), dryness, and burning.  In the event of skin irritation, the patient was advised to reduce the amount of the drug applied or use it less frequently.  Rarely, spots that are treated with hydroquinone can become darker (pseudoochronosis).  Should this occur, patient instructed to stop medication and call the office. The patient verbalized understanding of the proper use and possible adverse effects of hydroquinone.  All of the patient's questions and concerns were addressed.

## 2023-05-10 LAB — STATUS: NORMAL

## 2023-05-11 ENCOUNTER — TELEPHONE (OUTPATIENT)
Dept: PULMONOLOGY | Age: 58
End: 2023-05-11

## 2023-05-11 DIAGNOSIS — J44.9 OSA AND COPD OVERLAP SYNDROME (HCC): Primary | ICD-10-CM

## 2023-05-11 DIAGNOSIS — G47.33 OSA AND COPD OVERLAP SYNDROME (HCC): Primary | ICD-10-CM

## 2023-05-23 ENCOUNTER — OFFICE VISIT (OUTPATIENT)
Dept: INTERNAL MEDICINE CLINIC | Age: 58
End: 2023-05-23
Payer: COMMERCIAL

## 2023-05-23 DIAGNOSIS — Z87.891 QUIT SMOKING: ICD-10-CM

## 2023-05-23 DIAGNOSIS — R51.9 DAILY HEADACHE: ICD-10-CM

## 2023-05-23 DIAGNOSIS — Z28.21 INFLUENZA VACCINATION DECLINED: ICD-10-CM

## 2023-05-23 DIAGNOSIS — Z28.21 PNEUMOCOCCAL VACCINATION DECLINED: ICD-10-CM

## 2023-05-23 DIAGNOSIS — I10 ESSENTIAL HYPERTENSION: ICD-10-CM

## 2023-05-23 DIAGNOSIS — M47.816 LUMBAR FACET ARTHROPATHY: Primary | ICD-10-CM

## 2023-05-23 DIAGNOSIS — Z79.891 CHRONIC USE OF OPIATE DRUG FOR THERAPEUTIC PURPOSE: ICD-10-CM

## 2023-05-23 DIAGNOSIS — E11.9 CONTROLLED TYPE 2 DIABETES MELLITUS WITHOUT COMPLICATION, WITHOUT LONG-TERM CURRENT USE OF INSULIN (HCC): ICD-10-CM

## 2023-05-23 DIAGNOSIS — J44.9 OSA AND COPD OVERLAP SYNDROME (HCC): ICD-10-CM

## 2023-05-23 DIAGNOSIS — M51.36 DDD (DEGENERATIVE DISC DISEASE), LUMBAR: ICD-10-CM

## 2023-05-23 DIAGNOSIS — F41.9 ANXIETY AND DEPRESSION: ICD-10-CM

## 2023-05-23 DIAGNOSIS — F32.A ANXIETY AND DEPRESSION: ICD-10-CM

## 2023-05-23 DIAGNOSIS — G47.33 OSA AND COPD OVERLAP SYNDROME (HCC): ICD-10-CM

## 2023-05-23 DIAGNOSIS — E78.2 MIXED HYPERLIPIDEMIA: ICD-10-CM

## 2023-05-23 DIAGNOSIS — E66.01 MORBID OBESITY WITH BMI OF 40.0-44.9, ADULT (HCC): ICD-10-CM

## 2023-05-23 DIAGNOSIS — M47.26 OSTEOARTHRITIS OF SPINE WITH RADICULOPATHY, LUMBAR REGION: ICD-10-CM

## 2023-05-23 PROCEDURE — 99214 OFFICE O/P EST MOD 30 MIN: CPT | Performed by: FAMILY MEDICINE

## 2023-05-23 PROCEDURE — 3044F HG A1C LEVEL LT 7.0%: CPT | Performed by: FAMILY MEDICINE

## 2023-05-23 NOTE — PROGRESS NOTES
Asymmetry (Cranial nerve 7 motor function) (limited exam to video visit)          [] No gaze palsy        [x] Abnormal-none neuro headaches        Skin:        [] No significant exanthematous lesions or discoloration noted on facial skin         [] Abnormal-            Psychiatric:       [] Normal Affect [] No Hallucinations        [x] Abnormal-anxiety/depression with underlying chronic pain syndrome    Other pertinent observable physical exam findings-     ASSESSMENT/PLAN:  57-year-old overweight -American male with history of significant hyperlipidemia. Patient stated that since his last visit he has significantly changed his diet lifestyle change and exercise. He is advised to continue Crestor 40 mg p.o. daily  Obesity with sleep apnea. Patient states that he is scheduled for repeat sleep study with CPAP on  COPD. He is non-smoker. Continue beta agonist inhaled corticosteroid and Spiriva  Chronic pain syndrome opiate dependent as provided by pain management. Avoid constipation  Well-controlled diabetes mellitus on metformin 500 mg p.o. daily. Consume less than 2 g of salt a day, lose weight to keep BMI below 25, ADA 1800 diet  Hypertension on hydralazine, labetalol, Zestoretic. We aim to keep his blood pressure equal less than 130/80    Neuro headache. Stress reduction is advised, lose weight, comply with CPAP, continue pain management  Patient recently quit smoking  History of anxiety/depression with underlying chronic pain syndrome. He denies suicidality, delusion or hallucination. Reassurance provided  He is encouraged to call for any concern    Return in about 2 months (around 7/23/2023). Gela Salehifton, was evaluated through a synchronous (real-time) audio-video encounter. The patient (or guardian if applicable) is aware that this is a billable service, which includes applicable co-pays. This Virtual Visit was conducted with patient's (and/or legal guardian's) consent.  Patient

## 2023-06-08 ENCOUNTER — TELEPHONE (OUTPATIENT)
Dept: INTERNAL MEDICINE CLINIC | Age: 58
End: 2023-06-08

## 2023-06-08 NOTE — TELEPHONE ENCOUNTER
----- Message from Riri Hernandezon sent at 6/8/2023  8:55 AM EDT -----  Subject: Message to Provider    QUESTIONS  Information for Provider? [facility] is needing the May 23 office note faxed to   them for this client. [phone]. Fax number [phone]. please leave a   vm with any quesitons  ---------------------------------------------------------------------------  --------------  Nhi CUMMINGS  [phone]; OK to leave message on voicemail  ---------------------------------------------------------------------------  --------------  SCRIPT ANSWERS  Relationship to Patient? Covered Entity  Covered Entity Type? Physician Office?   Representative Name? Umesh Sauer

## 2023-06-09 ENCOUNTER — OFFICE VISIT (OUTPATIENT)
Dept: PAIN MANAGEMENT | Age: 58
End: 2023-06-09

## 2023-06-09 VITALS — OXYGEN SATURATION: 97 % | WEIGHT: 260 LBS | BODY MASS INDEX: 43.32 KG/M2 | HEIGHT: 65 IN | HEART RATE: 74 BPM

## 2023-06-09 DIAGNOSIS — Z79.891 CHRONIC USE OF OPIATE DRUG FOR THERAPEUTIC PURPOSE: ICD-10-CM

## 2023-06-09 RX ORDER — OXYCODONE HYDROCHLORIDE AND ACETAMINOPHEN 5; 325 MG/1; MG/1
1 TABLET ORAL 2 TIMES DAILY PRN
Qty: 60 TABLET | Refills: 0 | Status: SHIPPED | OUTPATIENT
Start: 2023-06-09 | End: 2023-07-09

## 2023-06-09 ASSESSMENT — ENCOUNTER SYMPTOMS
VOMITING: 0
SHORTNESS OF BREATH: 0
DIARRHEA: 0
BACK PAIN: 1
NAUSEA: 0
CONSTIPATION: 0
COUGH: 0

## 2023-06-09 NOTE — PROGRESS NOTES
falls. Gastrointestinal:  Negative for constipation, diarrhea, nausea and vomiting. Neurological:  Positive for headaches and numbness. Negative for dizziness. Physical Exam:  Pulse 74   Ht 5' 5\" (1.651 m)   Wt 260 lb (117.9 kg)   SpO2 97%   BMI 43.27 kg/m²     Physical Exam  Cardiovascular:      Rate and Rhythm: Normal rate. Pulmonary:      Effort: Pulmonary effort is normal.   Musculoskeletal:         General: Normal range of motion. Skin:     General: Skin is warm and dry. Neurological:      Mental Status: He is alert and oriented to person, place, and time. Assessment:  Problem List Items Addressed This Visit       Chronic use of opiate drugs therapeutic purposes    Relevant Medications    oxyCODONE-acetaminophen (PERCOCET) 5-325 MG per tablet          Treatment Plan:  Patient relates current medications are helping the pain. Patient reports taking pain medications as prescribed, denies obtaining medications from different sources and denies use of illegal drugs. Medication risk and benefits have been discussed. Patient denies side effects from medications like nausea, vomiting, constipation or drowsiness. Patient reports current activities of daily living are possible due to medications and would like to continue them. As always, we encourage daily stretching and strengthening exercises, and recommend minimizing use of pain medications unless patient cannot get through daily activities due to pain. Due to the high risk nature of this patient's pain medication close monitoring is required. Continue current medication management, pt has been stable and compliant. Script written for percocet  Follow up appointment made for 4 weeks    I have reviewed the chief complaint and history of present illness (including ROS and PFSH) and vital documentation by my staff and I agree with their documentation and have added where applicable.

## 2023-07-03 RX ORDER — LISINOPRIL AND HYDROCHLOROTHIAZIDE 12.5; 1 MG/1; MG/1
TABLET ORAL
Qty: 90 TABLET | Refills: 1 | Status: SHIPPED | OUTPATIENT
Start: 2023-07-03

## 2023-07-05 ENCOUNTER — OFFICE VISIT (OUTPATIENT)
Dept: INTERNAL MEDICINE CLINIC | Age: 58
End: 2023-07-05
Payer: COMMERCIAL

## 2023-07-05 VITALS
HEART RATE: 78 BPM | OXYGEN SATURATION: 98 % | TEMPERATURE: 97.3 F | SYSTOLIC BLOOD PRESSURE: 120 MMHG | DIASTOLIC BLOOD PRESSURE: 70 MMHG | RESPIRATION RATE: 11 BRPM | HEIGHT: 65 IN | BODY MASS INDEX: 46.32 KG/M2 | WEIGHT: 278 LBS

## 2023-07-05 DIAGNOSIS — R51.9 DAILY HEADACHE: ICD-10-CM

## 2023-07-05 DIAGNOSIS — J44.9 OSA AND COPD OVERLAP SYNDROME (HCC): ICD-10-CM

## 2023-07-05 DIAGNOSIS — Z28.21 PNEUMOCOCCAL VACCINATION DECLINED: ICD-10-CM

## 2023-07-05 DIAGNOSIS — E11.9 CONTROLLED TYPE 2 DIABETES MELLITUS WITHOUT COMPLICATION, WITHOUT LONG-TERM CURRENT USE OF INSULIN (HCC): ICD-10-CM

## 2023-07-05 DIAGNOSIS — E78.2 MIXED HYPERLIPIDEMIA: ICD-10-CM

## 2023-07-05 DIAGNOSIS — I10 ESSENTIAL HYPERTENSION: ICD-10-CM

## 2023-07-05 DIAGNOSIS — Z87.891 QUIT SMOKING: ICD-10-CM

## 2023-07-05 DIAGNOSIS — E66.01 MORBID OBESITY WITH BMI OF 40.0-44.9, ADULT (HCC): ICD-10-CM

## 2023-07-05 DIAGNOSIS — F32.A ANXIETY AND DEPRESSION: ICD-10-CM

## 2023-07-05 DIAGNOSIS — M47.816 LUMBAR FACET ARTHROPATHY: ICD-10-CM

## 2023-07-05 DIAGNOSIS — F41.9 ANXIETY AND DEPRESSION: ICD-10-CM

## 2023-07-05 DIAGNOSIS — M51.36 DDD (DEGENERATIVE DISC DISEASE), LUMBAR: ICD-10-CM

## 2023-07-05 DIAGNOSIS — R26.9 ABNORMALITY OF GAIT AND MOBILITY: Primary | ICD-10-CM

## 2023-07-05 DIAGNOSIS — Z28.21 INFLUENZA VACCINATION DECLINED: ICD-10-CM

## 2023-07-05 DIAGNOSIS — M47.26 OSTEOARTHRITIS OF SPINE WITH RADICULOPATHY, LUMBAR REGION: ICD-10-CM

## 2023-07-05 DIAGNOSIS — G47.33 OSA AND COPD OVERLAP SYNDROME (HCC): ICD-10-CM

## 2023-07-05 DIAGNOSIS — Z79.891 CHRONIC USE OF OPIATE DRUG FOR THERAPEUTIC PURPOSE: ICD-10-CM

## 2023-07-05 PROCEDURE — 99214 OFFICE O/P EST MOD 30 MIN: CPT | Performed by: FAMILY MEDICINE

## 2023-07-05 PROCEDURE — 3044F HG A1C LEVEL LT 7.0%: CPT | Performed by: FAMILY MEDICINE

## 2023-07-05 PROCEDURE — 3078F DIAST BP <80 MM HG: CPT | Performed by: FAMILY MEDICINE

## 2023-07-05 PROCEDURE — 3074F SYST BP LT 130 MM HG: CPT | Performed by: FAMILY MEDICINE

## 2023-07-05 ASSESSMENT — ENCOUNTER SYMPTOMS
EYES NEGATIVE: 1
ALLERGIC/IMMUNOLOGIC NEGATIVE: 1
RESPIRATORY NEGATIVE: 1
BACK PAIN: 1
GASTROINTESTINAL NEGATIVE: 1

## 2023-07-05 NOTE — PROGRESS NOTES
medicine and rehab. Continue mobility as tolerated. Continue close contact with pain management. Morbid obesity with sleep apnea/overlap syndrome. He is established with pulmonology. He states that he is waiting for his CPAP. He will benefit from weight reduction. Low-fat high-fiber diet, daily moderate exercise and lifestyle change  Diabetes mellitus with A1c of 6. Continue metformin that he is tolerating well. Hyperlipidemia. Recently switched from Lipitor to Crestor that he is tolerating well. We will recheck his labs  COPD. Continue Spiriva, beta agonist  Hemodynamically stable in response to Zestoretic that he is tolerating well. Consume less than 2 g of salt a day  Anxiety/depression with underlying chronic pain syndrome. He denies suicidality, delusion or hallucination  Recently quit smoking  He denies excessive alcohol or illicit drug use  List reviewed advised to continue  Further recommendations to follow updated labs and consultation with physical medicine and rehab. Advised to continue close contact and medications per pain management that he has been tolerating well. This note is created with a voice recognition program and while intend to generate a document that accurately reflects the content of the visit, no guarantee can be provided that every mistake has been identified and corrected by editing.           Juany Gonzalez MD

## 2023-07-11 ENCOUNTER — TELEPHONE (OUTPATIENT)
Dept: INTERNAL MEDICINE CLINIC | Age: 58
End: 2023-07-11

## 2023-07-11 DIAGNOSIS — M47.816 LUMBAR FACET ARTHROPATHY: ICD-10-CM

## 2023-07-11 DIAGNOSIS — R26.9 ABNORMALITY OF GAIT AND MOBILITY: Primary | ICD-10-CM

## 2023-07-11 NOTE — TELEPHONE ENCOUNTER
Referral for Functional Capacity Test was canceled because Physical therapy and Rehabilitation does not do this test. Only Physical therapy will do this test. Referral created for [facility] physical therapy for Functional Capacity test.

## 2023-07-12 ENCOUNTER — TELEPHONE (OUTPATIENT)
Dept: ONCOLOGY | Age: 58
End: 2023-07-12

## 2023-07-12 DIAGNOSIS — Z87.891 PERSONAL HISTORY OF NICOTINE DEPENDENCE: Primary | ICD-10-CM

## 2023-07-12 NOTE — TELEPHONE ENCOUNTER
Our records indicate that your patient is coming due for their annual lung cancer screening follow up testing. For your convenience, we have pended the order for the scan for you. If you do not agree with the need for the test, please cancel the order and let us know. Sincerely,    [id number] Screening Program    Auto printed reminder letter sent to patient.

## 2023-07-14 ENCOUNTER — OFFICE VISIT (OUTPATIENT)
Dept: PAIN MANAGEMENT | Age: 58
End: 2023-07-14
Payer: COMMERCIAL

## 2023-07-14 VITALS — WEIGHT: 278 LBS | BODY MASS INDEX: 46.32 KG/M2 | HEIGHT: 65 IN | OXYGEN SATURATION: 97 % | HEART RATE: 76 BPM

## 2023-07-14 DIAGNOSIS — M51.36 DDD (DEGENERATIVE DISC DISEASE), LUMBAR: ICD-10-CM

## 2023-07-14 DIAGNOSIS — M47.26 OSTEOARTHRITIS OF SPINE WITH RADICULOPATHY, LUMBAR REGION: Primary | ICD-10-CM

## 2023-07-14 DIAGNOSIS — Z79.891 CHRONIC USE OF OPIATE DRUG FOR THERAPEUTIC PURPOSE: ICD-10-CM

## 2023-07-14 PROCEDURE — 99213 OFFICE O/P EST LOW 20 MIN: CPT | Performed by: NURSE PRACTITIONER

## 2023-07-14 RX ORDER — OXYCODONE HYDROCHLORIDE AND ACETAMINOPHEN 5; 325 MG/1; MG/1
1 TABLET ORAL 2 TIMES DAILY PRN
Qty: 60 TABLET | Refills: 0 | Status: SHIPPED | OUTPATIENT
Start: 2023-07-14 | End: 2023-08-13

## 2023-07-14 ASSESSMENT — ENCOUNTER SYMPTOMS
SHORTNESS OF BREATH: 0
CONSTIPATION: 0
GASTROINTESTINAL NEGATIVE: 1
COUGH: 0
RESPIRATORY NEGATIVE: 1
BACK PAIN: 1

## 2023-07-14 NOTE — PROGRESS NOTES
tablet by mouth 2 times daily as needed for Pain for up to 30 days. , Disp: 60 tablet, Rfl: 0    lisinopril-hydroCHLOROthiazide (PRINZIDE;ZESTORETIC) 10-12.5 MG per tablet, take 1 tablet by mouth once daily, Disp: 90 tablet, Rfl: 1    rosuvastatin (CRESTOR) 40 MG tablet, Take 1 tablet by mouth daily, Disp: 90 tablet, Rfl: 1    metFORMIN (GLUCOPHAGE) 500 MG tablet, take 1 tablet by mouth twice a day with meals, Disp: 180 tablet, Rfl: 0    tiotropium (SPIRIVA HANDIHALER) 18 MCG inhalation capsule, Inhale 1 capsule into the lungs in the morning., Disp: 90 capsule, Rfl: 2    terbinafine (LAMISIL) 1 % cream, Apply topically 2 times daily. , Disp: 36 g, Rfl: 3    albuterol sulfate  (90 Base) MCG/ACT inhaler, Inhale 2 puffs into the lungs 4 times daily as needed for Wheezing, Disp: 3 Inhaler, Rfl: 1    tiotropium (SPIRIVA RESPIMAT) 2.5 MCG/ACT AERS inhaler, Inhale 2 puffs into the lungs daily, Disp: 1 g, Rfl: 5    Family History   Problem Relation Age of Onset    Diabetes Mother     Heart Disease Mother     Cancer Mother     Heart Disease Maternal Grandmother        Social History     Socioeconomic History    Marital status: Single     Spouse name: Not on file    Number of children: Not on file    Years of education: Not on file    Highest education level: Not on file   Occupational History    Not on file   Tobacco Use    Smoking status: Former     Packs/day: 1.50     Years: 20.00     Pack years: 30.00     Types: Cigarettes     Start date: 10/2/1979     Quit date: 2023     Years since quittin.4    Smokeless tobacco: Never    Tobacco comments:     down to 5 cigg.  per day   Substance and Sexual Activity    Alcohol use: No     Alcohol/week: 0.0 standard drinks    Drug use: No    Sexual activity: Not on file   Other Topics Concern    Not on file   Social History Narrative    Not on file     Social Determinants of Health     Financial Resource Strain: Not on file   Food Insecurity: Not on file   Transportation

## 2023-08-18 ENCOUNTER — OFFICE VISIT (OUTPATIENT)
Dept: PAIN MANAGEMENT | Age: 58
End: 2023-08-18
Payer: COMMERCIAL

## 2023-08-18 VITALS — OXYGEN SATURATION: 100 % | HEART RATE: 98 BPM | HEIGHT: 65 IN | BODY MASS INDEX: 46.32 KG/M2 | WEIGHT: 278 LBS

## 2023-08-18 DIAGNOSIS — M25.551 PAIN OF RIGHT HIP: Primary | ICD-10-CM

## 2023-08-18 DIAGNOSIS — M47.26 OSTEOARTHRITIS OF SPINE WITH RADICULOPATHY, LUMBAR REGION: ICD-10-CM

## 2023-08-18 DIAGNOSIS — Z79.891 CHRONIC USE OF OPIATE DRUG FOR THERAPEUTIC PURPOSE: ICD-10-CM

## 2023-08-18 PROCEDURE — 99214 OFFICE O/P EST MOD 30 MIN: CPT | Performed by: NURSE PRACTITIONER

## 2023-08-18 RX ORDER — OXYCODONE HYDROCHLORIDE AND ACETAMINOPHEN 5; 325 MG/1; MG/1
1 TABLET ORAL 2 TIMES DAILY PRN
Qty: 60 TABLET | Refills: 0 | Status: SHIPPED | OUTPATIENT
Start: 2023-08-18 | End: 2023-09-17

## 2023-08-18 ASSESSMENT — ENCOUNTER SYMPTOMS
GASTROINTESTINAL NEGATIVE: 1
RESPIRATORY NEGATIVE: 1
BACK PAIN: 1

## 2023-08-18 NOTE — PROGRESS NOTES
Chief Complaint   Patient presents with    Back Pain     Percocet       Medication Refill: Percocet     PMH     Chronic axial lumbar spinal pain, onset several years ago progressively worsened over years after being in extended coma for  and lost use of his legs for a short period voer 20 years ago HE believes pain is d/t scar from right hip fasciotomy with untended SI nerve cut that has flared up   Completed PT 12 visits 2/2019 with no improvement in his symptoms. Pain is mainly located in the axial lumbar spine which aggravates with walking and twisting and turning movements. Reports morning stiffness and difficulty sleeping. MRI imaging 2022 Multilevel degenerative change with mild canal stenosis at L4-5. Pt has seen 2 NS with no surgery recommended. Dr. Lilo Moralse has suggested SCS trial which pt is hesitant to follow through with at this time. Pt had L4-5 COLETTE 9/2021 and reported no relief from last one  Offered MBB for axial pain and declined     Main c/o today is right hip groin pain aggravated with walking sitting at times feels right leg feels numb from hip to toes and \"feels heavy\" has tried med and ice. Recently lost [facility] coverage         HPI:   Back Pain  This is a chronic problem. The current episode started more than 1 year ago. The problem occurs constantly. The problem is unchanged. The pain is present in the lumbar spine. The quality of the pain is described as aching and shooting. The pain radiates to the left foot and right foot. The pain is at a severity of 10/10. The pain is moderate. The pain is Worse during the day. The symptoms are aggravated by bending and position. Associated symptoms include headaches and numbness. Pertinent negatives include no chest pain or fever. Risk factors include obesity, poor posture, sedentary lifestyle and lack of exercise. He has tried analgesics, heat and muscle relaxant for the symptoms. The treatment provided mild relief.       Pain score Today:

## 2023-08-22 ENCOUNTER — HOSPITAL ENCOUNTER (OUTPATIENT)
Dept: PHYSICAL THERAPY | Facility: CLINIC | Age: 58
Setting detail: THERAPIES SERIES
Discharge: HOME | End: 2023-08-22
Attending: FAMILY MEDICINE
Payer: COMMERCIAL

## 2023-08-22 PROCEDURE — 97750 PHYSICAL PERFORMANCE TEST: CPT

## 2023-09-13 ENCOUNTER — HOSPITAL ENCOUNTER (OUTPATIENT)
Dept: GENERAL RADIOLOGY | Age: 58
Discharge: HOME | End: 2023-09-15
Attending: INTERNAL MEDICINE
Payer: COMMERCIAL

## 2023-09-13 ENCOUNTER — HOSPITAL ENCOUNTER (OUTPATIENT)
Dept: CT IMAGING | Age: 58
Discharge: HOME | End: 2023-09-15
Attending: INTERNAL MEDICINE
Payer: COMMERCIAL

## 2023-09-13 ENCOUNTER — HOSPITAL ENCOUNTER (OUTPATIENT)
Age: 58
Discharge: HOME | End: 2023-09-15
Attending: INTERNAL MEDICINE
Payer: COMMERCIAL

## 2023-09-13 VITALS — BODY MASS INDEX: 43.99 KG/M2 | HEIGHT: 65 IN | WEIGHT: 264 LBS

## 2023-09-13 DIAGNOSIS — M25.551 PAIN OF RIGHT HIP: ICD-10-CM

## 2023-09-13 DIAGNOSIS — Z87.891 PERSONAL HISTORY OF NICOTINE DEPENDENCE: ICD-10-CM

## 2023-09-13 PROCEDURE — 71271 CT THORAX LUNG CANCER SCR C-: CPT

## 2023-09-13 PROCEDURE — 73523 X-RAY EXAM HIPS BI 5/> VIEWS: CPT

## 2023-09-18 ENCOUNTER — OFFICE VISIT (OUTPATIENT)
Dept: INTERNAL MEDICINE CLINIC | Age: 58
End: 2023-09-18
Payer: COMMERCIAL

## 2023-09-18 VITALS
TEMPERATURE: 97.5 F | HEIGHT: 65 IN | RESPIRATION RATE: 15 BRPM | OXYGEN SATURATION: 98 % | BODY MASS INDEX: 44.82 KG/M2 | SYSTOLIC BLOOD PRESSURE: 170 MMHG | WEIGHT: 269 LBS | DIASTOLIC BLOOD PRESSURE: 100 MMHG | HEART RATE: 95 BPM

## 2023-09-18 DIAGNOSIS — Z79.891 CHRONIC USE OF OPIATE DRUG FOR THERAPEUTIC PURPOSE: ICD-10-CM

## 2023-09-18 DIAGNOSIS — M51.36 DDD (DEGENERATIVE DISC DISEASE), LUMBAR: ICD-10-CM

## 2023-09-18 DIAGNOSIS — J44.9 OSA AND COPD OVERLAP SYNDROME (HCC): ICD-10-CM

## 2023-09-18 DIAGNOSIS — F32.A ANXIETY AND DEPRESSION: ICD-10-CM

## 2023-09-18 DIAGNOSIS — G47.33 OSA AND COPD OVERLAP SYNDROME (HCC): ICD-10-CM

## 2023-09-18 DIAGNOSIS — R51.9 DAILY HEADACHE: ICD-10-CM

## 2023-09-18 DIAGNOSIS — Z87.891 QUIT SMOKING: ICD-10-CM

## 2023-09-18 DIAGNOSIS — I10 ESSENTIAL HYPERTENSION: ICD-10-CM

## 2023-09-18 DIAGNOSIS — E78.2 MIXED HYPERLIPIDEMIA: ICD-10-CM

## 2023-09-18 DIAGNOSIS — E66.01 MORBID OBESITY WITH BMI OF 40.0-44.9, ADULT (HCC): ICD-10-CM

## 2023-09-18 DIAGNOSIS — R10.30 LOWER ABDOMINAL PAIN: ICD-10-CM

## 2023-09-18 DIAGNOSIS — M47.816 LUMBAR FACET ARTHROPATHY: Primary | ICD-10-CM

## 2023-09-18 DIAGNOSIS — F41.9 ANXIETY AND DEPRESSION: ICD-10-CM

## 2023-09-18 DIAGNOSIS — M47.26 OSTEOARTHRITIS OF SPINE WITH RADICULOPATHY, LUMBAR REGION: ICD-10-CM

## 2023-09-18 DIAGNOSIS — Z28.21 INFLUENZA VACCINATION DECLINED: ICD-10-CM

## 2023-09-18 DIAGNOSIS — Z28.21 PNEUMOCOCCAL VACCINATION DECLINED: ICD-10-CM

## 2023-09-18 DIAGNOSIS — E11.9 CONTROLLED TYPE 2 DIABETES MELLITUS WITHOUT COMPLICATION, WITHOUT LONG-TERM CURRENT USE OF INSULIN (HCC): ICD-10-CM

## 2023-09-18 PROCEDURE — 3080F DIAST BP >= 90 MM HG: CPT | Performed by: FAMILY MEDICINE

## 2023-09-18 PROCEDURE — 3077F SYST BP >= 140 MM HG: CPT | Performed by: FAMILY MEDICINE

## 2023-09-18 PROCEDURE — 3044F HG A1C LEVEL LT 7.0%: CPT | Performed by: FAMILY MEDICINE

## 2023-09-18 PROCEDURE — 99214 OFFICE O/P EST MOD 30 MIN: CPT | Performed by: FAMILY MEDICINE

## 2023-09-18 RX ORDER — CLONIDINE HYDROCHLORIDE 0.1 MG/1
0.1 TABLET ORAL 2 TIMES DAILY
Qty: 180 TABLET | Refills: 1 | Status: SHIPPED | OUTPATIENT
Start: 2023-09-18

## 2023-09-18 ASSESSMENT — ENCOUNTER SYMPTOMS
RESPIRATORY NEGATIVE: 1
BACK PAIN: 1
ALLERGIC/IMMUNOLOGIC NEGATIVE: 1
ABDOMINAL PAIN: 1
EYES NEGATIVE: 1

## 2023-09-18 NOTE — PROGRESS NOTES
Subjective:      Patient ID: Devaughn Kerns is a 59 y.o. male. Abdominal Pain  This is a chronic problem. The current episode started more than 1 year ago. The onset quality is undetermined. The problem occurs constantly. The pain is located in the generalized abdominal region. The pain is at a severity of 4/10. The pain is moderate. The quality of the pain is dull. The abdominal pain does not radiate. Associated symptoms include arthralgias and myalgias. Relieved by: Chronic therapeutic opiate dependent. He has tried antacids and H2 blockers for the symptoms. The treatment provided mild relief. Prior diagnostic workup includes CT scan. His past medical history is significant for abdominal surgery. Review of Systems   Constitutional: Negative. HENT: Negative. Eyes: Negative. Respiratory: Negative. Cardiovascular: Negative. Gastrointestinal:  Positive for abdominal pain. Endocrine: Negative. Musculoskeletal:  Positive for arthralgias, back pain, gait problem, myalgias and neck pain. Skin: Negative. Allergic/Immunologic: Negative. Hematological: Negative. Psychiatric/Behavioral:  Positive for agitation, decreased concentration, dysphoric mood and sleep disturbance. The patient is nervous/anxious. Past family and social history unremarkable. Diagnosis Orders   1. Lumbar facet arthropathy        2. Quit smoking        3. Daily headache        4. Chronic use of opiate drugs therapeutic purposes        5. Morbid obesity with BMI of 40.0-44.9, adult ([facility])  CBC    Comprehensive Metabolic Panel    Hemoglobin A1C    Lipid Panel    Microalbumin, Ur    Urinalysis    TSH    PSA Screening      6. Osteoarthritis of spine with radiculopathy, lumbar region        7.  Controlled type 2 diabetes mellitus without complication, without long-term current use of insulin ([facility])  CBC    Comprehensive Metabolic Panel    Hemoglobin A1C    Lipid Panel    Microalbumin, Ur    Urinalysis    TSH    PSA

## 2023-09-19 ENCOUNTER — OFFICE VISIT (OUTPATIENT)
Dept: PAIN MANAGEMENT | Age: 58
End: 2023-09-19
Payer: COMMERCIAL

## 2023-09-19 ENCOUNTER — HOSPITAL ENCOUNTER (OUTPATIENT)
Age: 58
Setting detail: SPECIMEN
Discharge: HOME | End: 2023-09-19

## 2023-09-19 VITALS
SYSTOLIC BLOOD PRESSURE: 143 MMHG | HEART RATE: 83 BPM | WEIGHT: 269 LBS | BODY MASS INDEX: 44.82 KG/M2 | OXYGEN SATURATION: 97 % | DIASTOLIC BLOOD PRESSURE: 94 MMHG | HEIGHT: 65 IN

## 2023-09-19 DIAGNOSIS — M54.41 CHRONIC BILATERAL LOW BACK PAIN WITH RIGHT-SIDED SCIATICA: ICD-10-CM

## 2023-09-19 DIAGNOSIS — J44.9 OSA AND COPD OVERLAP SYNDROME (HCC): ICD-10-CM

## 2023-09-19 DIAGNOSIS — G47.33 OSA ON CPAP: ICD-10-CM

## 2023-09-19 DIAGNOSIS — E66.01 MORBID OBESITY WITH BMI OF 40.0-44.9, ADULT (HCC): Primary | ICD-10-CM

## 2023-09-19 DIAGNOSIS — G47.33 OSA AND COPD OVERLAP SYNDROME (HCC): ICD-10-CM

## 2023-09-19 DIAGNOSIS — Z99.89 OSA ON CPAP: ICD-10-CM

## 2023-09-19 DIAGNOSIS — Z79.891 CHRONIC USE OF OPIATE DRUG FOR THERAPEUTIC PURPOSE: ICD-10-CM

## 2023-09-19 DIAGNOSIS — G89.29 CHRONIC BILATERAL LOW BACK PAIN WITH RIGHT-SIDED SCIATICA: ICD-10-CM

## 2023-09-19 DIAGNOSIS — E66.01 MORBID OBESITY WITH BMI OF 40.0-44.9, ADULT (HCC): ICD-10-CM

## 2023-09-19 DIAGNOSIS — E11.9 CONTROLLED TYPE 2 DIABETES MELLITUS WITHOUT COMPLICATION, WITHOUT LONG-TERM CURRENT USE OF INSULIN (HCC): ICD-10-CM

## 2023-09-19 DIAGNOSIS — E78.2 MIXED HYPERLIPIDEMIA: ICD-10-CM

## 2023-09-19 LAB
ALBUMIN SERPL-MCNC: 4.4 G/DL (ref 3.5–5.2)
ALBUMIN/GLOB SERPL: 1 {RATIO}
ALP SERPL-CCNC: 49 U/L (ref 40–129)
ALT SERPL-CCNC: 26 U/L (ref 10–50)
ANION GAP SERPL CALCULATED.3IONS-SCNC: 12 MMOL/L (ref 9–16)
AST SERPL-CCNC: 24 U/L (ref 10–50)
BILIRUB SERPL-MCNC: 0.3 MG/DL (ref 0–1.2)
BILIRUB UR QL STRIP: NEGATIVE
BUN SERPL-MCNC: 10 MG/DL (ref 8–23)
CALCIUM SERPL-MCNC: 9.7 MG/DL (ref 8.6–10.4)
CHLORIDE SERPL-SCNC: 101 MMOL/L (ref 98–107)
CHOLEST SERPL-MCNC: 221 MG/DL (ref 0–199)
CHOLESTEROL/HDL RATIO: 5
CLARITY UR: CLEAR
CO2 SERPL-SCNC: 24 MMOL/L (ref 20–31)
COLOR UR: YELLOW
CREAT SERPL-MCNC: 0.8 MG/DL (ref 0.7–1.2)
CREAT UR-MCNC: 80.9 MG/DL (ref 39–259)
EPI CELLS #/AREA URNS HPF: NORMAL /HPF (ref 0–5)
ERYTHROCYTE [DISTWIDTH] IN BLOOD BY AUTOMATED COUNT: 13.8 % (ref 11.8–14.4)
GFR SERPL CREATININE-BSD FRML MDRD: >60 ML/MIN/1.73M2
GLUCOSE SERPL-MCNC: 95 MG/DL (ref 74–99)
GLUCOSE UR STRIP-MCNC: NEGATIVE MG/DL
HCT VFR BLD AUTO: 44.5 % (ref 40.7–50.3)
HDLC SERPL-MCNC: 40 MG/DL (ref 0–40)
HGB BLD-MCNC: 14.3 G/DL (ref 13–17)
HGB UR QL STRIP.AUTO: ABNORMAL
KETONES UR STRIP-MCNC: NEGATIVE MG/DL
LDLC SERPL CALC-MCNC: 145 MG/DL (ref 0–100)
LEUKOCYTE ESTERASE UR QL STRIP: NEGATIVE
MCH RBC QN AUTO: 29.6 PG (ref 25.2–33.5)
MCHC RBC AUTO-ENTMCNC: 32.1 G/DL (ref 28.4–34.8)
MCV RBC AUTO: 92.1 FL (ref 82.6–102.9)
MICROALBUMIN UR-MCNC: <12 MG/L
MICROALBUMIN/CREAT UR-RTO: NORMAL MCG/MG CREAT
NITRITE UR QL STRIP: NEGATIVE
NRBC BLD-RTO: 0 PER 100 WBC
PH UR STRIP: 5.5 [PH] (ref 5–8)
PLATELET # BLD AUTO: 233 K/UL (ref 138–453)
PMV BLD AUTO: 10.7 FL (ref 8.1–13.5)
POTASSIUM SERPL-SCNC: 3.9 MMOL/L (ref 3.7–5.3)
PROT SERPL-MCNC: 7.5 G/DL (ref 6.6–8.7)
PROT UR STRIP-MCNC: NEGATIVE MG/DL
PSA SERPL-MCNC: 0.62 NG/ML
RBC # BLD AUTO: 4.83 M/UL (ref 4.21–5.77)
RBC #/AREA URNS HPF: NORMAL /HPF (ref 0–4)
SODIUM SERPL-SCNC: 137 MMOL/L (ref 136–145)
SP GR UR STRIP: 1.02 (ref 1–1.03)
TRIGL SERPL-MCNC: 176 MG/DL (ref 0–149)
TSH SERPL DL<=0.05 MIU/L-ACNC: 2.76 UIU/ML (ref 0.27–4.2)
UROBILINOGEN UR STRIP-ACNC: NORMAL EU/DL (ref 0–1)
VLDLC SERPL CALC-MCNC: 35 MG/DL
WBC #/AREA URNS HPF: NORMAL /HPF (ref 0–5)
WBC OTHER # BLD: 9.2 K/UL (ref 3.5–11.3)

## 2023-09-19 PROCEDURE — 3077F SYST BP >= 140 MM HG: CPT | Performed by: ANESTHESIOLOGY

## 2023-09-19 PROCEDURE — 3080F DIAST BP >= 90 MM HG: CPT | Performed by: ANESTHESIOLOGY

## 2023-09-19 PROCEDURE — 99213 OFFICE O/P EST LOW 20 MIN: CPT | Performed by: ANESTHESIOLOGY

## 2023-09-19 RX ORDER — OXYCODONE HYDROCHLORIDE AND ACETAMINOPHEN 5; 325 MG/1; MG/1
1 TABLET ORAL 2 TIMES DAILY PRN
Qty: 60 TABLET | Refills: 0 | Status: SHIPPED | OUTPATIENT
Start: 2023-09-19 | End: 2023-10-19

## 2023-09-19 ASSESSMENT — ENCOUNTER SYMPTOMS
NAUSEA: 0
DIARRHEA: 0
WHEEZING: 0
CONSTIPATION: 0
CHEST TIGHTNESS: 0
BACK PAIN: 1
SHORTNESS OF BREATH: 0
VOMITING: 0

## 2023-09-19 NOTE — PROGRESS NOTES
The patient is a 59 y. o. Non- / non  male. Chief Complaint   Patient presents with    Back Pain        Back Pain  Associated symptoms include numbness and weakness. Pertinent negatives include no fever. Chronic axial lumbar spinal pain, onset several years ago progressively worsened over years after being in extended coma for  and lost use of his legs for a short period voer 20 years ago HE believes pain is d/t scar from right hip fasciotomy with untended SI nerve cut that has flared up   Completed PT 12 visits 2/2019 with no improvement in his symptoms. Pain is mainly located in the axial lumbar spine which aggravates with walking and twisting and turning movements. Reports morning stiffness and difficulty sleeping. MRI imaging 2022 Multilevel degenerative change with mild canal stenosis at L4-5. Pt has seen 2 NS with no surgery recommended. we suggested SCS trial which pt is hesitant to follow through with at this time. Pt had L4-5 COLETTE 9/2021 and reported no relief from last one  Offered MBB for axial pain and declined    Pain score Today:  10  Adverse effects (Constipation / Nausea / Sedation / sexual Dysfunction / others) : no  Mood: fair  Sleep pattern and quality: poor  Activity level: fair     Pill count Today:0   Last dose taken  09/18/2023 AM  OARRS report reviewed today: yes  ER/Hospitalizations/PCP visit related to pain since last visit:no   Any legal problems e.g. DUI etc.:No  Satisfied with current management: Yes     Opioid Contract:11/07/2022  Last Urine Dug screen dated:05/05/2023    Hemoglobin A1C   Date Value Ref Range Status   02/23/2023 6.0 4.0 - 6.0 % Final       Past Medical History, Past Surgical History, Social History, Allergies and Medications reviewed and updated in EPIC as indicated    Family History reviewed and is noncontributory.           Past Medical History:   Diagnosis Date    Asthma     Diabetes mellitus ([address])     HTN (hypertension)     Hyperlipidemia

## 2023-09-20 LAB
EST. AVERAGE GLUCOSE BLD GHB EST-MCNC: 126 MG/DL
HBA1C MFR BLD: 6 % (ref 4–6)

## 2023-09-25 RX ORDER — ROSUVASTATIN CALCIUM 40 MG/1
40 TABLET, COATED ORAL DAILY
Qty: 90 TABLET | Refills: 1 | Status: SHIPPED | OUTPATIENT
Start: 2023-09-25

## 2023-09-25 RX ORDER — ALBUTEROL SULFATE 90 UG/1
2 AEROSOL, METERED RESPIRATORY (INHALATION) 4 TIMES DAILY PRN
Qty: 3 EACH | Refills: 1 | Status: SHIPPED | OUTPATIENT
Start: 2023-09-25

## 2023-09-25 RX ORDER — TIOTROPIUM BROMIDE 18 UG/1
CAPSULE ORAL; RESPIRATORY (INHALATION)
Qty: 90 CAPSULE | Refills: 2 | OUTPATIENT
Start: 2023-09-25

## 2023-09-25 NOTE — TELEPHONE ENCOUNTER
LAST VISIT: 6/24/22  No follow up scheduled. Patient has cancelled last two appointments. Medication refused. Please make any changes needed. Thank you.

## 2023-09-25 NOTE — TELEPHONE ENCOUNTER
Indu Cormier is calling to request a refill on the following medication(s):    Medication Request:  Requested Prescriptions     Pending Prescriptions Disp Refills    metFORMIN (GLUCOPHAGE) 500 MG tablet 180 tablet 0     Sig: Take 1 tablet by mouth with breakfast and with evening meal       Last Visit Date (If Applicable):  6/84/7547    Next Visit Date:    9/24/2023

## 2023-09-25 NOTE — TELEPHONE ENCOUNTER
Say Baker is calling to request a refill on the following medication(s):    Medication Request:  Requested Prescriptions     Pending Prescriptions Disp Refills    albuterol sulfate HFA (PROVENTIL;VENTOLIN;PROAIR) 108 (90 Base) MCG/ACT inhaler 3 each 1     Sig: Inhale 2 puffs into the lungs 4 times daily as needed for Wheezing       Last Visit Date (If Applicable):  0/53/6521    Next Visit Date:    9/24/2023

## 2023-09-25 NOTE — TELEPHONE ENCOUNTER
[facility] is calling to request a refill on the following medication(s):    Medication Request:  Requested Prescriptions     Pending Prescriptions Disp Refills    rosuvastatin (CRESTOR) 40 MG tablet 90 tablet 1     Sig: Take 1 tablet by mouth daily       Last Visit Date (If Applicable):  5/26/7053    Next Visit Date:    10/30/2023

## 2023-09-27 ENCOUNTER — HOSPITAL ENCOUNTER (OUTPATIENT)
Dept: CT IMAGING | Facility: CLINIC | Age: 58
Discharge: HOME | End: 2023-09-29
Attending: FAMILY MEDICINE
Payer: COMMERCIAL

## 2023-09-27 DIAGNOSIS — R10.30 LOWER ABDOMINAL PAIN: ICD-10-CM

## 2023-09-27 PROCEDURE — 74176 CT ABD & PELVIS W/O CONTRAST: CPT

## 2023-09-27 PROCEDURE — 2500000003 HC RX 250 WO HCPCS: Performed by: FAMILY MEDICINE

## 2023-09-27 RX ADMIN — BARIUM SULFATE 450 ML: 20 SUSPENSION ORAL at 15:06

## 2023-10-16 ENCOUNTER — OFFICE VISIT (OUTPATIENT)
Dept: PAIN MANAGEMENT | Age: 58
End: 2023-10-16
Payer: COMMERCIAL

## 2023-10-16 VITALS — WEIGHT: 269 LBS | HEIGHT: 65 IN | OXYGEN SATURATION: 96 % | HEART RATE: 91 BPM | BODY MASS INDEX: 44.82 KG/M2

## 2023-10-16 DIAGNOSIS — M47.816 LUMBAR FACET ARTHROPATHY: ICD-10-CM

## 2023-10-16 DIAGNOSIS — Z79.891 CHRONIC USE OF OPIATE DRUG FOR THERAPEUTIC PURPOSE: ICD-10-CM

## 2023-10-16 DIAGNOSIS — G89.29 CHRONIC BILATERAL LOW BACK PAIN WITH RIGHT-SIDED SCIATICA: Primary | Chronic | ICD-10-CM

## 2023-10-16 DIAGNOSIS — M54.41 CHRONIC BILATERAL LOW BACK PAIN WITH RIGHT-SIDED SCIATICA: Primary | Chronic | ICD-10-CM

## 2023-10-16 PROCEDURE — 99213 OFFICE O/P EST LOW 20 MIN: CPT | Performed by: NURSE PRACTITIONER

## 2023-10-16 RX ORDER — OXYCODONE HYDROCHLORIDE AND ACETAMINOPHEN 5; 325 MG/1; MG/1
1 TABLET ORAL 2 TIMES DAILY PRN
Qty: 60 TABLET | Refills: 0 | Status: SHIPPED | OUTPATIENT
Start: 2023-10-19 | End: 2023-11-18

## 2023-10-16 ASSESSMENT — ENCOUNTER SYMPTOMS
DIARRHEA: 0
COUGH: 0
NAUSEA: 0
SHORTNESS OF BREATH: 0
CONSTIPATION: 0
BACK PAIN: 1
VOMITING: 0

## 2023-10-16 NOTE — PROGRESS NOTES
Chief Complaint   Patient presents with    Back Pain    Medication Refill     Percocet          [facility]     Chronic axial lumbar spinal pain, onset several years ago progressively worsened over years after being in extended coma and lost use of his legs for a short period over 20 years ago. He believes pain is also d/t scar from right hip fasciotomy with untended SI nerve cut that has flared up   Completed PT 12 visits 2/2019 with no improvement in his symptoms. Pain is mainly located in the axial lumbar spine which aggravates with walking and twisting and turning movements. Reports morning stiffness and difficulty sleeping. MRI imaging 2022 Multilevel degenerative change with mild canal stenosis at L4-5. Pt has seen 2 NS with no surgery recommended. Imaging did not show any surgical pathology  Reports very high pain score but do not appear in any distress  Dr. Rajan Caba has suggested SCS trial which pt is hesitant to follow through with at this time. Pt had L4-5 COLETTE 9/2021 and reported no relief from last one  Offered MBB for axial pain and declined  Risk and benefits discussed with the patient in detail particularly respiratory depression and encouraged to work on weight loss   Reported increasing hip pain with XR done 9/2023 showing No acute osseous abnormality. Degenerative changes in both hips right greater than left     HPI:   Back Pain  This is a chronic problem. The current episode started more than 1 year ago. The problem occurs constantly. The problem is unchanged. The pain is present in the lumbar spine. The quality of the pain is described as aching and shooting. The pain radiates to the left foot and right foot. The pain is at a severity of 9/10. The pain is moderate. The pain is Worse during the day. The symptoms are aggravated by bending and position. Associated symptoms include headaches and numbness. Pertinent negatives include no chest pain or fever.  Risk factors include obesity, poor

## 2023-10-30 ENCOUNTER — OFFICE VISIT (OUTPATIENT)
Dept: INTERNAL MEDICINE CLINIC | Age: 58
End: 2023-10-30
Payer: COMMERCIAL

## 2023-10-30 VITALS
HEIGHT: 65 IN | OXYGEN SATURATION: 97 % | RESPIRATION RATE: 16 BRPM | SYSTOLIC BLOOD PRESSURE: 170 MMHG | BODY MASS INDEX: 44.65 KG/M2 | DIASTOLIC BLOOD PRESSURE: 96 MMHG | HEART RATE: 88 BPM | TEMPERATURE: 97.1 F | WEIGHT: 268 LBS

## 2023-10-30 DIAGNOSIS — R10.9 CHRONIC ABDOMINAL PAIN: Primary | ICD-10-CM

## 2023-10-30 DIAGNOSIS — Z79.891 CHRONIC USE OF OPIATE DRUG FOR THERAPEUTIC PURPOSE: ICD-10-CM

## 2023-10-30 DIAGNOSIS — G89.29 CHRONIC BILATERAL LOW BACK PAIN WITH RIGHT-SIDED SCIATICA: Chronic | ICD-10-CM

## 2023-10-30 DIAGNOSIS — Z90.49 HISTORY OF APPENDECTOMY: ICD-10-CM

## 2023-10-30 DIAGNOSIS — Z28.21 INFLUENZA VACCINATION DECLINED: ICD-10-CM

## 2023-10-30 DIAGNOSIS — I10 ESSENTIAL HYPERTENSION: ICD-10-CM

## 2023-10-30 DIAGNOSIS — F41.9 ANXIETY AND DEPRESSION: ICD-10-CM

## 2023-10-30 DIAGNOSIS — M51.36 DDD (DEGENERATIVE DISC DISEASE), LUMBAR: ICD-10-CM

## 2023-10-30 DIAGNOSIS — Z90.49 HISTORY OF CHOLECYSTECTOMY: ICD-10-CM

## 2023-10-30 DIAGNOSIS — F32.A ANXIETY AND DEPRESSION: ICD-10-CM

## 2023-10-30 DIAGNOSIS — G47.33 OSA ON CPAP: ICD-10-CM

## 2023-10-30 DIAGNOSIS — Z87.891 QUIT SMOKING: ICD-10-CM

## 2023-10-30 DIAGNOSIS — M54.41 CHRONIC BILATERAL LOW BACK PAIN WITH RIGHT-SIDED SCIATICA: Chronic | ICD-10-CM

## 2023-10-30 DIAGNOSIS — E78.2 MIXED HYPERLIPIDEMIA: ICD-10-CM

## 2023-10-30 DIAGNOSIS — R51.9 DAILY HEADACHE: ICD-10-CM

## 2023-10-30 DIAGNOSIS — G89.29 CHRONIC ABDOMINAL PAIN: Primary | ICD-10-CM

## 2023-10-30 DIAGNOSIS — Z28.21 PNEUMOCOCCAL VACCINATION DECLINED: ICD-10-CM

## 2023-10-30 DIAGNOSIS — E11.9 CONTROLLED TYPE 2 DIABETES MELLITUS WITHOUT COMPLICATION, WITHOUT LONG-TERM CURRENT USE OF INSULIN (HCC): ICD-10-CM

## 2023-10-30 DIAGNOSIS — E66.01 MORBID OBESITY WITH BMI OF 40.0-44.9, ADULT (HCC): ICD-10-CM

## 2023-10-30 DIAGNOSIS — M47.26 OSTEOARTHRITIS OF SPINE WITH RADICULOPATHY, LUMBAR REGION: ICD-10-CM

## 2023-10-30 DIAGNOSIS — M47.816 LUMBAR FACET ARTHROPATHY: ICD-10-CM

## 2023-10-30 PROCEDURE — 3077F SYST BP >= 140 MM HG: CPT | Performed by: FAMILY MEDICINE

## 2023-10-30 PROCEDURE — 3080F DIAST BP >= 90 MM HG: CPT | Performed by: FAMILY MEDICINE

## 2023-10-30 PROCEDURE — 99214 OFFICE O/P EST MOD 30 MIN: CPT | Performed by: FAMILY MEDICINE

## 2023-10-30 PROCEDURE — 3044F HG A1C LEVEL LT 7.0%: CPT | Performed by: FAMILY MEDICINE

## 2023-10-30 RX ORDER — LISINOPRIL AND HYDROCHLOROTHIAZIDE 12.5; 1 MG/1; MG/1
1 TABLET ORAL DAILY
Qty: 90 TABLET | Refills: 1 | Status: SHIPPED | OUTPATIENT
Start: 2023-10-30

## 2023-10-30 ASSESSMENT — ENCOUNTER SYMPTOMS
EYES NEGATIVE: 1
RESPIRATORY NEGATIVE: 1
BACK PAIN: 1
ABDOMINAL PAIN: 1
ALLERGIC/IMMUNOLOGIC NEGATIVE: 1

## 2023-10-30 NOTE — PROGRESS NOTES
Subjective:      Patient ID: Karen Caicedo is a 59 y.o. male. Abdominal Pain  This is a chronic problem. The current episode started more than 1 month ago. The onset quality is undetermined. The problem occurs constantly. The problem has been waxing and waning. Pain location: Lower abdomen. The pain is at a severity of 5/10. The pain is moderate. The quality of the pain is dull and colicky. The abdominal pain does not radiate. Associated symptoms include arthralgias. The pain is relieved by Nothing. Treatments tried: Opioids. Prior diagnostic workup includes surgery, CT scan and lower endoscopy. His past medical history is significant for abdominal surgery. Review of Systems   Constitutional: Negative. HENT: Negative. Eyes: Negative. Respiratory: Negative. Cardiovascular: Negative. Gastrointestinal:  Positive for abdominal pain. Endocrine: Negative. Musculoskeletal:  Positive for arthralgias and back pain. Skin: Negative. Allergic/Immunologic: Negative. Neurological: Negative. Hematological: Negative. Psychiatric/Behavioral:  Positive for dysphoric mood. The patient is nervous/anxious. Past family and social history unremarkable. Diagnosis Orders   1. Chronic abdominal pain  [id number] [address], [place], General Surgery, [facility]      2. Lumbar facet arthropathy        3. Quit smoking        4. Chronic bilateral low back pain with right-sided sciatica        5. Daily headache        6. Chronic use of opiate drugs therapeutic purposes        7. Morbid obesity with BMI of 40.0-44.9, adult ([address])        8. Osteoarthritis of spine with radiculopathy, lumbar region        9. Controlled type 2 diabetes mellitus without complication, without long-term current use of insulin ([address])        10. MARLENE on CPAP        11. Anxiety and depression        12. Pneumococcal vaccination declined        13. Influenza vaccination declined        14.  DDD (degenerative disc disease), lumbar

## 2023-11-08 ENCOUNTER — TELEPHONE (OUTPATIENT)
Dept: INTERNAL MEDICINE CLINIC | Age: 58
End: 2023-11-08

## 2023-11-08 NOTE — TELEPHONE ENCOUNTER
Patient is having chest pains, chest tightness and left arm aches  x 1 1/2 weeks. Patient states he had an appt today but is cancelling because he doesn't feel good. I discussed this with Dr Maximus Martinez and he advised ER. Patient stated he will call his friend and go to ER.

## 2023-11-15 ENCOUNTER — OFFICE VISIT (OUTPATIENT)
Dept: SURGERY | Age: 58
End: 2023-11-15
Payer: COMMERCIAL

## 2023-11-15 VITALS
DIASTOLIC BLOOD PRESSURE: 92 MMHG | HEART RATE: 104 BPM | HEIGHT: 65 IN | BODY MASS INDEX: 46.48 KG/M2 | SYSTOLIC BLOOD PRESSURE: 166 MMHG | WEIGHT: 279 LBS

## 2023-11-15 DIAGNOSIS — R10.31 RIGHT LOWER QUADRANT ABDOMINAL PAIN: Primary | ICD-10-CM

## 2023-11-15 DIAGNOSIS — R39.11 URINARY HESITANCY: ICD-10-CM

## 2023-11-15 PROCEDURE — 3078F DIAST BP <80 MM HG: CPT | Performed by: SURGERY

## 2023-11-15 PROCEDURE — 99204 OFFICE O/P NEW MOD 45 MIN: CPT | Performed by: SURGERY

## 2023-11-15 PROCEDURE — 3074F SYST BP LT 130 MM HG: CPT | Performed by: SURGERY

## 2023-11-15 NOTE — PROGRESS NOTES
Visit Information    Have you changed or started any medications since your last visit including any over-the-counter medicines, vitamins, or herbal medicines? no   Have you stopped taking any of your medications? Is so, why? -  no  Are you having any side effects from any of your medications? - no    Have you seen any other physician or provider since your last visit?  no   Have you had any other diagnostic tests since your last visit? yes - Labs - CT   Have you been seen in the emergency room and/or had an admission in a hospital since we last saw you?  no   Have you had your routine dental cleaning in the past 6 months? No     Do you have an active MyChart account? If no, what is the barrier?   Yes    Patient Care Team:  Mian Johnson MD as PCP - General (Family Medicine)  Mian Johnson MD as PCP - Empaneled Provider  Nilesh Mares MD as Consulting Physician (Neurology)  Alona Gray MD as Consulting Physician (Gastroenterology)  Nalini Kirk MD as Consulting Physician (Plastic Surgery)  Merlinda Grieves, DO as Consulting Physician (Pulmonary Disease)  Tameka Alcaraz MD as Consulting Physician (Pain Management)  Bela Almanzar MD as Consulting Physician (Physical Medicine and Rehab)  Agustin Galeazzi, DO as Consulting Physician (General Surgery)    Medical History Review  Past Medical, Family, and Social History reviewed and does not contribute to the patient presenting condition    Health Maintenance   Topic Date Due    DTaP/Tdap/Td vaccine (1 - Tdap) Never done    Diabetic foot exam  06/01/2018    Annual Wellness Visit (AWV)  Never done    Flu vaccine (1) 08/01/2023    Hepatitis B vaccine (1 of 3 - Risk 3-dose series) 10/30/2024 (Originally 9/17/2023)    Shingles vaccine (1 of 2) 10/30/2024 (Originally 9/17/2013)    Pneumococcal 0-64 years Vaccine (1 - PCV) 10/30/2024 (Originally 9/17/1971)    COVID-19 Vaccine (3 - 2023-24 season) 10/30/2024 (Originally 9/1/2023)    Depression Monitoring
It was recommended that the pt have a colonoscopy performed to investigate any potential issue that could be causing the pt's pain. Pt was informed of and understood the potential risks of the procedure. Plan     1. Schedule Colonoscopy   2. Referral to urology for urinary retention   3. Pt is to follow up with the [facility] clinic to discuss results of colonoscopy    Attending Physician Statement  I have discussed the case with Dr Nena Carrillo, including pertinent history and exam findings with the resident. I have seen and examined the patient and the key elements of the encounter have been performed by me. I agree with the assessment, plan and orders as documented by the resident.       Electronically signed by Adis Arthur IV, DO  on 11/22/2023 at 9:53 AM

## 2023-11-17 ENCOUNTER — OFFICE VISIT (OUTPATIENT)
Dept: PAIN MANAGEMENT | Age: 58
End: 2023-11-17
Payer: COMMERCIAL

## 2023-11-17 VITALS — HEIGHT: 65 IN | BODY MASS INDEX: 46.48 KG/M2 | OXYGEN SATURATION: 96 % | HEART RATE: 87 BPM | WEIGHT: 279 LBS

## 2023-11-17 DIAGNOSIS — Z79.891 CHRONIC USE OF OPIATE DRUG FOR THERAPEUTIC PURPOSE: ICD-10-CM

## 2023-11-17 DIAGNOSIS — E66.01 MORBID OBESITY WITH BMI OF 40.0-44.9, ADULT (HCC): Primary | ICD-10-CM

## 2023-11-17 DIAGNOSIS — M47.816 LUMBAR FACET ARTHROPATHY: ICD-10-CM

## 2023-11-17 PROCEDURE — 99213 OFFICE O/P EST LOW 20 MIN: CPT | Performed by: NURSE PRACTITIONER

## 2023-11-17 RX ORDER — OXYCODONE HYDROCHLORIDE AND ACETAMINOPHEN 5; 325 MG/1; MG/1
1 TABLET ORAL 2 TIMES DAILY PRN
Qty: 60 TABLET | Refills: 0 | Status: SHIPPED | OUTPATIENT
Start: 2023-11-18 | End: 2023-12-18

## 2023-11-17 ASSESSMENT — ENCOUNTER SYMPTOMS
GASTROINTESTINAL NEGATIVE: 1
BACK PAIN: 1
RESPIRATORY NEGATIVE: 1

## 2023-11-17 NOTE — PROGRESS NOTES
oxyCODONE-acetaminophen (PERCOCET) 5-325 MG per tablet (Start on 11/18/2023)    Morbid obesity with BMI of 40.0-44.9, adult [facility]) - Primary          Treatment Plan:  Patient relates current medications are helping the pain. Patient reports taking pain medications as prescribed, denies obtaining medications from different sources and denies use of illegal drugs. Medication risk and benefits have been discussed. Patient denies side effects from medications like nausea, vomiting, constipation or drowsiness. Patient reports current activities of daily living are possible due to medications and would like to continue them. As always, we encourage daily stretching and strengthening exercises, and recommend minimizing use of pain medications unless patient cannot get through daily activities due to pain. Due to the high risk nature of this patient's pain medication close monitoring is required. Continue current medication management, pt has been stable and compliant. Script written for percocet  Follow up appointment made for 4 weeks    I have reviewed the chief complaint and history of present illness (including ROS and PFSH) and vital documentation by my staff and I agree with their documentation and have added where applicable.

## 2023-11-22 ENCOUNTER — HOSPITAL ENCOUNTER (OUTPATIENT)
Age: 58
Setting detail: SPECIMEN
Discharge: HOME | End: 2023-11-22
Payer: COMMERCIAL

## 2023-11-22 DIAGNOSIS — Z01.818 PRE-OP TESTING: ICD-10-CM

## 2023-11-22 LAB
ERYTHROCYTE [DISTWIDTH] IN BLOOD BY AUTOMATED COUNT: 14.4 % (ref 11.8–14.4)
HCT VFR BLD AUTO: 47.9 % (ref 40.7–50.3)
HGB BLD-MCNC: 15.5 G/DL (ref 13–17)
MCH RBC QN AUTO: 30 PG (ref 25.2–33.5)
MCHC RBC AUTO-ENTMCNC: 32.4 G/DL (ref 28.4–34.8)
MCV RBC AUTO: 92.6 FL (ref 82.6–102.9)
NRBC BLD-RTO: 0 PER 100 WBC
PLATELET # BLD AUTO: 239 K/UL (ref 138–453)
PMV BLD AUTO: 11.3 FL (ref 8.1–13.5)
RBC # BLD AUTO: 5.17 M/UL (ref 4.21–5.77)
WBC OTHER # BLD: 9.6 K/UL (ref 3.5–11.3)

## 2023-11-22 PROCEDURE — 36415 COLL VENOUS BLD VENIPUNCTURE: CPT

## 2023-11-22 PROCEDURE — 85027 COMPLETE CBC AUTOMATED: CPT

## 2023-12-15 ENCOUNTER — TELEMEDICINE (OUTPATIENT)
Dept: PAIN MANAGEMENT | Age: 58
End: 2023-12-15
Payer: COMMERCIAL

## 2023-12-15 DIAGNOSIS — Z79.891 CHRONIC USE OF OPIATE DRUG FOR THERAPEUTIC PURPOSE: ICD-10-CM

## 2023-12-15 DIAGNOSIS — M47.816 LUMBAR FACET ARTHROPATHY: ICD-10-CM

## 2023-12-15 DIAGNOSIS — G89.29 CHRONIC BILATERAL LOW BACK PAIN WITH RIGHT-SIDED SCIATICA: Primary | Chronic | ICD-10-CM

## 2023-12-15 DIAGNOSIS — M54.41 CHRONIC BILATERAL LOW BACK PAIN WITH RIGHT-SIDED SCIATICA: Primary | Chronic | ICD-10-CM

## 2023-12-15 PROCEDURE — 99213 OFFICE O/P EST LOW 20 MIN: CPT | Performed by: NURSE PRACTITIONER

## 2023-12-15 RX ORDER — OXYCODONE HYDROCHLORIDE AND ACETAMINOPHEN 5; 325 MG/1; MG/1
1 TABLET ORAL 2 TIMES DAILY PRN
Qty: 60 TABLET | Refills: 0 | Status: SHIPPED | OUTPATIENT
Start: 2023-12-18 | End: 2024-01-17
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
## 2024-01-12 ENCOUNTER — OFFICE VISIT (OUTPATIENT)
Dept: PULMONOLOGY | Age: 59
End: 2024-01-12
Payer: MEDICARE

## 2024-01-12 VITALS
HEART RATE: 97 BPM | TEMPERATURE: 97 F | RESPIRATION RATE: 12 BRPM | DIASTOLIC BLOOD PRESSURE: 102 MMHG | HEIGHT: 65 IN | BODY MASS INDEX: 46.65 KG/M2 | WEIGHT: 280 LBS | SYSTOLIC BLOOD PRESSURE: 158 MMHG

## 2024-01-12 DIAGNOSIS — J44.9 CHRONIC OBSTRUCTIVE PULMONARY DISEASE, UNSPECIFIED COPD TYPE (HCC): ICD-10-CM

## 2024-01-12 DIAGNOSIS — R91.1 LUNG NODULE SEEN ON IMAGING STUDY: Primary | ICD-10-CM

## 2024-01-12 DIAGNOSIS — G47.33 OSA (OBSTRUCTIVE SLEEP APNEA): ICD-10-CM

## 2024-01-12 DIAGNOSIS — F11.20 OPIOID DEPENDENCE WITH CURRENT USE (HCC): ICD-10-CM

## 2024-01-12 PROCEDURE — G8484 FLU IMMUNIZE NO ADMIN: HCPCS | Performed by: INTERNAL MEDICINE

## 2024-01-12 PROCEDURE — 3023F SPIROM DOC REV: CPT | Performed by: INTERNAL MEDICINE

## 2024-01-12 PROCEDURE — 3017F COLORECTAL CA SCREEN DOC REV: CPT | Performed by: INTERNAL MEDICINE

## 2024-01-12 PROCEDURE — G8417 CALC BMI ABV UP PARAM F/U: HCPCS | Performed by: INTERNAL MEDICINE

## 2024-01-12 PROCEDURE — 1036F TOBACCO NON-USER: CPT | Performed by: INTERNAL MEDICINE

## 2024-01-12 PROCEDURE — 3080F DIAST BP >= 90 MM HG: CPT | Performed by: INTERNAL MEDICINE

## 2024-01-12 PROCEDURE — 99214 OFFICE O/P EST MOD 30 MIN: CPT | Performed by: INTERNAL MEDICINE

## 2024-01-12 PROCEDURE — 3077F SYST BP >= 140 MM HG: CPT | Performed by: INTERNAL MEDICINE

## 2024-01-12 PROCEDURE — G8427 DOCREV CUR MEDS BY ELIG CLIN: HCPCS | Performed by: INTERNAL MEDICINE

## 2024-01-12 RX ORDER — PREDNISONE 20 MG/1
40 TABLET ORAL DAILY
Qty: 20 TABLET | Refills: 0 | Status: SHIPPED | OUTPATIENT
Start: 2024-01-12 | End: 2024-01-22

## 2024-01-12 RX ORDER — LEVOFLOXACIN 500 MG/1
500 TABLET, FILM COATED ORAL DAILY
Qty: 10 TABLET | Refills: 0 | Status: SHIPPED | OUTPATIENT
Start: 2024-01-12 | End: 2024-01-22

## 2024-01-12 ASSESSMENT — SLEEP AND FATIGUE QUESTIONNAIRES
ESS TOTAL SCORE: 8
HOW LIKELY ARE YOU TO NOD OFF OR FALL ASLEEP WHEN YOU ARE A PASSENGER IN A CAR FOR AN HOUR WITHOUT A BREAK: 1
HOW LIKELY ARE YOU TO NOD OFF OR FALL ASLEEP WHILE SITTING QUIETLY AFTER LUNCH WITHOUT ALCOHOL: 0
HOW LIKELY ARE YOU TO NOD OFF OR FALL ASLEEP WHILE SITTING AND READING: 2
HOW LIKELY ARE YOU TO NOD OFF OR FALL ASLEEP WHILE WATCHING TV: 2
HOW LIKELY ARE YOU TO NOD OFF OR FALL ASLEEP WHILE LYING DOWN TO REST IN THE AFTERNOON WHEN CIRCUMSTANCES PERMIT: 3
HOW LIKELY ARE YOU TO NOD OFF OR FALL ASLEEP IN A CAR, WHILE STOPPED FOR A FEW MINUTES IN TRAFFIC: 0
HOW LIKELY ARE YOU TO NOD OFF OR FALL ASLEEP WHILE SITTING INACTIVE IN A PUBLIC PLACE: 0
HOW LIKELY ARE YOU TO NOD OFF OR FALL ASLEEP WHILE SITTING AND TALKING TO SOMEONE: 0

## 2024-01-12 NOTE — PROGRESS NOTES
David Valdes  1/12/2024    Chief Complaint   Patient presents with    COPD    Sleep Apnea    Dyspnea.    Morbid obesity.  Mr. Morrison is known to have chronic obstructive lung disease due to chronic bronchitis and emphysema secondary to chronic smoking.  Unfortunately he has started smoking again.  He been smoking off and on and all total he probably has smoked for about 10 to 15 pack years.  He was noted to have a nodule in the right lung in the past.  It has not been worked up.  Will get a repeat CT scan in 6 months.  Most likely is a granuloma.    Patient is complaining of increasing dyspnea.  He also cough productive of mucoid mucopurulent sputum.  No blood in it no chest pain no angina.    He has no pedal edema no thromboembolic process.    Sleep apnea syndrome and is being treated with CPAP at a pressure of 16 cm water which apparently is too high for him.  I took the liberty of reducing the CPAP pressure to 13 cm water which may be more acceptable and workable for the patient.    He continues to be morbidly obese but is trying to lose weight which has been unsuccessful.    He very likely has chronic cor pulmonale.  He is not a CO2 retainer.    An [employer] Sleepiness Scale given to the patient in the office revealed a score of 8 indicating mild degree of daytime sleepiness.        1/12/2024    10:50 AM 6/24/2022    11:46 AM 2/10/2022     9:50 AM 10/16/2020     9:44 AM 7/15/2019    10:53 AM   Sleep Medicine   Sitting and reading 2 2 3 0 2   Watching TV 2 2 3 0 3   Sitting, inactive in a public place (e.g. a theatre or a meeting) 0 2 0 0 3   As a passenger in a car for an hour without a break 1 3 3 1 3   Lying down to rest in the afternoon when circumstances permit 3 0 3 0 0   Sitting and talking to someone 0 0 1 0 0   Sitting quietly after a lunch without alcohol 0 0 3 0 0   In a car, while stopped for a few minutes in traffic 0 0 1 0 0   Acme Sleepiness Score 8 9 17 1 11   .      He already had COVID-vaccine

## 2024-01-12 NOTE — PROGRESS NOTES
Pulmonary function study dictated by Dr. Joseph  Patient   peter Morrison  MR #[id number]    Date of study 1/12/2024    Ventilatory function revealed mild reduction of vital capacity FEV1 FEV1 FVC ratio is normal mid flows normal lung capacity is normal diffusion capacity is not mildly reduced flow-volume loops are essentially unremarkable    Impression  Patient very likely has mild degree of restrictive ventilatory dysfunction.  A clinical correlation should be obtained dictated by Dr. Joseph

## 2024-01-15 ENCOUNTER — TELEPHONE (OUTPATIENT)
Dept: PAIN MANAGEMENT | Age: 59
End: 2024-01-15

## 2024-01-15 ENCOUNTER — HOSPITAL ENCOUNTER (OUTPATIENT)
Age: 59
Setting detail: SPECIMEN
Discharge: HOME | End: 2024-01-15

## 2024-01-15 ENCOUNTER — OFFICE VISIT (OUTPATIENT)
Dept: PAIN MANAGEMENT | Age: 59
End: 2024-01-15
Payer: MEDICARE

## 2024-01-15 VITALS — HEART RATE: 101 BPM | OXYGEN SATURATION: 96 % | HEIGHT: 65 IN | BODY MASS INDEX: 46.65 KG/M2 | WEIGHT: 280 LBS

## 2024-01-15 DIAGNOSIS — M51.36 DDD (DEGENERATIVE DISC DISEASE), LUMBAR: ICD-10-CM

## 2024-01-15 DIAGNOSIS — G89.29 CHRONIC BILATERAL LOW BACK PAIN WITH RIGHT-SIDED SCIATICA: Primary | Chronic | ICD-10-CM

## 2024-01-15 DIAGNOSIS — M47.26 OSTEOARTHRITIS OF SPINE WITH RADICULOPATHY, LUMBAR REGION: ICD-10-CM

## 2024-01-15 DIAGNOSIS — J44.9 CHRONIC OBSTRUCTIVE PULMONARY DISEASE, UNSPECIFIED COPD TYPE (HCC): ICD-10-CM

## 2024-01-15 DIAGNOSIS — Z79.891 CHRONIC USE OF OPIATE DRUG FOR THERAPEUTIC PURPOSE: ICD-10-CM

## 2024-01-15 DIAGNOSIS — M54.41 CHRONIC BILATERAL LOW BACK PAIN WITH RIGHT-SIDED SCIATICA: Primary | Chronic | ICD-10-CM

## 2024-01-15 PROCEDURE — 1036F TOBACCO NON-USER: CPT | Performed by: NURSE PRACTITIONER

## 2024-01-15 PROCEDURE — G8427 DOCREV CUR MEDS BY ELIG CLIN: HCPCS | Performed by: NURSE PRACTITIONER

## 2024-01-15 PROCEDURE — G8417 CALC BMI ABV UP PARAM F/U: HCPCS | Performed by: NURSE PRACTITIONER

## 2024-01-15 PROCEDURE — G8484 FLU IMMUNIZE NO ADMIN: HCPCS | Performed by: NURSE PRACTITIONER

## 2024-01-15 PROCEDURE — 3017F COLORECTAL CA SCREEN DOC REV: CPT | Performed by: NURSE PRACTITIONER

## 2024-01-15 PROCEDURE — 99214 OFFICE O/P EST MOD 30 MIN: CPT | Performed by: NURSE PRACTITIONER

## 2024-01-15 RX ORDER — OXYCODONE HYDROCHLORIDE AND ACETAMINOPHEN 5; 325 MG/1; MG/1
1 TABLET ORAL 2 TIMES DAILY PRN
Qty: 60 TABLET | Refills: 0 | Status: SHIPPED | OUTPATIENT
Start: 2024-01-15 | End: 2024-02-14

## 2024-01-15 ASSESSMENT — ENCOUNTER SYMPTOMS
VOMITING: 0
BACK PAIN: 1
NAUSEA: 0
CONSTIPATION: 0
SHORTNESS OF BREATH: 1
DIARRHEA: 0
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
## 2024-01-19 LAB
6-ACETYLMORPHINE, UR: NOT DETECTED
7-AMINOCLONAZEPAM, URINE: NOT DETECTED
ALPHA-OH-ALPRAZ, URINE: NOT DETECTED
ALPHA-OH-MIDAZOLAM, URINE: NOT DETECTED
ALPRAZOLAM, URINE: NOT DETECTED
AMPHETAMINES, URINE: NOT DETECTED
BARBITURATES, URINE: NEGATIVE
BENZOYLECGONINE, UR: NEGATIVE
BUPRENORPHINE URINE: NOT DETECTED
CARISOPRODOL, UR: NEGATIVE
CLONAZEPAM, URINE: NOT DETECTED
CODEINE, URINE: NOT DETECTED
CREAT UR-MCNC: 332.6 MG/DL (ref 20–400)
DIAZEPAM, URINE: NOT DETECTED
DRUGS EXPECTED, UR: NORMAL
EER HI RES INTERP UR: NORMAL
ETHYL GLUCURONIDE UR: NEGATIVE
FENTANYL URINE: NOT DETECTED
GABAPENTIN: NOT DETECTED
HYDROCODONE, URINE: NOT DETECTED
HYDROMORPHONE, URINE: NOT DETECTED
LORAZEPAM, URINE: NOT DETECTED
MARIJUANA METAB, UR: NEGATIVE
MDA, UR: NOT DETECTED
MDEA, EVE, UR: NOT DETECTED
MDMA URINE: NOT DETECTED
MEPERIDINE METAB, UR: NOT DETECTED
METHADONE, URINE: NEGATIVE
METHAMPHETAMINE, URINE: NOT DETECTED
METHYLPHENIDATE: NOT DETECTED
MIDAZOLAM, URINE: NOT DETECTED
MORPHINE, OPI1M: NOT DETECTED
NALOXONE URINE: NOT DETECTED
NORBUPRENORPHINE, URINE: NOT DETECTED
NORDIAZEPAM, URINE: NOT DETECTED
NORFENTANYL, URINE: NOT DETECTED
NORHYDROCODONE, URINE: NOT DETECTED
NOROXYCODONE, URINE: PRESENT
NOROXYMORPHONE, URINE: PRESENT
OXAZEPAM, URINE: NOT DETECTED
OXYCODONE URINE: PRESENT
OXYMORPHONE, URINE: PRESENT
PAIN MANAGEMENT DRUG PANEL INTERP, URINE: NORMAL
PAIN MGT DRUG PANEL, HI RES, UR: NORMAL
PCP,URINE: NEGATIVE
PHENTERMINE, UR: NOT DETECTED
PREGABALIN: NOT DETECTED
TAPENTADOL, URINE: NOT DETECTED
TAPENTADOL-O-SULFATE, URINE: NOT DETECTED
TEMAZEPAM, URINE: NOT DETECTED
TRAMADOL, URINE: NEGATIVE
ZOLPIDEM METABOLITE (ZCA), URINE: NOT DETECTED
ZOLPIDEM, URINE: NOT DETECTED

## 2024-02-08 ENCOUNTER — ANESTHESIA EVENT (OUTPATIENT)
Dept: OPERATING ROOM | Age: 59
End: 2024-02-08
Payer: MEDICARE

## 2024-02-08 ENCOUNTER — HOSPITAL ENCOUNTER (OUTPATIENT)
Age: 59
Setting detail: OUTPATIENT SURGERY
Discharge: HOME | End: 2024-02-08
Attending: SURGERY | Admitting: SURGERY
Payer: MEDICARE

## 2024-02-08 ENCOUNTER — ANESTHESIA (OUTPATIENT)
Dept: OPERATING ROOM | Age: 59
End: 2024-02-08
Payer: MEDICARE

## 2024-02-08 VITALS
OXYGEN SATURATION: 96 % | BODY MASS INDEX: 46.65 KG/M2 | HEIGHT: 65 IN | DIASTOLIC BLOOD PRESSURE: 70 MMHG | RESPIRATION RATE: 25 BRPM | TEMPERATURE: 98.4 F | WEIGHT: 280 LBS | HEART RATE: 94 BPM | SYSTOLIC BLOOD PRESSURE: 133 MMHG

## 2024-02-08 LAB — GLUCOSE BLD-MCNC: 128 MG/DL (ref 75–110)

## 2024-02-08 PROCEDURE — 3609027000 HC COLONOSCOPY: Performed by: SURGERY

## 2024-02-08 PROCEDURE — 82947 ASSAY GLUCOSE BLOOD QUANT: CPT

## 2024-02-08 PROCEDURE — 7100000010 HC PHASE II RECOVERY - FIRST 15 MIN: Performed by: SURGERY

## 2024-02-08 PROCEDURE — 3700000001 HC ADD 15 MINUTES (ANESTHESIA): Performed by: SURGERY

## 2024-02-08 PROCEDURE — 7100000011 HC PHASE II RECOVERY - ADDTL 15 MIN: Performed by: SURGERY

## 2024-02-08 PROCEDURE — 3700000000 HC ANESTHESIA ATTENDED CARE: Performed by: SURGERY

## 2024-02-08 PROCEDURE — 6360000002 HC RX W HCPCS: Performed by: NURSE ANESTHETIST, CERTIFIED REGISTERED

## 2024-02-08 PROCEDURE — 2580000003 HC RX 258: Performed by: NURSE ANESTHETIST, CERTIFIED REGISTERED

## 2024-02-08 PROCEDURE — 2500000003 HC RX 250 WO HCPCS: Performed by: NURSE ANESTHETIST, CERTIFIED REGISTERED

## 2024-02-08 PROCEDURE — 2580000003 HC RX 258: Performed by: ANESTHESIOLOGY

## 2024-02-08 PROCEDURE — 2709999900 HC NON-CHARGEABLE SUPPLY: Performed by: SURGERY

## 2024-02-08 RX ORDER — SODIUM CHLORIDE 9 MG/ML
INJECTION, SOLUTION INTRAVENOUS CONTINUOUS
Status: DISCONTINUED | OUTPATIENT
Start: 2024-02-08 | End: 2024-02-08 | Stop reason: HOSPADM

## 2024-02-08 RX ORDER — SODIUM CHLORIDE 0.9 % (FLUSH) 0.9 %
5-40 SYRINGE (ML) INJECTION PRN
Status: DISCONTINUED | OUTPATIENT
Start: 2024-02-08 | End: 2024-02-08 | Stop reason: HOSPADM

## 2024-02-08 RX ORDER — KETAMINE HCL IN NACL, ISO-OSM 100MG/10ML
SYRINGE (ML) INJECTION PRN
Status: DISCONTINUED | OUTPATIENT
Start: 2024-02-08 | End: 2024-02-08 | Stop reason: SDUPTHER

## 2024-02-08 RX ORDER — PROPOFOL 10 MG/ML
INJECTION, EMULSION INTRAVENOUS CONTINUOUS PRN
Status: DISCONTINUED | OUTPATIENT
Start: 2024-02-08 | End: 2024-02-08 | Stop reason: SDUPTHER

## 2024-02-08 RX ORDER — SODIUM CHLORIDE, SODIUM LACTATE, POTASSIUM CHLORIDE, CALCIUM CHLORIDE 600; 310; 30; 20 MG/100ML; MG/100ML; MG/100ML; MG/100ML
INJECTION, SOLUTION INTRAVENOUS CONTINUOUS
Status: DISCONTINUED | OUTPATIENT
Start: 2024-02-08 | End: 2024-02-08 | Stop reason: HOSPADM

## 2024-02-08 RX ORDER — SENNA AND DOCUSATE SODIUM 50; 8.6 MG/1; MG/1
1 TABLET, FILM COATED ORAL DAILY PRN
Qty: 60 TABLET | Refills: 0 | Status: SHIPPED | OUTPATIENT
Start: 2024-02-08 | End: 2024-04-08

## 2024-02-08 RX ORDER — SODIUM CHLORIDE 0.9 % (FLUSH) 0.9 %
5-40 SYRINGE (ML) INJECTION EVERY 12 HOURS SCHEDULED
Status: DISCONTINUED | OUTPATIENT
Start: 2024-02-08 | End: 2024-02-08 | Stop reason: HOSPADM

## 2024-02-08 RX ORDER — SODIUM CHLORIDE, SODIUM LACTATE, POTASSIUM CHLORIDE, CALCIUM CHLORIDE 600; 310; 30; 20 MG/100ML; MG/100ML; MG/100ML; MG/100ML
INJECTION, SOLUTION INTRAVENOUS CONTINUOUS PRN
Status: DISCONTINUED | OUTPATIENT
Start: 2024-02-08 | End: 2024-02-08 | Stop reason: SDUPTHER

## 2024-02-08 RX ORDER — LIDOCAINE HYDROCHLORIDE 10 MG/ML
1 INJECTION, SOLUTION EPIDURAL; INFILTRATION; INTRACAUDAL; PERINEURAL
Status: DISCONTINUED | OUTPATIENT
Start: 2024-02-09 | End: 2024-02-08 | Stop reason: HOSPADM

## 2024-02-08 RX ORDER — LIDOCAINE HYDROCHLORIDE 20 MG/ML
INJECTION, SOLUTION INFILTRATION; PERINEURAL PRN
Status: DISCONTINUED | OUTPATIENT
Start: 2024-02-08 | End: 2024-02-08 | Stop reason: SDUPTHER

## 2024-02-08 RX ORDER — SODIUM CHLORIDE 9 MG/ML
INJECTION, SOLUTION INTRAVENOUS PRN
Status: DISCONTINUED | OUTPATIENT
Start: 2024-02-08 | End: 2024-02-08 | Stop reason: HOSPADM

## 2024-02-08 RX ADMIN — LIDOCAINE HYDROCHLORIDE 50 MG: 20 INJECTION, SOLUTION INFILTRATION; PERINEURAL at 13:42

## 2024-02-08 RX ADMIN — Medication 25 MG: at 13:45

## 2024-02-08 RX ADMIN — SODIUM CHLORIDE, POTASSIUM CHLORIDE, SODIUM LACTATE AND CALCIUM CHLORIDE: 600; 310; 30; 20 INJECTION, SOLUTION INTRAVENOUS at 13:38

## 2024-02-08 RX ADMIN — PHENYLEPHRINE HYDROCHLORIDE 100 MCG: 10 INJECTION INTRAVENOUS at 14:01

## 2024-02-08 RX ADMIN — SODIUM CHLORIDE, POTASSIUM CHLORIDE, SODIUM LACTATE AND CALCIUM CHLORIDE: 600; 310; 30; 20 INJECTION, SOLUTION INTRAVENOUS at 13:00

## 2024-02-08 RX ADMIN — PROPOFOL 100 MCG/KG/MIN: 10 INJECTION, EMULSION INTRAVENOUS at 13:42

## 2024-02-08 ASSESSMENT — PAIN - FUNCTIONAL ASSESSMENT
PAIN_FUNCTIONAL_ASSESSMENT: 0-10
PAIN_FUNCTIONAL_ASSESSMENT: NONE - DENIES PAIN

## 2024-02-08 ASSESSMENT — ENCOUNTER SYMPTOMS: SHORTNESS OF BREATH: 0

## 2024-02-08 ASSESSMENT — PAIN DESCRIPTION - DESCRIPTORS: DESCRIPTORS: STABBING

## 2024-02-08 NOTE — H&P
Interval H&P Note    Pt Name: David Valdes  MRN: [id number]  YOB: 1965  Date of evaluation: 2/8/2024      [x] I have reviewed the pulmonary progress note in  the electronic record attached below by dr eunice kern dated 1/12/24 for an Interval History and Physical note.     [x] I have examined  David Valdes, a 58 y.o. male.There are no changes to the patient who is scheduled for COLORECTAL CANCER SCREENING, NOT HIGH RISK by Jatinder Arthur IV,  for Screening for colon cancer. The patient denies new health changes, fever, chills, wheezing, cough, increased SOB, chest pain, open sores or wounds.    Patient denies bowel changes. he denies bloody tarry stools, diarrhea alternating with constipation, nausea, vomiting,  or unintentional weight loss. Reports intermittent right groin pain.  Patient followed bowel prep until watery clear.   Previous colonoscopy. No Previous EGD. No FH colon cancer or polyps.     The pt does have DM POC   The pt is not taking any anticoagulation medications    Vital signs: BP (!) 138/101   Pulse (!) 103   Temp 97.2 °F (36.2 °C)   Resp 20   Ht 1.651 m (5' 5\")   Wt 127 kg (280 lb)   SpO2 95%   BMI 46.59 kg/m²     Allergies:  Amitriptyline, Paroxetine, and Paxil [paroxetine hcl]    Medications:    Prior to Admission medications    Medication Sig Start Date End Date Taking? Authorizing Provider   oxyCODONE-acetaminophen (PERCOCET) 5-325 MG per tablet Take 1 tablet by mouth 2 times daily as needed for Pain for up to 30 days. 1/15/24 2/14/24  Althea Christie APRN - CNP   tiotropium (SPIRIVA RESPIMAT) 2.5 MCG/ACT AERS inhaler Inhale 2 puffs into the lungs daily 1/12/24 2/11/24  Santy Kern MD   metFORMIN (GLUCOPHAGE) 500 MG tablet take 1 tablet by mouth twice a day with meals 12/21/23   Eliud Acevedo MD   lisinopril-hydroCHLOROthiazide (PRINZIDE;ZESTORETIC) 10-12.5 MG per tablet Take 1 tablet by mouth daily 10/30/23   Eliud Acevedo MD   albuterol sulfate HFA

## 2024-02-08 NOTE — ANESTHESIA POSTPROCEDURE EVALUATION
Department of Anesthesiology  Postprocedure Note    Patient: David Valdes  MRN: [id number]  YOB: 1965  Date of evaluation: 2/8/2024    Procedure Summary       Date: 02/08/24 Room / Location: [address]    Anesthesia Start: 1338 Anesthesia Stop: 1420    Procedure: COLORECTAL CANCER SCREENING, NOT HIGH RISK (Anus) Diagnosis:       Screening for colon cancer      (Screening for colon cancer [Z12.11])    Surgeons: Jatinder Arthur IV, DO Responsible Provider: Mario Rosas MD    Anesthesia Type: MAC ASA Status: 3            Anesthesia Type: No value filed.    Maribeth Phase I:      Maribeth Phase II: Maribeth Score: 10    Anesthesia Post Evaluation    Cardiovascular status: hemodynamically stable    No notable events documented.

## 2024-02-08 NOTE — DISCHARGE INSTRUCTIONS
Recommend next colonoscopy in 5-7 years due to history of polyps.     Recommend over the counter miralax and prescribed Senna-S to prevent constipation.     Normal changes you may experience after a colonoscopy:  Passing of gas for several hours after  Some mild abdominal cramping  If a biopsy/ polypectomy was done, you may see some spotting of blood on the tissue when wiping  You may feel fatigued for the next 24-48 hours due to the preparation, sedation and procedure    Activity   You have had anesthesia today  Do not drive, operate heavy equipment, consume alcoholic beverages, or make any important decisions  for 24 hours   Take your time changing positions today. You may feel light headed or dizzy if you move too quickly.   Rest for the next 24 hours.   Diet   You can eat your normal diet when you feel well. You should start off with bland foods like chicken soup, toast, or yogurt. Then advance as tolerated.  Drink plenty of fluids (unless your doctor tells you not to). Your urine should be very lightly colored without a strong odor.    Medicines   Continue your home medications as ordered by your physician.     You are passing blood rectally or vomiting blood (color of blood may be red or black)  Severe abdominal pain or tenderness (that is not relieved by passing air)   You have a fever, chills or excessive sweating   You have persistent nausea or vomiting   Redness or swelling at the IV site   
No

## 2024-02-08 NOTE — OP NOTE
history of polyps. No polyps seen on current colonoscopy.   Recommend consistent bowel regimen to prevent constipation.     Jolly Hicks,   General Surgery PGY-5

## 2024-02-08 NOTE — ANESTHESIA PRE PROCEDURE
Department of Anesthesiology  Preprocedure Note       Name:  David Valdes   Age:  58 y.o.  :  1965                                          MRN:  [id number]         Date:  2024      Surgeon: Surgeon(s):  Jatinder Arthur IV, DO    Procedure: Procedure(s):  COLORECTAL CANCER SCREENING, NOT HIGH RISK    Medications prior to admission:   Prior to Admission medications    Medication Sig Start Date End Date Taking? Authorizing Provider   oxyCODONE-acetaminophen (PERCOCET) 5-325 MG per tablet Take 1 tablet by mouth 2 times daily as needed for Pain for up to 30 days. 1/15/24 2/14/24  Althea Christie, APRN - CNP   tiotropium (SPIRIVA RESPIMAT) 2.5 MCG/ACT AERS inhaler Inhale 2 puffs into the lungs daily 24  Santy Joseph MD   metFORMIN (GLUCOPHAGE) 500 MG tablet take 1 tablet by mouth twice a day with meals 23   Eliud Acevedo MD   lisinopril-hydroCHLOROthiazide (PRINZIDE;ZESTORETIC) 10-12.5 MG per tablet Take 1 tablet by mouth daily 10/30/23   Eliud Acevedo MD   albuterol sulfate HFA (PROVENTIL;VENTOLIN;PROAIR) 108 (90 Base) MCG/ACT inhaler Inhale 2 puffs into the lungs 4 times daily as needed for Wheezing 23   Eliud Acevedo MD   rosuvastatin (CRESTOR) 40 MG tablet Take 1 tablet by mouth daily 23   Eliud Acevedo MD   cloNIDine (CATAPRES) 0.1 MG tablet Take 1 tablet by mouth 2 times daily 23   Eliud Acevedo MD   tiotropium (SPIRIVA HANDIHALER) 18 MCG inhalation capsule Inhale 1 capsule into the lungs in the morning. 22   Santy Joseph MD   terbinafine (LAMISIL) 1 % cream Apply topically 2 times daily. 21   Antwon Gordillo DPM   tiotropium (SPIRIVA RESPIMAT) 2.5 MCG/ACT AERS inhaler Inhale 2 puffs into the lungs daily 21   Charles Nelson MD       Current medications:    No current facility-administered medications for this encounter.     Facility-Administered Medications Ordered in Other Encounters   Medication Dose Route Frequency Provider

## 2024-02-12 ENCOUNTER — OFFICE VISIT (OUTPATIENT)
Dept: PAIN MANAGEMENT | Age: 59
End: 2024-02-12
Payer: MEDICARE

## 2024-02-12 VITALS — WEIGHT: 280 LBS | OXYGEN SATURATION: 95 % | HEIGHT: 65 IN | HEART RATE: 91 BPM | BODY MASS INDEX: 46.65 KG/M2

## 2024-02-12 DIAGNOSIS — M51.36 DDD (DEGENERATIVE DISC DISEASE), LUMBAR: ICD-10-CM

## 2024-02-12 DIAGNOSIS — Z79.891 CHRONIC USE OF OPIATE DRUG FOR THERAPEUTIC PURPOSE: ICD-10-CM

## 2024-02-12 DIAGNOSIS — M47.26 OSTEOARTHRITIS OF SPINE WITH RADICULOPATHY, LUMBAR REGION: Primary | ICD-10-CM

## 2024-02-12 PROCEDURE — G8427 DOCREV CUR MEDS BY ELIG CLIN: HCPCS | Performed by: NURSE PRACTITIONER

## 2024-02-12 PROCEDURE — 1036F TOBACCO NON-USER: CPT | Performed by: NURSE PRACTITIONER

## 2024-02-12 PROCEDURE — G8417 CALC BMI ABV UP PARAM F/U: HCPCS | Performed by: NURSE PRACTITIONER

## 2024-02-12 PROCEDURE — 3017F COLORECTAL CA SCREEN DOC REV: CPT | Performed by: NURSE PRACTITIONER

## 2024-02-12 PROCEDURE — 99213 OFFICE O/P EST LOW 20 MIN: CPT | Performed by: NURSE PRACTITIONER

## 2024-02-12 PROCEDURE — G8484 FLU IMMUNIZE NO ADMIN: HCPCS | Performed by: NURSE PRACTITIONER

## 2024-02-12 RX ORDER — OXYCODONE HYDROCHLORIDE AND ACETAMINOPHEN 5; 325 MG/1; MG/1
1 TABLET ORAL 2 TIMES DAILY PRN
Qty: 60 TABLET | Refills: 0 | Status: SHIPPED | OUTPATIENT
Start: 2024-02-14 | End: 2024-03-15

## 2024-02-12 NOTE — PROGRESS NOTES
internal hemorrhoids; mild diverticulosis    COLONOSCOPY N/A 2/8/2024    COLORECTAL CANCER SCREENING, NOT HIGH RISK performed by Jatinder Arthur IV, DO at [facility]    ENDOSCOPY, COLON, DIAGNOSTIC      FIBULA FRACTURE SURGERY Left     LEG SURGERY Right     NERVE BLOCK N/A 10/24/2016    lumbar COLETTE    NERVE BLOCK Right 11/21/2016    lumbar COLETTE    NERVE BLOCK Left 08/13/2018    radiofrequency ablation medial branh block L2-L5    OTHER SURGICAL HISTORY Right 01/26/2017    right hip steroid injection    OTHER SURGICAL HISTORY Right 08/06/2018    NERVE RADIOFREQUENCY ABLATION RIGHT LUMBAR MEDIAL BRANCH L2-L5 (Right )    OTHER SURGICAL HISTORY  12/03/2018    RIGHT L4 L5 EPIDURAL STEROID INJECTION (Right )    OTHER SURGICAL HISTORY  03/25/2019    LUMBAR COLETTE (N/A )    PAIN MANAGEMENT PROCEDURE Right 05/28/2020    EPIDURAL STEROID INJECTION RIGHT L5S1 performed by Anmol Burnham MD at [facility]    PAIN MANAGEMENT PROCEDURE Right 06/15/2020    EPIDURAL STEROID INJECTION RIGTH L5S1 performed by Anmol Burnham MD at [facility]    DC NJX AA&/STRD TFRML EPI LUMBAR/SACRAL 1 LEVEL Right 12/03/2018    RIGHT L4 L5 EPIDURAL STEROID INJECTION performed by Anmol Burnham MD at [facility]    DC OFFICE/OUTPT VISIT,PROCEDURE ONLY Right 08/06/2018    NERVE RADIOFREQUENCY ABLATION RIGHT LUMBAR MEDIAL BRANCH L2-L5 performed by Anmol Burnham MD at [facility]    DC OFFICE/OUTPT VISIT,PROCEDURE ONLY Left 08/13/2018    NERVE RADIOFREQUENCY ABLATION LEFT LUMBAR MEDIAL BRANCH L2-L5 performed by Anmol Burnham MD at [facility]    RADIOFREQUENCY ABLATION NERVES N/A 03/25/2019    LUMBAR COLETTE performed by Anmlo Burnham MD at [facility]       Allergies   Allergen Reactions    Amitriptyline Anaphylaxis    Paroxetine Other (See Comments)    Paxil [Paroxetine Hcl] Other (See Comments)         Current Outpatient Medications:     [START ON 2/14/2024] oxyCODONE-acetaminophen (PERCOCET) 5-325 MG per tablet, Take 1 tablet by mouth 2 times daily as needed for Pain for up to

## 2024-03-06 RX ORDER — CLONIDINE HYDROCHLORIDE 0.1 MG/1
0.1 TABLET ORAL 2 TIMES DAILY
Qty: 180 TABLET | Refills: 1 | OUTPATIENT
Start: 2024-03-06

## 2024-03-06 NOTE — TELEPHONE ENCOUNTER
Per chart this medication is not in current medication list. Per last ov note it does not look it was d/c but pt did have a hospital visit on 2/8/24 which is the day it was taken out of his current med list.

## 2024-03-06 NOTE — TELEPHONE ENCOUNTER
David Valdes is calling to request a refill on the following medication(s):    Medication Request:  Requested Prescriptions     Pending Prescriptions Disp Refills    cloNIDine (CATAPRES) 0.1 MG tablet [Pharmacy Med Name: CLONIDINE HCL 0.1 MG TABLET] 180 tablet 1     Sig: take 1 tablet by mouth twice a day       Last Visit Date (If Applicable):  10/30/2023    Next Visit Date:    No future appt scheduled.     Last refill 9/18/23. Prescription pending.

## 2024-03-11 ENCOUNTER — OFFICE VISIT (OUTPATIENT)
Dept: PAIN MANAGEMENT | Age: 59
End: 2024-03-11
Payer: MEDICARE

## 2024-03-11 VITALS — BODY MASS INDEX: 46.65 KG/M2 | OXYGEN SATURATION: 97 % | HEART RATE: 81 BPM | HEIGHT: 65 IN | WEIGHT: 280 LBS

## 2024-03-11 DIAGNOSIS — G89.29 CHRONIC BILATERAL LOW BACK PAIN WITH RIGHT-SIDED SCIATICA: Primary | Chronic | ICD-10-CM

## 2024-03-11 DIAGNOSIS — Z79.891 CHRONIC USE OF OPIATE DRUG FOR THERAPEUTIC PURPOSE: ICD-10-CM

## 2024-03-11 DIAGNOSIS — M47.816 LUMBAR FACET ARTHROPATHY: ICD-10-CM

## 2024-03-11 DIAGNOSIS — M54.41 CHRONIC BILATERAL LOW BACK PAIN WITH RIGHT-SIDED SCIATICA: Primary | Chronic | ICD-10-CM

## 2024-03-11 DIAGNOSIS — M51.36 DDD (DEGENERATIVE DISC DISEASE), LUMBAR: ICD-10-CM

## 2024-03-11 PROCEDURE — G8417 CALC BMI ABV UP PARAM F/U: HCPCS | Performed by: NURSE PRACTITIONER

## 2024-03-11 PROCEDURE — 1036F TOBACCO NON-USER: CPT | Performed by: NURSE PRACTITIONER

## 2024-03-11 PROCEDURE — G8427 DOCREV CUR MEDS BY ELIG CLIN: HCPCS | Performed by: NURSE PRACTITIONER

## 2024-03-11 PROCEDURE — G8484 FLU IMMUNIZE NO ADMIN: HCPCS | Performed by: NURSE PRACTITIONER

## 2024-03-11 PROCEDURE — 99214 OFFICE O/P EST MOD 30 MIN: CPT | Performed by: NURSE PRACTITIONER

## 2024-03-11 PROCEDURE — 3017F COLORECTAL CA SCREEN DOC REV: CPT | Performed by: NURSE PRACTITIONER

## 2024-03-11 RX ORDER — OXYCODONE HYDROCHLORIDE AND ACETAMINOPHEN 5; 325 MG/1; MG/1
1 TABLET ORAL 2 TIMES DAILY PRN
Qty: 60 TABLET | Refills: 0 | Status: SHIPPED | OUTPATIENT
Start: 2024-03-15 | End: 2024-04-14

## 2024-03-11 ASSESSMENT — ENCOUNTER SYMPTOMS
GASTROINTESTINAL NEGATIVE: 1
SHORTNESS OF BREATH: 0
RESPIRATORY NEGATIVE: 1
COUGH: 0
CONSTIPATION: 0
BACK PAIN: 1

## 2024-03-11 NOTE — H&P (VIEW-ONLY)
for L4- L5 L5-S1 joints under fluoscopy ordered for c/o axial pain   Oswestry completed 58% ROCHELLE score showing severe disability associated with pain    Follow up appointment made for 4 weeks    I have reviewed the chief complaint and history of present illness (including ROS and PFSH) and vital documentation by my staff and I agree with their documentation and have added where applicable.

## 2024-03-11 NOTE — PROGRESS NOTES
Lumbar back: Decreased range of motion.   Skin:     General: Skin is warm and dry.   Neurological:      Mental Status: He is alert and oriented to person, place, and time.   Psychiatric:         Mood and Affect: Mood normal.         Behavior: Behavior normal.           Assessment:  Problem List Items Addressed This Visit       Lumbar facet arthropathy    Relevant Orders    ME NJX DX/THER AGT PVRT FACET JT LMBR/SAC 1 LEVEL (Completed)    Chronic bilateral low back pain with right-sided sciatica - Primary (Chronic)    Relevant Medications    oxyCODONE-acetaminophen (PERCOCET) 5-325 MG per tablet (Start on 3/15/2024)    Chronic use of opiate drugs therapeutic purposes    Relevant Medications    oxyCODONE-acetaminophen (PERCOCET) 5-325 MG per tablet (Start on 3/15/2024)    DDD (degenerative disc disease), lumbar    Relevant Medications    oxyCODONE-acetaminophen (PERCOCET) 5-325 MG per tablet (Start on 3/15/2024)    Other Relevant Orders    ME NJX DX/THER AGT PVRT FACET JT LMBR/SAC 1 LEVEL (Completed)          Treatment Plan:  Patient relates current medications are minimally helping the pain. Patient reports taking pain medications as prescribed, denies obtaining medications from different sources and denies use of illegal drugs. Medication risk and benefits have been discussed. Patient denies side effects from medications like nausea, vomiting, constipation or drowsiness. Patient reports current activities of daily living are possible due to medications and would like to continue them.     As always, we encourage daily stretching and strengthening exercises, and recommend minimizing use of pain medications unless patient cannot get through daily activities due to pain.     Due to the high risk nature of this patient's pain medication close monitoring is required.   Continue current medication management, pt has been stable and compliant.  Script written for [facility]. Lumbar Medial Branch nerve Blocks

## 2024-04-03 ENCOUNTER — HOSPITAL ENCOUNTER (OUTPATIENT)
Dept: PAIN MANAGEMENT | Facility: CLINIC | Age: 59
Discharge: HOME | End: 2024-04-03
Payer: MEDICARE

## 2024-04-03 VITALS
OXYGEN SATURATION: 98 % | HEIGHT: 65 IN | WEIGHT: 280 LBS | DIASTOLIC BLOOD PRESSURE: 90 MMHG | TEMPERATURE: 97.7 F | BODY MASS INDEX: 46.65 KG/M2 | RESPIRATION RATE: 16 BRPM | SYSTOLIC BLOOD PRESSURE: 183 MMHG | HEART RATE: 84 BPM

## 2024-04-03 DIAGNOSIS — M47.817 LUMBOSACRAL SPONDYLOSIS WITHOUT MYELOPATHY: Primary | ICD-10-CM

## 2024-04-03 DIAGNOSIS — R52 PAIN MANAGEMENT: ICD-10-CM

## 2024-04-03 LAB — GLUCOSE BLD-MCNC: 111 MG/DL (ref 75–110)

## 2024-04-03 PROCEDURE — 99152 MOD SED SAME PHYS/QHP 5/>YRS: CPT | Performed by: ANESTHESIOLOGY

## 2024-04-03 PROCEDURE — 64494 INJ PARAVERT F JNT L/S 2 LEV: CPT

## 2024-04-03 PROCEDURE — 2500000003 HC RX 250 WO HCPCS: Performed by: ANESTHESIOLOGY

## 2024-04-03 PROCEDURE — 6360000004 HC RX CONTRAST MEDICATION: Performed by: ANESTHESIOLOGY

## 2024-04-03 PROCEDURE — 64493 INJ PARAVERT F JNT L/S 1 LEV: CPT | Performed by: ANESTHESIOLOGY

## 2024-04-03 PROCEDURE — 64493 INJ PARAVERT F JNT L/S 1 LEV: CPT

## 2024-04-03 PROCEDURE — 6360000002 HC RX W HCPCS: Performed by: ANESTHESIOLOGY

## 2024-04-03 PROCEDURE — 64494 INJ PARAVERT F JNT L/S 2 LEV: CPT | Performed by: ANESTHESIOLOGY

## 2024-04-03 PROCEDURE — 82947 ASSAY GLUCOSE BLOOD QUANT: CPT

## 2024-04-03 RX ORDER — LIDOCAINE HYDROCHLORIDE 10 MG/ML
INJECTION, SOLUTION EPIDURAL; INFILTRATION; INTRACAUDAL; PERINEURAL
Status: COMPLETED | OUTPATIENT
Start: 2024-04-03 | End: 2024-04-03

## 2024-04-03 RX ORDER — FENTANYL CITRATE 50 UG/ML
INJECTION, SOLUTION INTRAMUSCULAR; INTRAVENOUS
Status: COMPLETED | OUTPATIENT
Start: 2024-04-03 | End: 2024-04-03

## 2024-04-03 RX ORDER — MIDAZOLAM HYDROCHLORIDE 2 MG/2ML
INJECTION, SOLUTION INTRAMUSCULAR; INTRAVENOUS
Status: COMPLETED | OUTPATIENT
Start: 2024-04-03 | End: 2024-04-03

## 2024-04-03 RX ORDER — BUPIVACAINE HYDROCHLORIDE 5 MG/ML
INJECTION, SOLUTION EPIDURAL; INTRACAUDAL
Status: COMPLETED | OUTPATIENT
Start: 2024-04-03 | End: 2024-04-03

## 2024-04-03 RX ADMIN — BUPIVACAINE HYDROCHLORIDE 5 ML: 5 INJECTION, SOLUTION EPIDURAL; INTRACAUDAL; PERINEURAL at 08:52

## 2024-04-03 RX ADMIN — MIDAZOLAM HYDROCHLORIDE 1 MG: 1 INJECTION, SOLUTION INTRAMUSCULAR; INTRAVENOUS at 08:49

## 2024-04-03 RX ADMIN — IOHEXOL 3 ML: 180 INJECTION INTRAVENOUS at 08:51

## 2024-04-03 RX ADMIN — FENTANYL CITRATE 50 MCG: 50 INJECTION, SOLUTION INTRAMUSCULAR; INTRAVENOUS at 08:49

## 2024-04-03 RX ADMIN — LIDOCAINE HYDROCHLORIDE 5 ML: 10 INJECTION, SOLUTION EPIDURAL; INFILTRATION; INTRACAUDAL at 08:49

## 2024-04-03 ASSESSMENT — PAIN - FUNCTIONAL ASSESSMENT
PAIN_FUNCTIONAL_ASSESSMENT: 0-10
PAIN_FUNCTIONAL_ASSESSMENT: PREVENTS OR INTERFERES WITH ALL ACTIVE AND SOME PASSIVE ACTIVITIES

## 2024-04-03 ASSESSMENT — PAIN SCALES - GENERAL: PAINLEVEL_OUTOF10: 0

## 2024-04-03 ASSESSMENT — PAIN DESCRIPTION - DESCRIPTORS: DESCRIPTORS: SHARP;THROBBING;CRAMPING

## 2024-04-03 NOTE — INTERVAL H&P NOTE
Update History & Physical    The patient's History and Physical of March 11, 2024 was reviewed with the patient and I examined the patient. There was no change. The surgical site was confirmed by the patient and me.     Plan: The risks, benefits, expected outcome, and alternative to the recommended procedure have been discussed with the patient. Patient understands and wants to proceed with the procedure.     ASA 3  MP 3    Electronically signed by Anmol Burnham MD on 4/3/2024 at 8:41 AM

## 2024-04-03 NOTE — OP NOTE
Patient Name: David Valdes   YOB: 1965  Room/Bed: Room/bed info not found  Medical Record Number: [id number]  Date: 4/3/2024       Sedation/ Anesthesia Plan:   intravenous sedation   as needed.    Medications Planned:   midazolam (Versed) / Fentanyl  Intravenously  as needed.    Preoperative Diagnosis: Lumbar spondylosis w/o myelopathy or radiculopathy  Postoperative Diagnosis: Lumbar spondylosis w/o myelopathy or radiculopathy  Blood Loss: none    Procedure Performed:  Bilateral Lumbar Medial Branch nerve Blocks at the transverse processes of L4, L5 and sacral  ala under fluoroscopy guidance    Procedure:      The Patient was seen in the preop area, chart was reviewed, informed consent was obtained. Patient was taken to procedure room and was placed in prone position. Vital signs were monitored through out the  Procedure. A time out was completed.  The skin over the back was prepped and draped in sterile manner.     The target point was marked at the junction of Transverse process and superior articular process at the target levels.  Skin and deep tissues were anesthetized with 1 % lidocaine. A 25-gauge needlele was advanced to the target spots under fluoroscopy guidance in AP / Lateral and Oblique views.     Then after negative aspiration contrast dye was injected with live fluoroscopy in AP views that showed  spread of the contrast with no epidural space and no vascular runoff or intrathecal spread.    Finally 0.5 ml of treatment solution 0.5% BUPIVACAINE  was injected at each level.  The needle was removed and a Band-Aid was placed over the needle  insertion site.  The patient's vital signs remained stable and the patient tolerated the procedure well.    Electronically signed by Anmol Burnham MD on 4/3/2024 at 9:05 AM    SEDATION NOTE:    ASA CLASSIFICATION  3  MP   CLASSIFICATION  3    Moderate intravenous conscious sedation was supervised by Dr. Burnham  The patient was independently monitored by

## 2024-04-03 NOTE — DISCHARGE INSTRUCTIONS
Discharge Instructions following Sedation or Anesthesia:  You have  received  a sedative/anesthetic therefore, you should not consume any alcoholic beverages for minimum of 12 hours.  Do not drive or operate machinery for 24 hours.  Do not sign legal documents for 24 hours.  Dizziness, drowsiness, and unsteadiness may occur.  Rest when need to.  Increase diet as tolerated.  Keep up on fluids if diet allows.      General Instructions:  Do not take a tub bath for 72 hours after procedure (this includes hot tubs and swimming pools).  You may shower, but avoid hot water to injection site.   Avoid strenuous activity TODAY especially if you experience dizziness.   Remove band-aid the next day.  Wash off any residual iodine   Do not use heat, heating pad, or any other heating device over the injection site for 3 days after the procedure.  If you experience pain after your procedure, you may continue with your current pain medication as prescribed.  (DO NOT INCREASE YOUR PAIN MEDICATION WITHOUT TALKING TO DOCTOR)  Soreness and pain at injection site is common, may use ice to reduce soreness.    Please complete pain diary as instructed.     Call [facility] at [phone] if you experience:   Fever, chills or temperature over 100    Vomiting, Headache, persistent stiff neck, nausea, blurred vision   Difficulty in urinating or unable to urinate with 8 hours   Increase in weakness, numbness or loss of function   Increased redness, swelling or drainage at the injection site

## 2024-04-04 ENCOUNTER — TELEPHONE (OUTPATIENT)
Dept: PAIN MANAGEMENT | Age: 59
End: 2024-04-04

## 2024-04-04 DIAGNOSIS — M47.817 LUMBOSACRAL SPONDYLOSIS WITHOUT MYELOPATHY: Primary | ICD-10-CM

## 2024-04-04 NOTE — TELEPHONE ENCOUNTER
Procedure: Bilateral Lumbar Medial Branch nerve Blocks at the transverse processes of L4, L5 and sacral  ala under fluoroscopy guidance   DOS: [id number]  Pain level before procedure with activity 8.  Pain with activity after procedure 4.  What activities done the day of procedure cleaning  What percentage of  pain relief from procedure did you receive 80  Success Y  OR Scheduled  4/24

## 2024-04-12 ENCOUNTER — OFFICE VISIT (OUTPATIENT)
Dept: PAIN MANAGEMENT | Age: 59
End: 2024-04-12
Payer: MEDICARE

## 2024-04-12 VITALS — OXYGEN SATURATION: 96 % | WEIGHT: 280 LBS | HEIGHT: 65 IN | BODY MASS INDEX: 46.65 KG/M2 | HEART RATE: 109 BPM

## 2024-04-12 DIAGNOSIS — M47.26 OSTEOARTHRITIS OF SPINE WITH RADICULOPATHY, LUMBAR REGION: Primary | ICD-10-CM

## 2024-04-12 DIAGNOSIS — Z79.891 CHRONIC USE OF OPIATE DRUG FOR THERAPEUTIC PURPOSE: ICD-10-CM

## 2024-04-12 DIAGNOSIS — M47.817 LUMBOSACRAL SPONDYLOSIS WITHOUT MYELOPATHY: ICD-10-CM

## 2024-04-12 PROCEDURE — G8417 CALC BMI ABV UP PARAM F/U: HCPCS | Performed by: NURSE PRACTITIONER

## 2024-04-12 PROCEDURE — G8427 DOCREV CUR MEDS BY ELIG CLIN: HCPCS | Performed by: NURSE PRACTITIONER

## 2024-04-12 PROCEDURE — 3017F COLORECTAL CA SCREEN DOC REV: CPT | Performed by: NURSE PRACTITIONER

## 2024-04-12 PROCEDURE — 1036F TOBACCO NON-USER: CPT | Performed by: NURSE PRACTITIONER

## 2024-04-12 PROCEDURE — 99213 OFFICE O/P EST LOW 20 MIN: CPT | Performed by: NURSE PRACTITIONER

## 2024-04-12 RX ORDER — OXYCODONE HYDROCHLORIDE AND ACETAMINOPHEN 5; 325 MG/1; MG/1
1 TABLET ORAL 2 TIMES DAILY PRN
Qty: 60 TABLET | Refills: 0 | Status: SHIPPED | OUTPATIENT
Start: 2024-04-14 | End: 2024-05-14

## 2024-04-12 ASSESSMENT — ENCOUNTER SYMPTOMS
NAUSEA: 0
DIARRHEA: 0
CONSTIPATION: 0
WHEEZING: 0
RESPIRATORY NEGATIVE: 1
VOMITING: 0
GASTROINTESTINAL NEGATIVE: 1
BACK PAIN: 1
SORE THROAT: 0
BOWEL INCONTINENCE: 0
SHORTNESS OF BREATH: 0
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
## 2024-05-13 ENCOUNTER — OFFICE VISIT (OUTPATIENT)
Dept: PAIN MANAGEMENT | Age: 59
End: 2024-05-13
Payer: MEDICARE

## 2024-05-13 VITALS — HEIGHT: 65 IN | HEART RATE: 90 BPM | BODY MASS INDEX: 46.65 KG/M2 | OXYGEN SATURATION: 95 % | WEIGHT: 280 LBS

## 2024-05-13 DIAGNOSIS — Z79.891 CHRONIC USE OF OPIATE DRUG FOR THERAPEUTIC PURPOSE: ICD-10-CM

## 2024-05-13 DIAGNOSIS — M54.41 CHRONIC BILATERAL LOW BACK PAIN WITH RIGHT-SIDED SCIATICA: Primary | Chronic | ICD-10-CM

## 2024-05-13 DIAGNOSIS — G89.29 CHRONIC BILATERAL LOW BACK PAIN WITH RIGHT-SIDED SCIATICA: Primary | Chronic | ICD-10-CM

## 2024-05-13 DIAGNOSIS — M47.26 OSTEOARTHRITIS OF SPINE WITH RADICULOPATHY, LUMBAR REGION: ICD-10-CM

## 2024-05-13 PROCEDURE — 99214 OFFICE O/P EST MOD 30 MIN: CPT | Performed by: NURSE PRACTITIONER

## 2024-05-13 PROCEDURE — G8417 CALC BMI ABV UP PARAM F/U: HCPCS | Performed by: NURSE PRACTITIONER

## 2024-05-13 PROCEDURE — G8427 DOCREV CUR MEDS BY ELIG CLIN: HCPCS | Performed by: NURSE PRACTITIONER

## 2024-05-13 PROCEDURE — 1036F TOBACCO NON-USER: CPT | Performed by: NURSE PRACTITIONER

## 2024-05-13 PROCEDURE — 3017F COLORECTAL CA SCREEN DOC REV: CPT | Performed by: NURSE PRACTITIONER

## 2024-05-13 RX ORDER — OXYCODONE HYDROCHLORIDE AND ACETAMINOPHEN 5; 325 MG/1; MG/1
1 TABLET ORAL 2 TIMES DAILY PRN
Qty: 60 TABLET | Refills: 0 | Status: SHIPPED | OUTPATIENT
Start: 2024-05-14 | End: 2024-06-13

## 2024-05-13 ASSESSMENT — ENCOUNTER SYMPTOMS
COUGH: 0
CONSTIPATION: 0
SHORTNESS OF BREATH: 0
BACK PAIN: 1

## 2024-05-13 NOTE — PROGRESS NOTES
interference with activities of daily living, quality of family life and social activities, and the physical activity);Obtaining appropriate analgesic effect of treatment.          Periodic Controlled Substance Monitoring: Possible medication side effects, risk of tolerance/dependence & alternative treatments discussed., No signs of potential drug abuse or diversion identified., Assessed functional status (ability to engage in work or other purposeful activities, the pain intensity and its interference with activities of daily living, quality of family life and social activities, and the physical activity), Obtaining appropriate analgesic effect of treatment. (Althea Christie, APRN - CNP)      Past Medical History:   Diagnosis Date    Asthma     COPD (chronic obstructive pulmonary disease) (HCC)     Diabetes mellitus (HCC)     HTN (hypertension)     Hyperlipidemia     Hypertension     Lung disease     Migraine     Neuropathy     Positive FIT (fecal immunochemical test)     Renal failure     Sleep apnea     CPAP       Past Surgical History:   Procedure Laterality Date    ANESTHESIA NERVE BLOCK Bilateral 09/18/2017    NERVE BLOCK BILATERAL MEDIAL BRANCH L2-L5 performed by Anmol Burnham MD at [facility]    ANESTHESIA NERVE BLOCK Bilateral 10/17/2017    NERVE BLOCK BILATERAL MEDIAL BRANCH L2-L5 performed by Anmol Burnham MD at [facility] OR    APPENDECTOMY      CHOLECYSTECTOMY  10/12/1999    COLONOSCOPY  03/08/2017    internal hemorrhoids; mild diverticulosis    COLONOSCOPY N/A 2/8/2024    COLORECTAL CANCER SCREENING, NOT HIGH RISK performed by Jatinder Arthur IV, DO at [facility] OR    ENDOSCOPY, COLON, DIAGNOSTIC      FIBULA FRACTURE SURGERY Left     LEG SURGERY Right     NERVE BLOCK N/A 10/24/2016    lumbar COLETTE    NERVE BLOCK Right 11/21/2016    lumbar COLETTE    NERVE BLOCK Left 08/13/2018    radiofrequency ablation medial branh block L2-L5    OTHER SURGICAL HISTORY Right 01/26/2017    right hip steroid injection    OTHER SURGICAL

## 2024-05-14 ENCOUNTER — OFFICE VISIT (OUTPATIENT)
Dept: INTERNAL MEDICINE CLINIC | Age: 59
End: 2024-05-14

## 2024-05-14 VITALS
HEART RATE: 89 BPM | BODY MASS INDEX: 46.65 KG/M2 | DIASTOLIC BLOOD PRESSURE: 68 MMHG | SYSTOLIC BLOOD PRESSURE: 128 MMHG | WEIGHT: 280 LBS | OXYGEN SATURATION: 97 % | HEIGHT: 65 IN | TEMPERATURE: 97.2 F | RESPIRATION RATE: 20 BRPM

## 2024-05-14 DIAGNOSIS — I10 ESSENTIAL HYPERTENSION: ICD-10-CM

## 2024-05-14 DIAGNOSIS — Z28.21 INFLUENZA VACCINATION DECLINED: ICD-10-CM

## 2024-05-14 DIAGNOSIS — E78.2 MIXED HYPERLIPIDEMIA: ICD-10-CM

## 2024-05-14 DIAGNOSIS — G89.29 CHRONIC BILATERAL LOW BACK PAIN WITH RIGHT-SIDED SCIATICA: Chronic | ICD-10-CM

## 2024-05-14 DIAGNOSIS — R51.9 DAILY HEADACHE: ICD-10-CM

## 2024-05-14 DIAGNOSIS — G47.33 OSA ON CPAP: ICD-10-CM

## 2024-05-14 DIAGNOSIS — Z87.891 QUIT SMOKING: ICD-10-CM

## 2024-05-14 DIAGNOSIS — E66.01 MORBID OBESITY WITH BMI OF 40.0-44.9, ADULT (HCC): ICD-10-CM

## 2024-05-14 DIAGNOSIS — F32.A ANXIETY AND DEPRESSION: ICD-10-CM

## 2024-05-14 DIAGNOSIS — Z79.891 CHRONIC USE OF OPIATE DRUG FOR THERAPEUTIC PURPOSE: ICD-10-CM

## 2024-05-14 DIAGNOSIS — Z90.49 HISTORY OF CHOLECYSTECTOMY: ICD-10-CM

## 2024-05-14 DIAGNOSIS — E11.9 CONTROLLED TYPE 2 DIABETES MELLITUS WITHOUT COMPLICATION, WITHOUT LONG-TERM CURRENT USE OF INSULIN (HCC): ICD-10-CM

## 2024-05-14 DIAGNOSIS — M47.816 LUMBAR FACET ARTHROPATHY: Primary | ICD-10-CM

## 2024-05-14 DIAGNOSIS — Z28.21 PNEUMOCOCCAL VACCINATION DECLINED: ICD-10-CM

## 2024-05-14 DIAGNOSIS — M51.36 DDD (DEGENERATIVE DISC DISEASE), LUMBAR: ICD-10-CM

## 2024-05-14 DIAGNOSIS — Z90.49 HISTORY OF APPENDECTOMY: ICD-10-CM

## 2024-05-14 DIAGNOSIS — F41.9 ANXIETY AND DEPRESSION: ICD-10-CM

## 2024-05-14 DIAGNOSIS — M54.41 CHRONIC BILATERAL LOW BACK PAIN WITH RIGHT-SIDED SCIATICA: Chronic | ICD-10-CM

## 2024-05-14 PROBLEM — M47.26 OSTEOARTHRITIS OF SPINE WITH RADICULOPATHY, LUMBAR REGION: Status: RESOLVED | Noted: 2017-09-18 | Resolved: 2024-05-14

## 2024-05-14 PROBLEM — M47.817 LUMBOSACRAL SPONDYLOSIS WITHOUT MYELOPATHY: Status: RESOLVED | Noted: 2024-04-03 | Resolved: 2024-05-14

## 2024-05-14 PROBLEM — F11.20 OPIOID DEPENDENCE WITH CURRENT USE (HCC): Status: RESOLVED | Noted: 2024-01-12 | Resolved: 2024-05-14

## 2024-05-14 SDOH — ECONOMIC STABILITY: FOOD INSECURITY: WITHIN THE PAST 12 MONTHS, THE FOOD YOU BOUGHT JUST DIDN'T LAST AND YOU DIDN'T HAVE MONEY TO GET MORE.: NEVER TRUE

## 2024-05-14 SDOH — ECONOMIC STABILITY: HOUSING INSECURITY
IN THE LAST 12 MONTHS, WAS THERE A TIME WHEN YOU DID NOT HAVE A STEADY PLACE TO SLEEP OR SLEPT IN A SHELTER (INCLUDING NOW)?: NO

## 2024-05-14 SDOH — ECONOMIC STABILITY: INCOME INSECURITY: HOW HARD IS IT FOR YOU TO PAY FOR THE VERY BASICS LIKE FOOD, HOUSING, MEDICAL CARE, AND HEATING?: NOT HARD AT ALL

## 2024-05-14 SDOH — ECONOMIC STABILITY: FOOD INSECURITY: WITHIN THE PAST 12 MONTHS, YOU WORRIED THAT YOUR FOOD WOULD RUN OUT BEFORE YOU GOT MONEY TO BUY MORE.: NEVER TRUE

## 2024-05-14 ASSESSMENT — ENCOUNTER SYMPTOMS
EYES NEGATIVE: 1
SHORTNESS OF BREATH: 1
COUGH: 1
BACK PAIN: 1
GASTROINTESTINAL NEGATIVE: 1
ALLERGIC/IMMUNOLOGIC NEGATIVE: 1
FREQUENT THROAT CLEARING: 1
DIFFICULTY BREATHING: 1

## 2024-05-14 ASSESSMENT — PATIENT HEALTH QUESTIONNAIRE - PHQ9
9. THOUGHTS THAT YOU WOULD BE BETTER OFF DEAD, OR OF HURTING YOURSELF: NOT AT ALL
SUM OF ALL RESPONSES TO PHQ9 QUESTIONS 1 & 2: 0
10. IF YOU CHECKED OFF ANY PROBLEMS, HOW DIFFICULT HAVE THESE PROBLEMS MADE IT FOR YOU TO DO YOUR WORK, TAKE CARE OF THINGS AT HOME, OR GET ALONG WITH OTHER PEOPLE: VERY DIFFICULT
SUM OF ALL RESPONSES TO PHQ QUESTIONS 1-9: 9
5. POOR APPETITE OR OVEREATING: MORE THAN HALF THE DAYS
4. FEELING TIRED OR HAVING LITTLE ENERGY: NEARLY EVERY DAY
8. MOVING OR SPEAKING SO SLOWLY THAT OTHER PEOPLE COULD HAVE NOTICED. OR THE OPPOSITE, BEING SO FIGETY OR RESTLESS THAT YOU HAVE BEEN MOVING AROUND A LOT MORE THAN USUAL: SEVERAL DAYS
3. TROUBLE FALLING OR STAYING ASLEEP: NEARLY EVERY DAY
SUM OF ALL RESPONSES TO PHQ QUESTIONS 1-9: 9
6. FEELING BAD ABOUT YOURSELF - OR THAT YOU ARE A FAILURE OR HAVE LET YOURSELF OR YOUR FAMILY DOWN: NOT AT ALL
7. TROUBLE CONCENTRATING ON THINGS, SUCH AS READING THE NEWSPAPER OR WATCHING TELEVISION: NOT AT ALL
1. LITTLE INTEREST OR PLEASURE IN DOING THINGS: NOT AT ALL
2. FEELING DOWN, DEPRESSED OR HOPELESS: NOT AT ALL

## 2024-05-14 ASSESSMENT — COPD QUESTIONNAIRES: COPD: 1

## 2024-05-14 NOTE — PROGRESS NOTES
purposes        7. Morbid obesity with BMI of 40.0-44.9, adult ([facility])  Kingsley Jean-Baptiste DO, Weight Management and Bariatrics, [place]      8. MARLENE on CPAP  Kingsley Jean-Baptiste DO, Weight Management and Bariatrics, [place]      9. Anxiety and depression        10. Pneumococcal vaccination declined        11. Influenza vaccination declined        12. DDD (degenerative disc disease), lumbar        13. Essential hypertension        14. Mixed hyperlipidemia        15. History of appendectomy        16. History of cholecystectomy                Plan:   Assessment & Plan   58-year-old overweight -American male is present for follow-up.  Known history of discogenic disease of lumbar spine without any radiculopathy.  He is on opiates as provided by pain management.  He is also having ongoing epidural injections with positive response.  Obesity with sleep apnea.  He is intolerant to positive pressure ventilation.  He wished to be seen by bariatric service.  He also agrees to GLP 1 agonist  Diabetes mellitus with A1c of 6.  Continue metformin that he is tolerating well he will significantly benefit from GLP-1 agonist if approved by his insurance  Hyperlipidemia on statin that he is tolerating well  Monofilament testing is unremarkable.  He is counseled on diabetic footcare  He is advised to update annual dilated eye exam  COPD.  Sleep apnea with overlap syndrome with morbid obesity.  Risk factor stratification is advised.  He is established with pulmonology/sleep clinic.  Continue current inhalers  Recently quit smoking  Once again, he declined age-appropriate vaccinations  Med list and available labs reviewed, discussed with patient, questions answered  This note is created with a voice recognition program and while intend to generate a document that accurately reflects the content of the visit, no guarantee can be provided that every mistake has been identified and corrected by editing.

## 2024-06-10 ENCOUNTER — OFFICE VISIT (OUTPATIENT)
Dept: PAIN MANAGEMENT | Age: 59
End: 2024-06-10
Payer: MEDICARE

## 2024-06-10 VITALS — HEIGHT: 65 IN | WEIGHT: 280 LBS | BODY MASS INDEX: 46.65 KG/M2

## 2024-06-10 DIAGNOSIS — M54.41 CHRONIC BILATERAL LOW BACK PAIN WITH RIGHT-SIDED SCIATICA: Primary | Chronic | ICD-10-CM

## 2024-06-10 DIAGNOSIS — G89.29 CHRONIC BILATERAL LOW BACK PAIN WITH RIGHT-SIDED SCIATICA: Primary | Chronic | ICD-10-CM

## 2024-06-10 DIAGNOSIS — Z79.891 CHRONIC USE OF OPIATE DRUG FOR THERAPEUTIC PURPOSE: ICD-10-CM

## 2024-06-10 PROCEDURE — G8417 CALC BMI ABV UP PARAM F/U: HCPCS | Performed by: NURSE PRACTITIONER

## 2024-06-10 PROCEDURE — G8427 DOCREV CUR MEDS BY ELIG CLIN: HCPCS | Performed by: NURSE PRACTITIONER

## 2024-06-10 PROCEDURE — 99213 OFFICE O/P EST LOW 20 MIN: CPT | Performed by: NURSE PRACTITIONER

## 2024-06-10 PROCEDURE — 3017F COLORECTAL CA SCREEN DOC REV: CPT | Performed by: NURSE PRACTITIONER

## 2024-06-10 PROCEDURE — 1036F TOBACCO NON-USER: CPT | Performed by: NURSE PRACTITIONER

## 2024-06-10 RX ORDER — CEPHALEXIN 500 MG/1
CAPSULE ORAL
COMMUNITY
Start: 2024-04-25 | End: 2024-06-12 | Stop reason: ALTCHOICE

## 2024-06-10 RX ORDER — OXYCODONE HYDROCHLORIDE AND ACETAMINOPHEN 5; 325 MG/1; MG/1
1 TABLET ORAL 2 TIMES DAILY PRN
Qty: 60 TABLET | Refills: 0 | Status: SHIPPED | OUTPATIENT
Start: 2024-06-13 | End: 2024-07-13

## 2024-06-10 RX ORDER — SENNA AND DOCUSATE SODIUM 50; 8.6 MG/1; MG/1
1 TABLET, FILM COATED ORAL DAILY PRN
Qty: 60 TABLET | Refills: 0 | Status: SHIPPED | OUTPATIENT
Start: 2024-06-10 | End: 2024-08-09

## 2024-06-10 ASSESSMENT — ENCOUNTER SYMPTOMS
BACK PAIN: 1
CONSTIPATION: 0
SHORTNESS OF BREATH: 0
BOWEL INCONTINENCE: 0
COUGH: 0

## 2024-06-10 NOTE — PROGRESS NOTES
comments:     down to 5 cigg. per day   Substance and Sexual Activity    Alcohol use: No     Alcohol/week: 0.0 standard drinks of alcohol    Drug use: No    Sexual activity: Not on file   Other Topics Concern    Not on file   Social History Narrative    Not on file     Social Determinants of Health     Financial Resource Strain: Low Risk  (5/14/2024)    Overall Financial Resource Strain (CARDIA)     Difficulty of Paying Living Expenses: Not hard at all   Food Insecurity: No Food Insecurity (5/14/2024)    Hunger Vital Sign     Worried About Running Out of Food in the Last Year: Never true     Ran Out of Food in the Last Year: Never true   Transportation Needs: Unknown (5/14/2024)    PRAPARE - Transportation     Lack of Transportation (Medical): Not on file     Lack of Transportation (Non-Medical): No   Physical Activity: Not on file   Stress: Not on file   Social Connections: Not on file   Intimate Partner Violence: Not on file   Housing Stability: Unknown (5/14/2024)    Housing Stability Vital Sign     Unable to Pay for Housing in the Last Year: Not on file     Number of Places Lived in the Last Year: Not on file     Unstable Housing in the Last Year: No       Review of Systems:  Review of Systems   Constitutional: Negative for chills and fever.   Cardiovascular:  Negative for chest pain.   Respiratory:  Negative for cough and shortness of breath.    Musculoskeletal:  Positive for back pain.   Gastrointestinal:  Negative for bowel incontinence and constipation.   Genitourinary:  Negative for bladder incontinence.       Physical Exam:  Ht 1.651 m (5' 5\")   Wt 127 kg (280 lb)   BMI 46.59 kg/m²     Physical Exam  Constitutional:       General: He is not in acute distress.     Appearance: Normal appearance. He is obese.   Pulmonary:      Effort: Pulmonary effort is normal.   Musculoskeletal:      Lumbar back: Decreased range of motion.   Skin:     General: Skin is warm and dry.   Neurological:      Mental Status: He

## 2024-06-10 NOTE — TELEPHONE ENCOUNTER
David Valdes is calling to request a refill on the following medication(s):    Medication Request:  Requested Prescriptions     Pending Prescriptions Disp Refills    dulaglutide (TRULICITY) 0.75 MG/0.5ML SOPN SC injection 4 Adjustable Dose Pre-filled Pen Syringe 0     Sig: Inject 0.5 mLs into the skin once a week       Last Visit Date (If Applicable):  5/14/2024    Next Visit Date:    6/25/2024

## 2024-06-12 ENCOUNTER — HOSPITAL ENCOUNTER (OUTPATIENT)
Dept: PAIN MANAGEMENT | Facility: CLINIC | Age: 59
Discharge: HOME | End: 2024-06-12
Payer: MEDICARE

## 2024-06-12 VITALS
DIASTOLIC BLOOD PRESSURE: 77 MMHG | RESPIRATION RATE: 13 BRPM | BODY MASS INDEX: 46.65 KG/M2 | OXYGEN SATURATION: 93 % | TEMPERATURE: 97.2 F | HEIGHT: 65 IN | WEIGHT: 280 LBS | HEART RATE: 89 BPM | SYSTOLIC BLOOD PRESSURE: 115 MMHG

## 2024-06-12 DIAGNOSIS — R52 PAIN MANAGEMENT: ICD-10-CM

## 2024-06-12 DIAGNOSIS — M47.817 LUMBOSACRAL SPONDYLOSIS WITHOUT MYELOPATHY: Primary | ICD-10-CM

## 2024-06-12 LAB — GLUCOSE BLD-MCNC: 98 MG/DL (ref 75–110)

## 2024-06-12 PROCEDURE — 2500000003 HC RX 250 WO HCPCS: Performed by: ANESTHESIOLOGY

## 2024-06-12 PROCEDURE — 64493 INJ PARAVERT F JNT L/S 1 LEV: CPT | Performed by: ANESTHESIOLOGY

## 2024-06-12 PROCEDURE — 64494 INJ PARAVERT F JNT L/S 2 LEV: CPT | Performed by: ANESTHESIOLOGY

## 2024-06-12 PROCEDURE — 64493 INJ PARAVERT F JNT L/S 1 LEV: CPT

## 2024-06-12 PROCEDURE — 6360000002 HC RX W HCPCS: Performed by: ANESTHESIOLOGY

## 2024-06-12 PROCEDURE — 6360000004 HC RX CONTRAST MEDICATION: Performed by: ANESTHESIOLOGY

## 2024-06-12 PROCEDURE — 99152 MOD SED SAME PHYS/QHP 5/>YRS: CPT | Performed by: ANESTHESIOLOGY

## 2024-06-12 PROCEDURE — 64494 INJ PARAVERT F JNT L/S 2 LEV: CPT

## 2024-06-12 PROCEDURE — 82947 ASSAY GLUCOSE BLOOD QUANT: CPT

## 2024-06-12 RX ORDER — LIDOCAINE HYDROCHLORIDE 10 MG/ML
INJECTION, SOLUTION EPIDURAL; INFILTRATION; INTRACAUDAL; PERINEURAL
Status: COMPLETED | OUTPATIENT
Start: 2024-06-12 | End: 2024-06-12

## 2024-06-12 RX ORDER — BUPIVACAINE HYDROCHLORIDE 5 MG/ML
INJECTION, SOLUTION EPIDURAL; INTRACAUDAL
Status: COMPLETED | OUTPATIENT
Start: 2024-06-12 | End: 2024-06-12

## 2024-06-12 RX ORDER — FENTANYL CITRATE 50 UG/ML
INJECTION, SOLUTION INTRAMUSCULAR; INTRAVENOUS
Status: COMPLETED | OUTPATIENT
Start: 2024-06-12 | End: 2024-06-12

## 2024-06-12 RX ORDER — MIDAZOLAM HYDROCHLORIDE 2 MG/2ML
INJECTION, SOLUTION INTRAMUSCULAR; INTRAVENOUS
Status: COMPLETED | OUTPATIENT
Start: 2024-06-12 | End: 2024-06-12

## 2024-06-12 RX ADMIN — BUPIVACAINE HYDROCHLORIDE 6 ML: 5 INJECTION, SOLUTION EPIDURAL; INTRACAUDAL; PERINEURAL at 11:24

## 2024-06-12 RX ADMIN — IOHEXOL 3 ML: 180 INJECTION INTRAVENOUS at 11:20

## 2024-06-12 RX ADMIN — MIDAZOLAM HYDROCHLORIDE 2 MG: 1 INJECTION, SOLUTION INTRAMUSCULAR; INTRAVENOUS at 11:15

## 2024-06-12 RX ADMIN — LIDOCAINE HYDROCHLORIDE 5 ML: 10 INJECTION, SOLUTION EPIDURAL; INFILTRATION; INTRACAUDAL at 11:17

## 2024-06-12 RX ADMIN — FENTANYL CITRATE 50 MCG: 50 INJECTION, SOLUTION INTRAMUSCULAR; INTRAVENOUS at 11:15

## 2024-06-12 ASSESSMENT — PAIN DESCRIPTION - LOCATION: LOCATION: BACK

## 2024-06-12 ASSESSMENT — PAIN DESCRIPTION - DESCRIPTORS: DESCRIPTORS: SHARP;STABBING

## 2024-06-12 ASSESSMENT — PAIN DESCRIPTION - ORIENTATION: ORIENTATION: LOWER

## 2024-06-12 ASSESSMENT — PAIN DESCRIPTION - FREQUENCY: FREQUENCY: CONTINUOUS

## 2024-06-12 ASSESSMENT — PAIN SCALES - GENERAL: PAINLEVEL_OUTOF10: 8

## 2024-06-12 ASSESSMENT — PAIN DESCRIPTION - DIRECTION: RADIATING_TOWARDS: DOWN RIGHT LEG

## 2024-06-12 ASSESSMENT — PAIN DESCRIPTION - PAIN TYPE: TYPE: CHRONIC PAIN

## 2024-06-12 NOTE — INTERVAL H&P NOTE
Update History & Physical    The patient's History and Physical of Katrin 10, 2024 was reviewed with the patient and I examined the patient. There was no change. The surgical site was confirmed by the patient and me.     Plan: The risks, benefits, expected outcome, and alternative to the recommended procedure have been discussed with the patient. Patient understands and wants to proceed with the procedure.     ASA 3  MP 3    Electronically signed by Anmol Burnham MD on 6/12/2024 at 10:55 AM

## 2024-06-12 NOTE — DISCHARGE INSTRUCTIONS
Discharge Instructions following Sedation or Anesthesia:  You have  received  a sedative/anesthetic therefore, you should not consume any alcoholic beverages for minimum of 12 hours.  Do not drive or operate machinery for 24 hours.  Do not sign legal documents for 24 hours.  Dizziness, drowsiness, and unsteadiness may occur.  Rest when need to.  Increase diet as tolerated.  Keep up on fluids if diet allows.      General Instructions:  Do not take a tub bath for 72 hours after procedure (this includes hot tubs and swimming pools).  You may shower, but avoid hot water to injection site.   Avoid strenuous activity TODAY especially if you experience dizziness.   Remove band-aid the next day.  Wash off any residual iodine   Do not use heat, heating pad, or any other heating device over the injection site for 3 days after the procedure.  If you experience pain after your procedure, you may continue with your current pain medication as prescribed.  (DO NOT INCREASE YOUR PAIN MEDICATION WITHOUT TALKING TO DOCTOR)  Soreness and pain at injection site is common, may use ice to reduce soreness.    Please complete pain diary as instructed.     Call [facility] at [phone] if you experience:   Fever, chills or temperature over 100    Vomiting, Headache, persistent stiff neck, nausea, blurred vision   Difficulty in urinating or unable to urinate with 8 hours   Increase in weakness, numbness or loss of function   Increased redness, swelling or drainage at the injection site

## 2024-06-12 NOTE — OP NOTE
monitored by a Registered Nurse assigned to the Procedure Room  Monitoring included automated blood pressure, continuous EKG, Capnography and continuous pulse oximetry.   The detailed Conscious Record is permanently stored in the Hospital Information System.     The following is the conscious sedation record;  Start Time:  1112  End times:  1126  Duration:  14 MINUTES  MEDS GIVEN 2 MG VERSED AND 50 MCG FENTANYL

## 2024-06-13 ENCOUNTER — TELEPHONE (OUTPATIENT)
Dept: PAIN MANAGEMENT | Age: 59
End: 2024-06-13

## 2024-06-13 NOTE — TELEPHONE ENCOUNTER
Procedure Bilateral Lumbar Medial Branch nerve Blocks at the transverse processes of L4, L5 and sacral  ala under fluoroscopy guidance   DOS 06/12/2024  Pain level before procedure with activity 8.  Pain with activity after procedure 4.  What activities done the day of procedure nothing  What percentage of  pain relief from procedure did you receive 50  Success no  OV Scheduled 07/12/2024

## 2024-06-25 ENCOUNTER — OFFICE VISIT (OUTPATIENT)
Dept: FAMILY MEDICINE CLINIC | Age: 59
End: 2024-06-25
Payer: MEDICARE

## 2024-06-25 VITALS
RESPIRATION RATE: 16 BRPM | HEART RATE: 76 BPM | HEIGHT: 65 IN | DIASTOLIC BLOOD PRESSURE: 88 MMHG | SYSTOLIC BLOOD PRESSURE: 128 MMHG | WEIGHT: 279.4 LBS | TEMPERATURE: 97.2 F | OXYGEN SATURATION: 95 % | BODY MASS INDEX: 46.55 KG/M2

## 2024-06-25 DIAGNOSIS — Z90.49 HISTORY OF CHOLECYSTECTOMY: ICD-10-CM

## 2024-06-25 DIAGNOSIS — M47.816 LUMBAR FACET ARTHROPATHY: Primary | ICD-10-CM

## 2024-06-25 DIAGNOSIS — I10 ESSENTIAL HYPERTENSION: ICD-10-CM

## 2024-06-25 DIAGNOSIS — Z87.891 QUIT SMOKING: ICD-10-CM

## 2024-06-25 DIAGNOSIS — R51.9 DAILY HEADACHE: ICD-10-CM

## 2024-06-25 DIAGNOSIS — Z79.891 CHRONIC USE OF OPIATE DRUG FOR THERAPEUTIC PURPOSE: ICD-10-CM

## 2024-06-25 DIAGNOSIS — G47.33 OSA ON CPAP: ICD-10-CM

## 2024-06-25 DIAGNOSIS — F32.A ANXIETY AND DEPRESSION: ICD-10-CM

## 2024-06-25 DIAGNOSIS — E11.9 CONTROLLED TYPE 2 DIABETES MELLITUS WITHOUT COMPLICATION, WITHOUT LONG-TERM CURRENT USE OF INSULIN (HCC): ICD-10-CM

## 2024-06-25 DIAGNOSIS — F41.9 ANXIETY AND DEPRESSION: ICD-10-CM

## 2024-06-25 DIAGNOSIS — Z28.21 PNEUMOCOCCAL VACCINATION DECLINED: ICD-10-CM

## 2024-06-25 DIAGNOSIS — E66.01 MORBID OBESITY WITH BMI OF 40.0-44.9, ADULT (HCC): ICD-10-CM

## 2024-06-25 DIAGNOSIS — M54.41 CHRONIC BILATERAL LOW BACK PAIN WITH RIGHT-SIDED SCIATICA: Chronic | ICD-10-CM

## 2024-06-25 DIAGNOSIS — E78.2 MIXED HYPERLIPIDEMIA: ICD-10-CM

## 2024-06-25 DIAGNOSIS — G89.29 CHRONIC BILATERAL LOW BACK PAIN WITH RIGHT-SIDED SCIATICA: Chronic | ICD-10-CM

## 2024-06-25 DIAGNOSIS — Z90.49 HISTORY OF APPENDECTOMY: ICD-10-CM

## 2024-06-25 DIAGNOSIS — Z28.21 INFLUENZA VACCINATION DECLINED: ICD-10-CM

## 2024-06-25 DIAGNOSIS — M51.36 DDD (DEGENERATIVE DISC DISEASE), LUMBAR: ICD-10-CM

## 2024-06-25 PROBLEM — M47.817 LUMBOSACRAL SPONDYLOSIS WITHOUT MYELOPATHY: Status: RESOLVED | Noted: 2024-04-03 | Resolved: 2024-06-25

## 2024-06-25 PROCEDURE — 3074F SYST BP LT 130 MM HG: CPT | Performed by: FAMILY MEDICINE

## 2024-06-25 PROCEDURE — 1036F TOBACCO NON-USER: CPT | Performed by: FAMILY MEDICINE

## 2024-06-25 PROCEDURE — 2022F DILAT RTA XM EVC RTNOPTHY: CPT | Performed by: FAMILY MEDICINE

## 2024-06-25 PROCEDURE — 3079F DIAST BP 80-89 MM HG: CPT | Performed by: FAMILY MEDICINE

## 2024-06-25 PROCEDURE — 3017F COLORECTAL CA SCREEN DOC REV: CPT | Performed by: FAMILY MEDICINE

## 2024-06-25 PROCEDURE — G8427 DOCREV CUR MEDS BY ELIG CLIN: HCPCS | Performed by: FAMILY MEDICINE

## 2024-06-25 PROCEDURE — 3046F HEMOGLOBIN A1C LEVEL >9.0%: CPT | Performed by: FAMILY MEDICINE

## 2024-06-25 PROCEDURE — 99214 OFFICE O/P EST MOD 30 MIN: CPT | Performed by: FAMILY MEDICINE

## 2024-06-25 PROCEDURE — G8417 CALC BMI ABV UP PARAM F/U: HCPCS | Performed by: FAMILY MEDICINE

## 2024-06-25 RX ORDER — LISINOPRIL AND HYDROCHLOROTHIAZIDE 12.5; 1 MG/1; MG/1
1 TABLET ORAL DAILY
Qty: 90 TABLET | Refills: 1 | Status: SHIPPED | OUTPATIENT
Start: 2024-06-25

## 2024-06-25 RX ORDER — ALBUTEROL SULFATE 90 UG/1
2 AEROSOL, METERED RESPIRATORY (INHALATION) 4 TIMES DAILY PRN
Qty: 3 EACH | Refills: 1 | Status: SHIPPED | OUTPATIENT
Start: 2024-06-25

## 2024-06-25 ASSESSMENT — ENCOUNTER SYMPTOMS
GASTROINTESTINAL NEGATIVE: 1
RESPIRATORY NEGATIVE: 1
ALLERGIC/IMMUNOLOGIC NEGATIVE: 1
EYES NEGATIVE: 1
BACK PAIN: 1

## 2024-06-25 NOTE — PROGRESS NOTES
Subjective:      Patient ID: David Valdes is a 58 y.o. male.    Diabetes  He presents for his follow-up diabetic visit. He has type 2 diabetes mellitus. The initial diagnosis of diabetes was made 5 years ago. His disease course has been stable. There are no hypoglycemic associated symptoms. There are no diabetic associated symptoms. There are no hypoglycemic complications. Symptoms are stable. There are no diabetic complications. Risk factors for coronary artery disease include diabetes mellitus, dyslipidemia, male sex, obesity, hypertension and stress. Current diabetic treatment includes oral agent (monotherapy). He is compliant with treatment all of the time. His weight is stable. He is following a generally healthy diet. Meal planning includes ADA exchanges, avoidance of concentrated sweets, calorie counting and carbohydrate counting. He has had a previous visit with a dietitian. He participates in exercise three times a week. There is no change in his home blood glucose trend. Eye exam is current.       Review of Systems   Constitutional: Negative.    HENT: Negative.     Eyes: Negative.    Respiratory: Negative.     Cardiovascular: Negative.    Gastrointestinal: Negative.    Endocrine: Negative.    Musculoskeletal:  Positive for arthralgias and back pain.   Skin: Negative.    Allergic/Immunologic: Negative.    Neurological: Negative.    Hematological: Negative.    Psychiatric/Behavioral:  Positive for dysphoric mood.    Past family and social history unremarkable.   Diagnosis Orders   1. Lumbar facet arthropathy        2. Quit smoking        3. Chronic bilateral low back pain with right-sided sciatica        4. Daily headache        5. Chronic use of opiate drugs therapeutic purposes        6. Morbid obesity with BMI of 40.0-44.9, adult ([facility])        7. Controlled type 2 diabetes mellitus without complication, without long-term current use of insulin ([facility])        8. MARLENE on CPAP        9. Anxiety and depression

## 2024-06-28 NOTE — TELEPHONE ENCOUNTER
I called the patient and informed him that his insurance did not approve the Spiriva and gave some alternative choices so a prescription for Dulera will be sent to his pharmacy.

## 2024-06-28 NOTE — TELEPHONE ENCOUNTER
David Valdes is calling to request a refill on the following medication(s):    Medication Request:  Requested Prescriptions     Pending Prescriptions Disp Refills    Mometasone Furo-Formoterol Fum (DULERA) 50-5 MCG/ACT AERO 4 each 0     Sig: Inhale into the lungs       Last Visit Date (If Applicable):  6/25/2024    Next Visit Date:    9/25/2024        New prescription pending.

## 2024-07-01 RX ORDER — MOMETASONE FUROATE AND FORMOTEROL FUMARATE DIHYDRATE 50; 5 UG/1; UG/1
1 AEROSOL RESPIRATORY (INHALATION) 2 TIMES DAILY
Qty: 2 EACH | Refills: 3 | Status: SHIPPED | OUTPATIENT
Start: 2024-07-01

## 2024-07-05 ENCOUNTER — OFFICE VISIT (OUTPATIENT)
Age: 59
End: 2024-07-05

## 2024-07-05 ENCOUNTER — TELEPHONE (OUTPATIENT)
Dept: FAMILY MEDICINE CLINIC | Age: 59
End: 2024-07-05

## 2024-07-05 VITALS
DIASTOLIC BLOOD PRESSURE: 81 MMHG | OXYGEN SATURATION: 98 % | BODY MASS INDEX: 45.43 KG/M2 | RESPIRATION RATE: 24 BRPM | TEMPERATURE: 98 F | WEIGHT: 273 LBS | SYSTOLIC BLOOD PRESSURE: 124 MMHG

## 2024-07-05 DIAGNOSIS — G47.33 OSA ON CPAP: ICD-10-CM

## 2024-07-05 DIAGNOSIS — I10 ESSENTIAL HYPERTENSION: ICD-10-CM

## 2024-07-05 DIAGNOSIS — E66.01 MORBID OBESITY WITH BMI OF 45.0-49.9, ADULT (HCC): Primary | ICD-10-CM

## 2024-07-05 DIAGNOSIS — E11.9 CONTROLLED TYPE 2 DIABETES MELLITUS WITHOUT COMPLICATION, WITHOUT LONG-TERM CURRENT USE OF INSULIN (HCC): ICD-10-CM

## 2024-07-05 DIAGNOSIS — E78.2 MIXED HYPERLIPIDEMIA: ICD-10-CM

## 2024-07-05 RX ORDER — PHENTERMINE HYDROCHLORIDE 37.5 MG/1
37.5 TABLET ORAL
Qty: 30 TABLET | Refills: 0 | Status: SHIPPED | OUTPATIENT
Start: 2024-07-05 | End: 2024-08-04

## 2024-07-05 NOTE — PROGRESS NOTES
Exercise intervention:     Formal exercise regimen: increase activity as tolerated.     5. Other treatment: Medication: phentermine    Patient's insurance does not cover weight loss medications and he is already on trulicity. Discussed I want him to start 1/2 tab of the adipex daily and monitor blood pressure at home. Discussed with patient if his systolic pressure is running above 140 - he needs to contact the office. If he has any issues with tachycardia or palpitations, contact the office.       6. Will have dietician meet patient at follow up appointments. Discussed with patient I recommend that the write down topics over the next 4 weeks that they feel they may struggle with then it comes to their diet/food choices. Also recommend to get a notebook and document a food diary 2-3 times per week and bring it to next appt    7. Potential side effects of the medication discussed. Discussed dosing, administration of medication. Discussed common side effects are dry mouth and constipation. Side effects that can occur and cause me to stop the medication includes mood changes, high blood pressure, tachycardia, palpitations.     8. Follow up: 4 weeks and as needed.       History reviewed, examine appropriate, BMI appropriate, ruled out contraindication for controlled substance utilization, patient is free from signs of drug or alcohol abuse, there is no medical contradiction for the use of adipex, OARRS reviewed without evidence of suspicious or aberrant behavior and we have developed a treatment plan which includes medication, diet and exercise of help with weight loss.     Discussed if BMI gets below 30, patient needs to have a BMI above 27 with obesity related comorbid factors.     Discussed we will see the patient monthly and in the first 3 months, they needs to lose 5% of their body weight in order to continue the medication. If patient gains weight while on the adipex or does not lose the 5% body weight loss after

## 2024-07-06 RX ORDER — DULAGLUTIDE 1.5 MG/.5ML
1.5 INJECTION, SOLUTION SUBCUTANEOUS WEEKLY
Qty: 4 ADJUSTABLE DOSE PRE-FILLED PEN SYRINGE | Refills: 0 | Status: SHIPPED | OUTPATIENT
Start: 2024-07-06

## 2024-07-08 ENCOUNTER — HOSPITAL ENCOUNTER (OUTPATIENT)
Dept: CT IMAGING | Age: 59
Discharge: HOME | End: 2024-07-10
Attending: INTERNAL MEDICINE
Payer: MEDICARE

## 2024-07-08 DIAGNOSIS — R91.1 LUNG NODULE SEEN ON IMAGING STUDY: ICD-10-CM

## 2024-07-08 PROCEDURE — 71250 CT THORAX DX C-: CPT

## 2024-07-09 RX ORDER — LISINOPRIL AND HYDROCHLOROTHIAZIDE 12.5; 1 MG/1; MG/1
1 TABLET ORAL DAILY
Qty: 90 TABLET | Refills: 1 | OUTPATIENT
Start: 2024-07-09

## 2024-07-12 ENCOUNTER — OFFICE VISIT (OUTPATIENT)
Dept: PAIN MANAGEMENT | Age: 59
End: 2024-07-12
Payer: MEDICARE

## 2024-07-12 VITALS — BODY MASS INDEX: 45.48 KG/M2 | HEIGHT: 65 IN | WEIGHT: 273 LBS

## 2024-07-12 DIAGNOSIS — M47.816 LUMBAR FACET ARTHROPATHY: Primary | ICD-10-CM

## 2024-07-12 DIAGNOSIS — Z79.891 CHRONIC USE OF OPIATE DRUG FOR THERAPEUTIC PURPOSE: ICD-10-CM

## 2024-07-12 DIAGNOSIS — M51.36 DDD (DEGENERATIVE DISC DISEASE), LUMBAR: ICD-10-CM

## 2024-07-12 DIAGNOSIS — G89.29 CHRONIC BILATERAL LOW BACK PAIN WITH RIGHT-SIDED SCIATICA: Chronic | ICD-10-CM

## 2024-07-12 DIAGNOSIS — M54.41 CHRONIC BILATERAL LOW BACK PAIN WITH RIGHT-SIDED SCIATICA: Chronic | ICD-10-CM

## 2024-07-12 PROCEDURE — 3017F COLORECTAL CA SCREEN DOC REV: CPT | Performed by: NURSE PRACTITIONER

## 2024-07-12 PROCEDURE — 99214 OFFICE O/P EST MOD 30 MIN: CPT | Performed by: NURSE PRACTITIONER

## 2024-07-12 PROCEDURE — G8427 DOCREV CUR MEDS BY ELIG CLIN: HCPCS | Performed by: NURSE PRACTITIONER

## 2024-07-12 PROCEDURE — G8417 CALC BMI ABV UP PARAM F/U: HCPCS | Performed by: NURSE PRACTITIONER

## 2024-07-12 PROCEDURE — 1036F TOBACCO NON-USER: CPT | Performed by: NURSE PRACTITIONER

## 2024-07-12 RX ORDER — OXYCODONE HYDROCHLORIDE AND ACETAMINOPHEN 5; 325 MG/1; MG/1
1 TABLET ORAL 2 TIMES DAILY PRN
Qty: 60 TABLET | Refills: 0 | Status: SHIPPED | OUTPATIENT
Start: 2024-07-13 | End: 2024-08-12

## 2024-07-12 ASSESSMENT — ENCOUNTER SYMPTOMS
COUGH: 0
BOWEL INCONTINENCE: 0
CONSTIPATION: 0
BACK PAIN: 1
SHORTNESS OF BREATH: 0
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
## 2024-07-16 RX ORDER — LISINOPRIL AND HYDROCHLOROTHIAZIDE 12.5; 1 MG/1; MG/1
1 TABLET ORAL DAILY
Qty: 90 TABLET | Refills: 1 | OUTPATIENT
Start: 2024-07-16

## 2024-07-23 ENCOUNTER — OFFICE VISIT (OUTPATIENT)
Dept: PULMONOLOGY | Age: 59
End: 2024-07-23
Payer: MEDICARE

## 2024-07-23 VITALS
RESPIRATION RATE: 18 BRPM | HEART RATE: 82 BPM | WEIGHT: 260 LBS | BODY MASS INDEX: 43.32 KG/M2 | OXYGEN SATURATION: 97 % | HEIGHT: 65 IN | SYSTOLIC BLOOD PRESSURE: 130 MMHG | DIASTOLIC BLOOD PRESSURE: 88 MMHG

## 2024-07-23 DIAGNOSIS — J44.9 CHRONIC OBSTRUCTIVE PULMONARY DISEASE, UNSPECIFIED COPD TYPE (HCC): ICD-10-CM

## 2024-07-23 DIAGNOSIS — I10 PRIMARY HYPERTENSION: ICD-10-CM

## 2024-07-23 DIAGNOSIS — F11.20 OPIOID DEPENDENCE WITH CURRENT USE (HCC): ICD-10-CM

## 2024-07-23 DIAGNOSIS — G47.33 OSA ON CPAP: Primary | ICD-10-CM

## 2024-07-23 DIAGNOSIS — E66.01 MORBID OBESITY WITH BMI OF 40.0-44.9, ADULT (HCC): ICD-10-CM

## 2024-07-23 PROCEDURE — G8417 CALC BMI ABV UP PARAM F/U: HCPCS | Performed by: INTERNAL MEDICINE

## 2024-07-23 PROCEDURE — 3023F SPIROM DOC REV: CPT | Performed by: INTERNAL MEDICINE

## 2024-07-23 PROCEDURE — 99214 OFFICE O/P EST MOD 30 MIN: CPT | Performed by: INTERNAL MEDICINE

## 2024-07-23 PROCEDURE — 3079F DIAST BP 80-89 MM HG: CPT | Performed by: INTERNAL MEDICINE

## 2024-07-23 PROCEDURE — G8427 DOCREV CUR MEDS BY ELIG CLIN: HCPCS | Performed by: INTERNAL MEDICINE

## 2024-07-23 PROCEDURE — 3017F COLORECTAL CA SCREEN DOC REV: CPT | Performed by: INTERNAL MEDICINE

## 2024-07-23 PROCEDURE — 1036F TOBACCO NON-USER: CPT | Performed by: INTERNAL MEDICINE

## 2024-07-23 PROCEDURE — 3075F SYST BP GE 130 - 139MM HG: CPT | Performed by: INTERNAL MEDICINE

## 2024-07-23 NOTE — PROGRESS NOTES
Patient performed PFT with good effort and understanding   
SpO2 97% Comment: ra  BMI 43.27 kg/m²     CXR  No recent chest x-ray      CT Scans  CT scan of the chest was lung screening ordered Echo  No echocardiogram        Assessment  COPD  Chronic smoker  Obstructive sleep apnea syndrome  Hypertension  Controlled diabetes    Plan:  This patient has chronic obstructive pulmonary disease.  He unfortunately continues to smoke.  Patient advised to give up smoking.  Will continue present bronchodilator therapy as before.    Stopping to smoke will also help with some symptoms of pulmonary disease.    Sleep apnea syndrome but not been using the CPAP lately because he cannot afford the supplies for the CPAP machine.  Will try to help him and if possible.    He continues to be morbidly obese but has lost about 20 pounds lately.  He has cor pulmonale no clinical evidence of heart failure.  No heart.    He is not sleepy during the daytime.    Blood pressure is under good control.    He has prediabetic state.  Unsure the weight loss will help it.    Advised him to get a flu vaccine and Pneumovax and                  Dictation over thank you  Low Dose CT (LDCT) Lung Screening criteria met:     Age 50-77(Medicare) or 50-80 ([facility])   Pack year smoking >20   Still smoking or less than 15 year since quit   No sign or symptoms of lung cancer   > 11 months since last LDCT     Risks and benefits of lung cancer screening with LDCT scans discussed:    Significance of positive screen - False-positive LDCT results often occur. 95% of all positive results do not lead to a diagnosis of cancer. Usually further imaging can resolve most false-positive results; however, some patients may require invasive procedures.    Over diagnosis risk - 10% to 12% of screen-detected lung cancer cases are over diagnosed--that is, the cancer would not have been detected in the patient's lifetime without the screening.    Need for follow up screens annually to continue lung cancer screening effectiveness     Risks

## 2024-07-29 ENCOUNTER — TELEPHONE (OUTPATIENT)
Dept: PAIN MANAGEMENT | Age: 59
End: 2024-07-29

## 2024-08-02 ENCOUNTER — OFFICE VISIT (OUTPATIENT)
Age: 59
End: 2024-08-02
Payer: MEDICARE

## 2024-08-02 VITALS
HEART RATE: 87 BPM | OXYGEN SATURATION: 98 % | WEIGHT: 255 LBS | TEMPERATURE: 98 F | RESPIRATION RATE: 24 BRPM | DIASTOLIC BLOOD PRESSURE: 83 MMHG | BODY MASS INDEX: 42.43 KG/M2 | SYSTOLIC BLOOD PRESSURE: 128 MMHG

## 2024-08-02 DIAGNOSIS — E78.2 MIXED HYPERLIPIDEMIA: ICD-10-CM

## 2024-08-02 DIAGNOSIS — E11.9 CONTROLLED TYPE 2 DIABETES MELLITUS WITHOUT COMPLICATION, WITHOUT LONG-TERM CURRENT USE OF INSULIN (HCC): Primary | ICD-10-CM

## 2024-08-02 DIAGNOSIS — E66.01 MORBID OBESITY WITH BMI OF 45.0-49.9, ADULT (HCC): ICD-10-CM

## 2024-08-02 DIAGNOSIS — G47.33 OSA ON CPAP: ICD-10-CM

## 2024-08-02 DIAGNOSIS — I10 ESSENTIAL HYPERTENSION: ICD-10-CM

## 2024-08-02 PROCEDURE — 3046F HEMOGLOBIN A1C LEVEL >9.0%: CPT | Performed by: NURSE PRACTITIONER

## 2024-08-02 PROCEDURE — 3079F DIAST BP 80-89 MM HG: CPT | Performed by: NURSE PRACTITIONER

## 2024-08-02 PROCEDURE — G8427 DOCREV CUR MEDS BY ELIG CLIN: HCPCS | Performed by: NURSE PRACTITIONER

## 2024-08-02 PROCEDURE — 3017F COLORECTAL CA SCREEN DOC REV: CPT | Performed by: NURSE PRACTITIONER

## 2024-08-02 PROCEDURE — G8417 CALC BMI ABV UP PARAM F/U: HCPCS | Performed by: NURSE PRACTITIONER

## 2024-08-02 PROCEDURE — 1036F TOBACCO NON-USER: CPT | Performed by: NURSE PRACTITIONER

## 2024-08-02 PROCEDURE — 99213 OFFICE O/P EST LOW 20 MIN: CPT | Performed by: NURSE PRACTITIONER

## 2024-08-02 PROCEDURE — 3074F SYST BP LT 130 MM HG: CPT | Performed by: NURSE PRACTITIONER

## 2024-08-02 PROCEDURE — 2022F DILAT RTA XM EVC RTNOPTHY: CPT | Performed by: NURSE PRACTITIONER

## 2024-08-02 RX ORDER — PHENTERMINE HYDROCHLORIDE 37.5 MG/1
37.5 TABLET ORAL
Qty: 30 TABLET | Refills: 0 | Status: SHIPPED | OUTPATIENT
Start: 2024-08-02 | End: 2024-09-01

## 2024-08-02 ASSESSMENT — ENCOUNTER SYMPTOMS
CHEST TIGHTNESS: 0
WHEEZING: 0
VOMITING: 0
ABDOMINAL PAIN: 0
CONSTIPATION: 1
SHORTNESS OF BREATH: 0
NAUSEA: 0
COUGH: 0

## 2024-08-02 NOTE — PROGRESS NOTES
Medical Nutrition Therapy for Non-Surgical Weight Loss  Nutrition Follow-Up: Weight Loss Medication    Nutrition Assessment:  Patient is taking adipex per LISHA Moncada for weight loss. Patient has lost 18 lbs since last visit.   Patient's nutrition questions include none. Reported was not feeling good yesterday.  Patient reports changes made since last month include measuring portions, focus on protein, cut out energy drinks.  Patient is eating 3 times per day. Reports protein is good but sometimes hard to afford. Does like to make chili.  Discussed affordable options, utilizing [employer] and [employer] for cheaper protein and vegetable options.   Overall eating fruits and vegetables almost every day when asked. Has cut out a lot of bread.  Patient is drinking at least 64 oz of fluid and sugar free beverages. Patient is typically drinking water, zero pepsi 2 per day. Cut out energy drinks.  Patient reports  can't walk due to leg pain  to remain active. Hx paralysis, is gaining more feeling in legs and now able to walk. Reports MD aware. Reports needs new primary care physician.  Discussed myplate, portion sizes. Encouraged to try some exercise for health if ok with physician approval.  Provided Exercise for Health and MyPlate Handout(s).    24-hr diet recall scanned into chart.    Vitals:   Vitals:    08/02/24 1027   BP: 128/83   Pulse: 87   Resp: 24   Temp: 98 °F (36.7 °C)   SpO2: 98%   Weight: 115.7 kg (255 lb)      Body mass index is 42.43 kg/m².    Estimated Energy Needs:  Calorie (MSJ x AF - 500): 1800 kcals  Protein: 60-80 grams protein  Fluids: minimum 64 oz fluid    Nutrition Diagnosis:  Overweight/Obesity related to complex combination of decreased energy needs, disordered eating patterns, physical inactivity, and increased psychological/life stress as evidenced by Body mass index is 42.43 kg/m².    Nutrition Intervention:  Diet: Low-Fat Diet, 60-80gm of protein, and 64oz of fluid    Nutrition

## 2024-08-02 NOTE — PROGRESS NOTES
[facility] INVASIVE BARIATRIC SURGERY  [address] [address]  Dept: [phone]    MEDICATION WEIGHT LOSS MANAGEMENT PROGRAM  PROGRESS NOTE     CC: Weight Loss    Patient: David Valdes      Service Date: 8/2/2024  Visit:   1 month follow up on medications    Medical Record #: [id number]  Current Visit Weight Information  Weight: 115.7 kg (255 lb)   BMI: Body mass index is 42.43 kg/m².        History: 58 y.o. male who presents today for a 1 month follow up on weight loss medication. Patient has been following monthly with our office for the management of obesity since July 2024.    Medication: Adipex  Amount of weight loss in the last month: 18 lbs  Amount of weight loss total: 18 lbs  Current dose: 37.5 mg  Side Effects: mild constipation - he states he started drinking more water and that helped    Reviewed diet recall - he states he didn't feel good yesterday so he didn't eat much. He states normally he is eating three times a day.     Blood pressure is normal today, denies any issues with hypertension, tachycardia. Denies any mental health changes.     He has doing well with increasing how often he is eating and working on protein. He states he will get full, set aside what he is eating and will go back to it later if he feels hunger. He does feel positive affects with his appetite and energy with the medication.     Height:   5 ft 5 in  Highest Weight:  273 lbs      Exercising?   [] Yes    [x] No   Active around the house  Review of Systems:     Review of Systems   Constitutional:  Negative for appetite change, chills, diaphoresis, fatigue and fever.        + obesity   Respiratory:  Negative for cough, chest tightness, shortness of breath and wheezing.    Cardiovascular:  Negative for chest pain and palpitations.   Gastrointestinal:  Positive for constipation (mild). Negative for abdominal pain, nausea and vomiting.   Neurological:

## 2024-08-05 RX ORDER — DULAGLUTIDE 1.5 MG/.5ML
1.5 INJECTION, SOLUTION SUBCUTANEOUS WEEKLY
Qty: 4 ADJUSTABLE DOSE PRE-FILLED PEN SYRINGE | Refills: 0 | Status: SHIPPED | OUTPATIENT
Start: 2024-08-05

## 2024-08-05 NOTE — TELEPHONE ENCOUNTER
David Valdes is calling to request a refill on the following medication(s):    Medication Request:  Requested Prescriptions     Pending Prescriptions Disp Refills    dulaglutide (TRULICITY) 1.5 MG/0.5ML SC injection 4 Adjustable Dose Pre-filled Pen Syringe 0     Sig: Inject 0.5 mLs into the skin once a week       Last Visit Date (If Applicable):  6/25/2024    Next Visit Date:    9/25/2024

## 2024-08-12 ENCOUNTER — OFFICE VISIT (OUTPATIENT)
Dept: PAIN MANAGEMENT | Age: 59
End: 2024-08-12
Payer: MEDICARE

## 2024-08-12 VITALS — BODY MASS INDEX: 42.49 KG/M2 | WEIGHT: 255 LBS | HEIGHT: 65 IN

## 2024-08-12 DIAGNOSIS — M54.41 CHRONIC BILATERAL LOW BACK PAIN WITH RIGHT-SIDED SCIATICA: Primary | Chronic | ICD-10-CM

## 2024-08-12 DIAGNOSIS — F11.20 OPIOID DEPENDENCE WITH CURRENT USE (HCC): ICD-10-CM

## 2024-08-12 DIAGNOSIS — Z79.891 CHRONIC USE OF OPIATE DRUG FOR THERAPEUTIC PURPOSE: ICD-10-CM

## 2024-08-12 DIAGNOSIS — M51.36 DDD (DEGENERATIVE DISC DISEASE), LUMBAR: ICD-10-CM

## 2024-08-12 DIAGNOSIS — G89.29 CHRONIC BILATERAL LOW BACK PAIN WITH RIGHT-SIDED SCIATICA: Primary | Chronic | ICD-10-CM

## 2024-08-12 PROCEDURE — 3017F COLORECTAL CA SCREEN DOC REV: CPT | Performed by: NURSE PRACTITIONER

## 2024-08-12 PROCEDURE — 1036F TOBACCO NON-USER: CPT | Performed by: NURSE PRACTITIONER

## 2024-08-12 PROCEDURE — 99214 OFFICE O/P EST MOD 30 MIN: CPT | Performed by: NURSE PRACTITIONER

## 2024-08-12 PROCEDURE — G8417 CALC BMI ABV UP PARAM F/U: HCPCS | Performed by: NURSE PRACTITIONER

## 2024-08-12 PROCEDURE — G8427 DOCREV CUR MEDS BY ELIG CLIN: HCPCS | Performed by: NURSE PRACTITIONER

## 2024-08-12 RX ORDER — OXYCODONE HYDROCHLORIDE AND ACETAMINOPHEN 5; 325 MG/1; MG/1
1 TABLET ORAL 2 TIMES DAILY PRN
Qty: 60 TABLET | Refills: 0 | Status: SHIPPED | OUTPATIENT
Start: 2024-08-12 | End: 2024-09-11

## 2024-08-12 ASSESSMENT — ENCOUNTER SYMPTOMS
SHORTNESS OF BREATH: 0
CONSTIPATION: 0
COUGH: 0
BACK PAIN: 1

## 2024-08-12 NOTE — PROGRESS NOTES
Neurological:      Mental Status: He is alert and oriented to person, place, and time.   Psychiatric:         Mood and Affect: Mood normal.         Behavior: Behavior normal.         Record/Diagnostics Review:    Last verna 1/24  and was appropriate     Assessment:  Problem List Items Addressed This Visit       Chronic bilateral low back pain with right-sided sciatica - Primary (Chronic)    Relevant Medications    oxyCODONE-acetaminophen (PERCOCET) 5-325 MG per tablet    Chronic use of opiate drugs therapeutic purposes    Relevant Medications    oxyCODONE-acetaminophen (PERCOCET) 5-325 MG per tablet    DDD (degenerative disc disease), lumbar    Relevant Medications    oxyCODONE-acetaminophen (PERCOCET) 5-325 MG per tablet    Opioid dependence with current use (HCC)    Relevant Orders    DRUG SCREEN, PAIN          Treatment Plan:  Patient relates current medications are helping the pain. Patient reports taking pain medications as prescribed, denies obtaining medications from different sources and denies use of illegal drugs. Medication risk and benefits have been discussed. Patient denies side effects from medications like nausea, vomiting, constipation or drowsiness. Patient reports current activities of daily living are possible due to medications and would like to continue them.     As always, we encourage daily stretching and strengthening exercises, and recommend minimizing use of pain medications unless patient cannot get through daily activities due to pain.    We have discussed with the patient the effect the patient’s medical condition and opioid medication may have on the patient’s ability to safely operate a vehicle. Pt verbalized understanding.     Due to the high risk nature of this patient's pain medication close monitoring is required.   Continue current medication management, pt has been stable and compliant.  Script written for percocet  VERNA obtained today for monitoring purposes. Last dose of

## 2024-08-26 NOTE — TELEPHONE ENCOUNTER
Kaveh Jeong is calling to request a refill on the following medication(s):    Medication Request:  Requested Prescriptions     Pending Prescriptions Disp Refills    lisinopril-hydroCHLOROthiazide (PRINZIDE;ZESTORETIC) 10-12.5 MG per tablet [Pharmacy Med Name: LISINOPRIL-HCTZ 10-12.5 MG TAB] 90 tablet 1     Sig: take 1 tablet by mouth once daily       Last Visit Date (If Applicable):  0/22/5885    Next Visit Date:    7/5/2023      Last refill 8/25/22. Prescription pending.
Detail Level: Zone
Add High Risk Medication Management Associated Diagnosis?: No
Length Of Therapy: 4 months
Patient Reported Weight(Optional But Include Units): 180 lb

## 2024-08-30 ENCOUNTER — OFFICE VISIT (OUTPATIENT)
Age: 59
End: 2024-08-30
Payer: MEDICARE

## 2024-08-30 VITALS
HEART RATE: 96 BPM | OXYGEN SATURATION: 97 % | RESPIRATION RATE: 16 BRPM | HEIGHT: 65 IN | SYSTOLIC BLOOD PRESSURE: 118 MMHG | BODY MASS INDEX: 40.98 KG/M2 | WEIGHT: 246 LBS | DIASTOLIC BLOOD PRESSURE: 70 MMHG

## 2024-08-30 DIAGNOSIS — E66.01 MORBID OBESITY WITH BMI OF 40.0-44.9, ADULT (HCC): ICD-10-CM

## 2024-08-30 DIAGNOSIS — E78.2 MIXED HYPERLIPIDEMIA: ICD-10-CM

## 2024-08-30 DIAGNOSIS — E11.9 CONTROLLED TYPE 2 DIABETES MELLITUS WITHOUT COMPLICATION, WITHOUT LONG-TERM CURRENT USE OF INSULIN (HCC): ICD-10-CM

## 2024-08-30 DIAGNOSIS — G47.33 OSA ON CPAP: ICD-10-CM

## 2024-08-30 DIAGNOSIS — I10 ESSENTIAL HYPERTENSION: Primary | ICD-10-CM

## 2024-08-30 PROCEDURE — G8417 CALC BMI ABV UP PARAM F/U: HCPCS | Performed by: NURSE PRACTITIONER

## 2024-08-30 PROCEDURE — 3017F COLORECTAL CA SCREEN DOC REV: CPT | Performed by: NURSE PRACTITIONER

## 2024-08-30 PROCEDURE — 2022F DILAT RTA XM EVC RTNOPTHY: CPT | Performed by: NURSE PRACTITIONER

## 2024-08-30 PROCEDURE — 3074F SYST BP LT 130 MM HG: CPT | Performed by: NURSE PRACTITIONER

## 2024-08-30 PROCEDURE — G8427 DOCREV CUR MEDS BY ELIG CLIN: HCPCS | Performed by: NURSE PRACTITIONER

## 2024-08-30 PROCEDURE — 1036F TOBACCO NON-USER: CPT | Performed by: NURSE PRACTITIONER

## 2024-08-30 PROCEDURE — 3078F DIAST BP <80 MM HG: CPT | Performed by: NURSE PRACTITIONER

## 2024-08-30 PROCEDURE — 99213 OFFICE O/P EST LOW 20 MIN: CPT | Performed by: NURSE PRACTITIONER

## 2024-08-30 PROCEDURE — 3046F HEMOGLOBIN A1C LEVEL >9.0%: CPT | Performed by: NURSE PRACTITIONER

## 2024-08-30 RX ORDER — PHENTERMINE HYDROCHLORIDE 37.5 MG/1
37.5 TABLET ORAL
Qty: 30 TABLET | Refills: 0 | Status: SHIPPED | OUTPATIENT
Start: 2024-08-30 | End: 2024-09-29

## 2024-08-30 ASSESSMENT — ENCOUNTER SYMPTOMS
SHORTNESS OF BREATH: 0
ABDOMINAL PAIN: 0
WHEEZING: 0
VOMITING: 0
BACK PAIN: 1
CONSTIPATION: 1
NAUSEA: 0
CHEST TIGHTNESS: 0
COUGH: 0

## 2024-08-30 NOTE — PROGRESS NOTES
[facility] INVASIVE BARIATRIC SURGERY  [address] [address]  Dept: [phone]    MEDICATION WEIGHT LOSS MANAGEMENT PROGRAM  PROGRESS NOTE     CC: Weight Loss    Patient: David Valdes      Service Date: 8/30/2024  Visit:   1 month follow up on medications    Medical Record #: [id number]  Current Visit Weight Information  Weight: 111.6 kg (246 lb)   BMI: Body mass index is 40.94 kg/m².        History: 58 y.o. male who presents today for a 1 month follow up on weight loss medication. Patient has been following monthly with our office for the management of obesity since July 2024.    Medication: Adipex  Amount of weight loss in the last month: 9 lbs  Amount of weight loss total: 27 lbs  Current dose: 37.5 mg  Side Effects: mild constipation - he states he started drinking more water and that helped    Reviewed diet recall - he is having protein for breakfast. He is having 2 yogurts with low sugar granola. He is having chicken and broccoli for dinner. He had pasta and broccoli for lunch. He states that he is having beef sticks for snacks.     He states that his pain has improved and his breathing has improved. He states even a friend noticed that his breathing is better.     Blood pressure is normal today, denies any issues with hypertension, tachycardia. Denies any mental health changes.     He has doing well with increasing how often he is eating and working on protein. He states he will get full, set aside what he is eating and will go back to it later if he feels hunger. He does feel positive affects with his appetite and energy with the medication.     Height: 1.651 m (5' 5\") 5 ft 5 in  Highest Weight:  273 lbs      Exercising?   [] Yes    [x] No   Active around the house  Review of Systems:     Review of Systems   Constitutional:  Negative for appetite change, chills, diaphoresis, fatigue and fever.        + obesity   Respiratory:   without long-term current use of insulin (HCC)    4. Mixed hyperlipidemia    5. MARLENE on CPAP             [x] Daily protein (65-75gm/day)   [x] Exercise Regularly     Need for long term compliance and follow up stressed to patient  Dietician not available today - asked patient if he had anything specific he wants to talk about - he states no. He is doing well.   Continue using adipex, as prescribed. Tolerating well.   Doing well    Follow up: Return in about 4 weeks (around 9/27/2024) for adipex med f/u.    Orders placed this encounter:   No orders of the defined types were placed in this encounter.      New Prescriptions:   Orders Placed This Encounter   Medications    phentermine (ADIPEX-P) 37.5 MG tablet     Sig: Take 1 tablet by mouth every morning (before breakfast) for 30 days. Max Daily Amount: 37.5 mg     Dispense:  30 tablet     Refill:  0        Electronically signed by SANDRA Moncada CNP on 8/30/2024 at 10:36 AM    Please note that this chart was generated using voice recognition Dragon dictation software.  Although every effort was made to ensure the accuracy of this automated transcription, some errors in transcription may have occurred.

## 2024-09-04 DIAGNOSIS — R63.8 INCREASED BMI: Primary | ICD-10-CM

## 2024-09-04 NOTE — TELEPHONE ENCOUNTER
David Valdes is calling to request a refill on the following medication(s):    Medication Request:  Requested Prescriptions     Pending Prescriptions Disp Refills    dulaglutide (TRULICITY) 1.5 MG/0.5ML SC injection 4 Adjustable Dose Pre-filled Pen Syringe 0     Sig: Inject 0.5 mLs into the skin once a week    sennosides-docusate sodium (SENOKOT-S) 8.6-50 MG tablet 60 tablet 0     Sig: Take 1 tablet by mouth daily as needed for Constipation       Last Visit Date (If Applicable):  6/25/2024    Next Visit Date:    9/25/2024

## 2024-09-05 RX ORDER — DULAGLUTIDE 1.5 MG/.5ML
1.5 INJECTION, SOLUTION SUBCUTANEOUS WEEKLY
Qty: 4 ADJUSTABLE DOSE PRE-FILLED PEN SYRINGE | Refills: 0 | Status: SHIPPED | OUTPATIENT
Start: 2024-09-05

## 2024-09-05 RX ORDER — SENNA AND DOCUSATE SODIUM 50; 8.6 MG/1; MG/1
1 TABLET, FILM COATED ORAL DAILY PRN
Qty: 60 TABLET | Refills: 0 | Status: SHIPPED | OUTPATIENT
Start: 2024-09-05 | End: 2024-11-04

## 2024-09-10 ENCOUNTER — TELEPHONE (OUTPATIENT)
Dept: PAIN MANAGEMENT | Age: 59
End: 2024-09-10

## 2024-09-16 ENCOUNTER — OFFICE VISIT (OUTPATIENT)
Dept: PAIN MANAGEMENT | Age: 59
End: 2024-09-16
Payer: MEDICARE

## 2024-09-16 VITALS — HEIGHT: 65 IN | BODY MASS INDEX: 40.98 KG/M2 | WEIGHT: 246 LBS

## 2024-09-16 DIAGNOSIS — Z79.891 CHRONIC USE OF OPIATE DRUG FOR THERAPEUTIC PURPOSE: ICD-10-CM

## 2024-09-16 DIAGNOSIS — M54.41 CHRONIC BILATERAL LOW BACK PAIN WITH RIGHT-SIDED SCIATICA: Primary | Chronic | ICD-10-CM

## 2024-09-16 DIAGNOSIS — G89.29 CHRONIC BILATERAL LOW BACK PAIN WITH RIGHT-SIDED SCIATICA: Primary | Chronic | ICD-10-CM

## 2024-09-16 PROCEDURE — G8417 CALC BMI ABV UP PARAM F/U: HCPCS | Performed by: NURSE PRACTITIONER

## 2024-09-16 PROCEDURE — G8427 DOCREV CUR MEDS BY ELIG CLIN: HCPCS | Performed by: NURSE PRACTITIONER

## 2024-09-16 PROCEDURE — 1036F TOBACCO NON-USER: CPT | Performed by: NURSE PRACTITIONER

## 2024-09-16 PROCEDURE — 99213 OFFICE O/P EST LOW 20 MIN: CPT | Performed by: NURSE PRACTITIONER

## 2024-09-16 PROCEDURE — 3017F COLORECTAL CA SCREEN DOC REV: CPT | Performed by: NURSE PRACTITIONER

## 2024-09-16 RX ORDER — OXYCODONE AND ACETAMINOPHEN 5; 325 MG/1; MG/1
1 TABLET ORAL 2 TIMES DAILY PRN
Qty: 60 TABLET | Refills: 0 | Status: SHIPPED | OUTPATIENT
Start: 2024-09-16 | End: 2024-10-16

## 2024-09-16 ASSESSMENT — ENCOUNTER SYMPTOMS
COUGH: 0
BACK PAIN: 1
SHORTNESS OF BREATH: 0
BOWEL INCONTINENCE: 0
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
## 2024-09-27 ENCOUNTER — OFFICE VISIT (OUTPATIENT)
Age: 59
End: 2024-09-27
Payer: MEDICARE

## 2024-09-27 VITALS
BODY MASS INDEX: 39.27 KG/M2 | RESPIRATION RATE: 22 BRPM | SYSTOLIC BLOOD PRESSURE: 122 MMHG | WEIGHT: 236 LBS | OXYGEN SATURATION: 95 % | TEMPERATURE: 98.6 F | HEART RATE: 90 BPM | DIASTOLIC BLOOD PRESSURE: 82 MMHG

## 2024-09-27 DIAGNOSIS — I10 ESSENTIAL HYPERTENSION: Primary | ICD-10-CM

## 2024-09-27 DIAGNOSIS — E66.01 MORBID OBESITY WITH BMI OF 40.0-44.9, ADULT: ICD-10-CM

## 2024-09-27 DIAGNOSIS — E11.9 CONTROLLED TYPE 2 DIABETES MELLITUS WITHOUT COMPLICATION, WITHOUT LONG-TERM CURRENT USE OF INSULIN (HCC): ICD-10-CM

## 2024-09-27 DIAGNOSIS — E78.2 MIXED HYPERLIPIDEMIA: ICD-10-CM

## 2024-09-27 DIAGNOSIS — E66.01 CLASS 2 SEVERE OBESITY DUE TO EXCESS CALORIES WITH SERIOUS COMORBIDITY AND BODY MASS INDEX (BMI) OF 39.0 TO 39.9 IN ADULT: ICD-10-CM

## 2024-09-27 PROCEDURE — 99214 OFFICE O/P EST MOD 30 MIN: CPT | Performed by: NURSE PRACTITIONER

## 2024-09-27 PROCEDURE — 3046F HEMOGLOBIN A1C LEVEL >9.0%: CPT | Performed by: NURSE PRACTITIONER

## 2024-09-27 PROCEDURE — G8417 CALC BMI ABV UP PARAM F/U: HCPCS | Performed by: NURSE PRACTITIONER

## 2024-09-27 PROCEDURE — 3017F COLORECTAL CA SCREEN DOC REV: CPT | Performed by: NURSE PRACTITIONER

## 2024-09-27 PROCEDURE — 1036F TOBACCO NON-USER: CPT | Performed by: NURSE PRACTITIONER

## 2024-09-27 PROCEDURE — G8427 DOCREV CUR MEDS BY ELIG CLIN: HCPCS | Performed by: NURSE PRACTITIONER

## 2024-09-27 PROCEDURE — 3074F SYST BP LT 130 MM HG: CPT | Performed by: NURSE PRACTITIONER

## 2024-09-27 PROCEDURE — 3079F DIAST BP 80-89 MM HG: CPT | Performed by: NURSE PRACTITIONER

## 2024-09-27 PROCEDURE — 2022F DILAT RTA XM EVC RTNOPTHY: CPT | Performed by: NURSE PRACTITIONER

## 2024-09-27 RX ORDER — PHENTERMINE HYDROCHLORIDE 37.5 MG/1
37.5 TABLET ORAL
Qty: 30 TABLET | Refills: 0 | Status: SHIPPED | OUTPATIENT
Start: 2024-09-27 | End: 2024-10-27

## 2024-09-27 RX ORDER — PHENTERMINE HYDROCHLORIDE 37.5 MG/1
37.5 TABLET ORAL
Qty: 30 TABLET | Refills: 0 | Status: SHIPPED | OUTPATIENT
Start: 2024-09-27 | End: 2024-09-27 | Stop reason: SDUPTHER

## 2024-09-27 ASSESSMENT — ENCOUNTER SYMPTOMS
BACK PAIN: 1
CHEST TIGHTNESS: 0
NAUSEA: 0
COUGH: 0
ABDOMINAL PAIN: 0
VOMITING: 0
WHEEZING: 0
CONSTIPATION: 1
SHORTNESS OF BREATH: 0

## 2024-10-01 ENCOUNTER — OFFICE VISIT (OUTPATIENT)
Dept: FAMILY MEDICINE CLINIC | Age: 59
End: 2024-10-01

## 2024-10-01 ENCOUNTER — HOSPITAL ENCOUNTER (OUTPATIENT)
Age: 59
Setting detail: SPECIMEN
Discharge: HOME | End: 2024-10-01

## 2024-10-01 VITALS
TEMPERATURE: 97.3 F | SYSTOLIC BLOOD PRESSURE: 104 MMHG | BODY MASS INDEX: 39.27 KG/M2 | OXYGEN SATURATION: 94 % | HEART RATE: 98 BPM | DIASTOLIC BLOOD PRESSURE: 62 MMHG | RESPIRATION RATE: 14 BRPM | HEIGHT: 65 IN

## 2024-10-01 DIAGNOSIS — R63.8 INCREASED BMI: ICD-10-CM

## 2024-10-01 DIAGNOSIS — E66.01 MORBID OBESITY WITH BMI OF 40.0-44.9, ADULT: ICD-10-CM

## 2024-10-01 DIAGNOSIS — I10 ESSENTIAL HYPERTENSION: ICD-10-CM

## 2024-10-01 DIAGNOSIS — Z79.891 CHRONIC USE OF OPIATE DRUG FOR THERAPEUTIC PURPOSE: ICD-10-CM

## 2024-10-01 DIAGNOSIS — M54.41 CHRONIC BILATERAL LOW BACK PAIN WITH RIGHT-SIDED SCIATICA: Chronic | ICD-10-CM

## 2024-10-01 DIAGNOSIS — Z00.00 INITIAL MEDICARE ANNUAL WELLNESS VISIT: Primary | ICD-10-CM

## 2024-10-01 DIAGNOSIS — F41.9 ANXIETY AND DEPRESSION: ICD-10-CM

## 2024-10-01 DIAGNOSIS — Z00.00 INITIAL MEDICARE ANNUAL WELLNESS VISIT: ICD-10-CM

## 2024-10-01 DIAGNOSIS — G47.33 OSA ON CPAP: ICD-10-CM

## 2024-10-01 DIAGNOSIS — Z90.49 HISTORY OF CHOLECYSTECTOMY: ICD-10-CM

## 2024-10-01 DIAGNOSIS — Z90.49 HISTORY OF APPENDECTOMY: ICD-10-CM

## 2024-10-01 DIAGNOSIS — Z28.21 PNEUMOCOCCAL VACCINATION DECLINED: ICD-10-CM

## 2024-10-01 DIAGNOSIS — E78.2 MIXED HYPERLIPIDEMIA: ICD-10-CM

## 2024-10-01 DIAGNOSIS — Z87.891 QUIT SMOKING: ICD-10-CM

## 2024-10-01 DIAGNOSIS — G89.29 CHRONIC BILATERAL LOW BACK PAIN WITH RIGHT-SIDED SCIATICA: Chronic | ICD-10-CM

## 2024-10-01 DIAGNOSIS — E11.9 CONTROLLED TYPE 2 DIABETES MELLITUS WITHOUT COMPLICATION, WITHOUT LONG-TERM CURRENT USE OF INSULIN (HCC): ICD-10-CM

## 2024-10-01 DIAGNOSIS — F32.A ANXIETY AND DEPRESSION: ICD-10-CM

## 2024-10-01 DIAGNOSIS — Z28.21 INFLUENZA VACCINATION DECLINED: ICD-10-CM

## 2024-10-01 PROBLEM — E11.40 TYPE 2 DIABETES MELLITUS WITH DIABETIC NEUROPATHY (HCC): Status: RESOLVED | Noted: 2024-10-01 | Resolved: 2024-10-01

## 2024-10-01 PROBLEM — F11.20 OPIOID DEPENDENCE WITH CURRENT USE (HCC): Status: RESOLVED | Noted: 2024-07-23 | Resolved: 2024-10-01

## 2024-10-01 PROBLEM — E11.40 TYPE 2 DIABETES MELLITUS WITH DIABETIC NEUROPATHY (HCC): Status: ACTIVE | Noted: 2024-10-01

## 2024-10-01 PROBLEM — E11.22 TYPE 2 DIABETES MELLITUS WITH CHRONIC KIDNEY DISEASE (HCC): Status: RESOLVED | Noted: 2024-10-01 | Resolved: 2024-10-01

## 2024-10-01 PROBLEM — E11.22 TYPE 2 DIABETES MELLITUS WITH CHRONIC KIDNEY DISEASE (HCC): Status: ACTIVE | Noted: 2024-10-01

## 2024-10-01 PROBLEM — M51.369 DDD (DEGENERATIVE DISC DISEASE), LUMBAR: Status: RESOLVED | Noted: 2020-05-28 | Resolved: 2024-10-01

## 2024-10-01 PROBLEM — M47.816 LUMBAR FACET ARTHROPATHY: Status: RESOLVED | Noted: 2022-05-11 | Resolved: 2024-10-01

## 2024-10-01 LAB
ALBUMIN SERPL-MCNC: 4.3 G/DL (ref 3.5–5.2)
ALBUMIN/GLOB SERPL: 1 {RATIO} (ref 1–2.5)
ALP SERPL-CCNC: 47 U/L (ref 40–129)
ALT SERPL-CCNC: 21 U/L (ref 10–50)
ANION GAP SERPL CALCULATED.3IONS-SCNC: 11 MMOL/L (ref 9–16)
AST SERPL-CCNC: 25 U/L (ref 10–50)
BILIRUB SERPL-MCNC: 0.5 MG/DL (ref 0–1.2)
BILIRUB UR QL STRIP: NEGATIVE
BUN SERPL-MCNC: 8 MG/DL (ref 8–23)
CALCIUM SERPL-MCNC: 9.5 MG/DL (ref 8.6–10.4)
CHLORIDE SERPL-SCNC: 95 MMOL/L (ref 98–107)
CHOLEST SERPL-MCNC: 236 MG/DL (ref 0–199)
CHOLESTEROL/HDL RATIO: 8
CLARITY UR: CLEAR
CO2 SERPL-SCNC: 24 MMOL/L (ref 20–31)
COLOR UR: YELLOW
CREAT SERPL-MCNC: 0.9 MG/DL (ref 0.7–1.2)
EPI CELLS #/AREA URNS HPF: NORMAL /HPF (ref 0–5)
ERYTHROCYTE [DISTWIDTH] IN BLOOD BY AUTOMATED COUNT: 14.2 % (ref 11.8–14.4)
EST. AVERAGE GLUCOSE BLD GHB EST-MCNC: 137 MG/DL
GFR, ESTIMATED: >90 ML/MIN/1.73M2
GLUCOSE SERPL-MCNC: 91 MG/DL (ref 74–99)
GLUCOSE UR STRIP-MCNC: NEGATIVE MG/DL
HBA1C MFR BLD: 6.4 % (ref 4–6)
HCT VFR BLD AUTO: 40.3 % (ref 40.7–50.3)
HDLC SERPL-MCNC: 31 MG/DL
HGB BLD-MCNC: 13.3 G/DL (ref 13–17)
HGB UR QL STRIP.AUTO: ABNORMAL
KETONES UR STRIP-MCNC: NEGATIVE MG/DL
LDLC SERPL CALC-MCNC: 181 MG/DL (ref 0–100)
LEUKOCYTE ESTERASE UR QL STRIP: NEGATIVE
MCH RBC QN AUTO: 28.2 PG (ref 25.2–33.5)
MCHC RBC AUTO-ENTMCNC: 33 G/DL (ref 28.4–34.8)
MCV RBC AUTO: 85.6 FL (ref 82.6–102.9)
NITRITE UR QL STRIP: NEGATIVE
NRBC BLD-RTO: 0 PER 100 WBC
PH UR STRIP: 6 [PH] (ref 5–8)
PLATELET # BLD AUTO: 178 K/UL (ref 138–453)
PMV BLD AUTO: 10.5 FL (ref 8.1–13.5)
POTASSIUM SERPL-SCNC: 4 MMOL/L (ref 3.7–5.3)
PROT SERPL-MCNC: 7.9 G/DL (ref 6.6–8.7)
PROT UR STRIP-MCNC: NEGATIVE MG/DL
PSA SERPL-MCNC: 0.9 NG/ML (ref 0–4)
RBC # BLD AUTO: 4.71 M/UL (ref 4.21–5.77)
RBC #/AREA URNS HPF: NORMAL /HPF (ref 0–2)
SODIUM SERPL-SCNC: 130 MMOL/L (ref 136–145)
SP GR UR STRIP: 1 (ref 1–1.03)
TRIGL SERPL-MCNC: 120 MG/DL
TSH SERPL DL<=0.05 MIU/L-ACNC: 1.3 UIU/ML (ref 0.27–4.2)
UROBILINOGEN UR STRIP-ACNC: NORMAL EU/DL (ref 0–1)
VLDLC SERPL CALC-MCNC: 24 MG/DL
WBC #/AREA URNS HPF: NORMAL /HPF (ref 0–5)
WBC OTHER # BLD: 5 K/UL (ref 3.5–11.3)

## 2024-10-01 RX ORDER — ROSUVASTATIN CALCIUM 40 MG/1
40 TABLET, COATED ORAL DAILY
Qty: 90 TABLET | Refills: 1 | Status: SHIPPED | OUTPATIENT
Start: 2024-10-01

## 2024-10-01 RX ORDER — DULAGLUTIDE 1.5 MG/.5ML
1.5 INJECTION, SOLUTION SUBCUTANEOUS WEEKLY
Qty: 4 ADJUSTABLE DOSE PRE-FILLED PEN SYRINGE | Refills: 0 | Status: SHIPPED | OUTPATIENT
Start: 2024-10-01

## 2024-10-01 RX ORDER — TAMSULOSIN HYDROCHLORIDE 0.4 MG/1
0.4 CAPSULE ORAL DAILY
Qty: 90 CAPSULE | Refills: 0 | Status: SHIPPED | OUTPATIENT
Start: 2024-10-01

## 2024-10-01 ASSESSMENT — LIFESTYLE VARIABLES
HOW OFTEN DO YOU HAVE A DRINK CONTAINING ALCOHOL: NEVER
HOW MANY STANDARD DRINKS CONTAINING ALCOHOL DO YOU HAVE ON A TYPICAL DAY: PATIENT DOES NOT DRINK

## 2024-10-01 ASSESSMENT — PATIENT HEALTH QUESTIONNAIRE - PHQ9
1. LITTLE INTEREST OR PLEASURE IN DOING THINGS: NOT AT ALL
2. FEELING DOWN, DEPRESSED OR HOPELESS: NOT AT ALL
SUM OF ALL RESPONSES TO PHQ QUESTIONS 1-9: 0
9. THOUGHTS THAT YOU WOULD BE BETTER OFF DEAD, OR OF HURTING YOURSELF: NOT AT ALL
3. TROUBLE FALLING OR STAYING ASLEEP: NOT AT ALL
SUM OF ALL RESPONSES TO PHQ QUESTIONS 1-9: 0
4. FEELING TIRED OR HAVING LITTLE ENERGY: NOT AT ALL
SUM OF ALL RESPONSES TO PHQ9 QUESTIONS 1 & 2: 0
7. TROUBLE CONCENTRATING ON THINGS, SUCH AS READING THE NEWSPAPER OR WATCHING TELEVISION: NOT AT ALL
10. IF YOU CHECKED OFF ANY PROBLEMS, HOW DIFFICULT HAVE THESE PROBLEMS MADE IT FOR YOU TO DO YOUR WORK, TAKE CARE OF THINGS AT HOME, OR GET ALONG WITH OTHER PEOPLE: NOT DIFFICULT AT ALL
6. FEELING BAD ABOUT YOURSELF - OR THAT YOU ARE A FAILURE OR HAVE LET YOURSELF OR YOUR FAMILY DOWN: NOT AT ALL
5. POOR APPETITE OR OVEREATING: NOT AT ALL
SUM OF ALL RESPONSES TO PHQ QUESTIONS 1-9: 0
8. MOVING OR SPEAKING SO SLOWLY THAT OTHER PEOPLE COULD HAVE NOTICED. OR THE OPPOSITE, BEING SO FIGETY OR RESTLESS THAT YOU HAVE BEEN MOVING AROUND A LOT MORE THAN USUAL: NOT AT ALL
SUM OF ALL RESPONSES TO PHQ QUESTIONS 1-9: 0

## 2024-10-01 NOTE — PROGRESS NOTES
Medicare Annual Wellness Visit    David Valdes is here for Medicare AWV, Immunizations (Patient is due for DTaP, Covid and Flu vaccines.), Diabetes (Patient is due for Diabetic foot and retinal exams, Alb to Cr ratio, A1C, GFR), and Other (Patient stopped taking Lisinopril due to lip swelling and throat tightness, but he needs another. )  Vitals  Afebrile hemodynamically stable  Systemic exam  HEENT unremarkable  Neck unremarkable  Lungs bilateral CTA  CVS S1-S2 regular rate and rhythm  Abdomen obese soft scaphoid benign to palpation normoactive bowel sound  CNS no acute focal sensorimotor deficit  Lower extremity no edema no calf tenderness  Good capillary refill  Skin no tenting no lesions  Assessment & Plan   Initial Medicare annual wellness visit  Increased BMI  The following orders have not been finalized:  -     dulaglutide (TRULICITY) 1.5 MG/0.5ML SC injection  59-year-old male is presented for Medicare annual exam.  He does not voice any distress.  He is afebrile hemodynamically stable  Patient expressed excitement that he lost 60 pounds in response to lifestyle change and Trulicity that he is tolerating well.  Patient states that he exercise every day eat healthy has good sleep hygiene  He states that in response to the new outcome his anxiety is significantly decreased.  COPD former smoker.  He is advised to continue beta agonist and inhaled corticosteroid  Obesity with sleep apnea.  Patient has been noncompliant with CPAP.  Patient states that he is notified his pulmonologist  Hyperlipidemia on statin that he is tolerating well  History of angioedema in response to ACE inhibitor.  Patient states that his symptoms have improved since he stopped taking lisinopril.  He is hemodynamically stable with random blood pressure equal to less than 130/80  Chronic pain syndrome opiate dependent as provided by pain management  He denies tobacco, excessive alcohol or illicit drug use  Med list reviewed advised to

## 2024-10-01 NOTE — PATIENT INSTRUCTIONS

## 2024-10-15 ENCOUNTER — OFFICE VISIT (OUTPATIENT)
Dept: PAIN MANAGEMENT | Age: 59
End: 2024-10-15
Payer: MEDICARE

## 2024-10-15 VITALS — HEIGHT: 65 IN | WEIGHT: 236 LBS | BODY MASS INDEX: 39.32 KG/M2

## 2024-10-15 DIAGNOSIS — M54.41 CHRONIC BILATERAL LOW BACK PAIN WITH RIGHT-SIDED SCIATICA: Chronic | ICD-10-CM

## 2024-10-15 DIAGNOSIS — G47.33 OSA ON CPAP: ICD-10-CM

## 2024-10-15 DIAGNOSIS — Z79.891 CHRONIC USE OF OPIATE DRUG FOR THERAPEUTIC PURPOSE: Primary | ICD-10-CM

## 2024-10-15 DIAGNOSIS — G89.29 CHRONIC BILATERAL LOW BACK PAIN WITH RIGHT-SIDED SCIATICA: Chronic | ICD-10-CM

## 2024-10-15 PROCEDURE — 1036F TOBACCO NON-USER: CPT | Performed by: ANESTHESIOLOGY

## 2024-10-15 PROCEDURE — 3017F COLORECTAL CA SCREEN DOC REV: CPT | Performed by: ANESTHESIOLOGY

## 2024-10-15 PROCEDURE — 99213 OFFICE O/P EST LOW 20 MIN: CPT | Performed by: ANESTHESIOLOGY

## 2024-10-15 PROCEDURE — G8427 DOCREV CUR MEDS BY ELIG CLIN: HCPCS | Performed by: ANESTHESIOLOGY

## 2024-10-15 PROCEDURE — G8484 FLU IMMUNIZE NO ADMIN: HCPCS | Performed by: ANESTHESIOLOGY

## 2024-10-15 PROCEDURE — G8417 CALC BMI ABV UP PARAM F/U: HCPCS | Performed by: ANESTHESIOLOGY

## 2024-10-15 RX ORDER — OXYCODONE AND ACETAMINOPHEN 5; 325 MG/1; MG/1
1 TABLET ORAL 2 TIMES DAILY PRN
Qty: 60 TABLET | Refills: 0 | Status: SHIPPED | OUTPATIENT
Start: 2024-10-15 | End: 2024-11-14

## 2024-10-15 ASSESSMENT — ENCOUNTER SYMPTOMS
BACK PAIN: 1
RESPIRATORY NEGATIVE: 1
EYES NEGATIVE: 1

## 2024-10-25 ENCOUNTER — OFFICE VISIT (OUTPATIENT)
Age: 59
End: 2024-10-25
Payer: MEDICARE

## 2024-10-25 VITALS
RESPIRATION RATE: 22 BRPM | SYSTOLIC BLOOD PRESSURE: 120 MMHG | BODY MASS INDEX: 39.11 KG/M2 | HEART RATE: 96 BPM | WEIGHT: 235 LBS | OXYGEN SATURATION: 96 % | TEMPERATURE: 98.2 F | DIASTOLIC BLOOD PRESSURE: 84 MMHG

## 2024-10-25 DIAGNOSIS — E78.2 MIXED HYPERLIPIDEMIA: ICD-10-CM

## 2024-10-25 DIAGNOSIS — E11.9 CONTROLLED TYPE 2 DIABETES MELLITUS WITHOUT COMPLICATION, WITHOUT LONG-TERM CURRENT USE OF INSULIN (HCC): ICD-10-CM

## 2024-10-25 DIAGNOSIS — E66.01 CLASS 2 SEVERE OBESITY DUE TO EXCESS CALORIES WITH SERIOUS COMORBIDITY AND BODY MASS INDEX (BMI) OF 39.0 TO 39.9 IN ADULT: ICD-10-CM

## 2024-10-25 DIAGNOSIS — I10 ESSENTIAL HYPERTENSION: Primary | ICD-10-CM

## 2024-10-25 DIAGNOSIS — E66.812 CLASS 2 SEVERE OBESITY DUE TO EXCESS CALORIES WITH SERIOUS COMORBIDITY AND BODY MASS INDEX (BMI) OF 39.0 TO 39.9 IN ADULT: ICD-10-CM

## 2024-10-25 PROCEDURE — 3017F COLORECTAL CA SCREEN DOC REV: CPT | Performed by: NURSE PRACTITIONER

## 2024-10-25 PROCEDURE — G8427 DOCREV CUR MEDS BY ELIG CLIN: HCPCS | Performed by: NURSE PRACTITIONER

## 2024-10-25 PROCEDURE — G8484 FLU IMMUNIZE NO ADMIN: HCPCS | Performed by: NURSE PRACTITIONER

## 2024-10-25 PROCEDURE — 3074F SYST BP LT 130 MM HG: CPT | Performed by: NURSE PRACTITIONER

## 2024-10-25 PROCEDURE — 3044F HG A1C LEVEL LT 7.0%: CPT | Performed by: NURSE PRACTITIONER

## 2024-10-25 PROCEDURE — G8417 CALC BMI ABV UP PARAM F/U: HCPCS | Performed by: NURSE PRACTITIONER

## 2024-10-25 PROCEDURE — 99214 OFFICE O/P EST MOD 30 MIN: CPT | Performed by: NURSE PRACTITIONER

## 2024-10-25 PROCEDURE — 2022F DILAT RTA XM EVC RTNOPTHY: CPT | Performed by: NURSE PRACTITIONER

## 2024-10-25 PROCEDURE — 3079F DIAST BP 80-89 MM HG: CPT | Performed by: NURSE PRACTITIONER

## 2024-10-25 PROCEDURE — 1036F TOBACCO NON-USER: CPT | Performed by: NURSE PRACTITIONER

## 2024-10-25 RX ORDER — PHENTERMINE HYDROCHLORIDE 37.5 MG/1
37.5 TABLET ORAL
Qty: 30 TABLET | Refills: 0 | Status: SHIPPED | OUTPATIENT
Start: 2024-10-25 | End: 2024-11-24

## 2024-10-25 ASSESSMENT — ENCOUNTER SYMPTOMS
CONSTIPATION: 1
BACK PAIN: 1
NAUSEA: 0
SHORTNESS OF BREATH: 0
WHEEZING: 0
ABDOMINAL PAIN: 0
COUGH: 0
CHEST TIGHTNESS: 0
VOMITING: 0

## 2024-10-25 NOTE — PROGRESS NOTES
[facility] INVASIVE BARIATRIC SURGERY  [address] [address]  Dept: [phone]    MEDICATION WEIGHT LOSS MANAGEMENT PROGRAM  PROGRESS NOTE     CC: Weight Loss    Patient: David Valdes      Service Date: 10/25/2024  Visit:   1 month follow up on medications    Medical Record #: [id number]  Current Visit Weight Information  Weight: 106.6 kg (235 lb)   BMI: Body mass index is 39.11 kg/m².        History: 59 y.o. male who presents today for a 1 month follow up on weight loss medication. Patient has been following monthly with our office for the management of obesity since July 2024.    Medication: Adipex  Amount of weight loss in the last month: 1 lbs  Amount of weight loss total: 38 lbs  Current dose: 37.5 mg  Side Effects: mild constipation - he has been eating more apples and using miralax which has helped.     He states that his pain has improved and his breathing has improved. He states even a friend noticed that his breathing is better.     Blood pressure is normal today, denies any issues with hypertension, tachycardia. Denies any mental health changes.      He has doing well with increasing how often he is eating and working on protein.  He does feel positive affects with his appetite and energy with the medication.     He had labs completed by his PCP on 10/1/24. A1C increased from 6.0 to 6.4. he denies excessive sweets. He is also on trulicity for his blood sugars. He has not been inconsistent with his metformin because he wasn't sure if he needed both the metformin and trulicity.   Lipid panel was elevated. He is on crestor daily.     He has increased his water to about 6 bottles per day.   He does feel like did better this month with eating more throughout the day.     Height:   5 ft 5 in  Highest Weight:  273 lbs      Exercising?   [x] Yes    [] No   He has started weight machines at the gym - he is starting with light

## 2024-10-25 NOTE — PROGRESS NOTES
Medical Nutrition Therapy for Non-Surgical Weight Loss  Nutrition Follow-Up: Weight Loss Medication    Nutrition Assessment:  Patient is taking adipex per LISHA Moncada for weight loss. Patient has lost 1 lbs since last visit.   Patient's nutrition questions include none.  Patient reports changes made since last month include eating more overall. Did shop at [employer] last week for the first time.  Patient is eating 2-3 times per day. Continuing protein focus. Incorporating vegetables and fruit.  Patient is drinking around 80-96 oz of fluid and sugar free beverages. Patient is typically drinking water. Zero pepsi now 0-1 per day.  Can't walk much due to leg pain but has also been doing some upper body strengthening. Hx paralysis, is gaining more feeling in legs and now able to walk. Started going to the gym with friend to be more active.  Discussed consistent meals with protein, then follow with a vegetable. Continue activity as able.    24-hr diet recall scanned into chart.    Vitals:   Vitals:    10/25/24 1125   BP: 120/84   Pulse: 96   Resp: 22   Temp: 98.2 °F (36.8 °C)   SpO2: 96%   Weight: 106.6 kg (235 lb)      Body mass index is 39.11 kg/m².    Estimated Energy Needs:  Calorie (MSJ x AF - 500): 1800 kcals  Protein: 60-80 grams protein  Fluids: minimum 64 oz fluid    Nutrition Diagnosis:  Overweight/Obesity related to complex combination of decreased energy needs, disordered eating patterns, physical inactivity, and increased psychological/life stress as evidenced by Body mass index is 39.11 kg/m².    Nutrition Intervention:  Diet: Low-Fat Diet, 60-80 gm of protein, and 64 oz of fluid/day.    Nutrition Education: Nutrition Care Manual    Goal:   Eat a variety of fruits and vegetables, lean protein, whole grains and low-fat dairy.   Sip on water or sugar-free fluid throughout the day.   Aim for 3 meals and 1-2 snacks daily.    Discuss activity with physician and determine a plan for remaining

## 2024-11-12 ENCOUNTER — OFFICE VISIT (OUTPATIENT)
Dept: PAIN MANAGEMENT | Age: 59
End: 2024-11-12
Payer: MEDICARE

## 2024-11-12 VITALS — HEART RATE: 96 BPM | WEIGHT: 235 LBS | BODY MASS INDEX: 39.15 KG/M2 | HEIGHT: 65 IN | OXYGEN SATURATION: 96 %

## 2024-11-12 DIAGNOSIS — Z79.891 CHRONIC USE OF OPIATE DRUG FOR THERAPEUTIC PURPOSE: ICD-10-CM

## 2024-11-12 DIAGNOSIS — G89.29 CHRONIC BILATERAL LOW BACK PAIN WITH RIGHT-SIDED SCIATICA: Chronic | ICD-10-CM

## 2024-11-12 DIAGNOSIS — M54.41 CHRONIC BILATERAL LOW BACK PAIN WITH RIGHT-SIDED SCIATICA: Chronic | ICD-10-CM

## 2024-11-12 PROCEDURE — G8484 FLU IMMUNIZE NO ADMIN: HCPCS | Performed by: ANESTHESIOLOGY

## 2024-11-12 PROCEDURE — G8427 DOCREV CUR MEDS BY ELIG CLIN: HCPCS | Performed by: ANESTHESIOLOGY

## 2024-11-12 PROCEDURE — G8417 CALC BMI ABV UP PARAM F/U: HCPCS | Performed by: ANESTHESIOLOGY

## 2024-11-12 PROCEDURE — 99213 OFFICE O/P EST LOW 20 MIN: CPT | Performed by: ANESTHESIOLOGY

## 2024-11-12 PROCEDURE — 1036F TOBACCO NON-USER: CPT | Performed by: ANESTHESIOLOGY

## 2024-11-12 PROCEDURE — 3017F COLORECTAL CA SCREEN DOC REV: CPT | Performed by: ANESTHESIOLOGY

## 2024-11-12 RX ORDER — OXYCODONE AND ACETAMINOPHEN 5; 325 MG/1; MG/1
1 TABLET ORAL 2 TIMES DAILY PRN
Qty: 60 TABLET | Refills: 0 | Status: SHIPPED | OUTPATIENT
Start: 2024-11-14 | End: 2024-12-14

## 2024-11-12 ASSESSMENT — ENCOUNTER SYMPTOMS
CONSTIPATION: 1
COUGH: 0
NAUSEA: 0
DIARRHEA: 1
SHORTNESS OF BREATH: 0
BACK PAIN: 1
VOMITING: 0

## 2024-11-12 NOTE — PROGRESS NOTES
The patient is a 59 y.o.Non- / non  male.    Chief Complaint   Patient presents with    Back Pain      Tenia this is 59-year-old man with history of chronic axial lower back pain with intermittent radiation down right lower extremities  Past history significant for morbid obesity and obstructive sleep apnea  Tried injection in the past without much relief  On chronic opioid therapy with oxycodone reports no side effect Daily morphine equaling 15  States that the medications do help him significantly for pain  Denies any illicit substance use  Is still report high pain score but states that the medication is helpful  Automated prescription report reviewed  Refill ordered  Safe use of medication discussed    Pain score Today:  9  Adverse effects (Constipation / Nausea / Sedation / sexual Dysfunction / others) : constapation  Mood: good  Sleep pattern and quality: good  Activity level: good    Pill count Today:0  Last dose taken  12/12/2024 am  OARRS report reviewed today: yes  ER/Hospitalizations/PCP visit related to pain since last visit:no   Any legal problems e.g. DUI etc.:No  Satisfied with current management: Yes    Opioid Contract:11/11/2022  Last Urine Dug screen dated:08/12/2024    Hemoglobin A1C   Date Value Ref Range Status   10/01/2024 6.4 (H) 4.0 - 6.0 % Final       Past Medical History, Past Surgical History, Social History, Allergies and Medications reviewed and updated in EPIC as indicated    Family History reviewed and is noncontributory.          Past Medical History:   Diagnosis Date    Asthma     COPD (chronic obstructive pulmonary disease) (HCC)     Diabetes mellitus (HCC)     HTN (hypertension)     Hyperlipidemia     Hypertension     Lung disease     Migraine     Neuropathy     Positive FIT (fecal immunochemical test)     Renal failure     Sleep apnea     CPAP        Past Surgical History:   Procedure Laterality Date    ANESTHESIA NERVE BLOCK Bilateral 09/18/2017    NERVE BLOCK

## 2024-11-19 RX ORDER — ALBUTEROL SULFATE 90 UG/1
2 INHALANT RESPIRATORY (INHALATION) 4 TIMES DAILY PRN
Qty: 3 EACH | Refills: 2 | Status: SHIPPED | OUTPATIENT
Start: 2024-11-19

## 2024-11-19 NOTE — TELEPHONE ENCOUNTER
David Valdes is calling to request a refill on the following medication(s):    Medication Request:  Requested Prescriptions     Pending Prescriptions Disp Refills    albuterol sulfate HFA (PROVENTIL;VENTOLIN;PROAIR) 108 (90 Base) MCG/ACT inhaler [Pharmacy Med Name: ALBUTEROL HFA (VENTOLIN) INH] 54 each 1     Sig: INHALE 2 PUFFS INTO THE LUNGS 4 TIMES DAILY AS NEEDED FOR WHEEZING.       Last Visit Date (If Applicable):  10/1/2024    Next Visit Date:    2/3/2025

## 2024-11-22 ENCOUNTER — OFFICE VISIT (OUTPATIENT)
Age: 59
End: 2024-11-22
Payer: MEDICARE

## 2024-11-22 VITALS
BODY MASS INDEX: 35.94 KG/M2 | DIASTOLIC BLOOD PRESSURE: 85 MMHG | RESPIRATION RATE: 24 BRPM | HEART RATE: 93 BPM | OXYGEN SATURATION: 98 % | TEMPERATURE: 98 F | WEIGHT: 216 LBS | SYSTOLIC BLOOD PRESSURE: 127 MMHG

## 2024-11-22 DIAGNOSIS — E66.01 CLASS 2 SEVERE OBESITY DUE TO EXCESS CALORIES WITH SERIOUS COMORBIDITY AND BODY MASS INDEX (BMI) OF 35.0 TO 35.9 IN ADULT: ICD-10-CM

## 2024-11-22 DIAGNOSIS — E66.812 CLASS 2 SEVERE OBESITY DUE TO EXCESS CALORIES WITH SERIOUS COMORBIDITY AND BODY MASS INDEX (BMI) OF 35.0 TO 35.9 IN ADULT: ICD-10-CM

## 2024-11-22 DIAGNOSIS — I10 ESSENTIAL HYPERTENSION: Primary | ICD-10-CM

## 2024-11-22 DIAGNOSIS — E11.9 CONTROLLED TYPE 2 DIABETES MELLITUS WITHOUT COMPLICATION, WITHOUT LONG-TERM CURRENT USE OF INSULIN (HCC): ICD-10-CM

## 2024-11-22 PROCEDURE — 3074F SYST BP LT 130 MM HG: CPT | Performed by: NURSE PRACTITIONER

## 2024-11-22 PROCEDURE — 3017F COLORECTAL CA SCREEN DOC REV: CPT | Performed by: NURSE PRACTITIONER

## 2024-11-22 PROCEDURE — 3079F DIAST BP 80-89 MM HG: CPT | Performed by: NURSE PRACTITIONER

## 2024-11-22 PROCEDURE — 3044F HG A1C LEVEL LT 7.0%: CPT | Performed by: NURSE PRACTITIONER

## 2024-11-22 PROCEDURE — 99213 OFFICE O/P EST LOW 20 MIN: CPT | Performed by: NURSE PRACTITIONER

## 2024-11-22 PROCEDURE — 2022F DILAT RTA XM EVC RTNOPTHY: CPT | Performed by: NURSE PRACTITIONER

## 2024-11-22 PROCEDURE — 1036F TOBACCO NON-USER: CPT | Performed by: NURSE PRACTITIONER

## 2024-11-22 PROCEDURE — G8484 FLU IMMUNIZE NO ADMIN: HCPCS | Performed by: NURSE PRACTITIONER

## 2024-11-22 PROCEDURE — G8427 DOCREV CUR MEDS BY ELIG CLIN: HCPCS | Performed by: NURSE PRACTITIONER

## 2024-11-22 PROCEDURE — G8417 CALC BMI ABV UP PARAM F/U: HCPCS | Performed by: NURSE PRACTITIONER

## 2024-11-22 RX ORDER — PHENTERMINE HYDROCHLORIDE 37.5 MG/1
37.5 TABLET ORAL
Qty: 30 TABLET | Refills: 0 | Status: SHIPPED | OUTPATIENT
Start: 2024-11-22 | End: 2024-12-22

## 2024-11-22 ASSESSMENT — ENCOUNTER SYMPTOMS
CONSTIPATION: 1
NAUSEA: 0
ABDOMINAL PAIN: 0
VOMITING: 0
COUGH: 0
SHORTNESS OF BREATH: 0
BACK PAIN: 1
WHEEZING: 0
CHEST TIGHTNESS: 0

## 2024-11-22 NOTE — PROGRESS NOTES
[facility] INVASIVE BARIATRIC SURGERY  [address] [address]  Dept: [phone]    MEDICATION WEIGHT LOSS MANAGEMENT PROGRAM  PROGRESS NOTE     CC: Weight Loss    Patient: David Valdes      Service Date: 11/22/2024  Visit:   1 month follow up on medications    Medical Record #: [id number]  Current Visit Weight Information  Weight: 98 kg (216 lb)   BMI: Body mass index is 35.94 kg/m².        History: 59 y.o. male who presents today for a 1 month follow up on weight loss medication. Patient has been following monthly with our office for the management of obesity since July 2024.    Medication: Adipex  Amount of weight loss in the last month: 19 lbs  Amount of weight loss total: 54 lbs  Current dose: 37.5 mg  Side Effects: mild constipation - he has been eating more apples and using miralax which has helped.     Blood pressure is normal today, denies any issues with hypertension, tachycardia. Denies any mental health changes.      He has doing well with increasing how often he is eating and working on protein.  He does feel positive affects with his appetite and energy with the medication.     He states 4 days ago, he hurt his back so he hasn't been eating much because of the pain.   He states he started working out more and likely flared up his back.   He has been using the air fryer a lot for his meats.   He also restarted his metformin last month after we talked.     He has increased his water to about 6 bottles per day.       Height:   5 ft 5 in  Highest Weight:  273 lbs      Exercising?   [x] Yes    [] No   He has started weight machines at the gym - he is starting with light weights. He is also using the treadmill and the bicycle. He has been going twice a week.   He also got a new dog so he started walking the dog.   Review of Systems:     Review of Systems   Constitutional:  Negative for appetite change, chills, diaphoresis,

## 2024-12-02 NOTE — PROGRESS NOTES
Subjective:      Patient ID: David Valdes is a 59 y.o. male.     Patient must see physician at next appointment    HPI  Patient here for MARLENE, pulmonary nodule, obesity. Last seen in office on July 2024 per       [] Dr. Jeff  [] Dr. Mccartney  [] Dr. Zaldivar  [] Dr. Nelson  [] Dr. Blake  [x] Dr. Joseph  [] Dr. Suarez  [] BRADLEY Solomon APRN- CNP      Today's visit:     No compliance data available.  Patient admittedly has not been utilizing his machine.  States that it still packed up in the bag next to his bed.  States that when he was using the machine he was waking up with it laying on the floor or being knocked over and that he is pulling off his mask without even being aware of it overnight.    Patient unfortunately continues to smoke, previously had quit smoking with success with hypnosis however with life stressors unfortunately resumed smoking, planning to return to [facility] and follow-up with his hypnotist for repeat hypnosis in the near future.  Indicates that he is been using his albuterol inhaler more frequently, Spiriva was no longer covered by his insurance and he was not able to get refills of his Dulera.  Reviewed patient's pulmonary function test noting mild restrictive ventilatory dysfunction.  Given patient reports a cough that he has difficulty expectorating secretions with suggested that we adjust his medications to a LABA LAMA and ordered Anoro.  Patient remains eligible for CT lung screening, next is due in July 2025.  Ordered today.      Medications:   Dulera 50-5 not   Spiriva 2.5 - not covered by insurance  Changed to Anoro 12/10/2024    Smoking status:  Cigarettes: Current daily smoker has at least a 65-pack-year smoking history  Illicit: no  Vaping: no      PRIOR WORKUP:  PFT:  PFT January 2024: FVC 70% of predicted, FEV1 77% of predicted, FEV1/FVC ratio 109% of predicted.  FEF % of predicted.  % of predicted TLC 84% of predicted.  Diffusion capacity 66% of predicted.

## 2024-12-09 ENCOUNTER — TELEPHONE (OUTPATIENT)
Dept: FAMILY MEDICINE CLINIC | Age: 59
End: 2024-12-09

## 2024-12-09 RX ORDER — DULAGLUTIDE 3 MG/.5ML
3 INJECTION, SOLUTION SUBCUTANEOUS WEEKLY
Qty: 4 ADJUSTABLE DOSE PRE-FILLED PEN SYRINGE | Refills: 0 | Status: SHIPPED | OUTPATIENT
Start: 2024-12-09

## 2024-12-09 NOTE — TELEPHONE ENCOUNTER
Patient called for refill of Trulicity , ready to move to next dosage. Using [facility]

## 2024-12-10 ENCOUNTER — OFFICE VISIT (OUTPATIENT)
Dept: PULMONOLOGY | Age: 59
End: 2024-12-10
Payer: MEDICARE

## 2024-12-10 VITALS
SYSTOLIC BLOOD PRESSURE: 144 MMHG | DIASTOLIC BLOOD PRESSURE: 99 MMHG | OXYGEN SATURATION: 98 % | RESPIRATION RATE: 18 BRPM | HEART RATE: 86 BPM

## 2024-12-10 DIAGNOSIS — Z87.891 PERSONAL HISTORY OF TOBACCO USE: ICD-10-CM

## 2024-12-10 DIAGNOSIS — J44.9 CHRONIC OBSTRUCTIVE PULMONARY DISEASE, UNSPECIFIED COPD TYPE (HCC): Primary | ICD-10-CM

## 2024-12-10 DIAGNOSIS — G47.33 OSA ON CPAP: ICD-10-CM

## 2024-12-10 DIAGNOSIS — R91.1 LUNG NODULE SEEN ON IMAGING STUDY: ICD-10-CM

## 2024-12-10 PROCEDURE — 1036F TOBACCO NON-USER: CPT | Performed by: NURSE PRACTITIONER

## 2024-12-10 PROCEDURE — G8417 CALC BMI ABV UP PARAM F/U: HCPCS | Performed by: NURSE PRACTITIONER

## 2024-12-10 PROCEDURE — G8484 FLU IMMUNIZE NO ADMIN: HCPCS | Performed by: NURSE PRACTITIONER

## 2024-12-10 PROCEDURE — 3023F SPIROM DOC REV: CPT | Performed by: NURSE PRACTITIONER

## 2024-12-10 PROCEDURE — 3077F SYST BP >= 140 MM HG: CPT | Performed by: NURSE PRACTITIONER

## 2024-12-10 PROCEDURE — 99214 OFFICE O/P EST MOD 30 MIN: CPT | Performed by: NURSE PRACTITIONER

## 2024-12-10 PROCEDURE — 3017F COLORECTAL CA SCREEN DOC REV: CPT | Performed by: NURSE PRACTITIONER

## 2024-12-10 PROCEDURE — G0296 VISIT TO DETERM LDCT ELIG: HCPCS | Performed by: NURSE PRACTITIONER

## 2024-12-10 PROCEDURE — G8427 DOCREV CUR MEDS BY ELIG CLIN: HCPCS | Performed by: NURSE PRACTITIONER

## 2024-12-10 PROCEDURE — 3080F DIAST BP >= 90 MM HG: CPT | Performed by: NURSE PRACTITIONER

## 2024-12-10 ASSESSMENT — ENCOUNTER SYMPTOMS
RESPIRATORY NEGATIVE: 1
GASTROINTESTINAL NEGATIVE: 1
EYES NEGATIVE: 1
ALLERGIC/IMMUNOLOGIC NEGATIVE: 1

## 2024-12-10 NOTE — PATIENT INSTRUCTIONS
follow-up, he or she will help you understand what to do next.  After a lung cancer screening, you can go back to your usual activities right away.  A lung cancer screening test can't tell if you have lung cancer. If your results are positive, your doctor can't tell whether an abnormal finding is a harmless nodule, cancer, or something else without doing more tests.  What can you do to help prevent lung cancer?  Some lung cancers can't be prevented. But if you smoke, quitting smoking is the best step you can take to prevent lung cancer. If you want to quit, your doctor can recommend medicines or other ways to help.  Follow-up care is a key part of your treatment and safety. Be sure to make and go to all appointments, and call your doctor if you are having problems. It's also a good idea to know your test results and keep a list of the medicines you take.  Where can you learn more?  Go to https://www.[employer].net/patientEd and enter Q940 to learn more about \"Learning About Lung Cancer Screening.\"  Current as of: October 25, 2023  Content Version: 14.2  © 2024 [employer].   Care instructions adapted under license by [employer]. If you have questions about a medical condition or this instruction, always ask your healthcare professional. [employer] disclaims any warranty or liability for your use of this information.

## 2024-12-12 ENCOUNTER — TELEPHONE (OUTPATIENT)
Dept: BARIATRICS/WEIGHT MGMT | Age: 59
End: 2024-12-12

## 2024-12-12 ENCOUNTER — OFFICE VISIT (OUTPATIENT)
Dept: PAIN MANAGEMENT | Age: 59
End: 2024-12-12
Payer: MEDICARE

## 2024-12-12 VITALS — BODY MASS INDEX: 35.99 KG/M2 | HEIGHT: 65 IN | WEIGHT: 216 LBS

## 2024-12-12 DIAGNOSIS — M47.26 OSTEOARTHRITIS OF SPINE WITH RADICULOPATHY, LUMBAR REGION: ICD-10-CM

## 2024-12-12 DIAGNOSIS — E66.01 CLASS 2 SEVERE OBESITY DUE TO EXCESS CALORIES WITH SERIOUS COMORBIDITY AND BODY MASS INDEX (BMI) OF 39.0 TO 39.9 IN ADULT: ICD-10-CM

## 2024-12-12 DIAGNOSIS — Z79.891 CHRONIC USE OF OPIATE DRUG FOR THERAPEUTIC PURPOSE: ICD-10-CM

## 2024-12-12 DIAGNOSIS — E66.812 CLASS 2 SEVERE OBESITY DUE TO EXCESS CALORIES WITH SERIOUS COMORBIDITY AND BODY MASS INDEX (BMI) OF 39.0 TO 39.9 IN ADULT: ICD-10-CM

## 2024-12-12 DIAGNOSIS — M47.817 LUMBOSACRAL SPONDYLOSIS WITHOUT MYELOPATHY: ICD-10-CM

## 2024-12-12 DIAGNOSIS — M47.816 LUMBAR FACET ARTHROPATHY: Primary | ICD-10-CM

## 2024-12-12 PROCEDURE — G8417 CALC BMI ABV UP PARAM F/U: HCPCS | Performed by: NURSE PRACTITIONER

## 2024-12-12 PROCEDURE — G8427 DOCREV CUR MEDS BY ELIG CLIN: HCPCS | Performed by: NURSE PRACTITIONER

## 2024-12-12 PROCEDURE — G8484 FLU IMMUNIZE NO ADMIN: HCPCS | Performed by: NURSE PRACTITIONER

## 2024-12-12 PROCEDURE — 1036F TOBACCO NON-USER: CPT | Performed by: NURSE PRACTITIONER

## 2024-12-12 PROCEDURE — 3017F COLORECTAL CA SCREEN DOC REV: CPT | Performed by: NURSE PRACTITIONER

## 2024-12-12 PROCEDURE — 99213 OFFICE O/P EST LOW 20 MIN: CPT | Performed by: NURSE PRACTITIONER

## 2024-12-12 RX ORDER — OXYCODONE AND ACETAMINOPHEN 5; 325 MG/1; MG/1
1 TABLET ORAL 2 TIMES DAILY PRN
Qty: 60 TABLET | Refills: 0 | Status: SHIPPED | OUTPATIENT
Start: 2024-12-15 | End: 2025-01-14

## 2024-12-12 RX ORDER — PHENTERMINE HYDROCHLORIDE 37.5 MG/1
37.5 TABLET ORAL
Qty: 30 TABLET | Refills: 0 | Status: SHIPPED | OUTPATIENT
Start: 2024-12-12 | End: 2025-01-11

## 2024-12-12 ASSESSMENT — ENCOUNTER SYMPTOMS
CONSTIPATION: 0
COUGH: 0
SHORTNESS OF BREATH: 0
BACK PAIN: 1

## 2024-12-12 NOTE — TELEPHONE ENCOUNTER
Patient went to  Adipex and was told by pharmacy there was no prescription sent to them on 11/22/24.  I did confirm pharmacy received E-Script.  Please send new order to pharmacy, patients appointment for 12/20/24 was rescheduled due to not having medication-patient ran out today.

## 2024-12-12 NOTE — PROGRESS NOTES
Relevant Medications    oxyCODONE-acetaminophen (PERCOCET) 5-325 MG per tablet (Start on 12/15/2024)     Other Visit Diagnoses       Lumbar facet arthropathy    -  Primary    Lumbosacral spondylosis without myelopathy        Relevant Medications    oxyCODONE-acetaminophen (PERCOCET) 5-325 MG per tablet (Start on 12/15/2024)    Osteoarthritis of spine with radiculopathy, lumbar region        Relevant Medications    oxyCODONE-acetaminophen (PERCOCET) 5-325 MG per tablet (Start on 12/15/2024)               Treatment Plan:  Patient relates current medications are helping the pain. Patient reports taking pain medications as prescribed, denies obtaining medications from different sources and denies use of illegal drugs. Medication risk and benefits have been discussed. Patient denies side effects from medications like nausea, vomiting, constipation or drowsiness. Patient reports current activities of daily living are possible due to medications and would like to continue them.     As always, we encourage daily stretching and strengthening exercises, and recommend minimizing use of pain medications unless patient cannot get through daily activities due to pain.    We have discussed with the patient the effect the patient’s medical condition and opioid medication may have on the patient’s ability to safely operate a vehicle. Pt verbalized understanding.     Due to the high risk nature of this patient's pain medication close monitoring is required.   Continue current medication management, pt has been stable and compliant.  Script written for percocet  He continues to work on weight loss  Follow up appointment made for 4 weeks    I have reviewed the chief complaint and history of present illness (including ROS and PFSH) and vital documentation by my staff and I agree with their documentation and have added where applicable.

## 2025-01-03 RX ORDER — DULAGLUTIDE 3 MG/.5ML
3 INJECTION, SOLUTION SUBCUTANEOUS WEEKLY
Qty: 4 ADJUSTABLE DOSE PRE-FILLED PEN SYRINGE | Refills: 0 | Status: SHIPPED | OUTPATIENT
Start: 2025-01-03

## 2025-01-03 NOTE — TELEPHONE ENCOUNTER
David Valdes is calling to request a refill on the following medication(s):    Medication Request:  Requested Prescriptions     Pending Prescriptions Disp Refills    Dulaglutide (TRULICITY) 3 MG/0.5ML SOAJ 4 Adjustable Dose Pre-filled Pen Syringe 0     Sig: Inject 3 mg into the skin once a week       Last Visit Date (If Applicable):  10/1/2024    Next Visit Date:    2/3/2025

## 2025-01-07 ENCOUNTER — OFFICE VISIT (OUTPATIENT)
Dept: PAIN MANAGEMENT | Age: 60
End: 2025-01-07
Payer: MEDICARE

## 2025-01-07 VITALS — BODY MASS INDEX: 35.99 KG/M2 | HEIGHT: 65 IN | WEIGHT: 216 LBS

## 2025-01-07 DIAGNOSIS — Z79.891 CHRONIC USE OF OPIATE DRUG FOR THERAPEUTIC PURPOSE: ICD-10-CM

## 2025-01-07 DIAGNOSIS — G89.29 CHRONIC BILATERAL LOW BACK PAIN WITH RIGHT-SIDED SCIATICA: Primary | Chronic | ICD-10-CM

## 2025-01-07 DIAGNOSIS — R31.0 GROSS HEMATURIA: ICD-10-CM

## 2025-01-07 DIAGNOSIS — M54.41 CHRONIC BILATERAL LOW BACK PAIN WITH RIGHT-SIDED SCIATICA: Primary | Chronic | ICD-10-CM

## 2025-01-07 PROCEDURE — 99214 OFFICE O/P EST MOD 30 MIN: CPT | Performed by: ANESTHESIOLOGY

## 2025-01-07 PROCEDURE — 1036F TOBACCO NON-USER: CPT | Performed by: ANESTHESIOLOGY

## 2025-01-07 PROCEDURE — G8427 DOCREV CUR MEDS BY ELIG CLIN: HCPCS | Performed by: ANESTHESIOLOGY

## 2025-01-07 PROCEDURE — G8417 CALC BMI ABV UP PARAM F/U: HCPCS | Performed by: ANESTHESIOLOGY

## 2025-01-07 PROCEDURE — M1308 PR FLU IMMUNIZE NO ADMIN: HCPCS | Performed by: ANESTHESIOLOGY

## 2025-01-07 PROCEDURE — 3017F COLORECTAL CA SCREEN DOC REV: CPT | Performed by: ANESTHESIOLOGY

## 2025-01-07 RX ORDER — OXYCODONE AND ACETAMINOPHEN 5; 325 MG/1; MG/1
1 TABLET ORAL 2 TIMES DAILY PRN
Qty: 60 TABLET | Refills: 0 | Status: SHIPPED | OUTPATIENT
Start: 2025-01-14 | End: 2025-02-13

## 2025-01-07 ASSESSMENT — ENCOUNTER SYMPTOMS
CHEST TIGHTNESS: 0
SHORTNESS OF BREATH: 0
ABDOMINAL PAIN: 0
ALLERGIC/IMMUNOLOGIC NEGATIVE: 1
SORE THROAT: 0

## 2025-01-07 NOTE — PROGRESS NOTES
Controlled Substance Monitoring Chronic Pain > 50 MEDD   1/7/2025  10:10 AM Possible medication side effects, risk of tolerance/dependence & alternative treatments discussed.;No signs of potential drug abuse or diversion identified. Obtained or confirmed \"Consent for Opioid Use\" on file.               Electronically signed by Anmol Burnham MD on 1/7/2025 at 10:15 AM

## 2025-01-10 ENCOUNTER — OFFICE VISIT (OUTPATIENT)
Age: 60
End: 2025-01-10
Payer: MEDICARE

## 2025-01-10 VITALS
WEIGHT: 227 LBS | SYSTOLIC BLOOD PRESSURE: 140 MMHG | RESPIRATION RATE: 20 BRPM | TEMPERATURE: 97.5 F | BODY MASS INDEX: 37.77 KG/M2 | OXYGEN SATURATION: 95 % | DIASTOLIC BLOOD PRESSURE: 90 MMHG | HEART RATE: 88 BPM

## 2025-01-10 DIAGNOSIS — E66.01 CLASS 2 SEVERE OBESITY DUE TO EXCESS CALORIES WITH SERIOUS COMORBIDITY AND BODY MASS INDEX (BMI) OF 37.0 TO 37.9 IN ADULT: ICD-10-CM

## 2025-01-10 DIAGNOSIS — E78.2 MIXED HYPERLIPIDEMIA: ICD-10-CM

## 2025-01-10 DIAGNOSIS — I10 ESSENTIAL HYPERTENSION: ICD-10-CM

## 2025-01-10 DIAGNOSIS — E66.812 CLASS 2 SEVERE OBESITY DUE TO EXCESS CALORIES WITH SERIOUS COMORBIDITY AND BODY MASS INDEX (BMI) OF 37.0 TO 37.9 IN ADULT: ICD-10-CM

## 2025-01-10 DIAGNOSIS — E11.9 CONTROLLED TYPE 2 DIABETES MELLITUS WITHOUT COMPLICATION, WITHOUT LONG-TERM CURRENT USE OF INSULIN (HCC): Primary | ICD-10-CM

## 2025-01-10 PROCEDURE — G8417 CALC BMI ABV UP PARAM F/U: HCPCS | Performed by: NURSE PRACTITIONER

## 2025-01-10 PROCEDURE — 3046F HEMOGLOBIN A1C LEVEL >9.0%: CPT | Performed by: NURSE PRACTITIONER

## 2025-01-10 PROCEDURE — 3080F DIAST BP >= 90 MM HG: CPT | Performed by: NURSE PRACTITIONER

## 2025-01-10 PROCEDURE — 1036F TOBACCO NON-USER: CPT | Performed by: NURSE PRACTITIONER

## 2025-01-10 PROCEDURE — 3017F COLORECTAL CA SCREEN DOC REV: CPT | Performed by: NURSE PRACTITIONER

## 2025-01-10 PROCEDURE — G8427 DOCREV CUR MEDS BY ELIG CLIN: HCPCS | Performed by: NURSE PRACTITIONER

## 2025-01-10 PROCEDURE — 2022F DILAT RTA XM EVC RTNOPTHY: CPT | Performed by: NURSE PRACTITIONER

## 2025-01-10 PROCEDURE — 3077F SYST BP >= 140 MM HG: CPT | Performed by: NURSE PRACTITIONER

## 2025-01-10 PROCEDURE — 99214 OFFICE O/P EST MOD 30 MIN: CPT | Performed by: NURSE PRACTITIONER

## 2025-01-10 RX ORDER — PHENTERMINE HYDROCHLORIDE 37.5 MG/1
37.5 TABLET ORAL
Qty: 15 TABLET | Refills: 0 | Status: SHIPPED | OUTPATIENT
Start: 2025-01-10 | End: 2025-01-25

## 2025-01-10 ASSESSMENT — ENCOUNTER SYMPTOMS
COUGH: 0
WHEEZING: 0
ABDOMINAL PAIN: 0
BACK PAIN: 1
CONSTIPATION: 1
SHORTNESS OF BREATH: 0
NAUSEA: 0
CHEST TIGHTNESS: 0
VOMITING: 0

## 2025-01-10 NOTE — PROGRESS NOTES
[facility] INVASIVE BARIATRIC SURGERY  [address] [address]  Dept: [phone]    MEDICATION WEIGHT LOSS MANAGEMENT PROGRAM  PROGRESS NOTE     CC: Weight Loss    Patient: David Valdes      Service Date: 1/10/2025  Visit:   1 month follow up on medications    Medical Record #: [id number]  Current Visit Weight Information  Weight: 103 kg (227 lb)   BMI: Body mass index is 37.77 kg/m².        History: 59 y.o. male who presents today for a 1 month follow up on weight loss medication. Patient has been following monthly with our office for the management of obesity since July 2024.    Medication: Adipex  Amount of weight loss in the last month: 0 lbs. + 11 lbs  Amount of weight loss total: 46 lbs  Current dose: 37.5 mg  Side Effects: mild constipation - he has been eating more apples and using miralax which has helped.     Patient thinks that when we saw him last, he was 226 lbs, not 216 lbs. I reviewed the notes taken on the paperwork that the MA wrote and also my documentation and I am fairly confident that he was 216 pounds at that appointment.  I was surprised that a 19 pound weight loss but there is a possibility that it was documented on the paperwork incorrectly so he may have only had a 1 pound weight gain this past month.  He has been on the Adipex since July 2024.  He has been consistent with his appointments and has done very well overall.   Denies any mental health changes.      He has been doing better about eating more throughout the day.  He was going through a period of time where he was skipping a lot of meals, eating very small amounts but diet recall today shows an improvement in regards to that.  He does feel positive affects with his appetite and energy with the medication.     He has been doing well with focusing on protein but he hasn't been looking at serving sizes. He made a home made trail mix and he said he has it, 
active.    Monitor/Evaluate:  Diet tolerance, protein intake, fluid intake, frequency of meals/snacks, activity, and follow-up monthly/prn.      Anusha Castaneda MS, RD, LD  Clinical Dietitian  [facility]  [phone]

## 2025-01-10 NOTE — PATIENT INSTRUCTIONS
Adipex Taper:    Take 1 tab daily for 1 week  Take 1/2 tab daily for 1 week   Take 1/2 tab every other day until completed.

## 2025-02-10 ENCOUNTER — OFFICE VISIT (OUTPATIENT)
Dept: FAMILY MEDICINE CLINIC | Age: 60
End: 2025-02-10
Payer: MEDICARE

## 2025-02-10 VITALS
WEIGHT: 224 LBS | OXYGEN SATURATION: 97 % | TEMPERATURE: 98.3 F | RESPIRATION RATE: 12 BRPM | SYSTOLIC BLOOD PRESSURE: 128 MMHG | DIASTOLIC BLOOD PRESSURE: 78 MMHG | HEIGHT: 65 IN | BODY MASS INDEX: 37.32 KG/M2 | HEART RATE: 85 BPM

## 2025-02-10 DIAGNOSIS — E78.2 MIXED HYPERLIPIDEMIA: ICD-10-CM

## 2025-02-10 DIAGNOSIS — J44.9 CHRONIC OBSTRUCTIVE PULMONARY DISEASE, UNSPECIFIED COPD TYPE (HCC): ICD-10-CM

## 2025-02-10 DIAGNOSIS — M54.41 CHRONIC BILATERAL LOW BACK PAIN WITH RIGHT-SIDED SCIATICA: Primary | Chronic | ICD-10-CM

## 2025-02-10 DIAGNOSIS — I10 ESSENTIAL HYPERTENSION: ICD-10-CM

## 2025-02-10 DIAGNOSIS — G89.29 CHRONIC BILATERAL LOW BACK PAIN WITH RIGHT-SIDED SCIATICA: Primary | Chronic | ICD-10-CM

## 2025-02-10 DIAGNOSIS — F32.A ANXIETY AND DEPRESSION: ICD-10-CM

## 2025-02-10 DIAGNOSIS — Z79.891 CHRONIC USE OF OPIATE DRUG FOR THERAPEUTIC PURPOSE: ICD-10-CM

## 2025-02-10 DIAGNOSIS — G47.33 OSA ON CPAP: ICD-10-CM

## 2025-02-10 DIAGNOSIS — E11.9 CONTROLLED TYPE 2 DIABETES MELLITUS WITHOUT COMPLICATION, WITHOUT LONG-TERM CURRENT USE OF INSULIN (HCC): ICD-10-CM

## 2025-02-10 DIAGNOSIS — F41.9 ANXIETY AND DEPRESSION: ICD-10-CM

## 2025-02-10 DIAGNOSIS — Z28.21 VACCINATION DECLINED: ICD-10-CM

## 2025-02-10 DIAGNOSIS — E66.01 MORBID OBESITY WITH BMI OF 40.0-44.9, ADULT: ICD-10-CM

## 2025-02-10 PROBLEM — Z90.49 HISTORY OF APPENDECTOMY: Status: RESOLVED | Noted: 2023-10-30 | Resolved: 2025-02-10

## 2025-02-10 PROBLEM — Z90.49 HISTORY OF CHOLECYSTECTOMY: Status: RESOLVED | Noted: 2023-10-30 | Resolved: 2025-02-10

## 2025-02-10 PROBLEM — Z87.891 QUIT SMOKING: Status: RESOLVED | Noted: 2023-02-23 | Resolved: 2025-02-10

## 2025-02-10 PROCEDURE — 3078F DIAST BP <80 MM HG: CPT | Performed by: FAMILY MEDICINE

## 2025-02-10 PROCEDURE — 2022F DILAT RTA XM EVC RTNOPTHY: CPT | Performed by: FAMILY MEDICINE

## 2025-02-10 PROCEDURE — 99214 OFFICE O/P EST MOD 30 MIN: CPT | Performed by: FAMILY MEDICINE

## 2025-02-10 PROCEDURE — 3046F HEMOGLOBIN A1C LEVEL >9.0%: CPT | Performed by: FAMILY MEDICINE

## 2025-02-10 PROCEDURE — 1036F TOBACCO NON-USER: CPT | Performed by: FAMILY MEDICINE

## 2025-02-10 PROCEDURE — G8417 CALC BMI ABV UP PARAM F/U: HCPCS | Performed by: FAMILY MEDICINE

## 2025-02-10 PROCEDURE — 3074F SYST BP LT 130 MM HG: CPT | Performed by: FAMILY MEDICINE

## 2025-02-10 PROCEDURE — 3017F COLORECTAL CA SCREEN DOC REV: CPT | Performed by: FAMILY MEDICINE

## 2025-02-10 PROCEDURE — 3023F SPIROM DOC REV: CPT | Performed by: FAMILY MEDICINE

## 2025-02-10 PROCEDURE — G8427 DOCREV CUR MEDS BY ELIG CLIN: HCPCS | Performed by: FAMILY MEDICINE

## 2025-02-10 RX ORDER — TAMSULOSIN HYDROCHLORIDE 0.4 MG/1
0.4 CAPSULE ORAL DAILY
Qty: 90 CAPSULE | Refills: 1 | Status: SHIPPED | OUTPATIENT
Start: 2025-02-10

## 2025-02-10 RX ORDER — ROSUVASTATIN CALCIUM 40 MG/1
40 TABLET, COATED ORAL DAILY
Qty: 90 TABLET | Refills: 1 | Status: SHIPPED | OUTPATIENT
Start: 2025-02-10

## 2025-02-10 SDOH — ECONOMIC STABILITY: FOOD INSECURITY: WITHIN THE PAST 12 MONTHS, YOU WORRIED THAT YOUR FOOD WOULD RUN OUT BEFORE YOU GOT MONEY TO BUY MORE.: NEVER TRUE

## 2025-02-10 SDOH — ECONOMIC STABILITY: FOOD INSECURITY: WITHIN THE PAST 12 MONTHS, THE FOOD YOU BOUGHT JUST DIDN'T LAST AND YOU DIDN'T HAVE MONEY TO GET MORE.: NEVER TRUE

## 2025-02-10 ASSESSMENT — ENCOUNTER SYMPTOMS
RESPIRATORY NEGATIVE: 1
GASTROINTESTINAL NEGATIVE: 1
ALLERGIC/IMMUNOLOGIC NEGATIVE: 1
EYES NEGATIVE: 1
BACK PAIN: 1

## 2025-02-10 ASSESSMENT — PATIENT HEALTH QUESTIONNAIRE - PHQ9
8. MOVING OR SPEAKING SO SLOWLY THAT OTHER PEOPLE COULD HAVE NOTICED. OR THE OPPOSITE, BEING SO FIGETY OR RESTLESS THAT YOU HAVE BEEN MOVING AROUND A LOT MORE THAN USUAL: NOT AT ALL
10. IF YOU CHECKED OFF ANY PROBLEMS, HOW DIFFICULT HAVE THESE PROBLEMS MADE IT FOR YOU TO DO YOUR WORK, TAKE CARE OF THINGS AT HOME, OR GET ALONG WITH OTHER PEOPLE: NOT DIFFICULT AT ALL
2. FEELING DOWN, DEPRESSED OR HOPELESS: NOT AT ALL
SUM OF ALL RESPONSES TO PHQ QUESTIONS 1-9: 1
SUM OF ALL RESPONSES TO PHQ9 QUESTIONS 1 & 2: 0
1. LITTLE INTEREST OR PLEASURE IN DOING THINGS: NOT AT ALL
SUM OF ALL RESPONSES TO PHQ QUESTIONS 1-9: 1
5. POOR APPETITE OR OVEREATING: NOT AT ALL
6. FEELING BAD ABOUT YOURSELF - OR THAT YOU ARE A FAILURE OR HAVE LET YOURSELF OR YOUR FAMILY DOWN: NOT AT ALL
3. TROUBLE FALLING OR STAYING ASLEEP: NOT AT ALL
SUM OF ALL RESPONSES TO PHQ QUESTIONS 1-9: 1
7. TROUBLE CONCENTRATING ON THINGS, SUCH AS READING THE NEWSPAPER OR WATCHING TELEVISION: NOT AT ALL
4. FEELING TIRED OR HAVING LITTLE ENERGY: SEVERAL DAYS
9. THOUGHTS THAT YOU WOULD BE BETTER OFF DEAD, OR OF HURTING YOURSELF: NOT AT ALL
SUM OF ALL RESPONSES TO PHQ QUESTIONS 1-9: 1

## 2025-02-10 NOTE — PROGRESS NOTES
Subjective:      Patient ID: David Valdes is a 59 y.o. male.    Hyperlipidemia  This is a chronic problem. The current episode started more than 1 year ago. The problem is uncontrolled. Recent lipid tests were reviewed and are high. Exacerbating diseases include obesity. Current antihyperlipidemic treatment includes statins. Compliance problems include adherence to exercise and psychosocial issues.  Risk factors for coronary artery disease include diabetes mellitus, dyslipidemia, male sex, obesity, hypertension, stress and a sedentary lifestyle.       Review of Systems   Constitutional: Negative.    HENT: Negative.     Eyes: Negative.    Respiratory: Negative.     Cardiovascular: Negative.    Gastrointestinal: Negative.    Endocrine: Negative.    Musculoskeletal:  Positive for arthralgias and back pain.   Skin: Negative.    Allergic/Immunologic: Negative.    Neurological: Negative.    Hematological: Negative.    Psychiatric/Behavioral:  Positive for dysphoric mood. The patient is nervous/anxious.    Past family and social history unremarkable.  .iag      Objective:   Physical Exam  Vitals and nursing note reviewed.   Constitutional:       Appearance: He is well-developed. He is obese.      Comments: Sleep apnea   HENT:      Head: Normocephalic and atraumatic.      Right Ear: External ear normal.      Left Ear: External ear normal.      Nose: Nose normal.   Eyes:      Conjunctiva/sclera: Conjunctivae normal.      Pupils: Pupils are equal, round, and reactive to light.   Cardiovascular:      Rate and Rhythm: Normal rate and regular rhythm.      Heart sounds: Normal heart sounds.      Comments: Diabetes  Hypertension  Hyperlipidemia  Pulmonary:      Effort: Pulmonary effort is normal.      Breath sounds: Normal breath sounds.      Comments: Off again on again smoking  COPD  Abdominal:      General: Bowel sounds are normal.      Palpations: Abdomen is soft.   Musculoskeletal:         General: Normal range of motion.

## 2025-02-11 ENCOUNTER — HOSPITAL ENCOUNTER (OUTPATIENT)
Age: 60
Setting detail: SPECIMEN
Discharge: HOME | End: 2025-02-11

## 2025-02-11 ENCOUNTER — OFFICE VISIT (OUTPATIENT)
Dept: PAIN MANAGEMENT | Age: 60
End: 2025-02-11
Payer: MEDICARE

## 2025-02-11 VITALS — HEIGHT: 65 IN | WEIGHT: 224 LBS | BODY MASS INDEX: 37.32 KG/M2

## 2025-02-11 DIAGNOSIS — G89.29 CHRONIC BILATERAL LOW BACK PAIN WITH RIGHT-SIDED SCIATICA: Primary | Chronic | ICD-10-CM

## 2025-02-11 DIAGNOSIS — M54.41 CHRONIC BILATERAL LOW BACK PAIN WITH RIGHT-SIDED SCIATICA: Primary | Chronic | ICD-10-CM

## 2025-02-11 DIAGNOSIS — E11.9 CONTROLLED TYPE 2 DIABETES MELLITUS WITHOUT COMPLICATION, WITHOUT LONG-TERM CURRENT USE OF INSULIN (HCC): ICD-10-CM

## 2025-02-11 DIAGNOSIS — E78.2 MIXED HYPERLIPIDEMIA: ICD-10-CM

## 2025-02-11 DIAGNOSIS — Z79.891 CHRONIC USE OF OPIATE DRUG FOR THERAPEUTIC PURPOSE: ICD-10-CM

## 2025-02-11 LAB
CHOLEST SERPL-MCNC: 163 MG/DL (ref 0–199)
CHOLESTEROL/HDL RATIO: 3.2
EST. AVERAGE GLUCOSE BLD GHB EST-MCNC: 120 MG/DL
HBA1C MFR BLD: 5.8 % (ref 4–6)
HDLC SERPL-MCNC: 51 MG/DL
LDLC SERPL CALC-MCNC: 102 MG/DL (ref 0–100)
TRIGL SERPL-MCNC: 51 MG/DL
TSH SERPL DL<=0.05 MIU/L-ACNC: 2.16 UIU/ML (ref 0.27–4.2)
VLDLC SERPL CALC-MCNC: 10 MG/DL (ref 1–30)

## 2025-02-11 PROCEDURE — G8427 DOCREV CUR MEDS BY ELIG CLIN: HCPCS | Performed by: ANESTHESIOLOGY

## 2025-02-11 PROCEDURE — 3017F COLORECTAL CA SCREEN DOC REV: CPT | Performed by: ANESTHESIOLOGY

## 2025-02-11 PROCEDURE — G8417 CALC BMI ABV UP PARAM F/U: HCPCS | Performed by: ANESTHESIOLOGY

## 2025-02-11 PROCEDURE — 99213 OFFICE O/P EST LOW 20 MIN: CPT | Performed by: ANESTHESIOLOGY

## 2025-02-11 PROCEDURE — 1036F TOBACCO NON-USER: CPT | Performed by: ANESTHESIOLOGY

## 2025-02-11 RX ORDER — OXYCODONE AND ACETAMINOPHEN 5; 325 MG/1; MG/1
1 TABLET ORAL 2 TIMES DAILY PRN
Qty: 60 TABLET | Refills: 0 | Status: SHIPPED | OUTPATIENT
Start: 2025-02-11 | End: 2025-03-13

## 2025-02-11 ASSESSMENT — ENCOUNTER SYMPTOMS
RESPIRATORY NEGATIVE: 1
EYES NEGATIVE: 1
BACK PAIN: 1

## 2025-02-11 NOTE — PROGRESS NOTES
The patient is a 59 y.o.Non- / non  male.    Chief Complaint   Patient presents with    Back Pain        HPI  Chronic lower back pain issues for which she is followed in the clinic  Have tried physical therapy and different interventional procedure  Have lost significant amount of weight and have noticed improvement in back pain  On chronic low-dose opioid therapy reports no side effect, no sign or symptom of any aberrancy  Automated prescription report reviewed  Refill ordered    For hematuria he is advised to follow-up with urology    Patient report 5-day history of migraine, advised him to call PCP and also consider neurology referral     medication Refill:  Percocet     Pain score Today:  8  Adverse effects (Constipation / Nausea / Sedation / sexual Dysfunction / others) : no  Mood: poor  Sleep pattern and quality: poor  Activity level: fair    Pill count Today: 1.5  Last dose taken  02/11/2025 morning   OARRS report reviewed today: yes  ER/Hospitalizations/PCP visit related to pain since last visit:no   Any legal problems e.g. DUI etc.:No  Satisfied with current management: Yes    Opioid Contract: 11/12/2024  Last Urine Dug screen dated: 08/12/2024    Hemoglobin A1C   Date Value Ref Range Status   10/01/2024 6.4 (H) 4.0 - 6.0 % Final       Past Medical History, Past Surgical History, Social History, Allergies and Medications reviewed and updated in EPIC as indicated    Family History reviewed and is noncontributory.        Past Medical History:   Diagnosis Date    Asthma     COPD (chronic obstructive pulmonary disease) (HCC)     Diabetes mellitus (HCC)     On weight loss program 2024    HTN (hypertension)     Hyperlipidemia     Hypertension     Lung disease     Migraine     Neuropathy     Positive FIT (fecal immunochemical test)     Renal failure     Sleep apnea     CPAP        Past Surgical History:   Procedure Laterality Date    ANESTHESIA NERVE BLOCK Bilateral 09/18/2017    NERVE BLOCK

## 2025-02-27 RX ORDER — DULAGLUTIDE 3 MG/.5ML
3 INJECTION, SOLUTION SUBCUTANEOUS WEEKLY
Qty: 4 ADJUSTABLE DOSE PRE-FILLED PEN SYRINGE | Refills: 0 | Status: SHIPPED | OUTPATIENT
Start: 2025-02-27

## 2025-02-27 NOTE — TELEPHONE ENCOUNTER
David Valdes is calling to request a refill on the following medication(s):    Medication Request:  Requested Prescriptions     Pending Prescriptions Disp Refills    Dulaglutide (TRULICITY) 3 MG/0.5ML SOAJ 4 Adjustable Dose Pre-filled Pen Syringe 0     Sig: Inject 3 mg into the skin once a week       Last Visit Date (If Applicable):  2/10/2025    Next Visit Date:    8/11/2025

## 2025-03-11 NOTE — TELEPHONE ENCOUNTER
David Valdes is calling to request a refill on the following medication(s):    Medication Request:  Requested Prescriptions     Pending Prescriptions Disp Refills    metFORMIN (GLUCOPHAGE) 500 MG tablet [Pharmacy Med Name: METFORMIN  MG TABLET] 180 tablet 1     Sig: TAKE 1 TABLET BY MOUTH TWICE A DAY WITH MEALS       Last Visit Date (If Applicable):  2/10/2025    Next Visit Date:    8/11/2025            Last refilled 6/25/2024  Rx pending

## 2025-03-13 ENCOUNTER — OFFICE VISIT (OUTPATIENT)
Dept: PAIN MANAGEMENT | Age: 60
End: 2025-03-13
Payer: MEDICARE

## 2025-03-13 VITALS — HEIGHT: 65 IN | WEIGHT: 224 LBS | BODY MASS INDEX: 37.32 KG/M2

## 2025-03-13 DIAGNOSIS — M47.816 LUMBAR FACET ARTHROPATHY: ICD-10-CM

## 2025-03-13 DIAGNOSIS — E11.9 CONTROLLED TYPE 2 DIABETES MELLITUS WITHOUT COMPLICATION, WITHOUT LONG-TERM CURRENT USE OF INSULIN (HCC): ICD-10-CM

## 2025-03-13 DIAGNOSIS — M47.817 LUMBOSACRAL SPONDYLOSIS WITHOUT MYELOPATHY: ICD-10-CM

## 2025-03-13 DIAGNOSIS — Z79.891 CHRONIC USE OF OPIATE DRUG FOR THERAPEUTIC PURPOSE: Primary | ICD-10-CM

## 2025-03-13 PROCEDURE — G8417 CALC BMI ABV UP PARAM F/U: HCPCS | Performed by: NURSE PRACTITIONER

## 2025-03-13 PROCEDURE — 2022F DILAT RTA XM EVC RTNOPTHY: CPT | Performed by: NURSE PRACTITIONER

## 2025-03-13 PROCEDURE — 3017F COLORECTAL CA SCREEN DOC REV: CPT | Performed by: NURSE PRACTITIONER

## 2025-03-13 PROCEDURE — 99214 OFFICE O/P EST MOD 30 MIN: CPT | Performed by: NURSE PRACTITIONER

## 2025-03-13 PROCEDURE — 1036F TOBACCO NON-USER: CPT | Performed by: NURSE PRACTITIONER

## 2025-03-13 PROCEDURE — G8427 DOCREV CUR MEDS BY ELIG CLIN: HCPCS | Performed by: NURSE PRACTITIONER

## 2025-03-13 PROCEDURE — 3044F HG A1C LEVEL LT 7.0%: CPT | Performed by: NURSE PRACTITIONER

## 2025-03-13 RX ORDER — OXYCODONE AND ACETAMINOPHEN 5; 325 MG/1; MG/1
1 TABLET ORAL 2 TIMES DAILY PRN
Qty: 60 TABLET | Refills: 0 | Status: SHIPPED | OUTPATIENT
Start: 2025-03-13 | End: 2025-04-12

## 2025-03-13 ASSESSMENT — ENCOUNTER SYMPTOMS
SHORTNESS OF BREATH: 0
BACK PAIN: 1
BOWEL INCONTINENCE: 0
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
## 2025-03-14 ENCOUNTER — HOSPITAL ENCOUNTER (OUTPATIENT)
Age: 60
Setting detail: SPECIMEN
Discharge: HOME | End: 2025-03-14

## 2025-03-14 DIAGNOSIS — Z79.891 CHRONIC USE OF OPIATE DRUG FOR THERAPEUTIC PURPOSE: ICD-10-CM

## 2025-03-15 LAB
6-ACETYLMORPHINE, UR: NORMAL
7-AMINOCLONAZEPAM, URINE: NORMAL
ALPHA-OH-ALPRAZ, URINE: NORMAL
ALPHA-OH-MIDAZOLAM, URINE: NORMAL
ALPRAZOLAM, URINE: NORMAL
AMPHETAMINE, URINE: NORMAL
BARBITURATES, URINE: NORMAL
BENZOYLECGONINE, UR: NORMAL
BUPRENORPHINE URINE: NORMAL
CARISOPRODOL, UR: NORMAL
CLONAZEPAM, URINE: NORMAL
CODEINE, URINE: NORMAL
CREAT UR-MCNC: NORMAL MG/DL
DIAZEPAM, URINE: NORMAL
DRUGS EXPECTED, UR: NORMAL
EER HI RES INTERP UR: NORMAL
ETHYL GLUCURONIDE UR: NORMAL
FENTANYL URINE: NORMAL
GABAPENTIN: NORMAL
HYDROCODONE, URINE: NORMAL
HYDROMORPHONE, URINE: NORMAL
LORAZEPAM, URINE: NORMAL
MARIJUANA METAB, UR: NORMAL
MDA, URINE: NORMAL
MDEA, EVE, UR: NORMAL
MDMA, URINE: NORMAL
MEPERIDINE METAB, UR: NORMAL
METHADONE, URINE: NORMAL
METHAMPHETAMINE, URINE: NORMAL
METHYLPHENIDATE: NORMAL
MIDAZOLAM, URINE: NORMAL
MORPHINE, OPI1M: NORMAL
NALOXONE URINE: NORMAL
NORBUPRENORPHINE, URINE: NORMAL
NORDIAZEPAM, URINE: NORMAL
NORFENTANYL, URINE: NORMAL
NORHYDROCODONE, URINE: NORMAL
NOROXYCODONE, URINE: NORMAL
NOROXYMORPHONE, URINE: NORMAL
OXAZEPAM, URINE: NORMAL
OXYCODONE URINE: NORMAL
OXYMORPHONE, URINE: NORMAL
PAIN MANAGEMENT DRUG PANEL INTERP, URINE: NORMAL
PAIN MGT DRUG PANEL, HI RES, UR: NORMAL
PCP,URINE: NORMAL
PHENTERMINE, UR: NORMAL
PREGABALIN: NORMAL
TAPENTADOL, URINE: NORMAL
TAPENTADOL-O-SULFATE, URINE: NORMAL
TEMAZEPAM, URINE: NORMAL
TRAMADOL, URINE: NORMAL
ZOLPIDEM METABOLITE (ZCA), URINE: NORMAL
ZOLPIDEM, URINE: NORMAL

## 2025-03-19 ENCOUNTER — RESULTS FOLLOW-UP (OUTPATIENT)
Dept: PAIN MANAGEMENT | Age: 60
End: 2025-03-19

## 2025-03-19 LAB
6-ACETYLMORPHINE, UR: NOT DETECTED
7-AMINOCLONAZEPAM, URINE: NOT DETECTED
ALPHA-OH-ALPRAZ, URINE: NOT DETECTED
ALPHA-OH-MIDAZOLAM, URINE: NOT DETECTED
ALPRAZOLAM, URINE: NOT DETECTED
AMPHETAMINE, URINE: NOT DETECTED
BARBITURATES, URINE: NEGATIVE
BENZOYLECGONINE, UR: NEGATIVE
BUPRENORPHINE URINE: NOT DETECTED
CARISOPRODOL, UR: NEGATIVE
CLONAZEPAM, URINE: NOT DETECTED
CODEINE, URINE: NOT DETECTED
CREAT UR-MCNC: 103.7 MG/DL (ref 20–400)
DIAZEPAM, URINE: NOT DETECTED
DRUGS EXPECTED, UR: NORMAL
EER HI RES INTERP UR: NORMAL
ETHYL GLUCURONIDE UR: NEGATIVE
FENTANYL URINE: NOT DETECTED
GABAPENTIN: NOT DETECTED
HYDROCODONE, URINE: NOT DETECTED
HYDROMORPHONE, URINE: NOT DETECTED
LORAZEPAM, URINE: NOT DETECTED
MARIJUANA METAB, UR: NEGATIVE
MDA, URINE: NOT DETECTED
MDEA, EVE, UR: NOT DETECTED
MDMA, URINE: NOT DETECTED
MEPERIDINE METAB, UR: NOT DETECTED
METHADONE, URINE: NEGATIVE
METHAMPHETAMINE, URINE: NOT DETECTED
METHYLPHENIDATE: NOT DETECTED
MIDAZOLAM, URINE: NOT DETECTED
MORPHINE, OPI1M: NOT DETECTED
NALOXONE URINE: NOT DETECTED
NORBUPRENORPHINE, URINE: NOT DETECTED
NORDIAZEPAM, URINE: NOT DETECTED
NORFENTANYL, URINE: NOT DETECTED
NORHYDROCODONE, URINE: NOT DETECTED
NOROXYCODONE, URINE: PRESENT
NOROXYMORPHONE, URINE: PRESENT
OXAZEPAM, URINE: NOT DETECTED
OXYCODONE URINE: PRESENT
OXYMORPHONE, URINE: PRESENT
PAIN MANAGEMENT DRUG PANEL INTERP, URINE: NORMAL
PAIN MGT DRUG PANEL, HI RES, UR: NORMAL
PCP,URINE: NEGATIVE
PHENTERMINE, UR: NOT DETECTED
PREGABALIN: NOT DETECTED
TAPENTADOL, URINE: NOT DETECTED
TAPENTADOL-O-SULFATE, URINE: NOT DETECTED
TEMAZEPAM, URINE: NOT DETECTED
TRAMADOL, URINE: NEGATIVE
ZOLPIDEM METABOLITE (ZCA), URINE: NOT DETECTED
ZOLPIDEM, URINE: NOT DETECTED

## 2025-04-10 ENCOUNTER — OFFICE VISIT (OUTPATIENT)
Dept: PAIN MANAGEMENT | Age: 60
End: 2025-04-10
Payer: MEDICARE

## 2025-04-10 VITALS — WEIGHT: 224 LBS | BODY MASS INDEX: 37.32 KG/M2 | HEIGHT: 65 IN

## 2025-04-10 DIAGNOSIS — E11.9 CONTROLLED TYPE 2 DIABETES MELLITUS WITHOUT COMPLICATION, WITHOUT LONG-TERM CURRENT USE OF INSULIN: ICD-10-CM

## 2025-04-10 DIAGNOSIS — M47.817 LUMBOSACRAL SPONDYLOSIS WITHOUT MYELOPATHY: ICD-10-CM

## 2025-04-10 DIAGNOSIS — M47.816 LUMBAR FACET ARTHROPATHY: ICD-10-CM

## 2025-04-10 DIAGNOSIS — Z79.891 CHRONIC USE OF OPIATE DRUG FOR THERAPEUTIC PURPOSE: Primary | ICD-10-CM

## 2025-04-10 PROCEDURE — 3017F COLORECTAL CA SCREEN DOC REV: CPT | Performed by: NURSE PRACTITIONER

## 2025-04-10 PROCEDURE — G8417 CALC BMI ABV UP PARAM F/U: HCPCS | Performed by: NURSE PRACTITIONER

## 2025-04-10 PROCEDURE — 2022F DILAT RTA XM EVC RTNOPTHY: CPT | Performed by: NURSE PRACTITIONER

## 2025-04-10 PROCEDURE — G8427 DOCREV CUR MEDS BY ELIG CLIN: HCPCS | Performed by: NURSE PRACTITIONER

## 2025-04-10 PROCEDURE — 1036F TOBACCO NON-USER: CPT | Performed by: NURSE PRACTITIONER

## 2025-04-10 PROCEDURE — 99213 OFFICE O/P EST LOW 20 MIN: CPT | Performed by: NURSE PRACTITIONER

## 2025-04-10 PROCEDURE — 3044F HG A1C LEVEL LT 7.0%: CPT | Performed by: NURSE PRACTITIONER

## 2025-04-10 RX ORDER — OXYCODONE AND ACETAMINOPHEN 5; 325 MG/1; MG/1
1 TABLET ORAL 2 TIMES DAILY PRN
Qty: 60 TABLET | Refills: 0 | Status: SHIPPED | OUTPATIENT
Start: 2025-04-12 | End: 2025-04-11 | Stop reason: SDUPTHER

## 2025-04-10 ASSESSMENT — ENCOUNTER SYMPTOMS
BOWEL INCONTINENCE: 0
BACK PAIN: 1
SHORTNESS OF BREATH: 0
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
## 2025-04-11 ENCOUNTER — TELEPHONE (OUTPATIENT)
Dept: PAIN MANAGEMENT | Age: 60
End: 2025-04-11

## 2025-04-11 DIAGNOSIS — Z79.891 CHRONIC USE OF OPIATE DRUG FOR THERAPEUTIC PURPOSE: ICD-10-CM

## 2025-04-11 DIAGNOSIS — M47.817 LUMBOSACRAL SPONDYLOSIS WITHOUT MYELOPATHY: ICD-10-CM

## 2025-04-11 RX ORDER — OXYCODONE AND ACETAMINOPHEN 5; 325 MG/1; MG/1
1 TABLET ORAL 2 TIMES DAILY PRN
Qty: 60 TABLET | Refills: 0 | Status: SHIPPED | OUTPATIENT
Start: 2025-04-11 | End: 2025-05-11

## 2025-04-11 NOTE — TELEPHONE ENCOUNTER
Patient called in stating his pharmacy will be closed on the weekend, he will be unable to  his medications. Please advise

## 2025-05-02 NOTE — TELEPHONE ENCOUNTER
David Valdes is calling to request a refill on the following medication(s):    Medication Request:  Requested Prescriptions     Pending Prescriptions Disp Refills    Dulaglutide (TRULICITY) 3 MG/0.5ML SOAJ 4 Adjustable Dose Pre-filled Pen Syringe 0     Sig: Inject 3 mg into the skin once a week       Last Visit Date (If Applicable):  2/10/2025    Next Visit Date:    8/11/2025    Last refilled on 2/27/25. Prescription pending.

## 2025-05-03 RX ORDER — DULAGLUTIDE 3 MG/.5ML
3 INJECTION, SOLUTION SUBCUTANEOUS WEEKLY
Qty: 4 ADJUSTABLE DOSE PRE-FILLED PEN SYRINGE | Refills: 1 | Status: SHIPPED | OUTPATIENT
Start: 2025-05-03

## 2025-05-08 ENCOUNTER — OFFICE VISIT (OUTPATIENT)
Dept: PAIN MANAGEMENT | Age: 60
End: 2025-05-08
Payer: MEDICARE

## 2025-05-08 VITALS — HEIGHT: 65 IN | WEIGHT: 224 LBS | BODY MASS INDEX: 37.32 KG/M2

## 2025-05-08 DIAGNOSIS — M47.817 LUMBOSACRAL SPONDYLOSIS WITHOUT MYELOPATHY: ICD-10-CM

## 2025-05-08 DIAGNOSIS — M54.41 CHRONIC BILATERAL LOW BACK PAIN WITH RIGHT-SIDED SCIATICA: ICD-10-CM

## 2025-05-08 DIAGNOSIS — M50.30 DDD (DEGENERATIVE DISC DISEASE), CERVICAL: ICD-10-CM

## 2025-05-08 DIAGNOSIS — E11.9 CONTROLLED TYPE 2 DIABETES MELLITUS WITHOUT COMPLICATION, WITHOUT LONG-TERM CURRENT USE OF INSULIN (HCC): ICD-10-CM

## 2025-05-08 DIAGNOSIS — G89.29 CHRONIC BILATERAL LOW BACK PAIN WITH RIGHT-SIDED SCIATICA: ICD-10-CM

## 2025-05-08 DIAGNOSIS — M51.362 DEGENERATION OF INTERVERTEBRAL DISC OF LUMBAR REGION WITH DISCOGENIC BACK PAIN AND LOWER EXTREMITY PAIN: ICD-10-CM

## 2025-05-08 DIAGNOSIS — Z79.891 CHRONIC USE OF OPIATE DRUG FOR THERAPEUTIC PURPOSE: Primary | ICD-10-CM

## 2025-05-08 DIAGNOSIS — M54.2 CERVICALGIA: ICD-10-CM

## 2025-05-08 PROCEDURE — 1036F TOBACCO NON-USER: CPT | Performed by: NURSE PRACTITIONER

## 2025-05-08 PROCEDURE — G8427 DOCREV CUR MEDS BY ELIG CLIN: HCPCS | Performed by: NURSE PRACTITIONER

## 2025-05-08 PROCEDURE — 2022F DILAT RTA XM EVC RTNOPTHY: CPT | Performed by: NURSE PRACTITIONER

## 2025-05-08 PROCEDURE — G8417 CALC BMI ABV UP PARAM F/U: HCPCS | Performed by: NURSE PRACTITIONER

## 2025-05-08 PROCEDURE — 3044F HG A1C LEVEL LT 7.0%: CPT | Performed by: NURSE PRACTITIONER

## 2025-05-08 PROCEDURE — 3017F COLORECTAL CA SCREEN DOC REV: CPT | Performed by: NURSE PRACTITIONER

## 2025-05-08 PROCEDURE — 99214 OFFICE O/P EST MOD 30 MIN: CPT | Performed by: NURSE PRACTITIONER

## 2025-05-08 RX ORDER — OXYCODONE AND ACETAMINOPHEN 5; 325 MG/1; MG/1
1 TABLET ORAL 2 TIMES DAILY PRN
Qty: 60 TABLET | Refills: 0 | Status: SHIPPED | OUTPATIENT
Start: 2025-05-11 | End: 2025-06-10

## 2025-05-08 ASSESSMENT — ENCOUNTER SYMPTOMS
SHORTNESS OF BREATH: 0
BOWEL INCONTINENCE: 0
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
## 2025-05-22 ENCOUNTER — HOSPITAL ENCOUNTER (OUTPATIENT)
Dept: MRI IMAGING | Age: 60
Discharge: HOME | End: 2025-05-24
Payer: MEDICARE

## 2025-05-22 ENCOUNTER — TELEPHONE (OUTPATIENT)
Dept: FAMILY MEDICINE CLINIC | Age: 60
End: 2025-05-22

## 2025-05-22 DIAGNOSIS — M54.2 CERVICALGIA: ICD-10-CM

## 2025-05-22 DIAGNOSIS — M47.817 LUMBOSACRAL SPONDYLOSIS WITHOUT MYELOPATHY: ICD-10-CM

## 2025-05-22 DIAGNOSIS — M50.30 DDD (DEGENERATIVE DISC DISEASE), CERVICAL: ICD-10-CM

## 2025-05-22 DIAGNOSIS — M54.41 CHRONIC BILATERAL LOW BACK PAIN WITH RIGHT-SIDED SCIATICA: ICD-10-CM

## 2025-05-22 DIAGNOSIS — M51.362 DEGENERATION OF INTERVERTEBRAL DISC OF LUMBAR REGION WITH DISCOGENIC BACK PAIN AND LOWER EXTREMITY PAIN: ICD-10-CM

## 2025-05-22 DIAGNOSIS — G89.29 CHRONIC BILATERAL LOW BACK PAIN WITH RIGHT-SIDED SCIATICA: ICD-10-CM

## 2025-05-22 PROCEDURE — 72148 MRI LUMBAR SPINE W/O DYE: CPT

## 2025-05-22 PROCEDURE — 72141 MRI NECK SPINE W/O DYE: CPT

## 2025-05-22 RX ORDER — AMOXICILLIN 500 MG/1
500 CAPSULE ORAL 3 TIMES DAILY
Qty: 42 CAPSULE | Refills: 0 | Status: SHIPPED | OUTPATIENT
Start: 2025-05-22 | End: 2025-06-05

## 2025-05-22 NOTE — TELEPHONE ENCOUNTER
Amoxicillin 500 mg p.o. 3 times daily for 2 weeks.  He may go to emergency room for incision and drainage as necessary

## 2025-05-22 NOTE — TELEPHONE ENCOUNTER
Patient called and stated that he can not get in with his dentist for a couple of weeks and has a painful tooth abscess. Patient asking if he can have an antibiotic called in to help with the discomfort or if you need to see him first prior to prescribing anything.

## 2025-06-12 ENCOUNTER — OFFICE VISIT (OUTPATIENT)
Dept: PAIN MANAGEMENT | Age: 60
End: 2025-06-12
Payer: MEDICARE

## 2025-06-12 VITALS — HEIGHT: 64 IN | BODY MASS INDEX: 38.24 KG/M2 | WEIGHT: 224 LBS

## 2025-06-12 DIAGNOSIS — E11.9 CONTROLLED TYPE 2 DIABETES MELLITUS WITHOUT COMPLICATION, WITHOUT LONG-TERM CURRENT USE OF INSULIN (HCC): ICD-10-CM

## 2025-06-12 DIAGNOSIS — M48.02 DEGENERATIVE CERVICAL SPINAL STENOSIS: ICD-10-CM

## 2025-06-12 DIAGNOSIS — Z79.891 CHRONIC USE OF OPIATE DRUG FOR THERAPEUTIC PURPOSE: Primary | ICD-10-CM

## 2025-06-12 DIAGNOSIS — M47.26 OSTEOARTHRITIS OF SPINE WITH RADICULOPATHY, LUMBAR REGION: ICD-10-CM

## 2025-06-12 DIAGNOSIS — G47.33 OSA ON CPAP: ICD-10-CM

## 2025-06-12 DIAGNOSIS — M47.817 LUMBOSACRAL SPONDYLOSIS WITHOUT MYELOPATHY: ICD-10-CM

## 2025-06-12 DIAGNOSIS — M50.30 DDD (DEGENERATIVE DISC DISEASE), CERVICAL: ICD-10-CM

## 2025-06-12 PROCEDURE — G8427 DOCREV CUR MEDS BY ELIG CLIN: HCPCS | Performed by: NURSE PRACTITIONER

## 2025-06-12 PROCEDURE — G8417 CALC BMI ABV UP PARAM F/U: HCPCS | Performed by: NURSE PRACTITIONER

## 2025-06-12 PROCEDURE — 1036F TOBACCO NON-USER: CPT | Performed by: NURSE PRACTITIONER

## 2025-06-12 PROCEDURE — 3044F HG A1C LEVEL LT 7.0%: CPT | Performed by: NURSE PRACTITIONER

## 2025-06-12 PROCEDURE — 3017F COLORECTAL CA SCREEN DOC REV: CPT | Performed by: NURSE PRACTITIONER

## 2025-06-12 PROCEDURE — 99214 OFFICE O/P EST MOD 30 MIN: CPT | Performed by: NURSE PRACTITIONER

## 2025-06-12 PROCEDURE — 2022F DILAT RTA XM EVC RTNOPTHY: CPT | Performed by: NURSE PRACTITIONER

## 2025-06-12 RX ORDER — OXYCODONE AND ACETAMINOPHEN 5; 325 MG/1; MG/1
1 TABLET ORAL 2 TIMES DAILY PRN
Qty: 60 TABLET | Refills: 0 | Status: SHIPPED | OUTPATIENT
Start: 2025-06-12 | End: 2025-07-12

## 2025-06-12 ASSESSMENT — ENCOUNTER SYMPTOMS
BACK PAIN: 1
BOWEL INCONTINENCE: 0
SHORTNESS OF BREATH: 0

## 2025-06-12 NOTE — PROGRESS NOTES
Medications    oxyCODONE-acetaminophen (PERCOCET) 5-325 MG per tablet      Osteoarthritis of spine with radiculopathy, lumbar region        Relevant Medications    oxyCODONE-acetaminophen (PERCOCET) 5-325 MG per tablet      Degenerative cervical spinal stenosis        Relevant Orders    Leatha Sullivan DO, Neurosurgery, [place]      DDD (degenerative disc disease), cervical        Relevant Orders    Leatha Sullivan DO, Neurosurgery, [place]      BMI 38.0-38.9,adult                 Treatment Plan:  Patient relates current medications are helping the pain. Patient reports taking pain medications as prescribed, denies obtaining medications from different sources and denies use of illegal drugs. Medication risk and benefits have been discussed. Patient denies side effects from medications like nausea, vomiting, constipation or drowsiness. Patient reports current activities of daily living are possible due to medications and would like to continue them.     As always, we encourage daily stretching and strengthening exercises, and recommend minimizing use of pain medications unless patient cannot get through daily activities due to pain.    We have discussed with the patient the effect the patient’s medical condition and opioid medication may have on the patient’s ability to safely operate a vehicle. Pt verbalized understanding.    Referral to spine surgeon for evaluation of neck pain/imaging. Patient accepts.      Due to the high risk nature of this patient's pain medication close monitoring is required.   Continue current medication management, pt has been stable and compliant.  Script written for percocet 5-325   Follow up appointment made for 4 weeks    I have reviewed the chief complaint and history of present illness (including ROS and PFSH) and vital documentation by my staff and I agree with their documentation and have added where applicable.

## 2025-06-19 ENCOUNTER — OFFICE VISIT (OUTPATIENT)
Dept: NEUROSURGERY | Age: 60
End: 2025-06-19
Payer: MEDICARE

## 2025-06-19 VITALS
WEIGHT: 236.8 LBS | HEART RATE: 77 BPM | BODY MASS INDEX: 40.43 KG/M2 | SYSTOLIC BLOOD PRESSURE: 158 MMHG | OXYGEN SATURATION: 96 % | DIASTOLIC BLOOD PRESSURE: 99 MMHG | HEIGHT: 64 IN

## 2025-06-19 DIAGNOSIS — M47.816 LUMBAR SPONDYLOSIS: ICD-10-CM

## 2025-06-19 DIAGNOSIS — R51.9 OCCIPITAL HEADACHE: Primary | ICD-10-CM

## 2025-06-19 DIAGNOSIS — R53.1 RIGHT SIDED WEAKNESS: ICD-10-CM

## 2025-06-19 DIAGNOSIS — M48.02 STENOSIS OF CERVICAL SPINE WITH MYELOPATHY (HCC): ICD-10-CM

## 2025-06-19 DIAGNOSIS — G99.2 STENOSIS OF CERVICAL SPINE WITH MYELOPATHY (HCC): ICD-10-CM

## 2025-06-19 PROCEDURE — G8417 CALC BMI ABV UP PARAM F/U: HCPCS | Performed by: NURSE PRACTITIONER

## 2025-06-19 PROCEDURE — 1036F TOBACCO NON-USER: CPT | Performed by: NURSE PRACTITIONER

## 2025-06-19 PROCEDURE — 3080F DIAST BP >= 90 MM HG: CPT | Performed by: NURSE PRACTITIONER

## 2025-06-19 PROCEDURE — 99204 OFFICE O/P NEW MOD 45 MIN: CPT | Performed by: NURSE PRACTITIONER

## 2025-06-19 PROCEDURE — G8427 DOCREV CUR MEDS BY ELIG CLIN: HCPCS | Performed by: NURSE PRACTITIONER

## 2025-06-19 PROCEDURE — 3077F SYST BP >= 140 MM HG: CPT | Performed by: NURSE PRACTITIONER

## 2025-06-19 PROCEDURE — 3017F COLORECTAL CA SCREEN DOC REV: CPT | Performed by: NURSE PRACTITIONER
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
## 2025-07-07 ENCOUNTER — HOSPITAL ENCOUNTER (OUTPATIENT)
Dept: PHYSICAL THERAPY | Facility: CLINIC | Age: 60
Setting detail: THERAPIES SERIES
Discharge: HOME | End: 2025-07-07

## 2025-07-07 RX ORDER — DULAGLUTIDE 3 MG/.5ML
3 INJECTION, SOLUTION SUBCUTANEOUS WEEKLY
Qty: 4 ADJUSTABLE DOSE PRE-FILLED PEN SYRINGE | Refills: 1 | Status: SHIPPED | OUTPATIENT
Start: 2025-07-07

## 2025-07-07 NOTE — FLOWSHEET NOTE
[] [facility]  Outpatient Rehabilitation &  Therapy  [address]  [phone]  [phone] [x] [facility]  Outpatient Rehabilitation &  Therapy  [address]  P: [phone]  F: [phone] [] [facility]  Outpatient Rehabilitation &  Therapy  [address]  P: [phone]  F: [phone] [] [facility]  Outpatient Rehabilitation &  Therapy  [address]  [phone]  [phone] [] [facility]  Outpatient Rehabilitation &  Therapy  [address]   P: [phone]  F: [phone]  [] [facility]  Outpatient Rehabilitation &  Therapy  [address]  P: [phone]  F: [phone] [] [facility]  Outpatient Rehabilitation &  Therapy  [address]  P: [phone]  F: [phone] [] [facility]  Outpatient Rehabilitation &  Therapy  [address]  P: [phone]  F: [phone] [] [facility]  Outpatient Rehabilitation &  Therapy  [address]  P: [phone]  F: [phone]  [] [facility] Outpatient Rehabilitation &  Therapy  [address]  P: [phone]  F: [phone] [] [facility] Pelvic Floor Outpatient Rehabilitation &  Therapy  [address]  P: [phone]   F: [phone]      Therapy Cancel/No Show note    Date: 2025  Patient: Alvert Valdes  : 1965  MRN: [id number]    Cancels/No Shows to date: 1 cx    For today's INITIAL appointment patient:      [x]  Cancelled    Reason given by patient:      [x] No transportation         Comments:  pt to call back to reschedule    Electronically signed by: CLAUDE CRUZ PT

## 2025-07-07 NOTE — TELEPHONE ENCOUNTER
David Valdes is calling to request a refill on the following medication(s):    Medication Request:  Requested Prescriptions     Pending Prescriptions Disp Refills    Dulaglutide (TRULICITY) 3 MG/0.5ML SOAJ 4 Adjustable Dose Pre-filled Pen Syringe 1     Sig: Inject 3 mg into the skin once a week       Last Visit Date (If Applicable):  2/10/2025    Next Visit Date:    8/11/2025    Last refilled on 5/3/25. Prescription pending.

## 2025-07-11 ENCOUNTER — OFFICE VISIT (OUTPATIENT)
Dept: PAIN MANAGEMENT | Age: 60
End: 2025-07-11

## 2025-07-11 ENCOUNTER — OFFICE VISIT (OUTPATIENT)
Age: 60
End: 2025-07-11
Payer: MEDICARE

## 2025-07-11 VITALS
HEIGHT: 64 IN | OXYGEN SATURATION: 95 % | WEIGHT: 241 LBS | SYSTOLIC BLOOD PRESSURE: 150 MMHG | BODY MASS INDEX: 41.15 KG/M2 | HEART RATE: 81 BPM | DIASTOLIC BLOOD PRESSURE: 92 MMHG

## 2025-07-11 VITALS — HEIGHT: 64 IN | WEIGHT: 239.2 LBS | BODY MASS INDEX: 40.84 KG/M2

## 2025-07-11 DIAGNOSIS — M47.26 OSTEOARTHRITIS OF SPINE WITH RADICULOPATHY, LUMBAR REGION: ICD-10-CM

## 2025-07-11 DIAGNOSIS — M48.02 DEGENERATIVE CERVICAL SPINAL STENOSIS: ICD-10-CM

## 2025-07-11 DIAGNOSIS — E66.813 CLASS 3 SEVERE OBESITY DUE TO EXCESS CALORIES WITH SERIOUS COMORBIDITY AND BODY MASS INDEX (BMI) OF 40.0 TO 44.9 IN ADULT (HCC): ICD-10-CM

## 2025-07-11 DIAGNOSIS — I10 UNCONTROLLED HYPERTENSION: Primary | ICD-10-CM

## 2025-07-11 DIAGNOSIS — M47.817 LUMBOSACRAL SPONDYLOSIS WITHOUT MYELOPATHY: ICD-10-CM

## 2025-07-11 DIAGNOSIS — E11.9 CONTROLLED TYPE 2 DIABETES MELLITUS WITHOUT COMPLICATION, WITHOUT LONG-TERM CURRENT USE OF INSULIN (HCC): ICD-10-CM

## 2025-07-11 DIAGNOSIS — M50.30 DDD (DEGENERATIVE DISC DISEASE), CERVICAL: ICD-10-CM

## 2025-07-11 DIAGNOSIS — Z79.891 CHRONIC USE OF OPIATE DRUG FOR THERAPEUTIC PURPOSE: Primary | ICD-10-CM

## 2025-07-11 DIAGNOSIS — G47.33 OSA (OBSTRUCTIVE SLEEP APNEA): ICD-10-CM

## 2025-07-11 DIAGNOSIS — E66.01 MORBID OBESITY WITH BMI OF 40.0-44.9, ADULT (HCC): ICD-10-CM

## 2025-07-11 PROCEDURE — 3077F SYST BP >= 140 MM HG: CPT | Performed by: NURSE PRACTITIONER

## 2025-07-11 PROCEDURE — 1036F TOBACCO NON-USER: CPT | Performed by: NURSE PRACTITIONER

## 2025-07-11 PROCEDURE — 3044F HG A1C LEVEL LT 7.0%: CPT | Performed by: NURSE PRACTITIONER

## 2025-07-11 PROCEDURE — G8427 DOCREV CUR MEDS BY ELIG CLIN: HCPCS | Performed by: NURSE PRACTITIONER

## 2025-07-11 PROCEDURE — 2022F DILAT RTA XM EVC RTNOPTHY: CPT | Performed by: NURSE PRACTITIONER

## 2025-07-11 PROCEDURE — 3080F DIAST BP >= 90 MM HG: CPT | Performed by: NURSE PRACTITIONER

## 2025-07-11 PROCEDURE — G8417 CALC BMI ABV UP PARAM F/U: HCPCS | Performed by: NURSE PRACTITIONER

## 2025-07-11 PROCEDURE — 3017F COLORECTAL CA SCREEN DOC REV: CPT | Performed by: NURSE PRACTITIONER

## 2025-07-11 PROCEDURE — 99214 OFFICE O/P EST MOD 30 MIN: CPT | Performed by: NURSE PRACTITIONER

## 2025-07-11 RX ORDER — OXYCODONE AND ACETAMINOPHEN 5; 325 MG/1; MG/1
1 TABLET ORAL 2 TIMES DAILY PRN
Qty: 60 TABLET | Refills: 0 | Status: SHIPPED | OUTPATIENT
Start: 2025-07-13 | End: 2025-08-12

## 2025-07-11 ASSESSMENT — ENCOUNTER SYMPTOMS
BOWEL INCONTINENCE: 0
SHORTNESS OF BREATH: 0
BACK PAIN: 1
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
## 2025-07-17 ENCOUNTER — OFFICE VISIT (OUTPATIENT)
Dept: PULMONOLOGY | Age: 60
End: 2025-07-17
Payer: MEDICARE

## 2025-07-17 VITALS
BODY MASS INDEX: 39.95 KG/M2 | OXYGEN SATURATION: 99 % | HEART RATE: 87 BPM | DIASTOLIC BLOOD PRESSURE: 88 MMHG | WEIGHT: 234 LBS | HEIGHT: 64 IN | SYSTOLIC BLOOD PRESSURE: 147 MMHG

## 2025-07-17 DIAGNOSIS — G47.33 OSA ON CPAP: ICD-10-CM

## 2025-07-17 DIAGNOSIS — R91.1 LUNG NODULE SEEN ON IMAGING STUDY: ICD-10-CM

## 2025-07-17 DIAGNOSIS — J44.9 CHRONIC OBSTRUCTIVE PULMONARY DISEASE, UNSPECIFIED COPD TYPE (HCC): Primary | ICD-10-CM

## 2025-07-17 PROCEDURE — G8427 DOCREV CUR MEDS BY ELIG CLIN: HCPCS | Performed by: INTERNAL MEDICINE

## 2025-07-17 PROCEDURE — 3017F COLORECTAL CA SCREEN DOC REV: CPT | Performed by: INTERNAL MEDICINE

## 2025-07-17 PROCEDURE — 3079F DIAST BP 80-89 MM HG: CPT | Performed by: INTERNAL MEDICINE

## 2025-07-17 PROCEDURE — 3023F SPIROM DOC REV: CPT | Performed by: INTERNAL MEDICINE

## 2025-07-17 PROCEDURE — G8417 CALC BMI ABV UP PARAM F/U: HCPCS | Performed by: INTERNAL MEDICINE

## 2025-07-17 PROCEDURE — 1036F TOBACCO NON-USER: CPT | Performed by: INTERNAL MEDICINE

## 2025-07-17 PROCEDURE — 99214 OFFICE O/P EST MOD 30 MIN: CPT | Performed by: INTERNAL MEDICINE

## 2025-07-17 PROCEDURE — 3077F SYST BP >= 140 MM HG: CPT | Performed by: INTERNAL MEDICINE

## 2025-07-17 ASSESSMENT — ENCOUNTER SYMPTOMS
RESPIRATORY NEGATIVE: 1
ALLERGIC/IMMUNOLOGIC NEGATIVE: 1
EYES NEGATIVE: 1
GASTROINTESTINAL NEGATIVE: 1

## 2025-07-17 NOTE — PROGRESS NOTES
Subjective:      Patient ID: David Valdes is a 59 y.o. male.     Patient must see physician at next appointment    HPI  Patient here for MARLENE, pulmonary nodule, obesity. Last seen in office on July 2024 per       [] Dr. Jeff  [] Dr. Mccartney  [] Dr. Zaldivar  [] Dr. Nelson  [] Dr. Blake  [x] Dr. Joseph  [] Dr. Suarez  [] BRADLEY Solomon APRN- CNP      Today's visit:   This patient is known to have COPD.  He is being treated with albuterol on as-needed basis.  He also on Anoro.  Pulmonary status is stable.  No evidence of any acute cough yellow sputum hemoptysis or chest pain.  He has some scratchy throat which is probably related to humidity and weather changes.    He have had granuloma in the lung 4 mm in size which he had which is being followed and stable he was post to get a CT scan of the chest before coming to see me but he missed it but he and he is going to schedule it again.  No other evidence of any malignancy.    He has sleep apnea and has been advised to use CPAP.  However unfortunately he refuses to use the CPAP because he cannot tolerate it.    He has been successful in losing weight.  He lost about 30 pounds and is very motivated to do that.    No pedal edema no thromboembolic process.  He already had COVID flu and Pneumovax.  He did not feel up for sleepiness scale but denies any daytime sleepiness subjectively.    He is not on home O2 is not a CO2 retainer..  He icontinues to smoke but is trying to give up smoking.  I encouraged him to do that.  His medications were reviewed.  Medications:   Dulera 50-5 not   Spiriva 2.5 - not covered by insurance  Changed to Anoro 12/10/2024    Smoking status:  Cigarettes: Current daily smoker has at least a 65-pack-year smoking history  Illicit: no  Vaping: no      PRIOR WORKUP:  PFT:  PFT January 2024: FVC 70% of predicted, FEV1 77% of predicted, FEV1/FVC ratio 109% of predicted.  FEF % of predicted.  % of predicted TLC 84% of predicted.  Diffusion

## 2025-07-22 NOTE — PROGRESS NOTES
Continue with gabapentin   The patient is a 60 y. o. Non-/non  male. Chief Complaint   Patient presents with    Follow-up    Back Pain    Medication Refill        HPI   Chief Complaint: back pain   Medication Refill: percocet 7-1mg  47-year-old man with history of obstructive sleep apnea and morbid obesity  Is seen in our clinic for chronic lower back pain onset of symptom many years ago located in the lumbar area with radiation down right leg pain aggravated with lifting bending walking  Alleviating factors rest and medication  Patient had epidural injection in past with good outcome  Last injection was more than a year ago  He is on chronic low-dose opioid therapy with oxycodone 5 mg twice a day  Daily morphine equaling 15 mg a day  Report no side effect from the medication  Finds the medication helpful allowing him to do daily life activities  Report no changes in bladder or bowel control    Pain score Today:  6  Adverse effects (Constipation / Nausea / Sedation / sexual Dysfunction / others) : none  Mood: good  Sleep pattern and quality: poor  Activity level: good    Pill count Today: patient reports 18 pills   Last dose taken  today  OARRS report reviewed today: yes  ER/Hospitalizations/PCP visit related to pain since last visit:no   Any legal problems e.g. DUI etc.:No  Satisfied with current management: No, states that pills are too far apart and that his sleep has been troubling. Opioid Contract: 6/6/19  Last Urine Dug screen dated: 9/22/2020    Lab Results   Component Value Date    LABA1C 6.6 (H) 09/23/2020     Lab Results   Component Value Date     09/23/2020       Past Medical History, Past Surgical History, Social History, Allergies and Medications reviewed and updated in EPIC as indicated    Family History reviewed and is noncontributory.           Past Medical History:   Diagnosis Date    Asthma     Diabetes mellitus ([facility]     Hyperlipidemia     Hypertension     Migraine     Neuropathy     Positive FIT (fecal immunochemical test)     Renal failure       Past Surgical History:   Procedure Laterality Date    ANESTHESIA NERVE BLOCK Bilateral 9/18/2017    NERVE BLOCK BILATERAL MEDIAL BRANCH L2-L5 performed by Hema Perez MD at [facility] Bilateral 10/17/2017    [address] L2-L5 performed by Hema Perez MD at [address]  03/08/2017    COLONOSCOPY  03/08/2017    internal hemorrhoids; mild diverticulosis    ENDOSCOPY, COLON, DIAGNOSTIC      FIBULA FRACTURE SURGERY Left     LEG SURGERY Right     NERVE BLOCK N/A 10/24/2016    lumbar COLETTE    NERVE BLOCK Right 11/21/2016    lumbar COLETTE    NERVE BLOCK Left 08/13/2018    radiofrequency ablation medial branh block L2-L5    OTHER SURGICAL HISTORY Right 01/26/2017    right hip steroid injection    OTHER SURGICAL HISTORY Right 08/06/2018    NERVE RADIOFREQUENCY ABLATION RIGHT LUMBAR MEDIAL BRANCH L2-L5 (Right )    OTHER SURGICAL HISTORY  12/03/2018    RIGHT L4 L5 EPIDURAL STEROID INJECTION (Right )    OTHER SURGICAL HISTORY  03/25/2019    LUMBAR COLETTE (N/A )    PAIN MANAGEMENT PROCEDURE Right 5/28/2020    EPIDURAL STEROID INJECTION RIGHT L5S1 performed by Hema Perez MD at [address] [facility] Right 6/15/2020    EPIDURAL STEROID INJECTION RIGTH L5S1 performed by Hema Perez MD at [address] AA&/STRD TFRML EPI LUMBAR/SACRAL 1 LEVEL Right 12/3/2018    RIGHT L4 L5 EPIDURAL STEROID INJECTION performed by Hema Perez MD at [address] [facility] OFFICE/OUTPT [address] [facility] Right 8/6/2018    NERVE RADIOFREQUENCY ABLATION RIGHT LUMBAR MEDIAL BRANCH L2-L5 performed by Hema Perez MD at [address] [facility] OFFICE/OUTPT [address] [facility] Left 8/13/2018    NERVE RADIOFREQUENCY ABLATION LEFT LUMBAR MEDIAL BRANCH L2-L5 performed by Hema Perez MD at [address] [place] [address] N/A 3/25/2019    LUMBAR COLETTE performed by Hema Perez Constitutional: Negative. Negative for fever. HENT: Negative. Eyes: Positive for visual disturbance. Respiratory: Positive for shortness of breath. Gastrointestinal: Negative. Genitourinary: Negative. Musculoskeletal: Positive for back pain. Neurological: Positive for headaches. Objective:  General Appearance:  Well-appearing, in no acute distress, uncomfortable and in pain. Vital signs: (most recent): Blood pressure (!) 178/101, pulse 83, temperature 97.6 °F (36.4 °C), height 5' 5\" (1.651 m), weight 263 lb (119.3 kg), SpO2 100 %. Vital signs are normal.  No fever. Output: Producing urine and producing stool. HEENT: Normal HEENT exam.    Lungs:  Normal effort and normal respiratory rate. Breath sounds clear to auscultation. He is not in respiratory distress. Heart: Normal rate. Extremities: Normal range of motion. There is no deformity. Neurological: Patient is alert and oriented to person, place and time. Patient has normal coordination. Pupils:  Pupils are equal, round, and reactive to light. Pupils are equal.   Skin:  Warm and dry. No rash or cyanosis. Assessment & Plan  1. Chronic bilateral low back pain with right-sided sciatica    2. Chronic use of opiate drugs therapeutic purposes    3. Diabetic nephropathy associated with type 2 diabetes mellitus ([facility] [facility]    4. Morbid obesity with BMI of 40.0-44.9, adult ([facility] [facility]    5. MARLENE and COPD overlap syndrome [facility]    Automated prescription report reviewed  No sign or symptom of any aberrancy  Safe use of medication discussed with patient    Consider for repeat epidural injection in future if symptoms flareup    No orders of the defined types were placed in this encounter. Orders Placed This Encounter   Medications    oxyCODONE-acetaminophen (PERCOCET) 5-325 MG per tablet     Sig: Take 1 tablet by mouth 2 times daily as needed for Pain for up to 30 days.      Dispense:  60 tablet     Refill:  0     Reduce doses taken as pain becomes manageable        Controlled Substance Monitoring:    Acute and Chronic Pain Monitoring:   RX Monitoring 1/6/2021   Attestation -   Periodic Controlled Substance Monitoring Possible medication side effects, risk of tolerance/dependence & alternative treatments discussed. ;No signs of potential drug abuse or diversion identified.;Obtaining appropriate analgesic effect of treatment. ;Assessed functional status. Chronic Pain > 50 MEDD Obtained or confirmed \"Consent for Opioid Use\" on file.    Chronic Pain > 80 MEDD -         Electronically signed by Nicholas Dos Santos MD on 1/6/2021 at 9:13 AM

## 2025-08-07 ENCOUNTER — OFFICE VISIT (OUTPATIENT)
Dept: PAIN MANAGEMENT | Age: 60
End: 2025-08-07
Payer: MEDICARE

## 2025-08-07 VITALS — BODY MASS INDEX: 38.99 KG/M2 | WEIGHT: 234 LBS | HEIGHT: 65 IN

## 2025-08-07 DIAGNOSIS — M51.362 DEGENERATION OF INTERVERTEBRAL DISC OF LUMBAR REGION WITH DISCOGENIC BACK PAIN AND LOWER EXTREMITY PAIN: ICD-10-CM

## 2025-08-07 DIAGNOSIS — M47.26 OSTEOARTHRITIS OF SPINE WITH RADICULOPATHY, LUMBAR REGION: ICD-10-CM

## 2025-08-07 DIAGNOSIS — M47.817 LUMBOSACRAL SPONDYLOSIS WITHOUT MYELOPATHY: ICD-10-CM

## 2025-08-07 DIAGNOSIS — E11.9 CONTROLLED TYPE 2 DIABETES MELLITUS WITHOUT COMPLICATION, WITHOUT LONG-TERM CURRENT USE OF INSULIN (HCC): ICD-10-CM

## 2025-08-07 DIAGNOSIS — Z79.891 CHRONIC USE OF OPIATE DRUG FOR THERAPEUTIC PURPOSE: Primary | ICD-10-CM

## 2025-08-07 DIAGNOSIS — M50.30 DDD (DEGENERATIVE DISC DISEASE), CERVICAL: ICD-10-CM

## 2025-08-07 PROCEDURE — 3017F COLORECTAL CA SCREEN DOC REV: CPT | Performed by: NURSE PRACTITIONER

## 2025-08-07 PROCEDURE — 99214 OFFICE O/P EST MOD 30 MIN: CPT | Performed by: NURSE PRACTITIONER

## 2025-08-07 PROCEDURE — 1036F TOBACCO NON-USER: CPT | Performed by: NURSE PRACTITIONER

## 2025-08-07 PROCEDURE — 3044F HG A1C LEVEL LT 7.0%: CPT | Performed by: NURSE PRACTITIONER

## 2025-08-07 PROCEDURE — G8427 DOCREV CUR MEDS BY ELIG CLIN: HCPCS | Performed by: NURSE PRACTITIONER

## 2025-08-07 PROCEDURE — 2022F DILAT RTA XM EVC RTNOPTHY: CPT | Performed by: NURSE PRACTITIONER

## 2025-08-07 PROCEDURE — G8417 CALC BMI ABV UP PARAM F/U: HCPCS | Performed by: NURSE PRACTITIONER

## 2025-08-07 RX ORDER — OXYCODONE AND ACETAMINOPHEN 5; 325 MG/1; MG/1
1 TABLET ORAL 2 TIMES DAILY PRN
Qty: 60 TABLET | Refills: 0 | Status: SHIPPED | OUTPATIENT
Start: 2025-08-12 | End: 2025-09-11

## 2025-08-07 ASSESSMENT — ENCOUNTER SYMPTOMS
BOWEL INCONTINENCE: 0
BACK PAIN: 1

## 2025-08-08 ENCOUNTER — OFFICE VISIT (OUTPATIENT)
Age: 60
End: 2025-08-08
Payer: MEDICARE

## 2025-08-08 VITALS
HEART RATE: 82 BPM | WEIGHT: 238 LBS | HEIGHT: 65 IN | SYSTOLIC BLOOD PRESSURE: 136 MMHG | BODY MASS INDEX: 39.65 KG/M2 | OXYGEN SATURATION: 92 % | DIASTOLIC BLOOD PRESSURE: 85 MMHG

## 2025-08-08 DIAGNOSIS — G47.33 OSA (OBSTRUCTIVE SLEEP APNEA): ICD-10-CM

## 2025-08-08 DIAGNOSIS — E11.9 CONTROLLED TYPE 2 DIABETES MELLITUS WITHOUT COMPLICATION, WITHOUT LONG-TERM CURRENT USE OF INSULIN (HCC): Primary | ICD-10-CM

## 2025-08-08 DIAGNOSIS — E66.01 CLASS 2 SEVERE OBESITY DUE TO EXCESS CALORIES WITH SERIOUS COMORBIDITY AND BODY MASS INDEX (BMI) OF 39.0 TO 39.9 IN ADULT (HCC): ICD-10-CM

## 2025-08-08 DIAGNOSIS — E66.812 CLASS 2 SEVERE OBESITY DUE TO EXCESS CALORIES WITH SERIOUS COMORBIDITY AND BODY MASS INDEX (BMI) OF 39.0 TO 39.9 IN ADULT (HCC): ICD-10-CM

## 2025-08-08 DIAGNOSIS — I10 UNCONTROLLED HYPERTENSION: ICD-10-CM

## 2025-08-08 PROCEDURE — 1036F TOBACCO NON-USER: CPT | Performed by: NURSE PRACTITIONER

## 2025-08-08 PROCEDURE — 3017F COLORECTAL CA SCREEN DOC REV: CPT | Performed by: NURSE PRACTITIONER

## 2025-08-08 PROCEDURE — 3075F SYST BP GE 130 - 139MM HG: CPT | Performed by: NURSE PRACTITIONER

## 2025-08-08 PROCEDURE — 99214 OFFICE O/P EST MOD 30 MIN: CPT | Performed by: NURSE PRACTITIONER

## 2025-08-08 PROCEDURE — 2022F DILAT RTA XM EVC RTNOPTHY: CPT | Performed by: NURSE PRACTITIONER

## 2025-08-08 PROCEDURE — 3044F HG A1C LEVEL LT 7.0%: CPT | Performed by: NURSE PRACTITIONER

## 2025-08-08 PROCEDURE — 3079F DIAST BP 80-89 MM HG: CPT | Performed by: NURSE PRACTITIONER

## 2025-08-08 PROCEDURE — G8417 CALC BMI ABV UP PARAM F/U: HCPCS | Performed by: NURSE PRACTITIONER

## 2025-08-08 PROCEDURE — G8427 DOCREV CUR MEDS BY ELIG CLIN: HCPCS | Performed by: NURSE PRACTITIONER

## 2025-08-08 RX ORDER — PHENTERMINE HYDROCHLORIDE 37.5 MG/1
37.5 TABLET ORAL
Qty: 30 TABLET | Refills: 0 | Status: SHIPPED | OUTPATIENT
Start: 2025-08-08 | End: 2025-09-07

## 2025-08-08 ASSESSMENT — ENCOUNTER SYMPTOMS
BACK PAIN: 1
ABDOMINAL PAIN: 0
VOMITING: 0
COUGH: 0
SHORTNESS OF BREATH: 0
WHEEZING: 0
NAUSEA: 0
CHEST TIGHTNESS: 0

## 2025-08-29 PROBLEM — F32.A ANXIETY AND DEPRESSION: Status: RESOLVED | Noted: 2019-04-02 | Resolved: 2025-08-29

## 2025-08-29 PROBLEM — F41.9 ANXIETY AND DEPRESSION: Status: RESOLVED | Noted: 2019-04-02 | Resolved: 2025-08-29

## 2025-09-05 ENCOUNTER — OFFICE VISIT (OUTPATIENT)
Dept: PAIN MANAGEMENT | Age: 60
End: 2025-09-05
Payer: MEDICARE

## 2025-09-05 VITALS — HEIGHT: 65 IN | WEIGHT: 233 LBS | BODY MASS INDEX: 38.82 KG/M2

## 2025-09-05 DIAGNOSIS — M47.817 LUMBOSACRAL SPONDYLOSIS WITHOUT MYELOPATHY: ICD-10-CM

## 2025-09-05 DIAGNOSIS — M47.26 OSTEOARTHRITIS OF SPINE WITH RADICULOPATHY, LUMBAR REGION: ICD-10-CM

## 2025-09-05 DIAGNOSIS — M51.362 DEGENERATION OF INTERVERTEBRAL DISC OF LUMBAR REGION WITH DISCOGENIC BACK PAIN AND LOWER EXTREMITY PAIN: ICD-10-CM

## 2025-09-05 DIAGNOSIS — E11.9 CONTROLLED TYPE 2 DIABETES MELLITUS WITHOUT COMPLICATION, WITHOUT LONG-TERM CURRENT USE OF INSULIN (HCC): ICD-10-CM

## 2025-09-05 DIAGNOSIS — M48.02 DEGENERATIVE CERVICAL SPINAL STENOSIS: ICD-10-CM

## 2025-09-05 DIAGNOSIS — Z79.891 CHRONIC USE OF OPIATE DRUG FOR THERAPEUTIC PURPOSE: Primary | ICD-10-CM

## 2025-09-05 PROCEDURE — 1036F TOBACCO NON-USER: CPT | Performed by: NURSE PRACTITIONER

## 2025-09-05 PROCEDURE — G8417 CALC BMI ABV UP PARAM F/U: HCPCS | Performed by: NURSE PRACTITIONER

## 2025-09-05 PROCEDURE — 3044F HG A1C LEVEL LT 7.0%: CPT | Performed by: NURSE PRACTITIONER

## 2025-09-05 PROCEDURE — 3017F COLORECTAL CA SCREEN DOC REV: CPT | Performed by: NURSE PRACTITIONER

## 2025-09-05 PROCEDURE — G8427 DOCREV CUR MEDS BY ELIG CLIN: HCPCS | Performed by: NURSE PRACTITIONER

## 2025-09-05 PROCEDURE — 99214 OFFICE O/P EST MOD 30 MIN: CPT | Performed by: NURSE PRACTITIONER

## 2025-09-05 PROCEDURE — 2022F DILAT RTA XM EVC RTNOPTHY: CPT | Performed by: NURSE PRACTITIONER

## 2025-09-05 RX ORDER — OXYCODONE AND ACETAMINOPHEN 5; 325 MG/1; MG/1
1 TABLET ORAL 2 TIMES DAILY PRN
Qty: 60 TABLET | Refills: 0 | Status: SHIPPED | OUTPATIENT
Start: 2025-09-11 | End: 2025-10-11

## 2025-09-05 ASSESSMENT — ENCOUNTER SYMPTOMS
BACK PAIN: 1
SHORTNESS OF BREATH: 0
BOWEL INCONTINENCE: 0

## (undated) DEVICE — NEEDLE SPNL 25GA L4 11/16IN BLU HUB DISP

## (undated) DEVICE — GLOVE SURG SZ 75 CRM LTX FREE POLYISOPRENE POLYMER BEAD ANTI

## (undated) DEVICE — PAD ELECTROSURG HRVSTR CORD PATIENT

## (undated) DEVICE — SINGLE DOSE EPI TY

## (undated) DEVICE — GOWN,AURORA,NONREINFORCED,LARGE: Brand: MEDLINE

## (undated) DEVICE — 1 ML INSULIN SYRINGE LUER-LOCK WITH TIP CAP: Brand: MONOJECT

## (undated) DEVICE — PROTECTOR ULN NRV PUR FOAM HK LOOP STRP ANATOMICALLY

## (undated) DEVICE — NEEDLE EPI 20GA L6IN TUOHY CRV BLNT CUT TIP WNG LUERLOCK

## (undated) DEVICE — GLOVE SURG SZ 75 L12IN FNGR THK87MIL WHT LTX FREE

## (undated) DEVICE — Z DISCONTINUED APPLICATOR SURG PREP 0.35OZ 2% CHG 70% ISO ALC W/ HI LT

## (undated) DEVICE — SINGLE SHOT EPIDURAL TRAY: Brand: MEDLINE INDUSTRIES, INC.

## (undated) DEVICE — BANDAGE ADH W1XL3IN UNIV PLAS NAT GEN USE STRP

## (undated) DEVICE — STAZ ENDO KIT: Brand: MEDLINE INDUSTRIES, INC.

## (undated) DEVICE — BANDAGE ADH W2XL4IN NITRL FAB STRP CURAD

## (undated) DEVICE — CONVERTED USE 248063 TOWELS OR BL ST

## (undated) DEVICE — TOWEL,OR,DSP,ST,BLUE,STD,4/PK,20PK/CS: Brand: MEDLINE

## (undated) DEVICE — CURVED SHARP RF CANNULA, RADIOPAQUE MARKER: Brand: RADIOPAQUE RADIOFREQUENCY CANNULA

## (undated) DEVICE — ELECTRODE PT RET AD L9FT HI MOIST COND ADH HYDRGEL CORDED

## (undated) DEVICE — NEEDLE SPNL L4.5IN OD22GA QNCKE TYP SPINOCAN

## (undated) DEVICE — TRAP SURG QUAD PARABOLA SLOT DSGN SFTY SCRN TRAPEASE

## (undated) DEVICE — 1010 S-DRAPE TOWEL DRAPE 10/BX: Brand: STERI-DRAPE™